# Patient Record
Sex: MALE | Race: WHITE | NOT HISPANIC OR LATINO | URBAN - METROPOLITAN AREA
[De-identification: names, ages, dates, MRNs, and addresses within clinical notes are randomized per-mention and may not be internally consistent; named-entity substitution may affect disease eponyms.]

---

## 2017-02-20 ENCOUNTER — INPATIENT (INPATIENT)
Facility: HOSPITAL | Age: 62
LOS: 6 days | Discharge: HOME | End: 2017-02-27
Attending: INTERNAL MEDICINE | Admitting: INTERNAL MEDICINE
Payer: MEDICARE

## 2017-02-20 VITALS
TEMPERATURE: 98 F | HEIGHT: 73 IN | DIASTOLIC BLOOD PRESSURE: 74 MMHG | SYSTOLIC BLOOD PRESSURE: 109 MMHG | HEART RATE: 108 BPM | OXYGEN SATURATION: 99 % | WEIGHT: 309.97 LBS

## 2017-02-20 DIAGNOSIS — Z95.5 PRESENCE OF CORONARY ANGIOPLASTY IMPLANT AND GRAFT: Chronic | ICD-10-CM

## 2017-02-20 LAB
ACETONE SERPL-MCNC: NEGATIVE — SIGNIFICANT CHANGE UP
ALBUMIN SERPL ELPH-MCNC: 2.9 G/DL — LOW (ref 3.3–5)
ALP SERPL-CCNC: 49 U/L — SIGNIFICANT CHANGE UP (ref 40–120)
ALT FLD-CCNC: 19 U/L — SIGNIFICANT CHANGE UP (ref 12–78)
ANION GAP SERPL CALC-SCNC: 8 MMOL/L — SIGNIFICANT CHANGE UP (ref 5–17)
APPEARANCE UR: CLEAR — SIGNIFICANT CHANGE UP
AST SERPL-CCNC: 7 U/L — LOW (ref 15–37)
BACTERIA # UR AUTO: ABNORMAL
BILIRUB SERPL-MCNC: 0.4 MG/DL — SIGNIFICANT CHANGE UP (ref 0.2–1.2)
BILIRUB UR-MCNC: NEGATIVE — SIGNIFICANT CHANGE UP
BUN SERPL-MCNC: 89 MG/DL — HIGH (ref 7–23)
CALCIUM SERPL-MCNC: 9.5 MG/DL — SIGNIFICANT CHANGE UP (ref 8.5–10.1)
CHLORIDE SERPL-SCNC: 100 MMOL/L — SIGNIFICANT CHANGE UP (ref 96–108)
CO2 SERPL-SCNC: 23 MMOL/L — SIGNIFICANT CHANGE UP (ref 22–31)
COLOR SPEC: YELLOW — SIGNIFICANT CHANGE UP
CREAT SERPL-MCNC: 1.7 MG/DL — HIGH (ref 0.5–1.3)
DIFF PNL FLD: NEGATIVE — SIGNIFICANT CHANGE UP
EPI CELLS # UR: SIGNIFICANT CHANGE UP
GLUCOSE SERPL-MCNC: 313 MG/DL — HIGH (ref 70–99)
GLUCOSE UR QL: 100 MG/DL
HCT VFR BLD CALC: 31.5 % — LOW (ref 39–50)
HGB BLD-MCNC: 11.2 G/DL — LOW (ref 13–17)
KETONES UR-MCNC: NEGATIVE — SIGNIFICANT CHANGE UP
LACTATE SERPL-SCNC: 1.6 MMOL/L — SIGNIFICANT CHANGE UP (ref 0.7–2)
LACTATE SERPL-SCNC: 2.1 MMOL/L — HIGH (ref 0.7–2)
LEUKOCYTE ESTERASE UR-ACNC: NEGATIVE — SIGNIFICANT CHANGE UP
MCHC RBC-ENTMCNC: 31.7 PG — SIGNIFICANT CHANGE UP (ref 27–34)
MCHC RBC-ENTMCNC: 35.5 GM/DL — SIGNIFICANT CHANGE UP (ref 32–36)
MCV RBC AUTO: 89.3 FL — SIGNIFICANT CHANGE UP (ref 80–100)
NITRITE UR-MCNC: NEGATIVE — SIGNIFICANT CHANGE UP
PH UR: 5 — SIGNIFICANT CHANGE UP (ref 4.8–8)
PLATELET # BLD AUTO: 212 K/UL — SIGNIFICANT CHANGE UP (ref 150–400)
POTASSIUM SERPL-MCNC: 4.8 MMOL/L — SIGNIFICANT CHANGE UP (ref 3.5–5.3)
POTASSIUM SERPL-SCNC: 4.8 MMOL/L — SIGNIFICANT CHANGE UP (ref 3.5–5.3)
PROT SERPL-MCNC: 6.2 GM/DL — SIGNIFICANT CHANGE UP (ref 6–8.3)
PROT UR-MCNC: 15 MG/DL
RBC # BLD: 3.53 M/UL — LOW (ref 4.2–5.8)
RBC # FLD: 12.8 % — SIGNIFICANT CHANGE UP (ref 11–15)
RBC CASTS # UR COMP ASSIST: SIGNIFICANT CHANGE UP /HPF (ref 0–4)
SODIUM SERPL-SCNC: 131 MMOL/L — LOW (ref 135–145)
SP GR SPEC: 1.01 — SIGNIFICANT CHANGE UP (ref 1.01–1.02)
UROBILINOGEN FLD QL: NEGATIVE MG/DL — SIGNIFICANT CHANGE UP
WBC # BLD: 18.4 K/UL — HIGH (ref 3.8–10.5)
WBC # FLD AUTO: 18.4 K/UL — HIGH (ref 3.8–10.5)
WBC UR QL: SIGNIFICANT CHANGE UP

## 2017-02-20 PROCEDURE — 74176 CT ABD & PELVIS W/O CONTRAST: CPT | Mod: 26

## 2017-02-20 PROCEDURE — 99285 EMERGENCY DEPT VISIT HI MDM: CPT

## 2017-02-20 RX ORDER — HEPARIN SODIUM 5000 [USP'U]/ML
5000 INJECTION INTRAVENOUS; SUBCUTANEOUS EVERY 12 HOURS
Qty: 0 | Refills: 0 | Status: DISCONTINUED | OUTPATIENT
Start: 2017-02-20 | End: 2017-02-27

## 2017-02-20 RX ORDER — SODIUM CHLORIDE 9 MG/ML
1000 INJECTION, SOLUTION INTRAVENOUS
Qty: 0 | Refills: 0 | Status: DISCONTINUED | OUTPATIENT
Start: 2017-02-20 | End: 2017-02-27

## 2017-02-20 RX ORDER — INSULIN GLARGINE 100 [IU]/ML
10 INJECTION, SOLUTION SUBCUTANEOUS AT BEDTIME
Qty: 0 | Refills: 0 | Status: DISCONTINUED | OUTPATIENT
Start: 2017-02-20 | End: 2017-02-27

## 2017-02-20 RX ORDER — PANTOPRAZOLE SODIUM 20 MG/1
40 TABLET, DELAYED RELEASE ORAL
Qty: 0 | Refills: 0 | Status: DISCONTINUED | OUTPATIENT
Start: 2017-02-20 | End: 2017-02-21

## 2017-02-20 RX ORDER — ASPIRIN/CALCIUM CARB/MAGNESIUM 324 MG
325 TABLET ORAL DAILY
Qty: 0 | Refills: 0 | Status: DISCONTINUED | OUTPATIENT
Start: 2017-02-20 | End: 2017-02-27

## 2017-02-20 RX ORDER — IOHEXOL 300 MG/ML
30 INJECTION, SOLUTION INTRAVENOUS ONCE
Qty: 0 | Refills: 0 | Status: COMPLETED | OUTPATIENT
Start: 2017-02-20 | End: 2017-02-20

## 2017-02-20 RX ORDER — DEXTROSE 50 % IN WATER 50 %
25 SYRINGE (ML) INTRAVENOUS ONCE
Qty: 0 | Refills: 0 | Status: DISCONTINUED | OUTPATIENT
Start: 2017-02-20 | End: 2017-02-27

## 2017-02-20 RX ORDER — ATORVASTATIN CALCIUM 80 MG/1
40 TABLET, FILM COATED ORAL AT BEDTIME
Qty: 0 | Refills: 0 | Status: DISCONTINUED | OUTPATIENT
Start: 2017-02-20 | End: 2017-02-27

## 2017-02-20 RX ORDER — GLUCAGON INJECTION, SOLUTION 0.5 MG/.1ML
1 INJECTION, SOLUTION SUBCUTANEOUS ONCE
Qty: 0 | Refills: 0 | Status: DISCONTINUED | OUTPATIENT
Start: 2017-02-20 | End: 2017-02-27

## 2017-02-20 RX ORDER — FAMOTIDINE 10 MG/ML
20 INJECTION INTRAVENOUS ONCE
Qty: 0 | Refills: 0 | Status: COMPLETED | OUTPATIENT
Start: 2017-02-20 | End: 2017-02-20

## 2017-02-20 RX ORDER — DEXTROSE 50 % IN WATER 50 %
12.5 SYRINGE (ML) INTRAVENOUS ONCE
Qty: 0 | Refills: 0 | Status: DISCONTINUED | OUTPATIENT
Start: 2017-02-20 | End: 2017-02-27

## 2017-02-20 RX ORDER — BACLOFEN 100 %
20 POWDER (GRAM) MISCELLANEOUS ONCE
Qty: 0 | Refills: 0 | Status: COMPLETED | OUTPATIENT
Start: 2017-02-20 | End: 2017-02-20

## 2017-02-20 RX ORDER — DEXTROSE 50 % IN WATER 50 %
1 SYRINGE (ML) INTRAVENOUS ONCE
Qty: 0 | Refills: 0 | Status: DISCONTINUED | OUTPATIENT
Start: 2017-02-20 | End: 2017-02-27

## 2017-02-20 RX ORDER — SODIUM CHLORIDE 9 MG/ML
2000 INJECTION INTRAMUSCULAR; INTRAVENOUS; SUBCUTANEOUS ONCE
Qty: 0 | Refills: 0 | Status: COMPLETED | OUTPATIENT
Start: 2017-02-20 | End: 2017-02-20

## 2017-02-20 RX ORDER — INSULIN LISPRO 100/ML
VIAL (ML) SUBCUTANEOUS
Qty: 0 | Refills: 0 | Status: DISCONTINUED | OUTPATIENT
Start: 2017-02-20 | End: 2017-02-27

## 2017-02-20 RX ORDER — CLOPIDOGREL BISULFATE 75 MG/1
75 TABLET, FILM COATED ORAL DAILY
Qty: 0 | Refills: 0 | Status: DISCONTINUED | OUTPATIENT
Start: 2017-02-20 | End: 2017-02-27

## 2017-02-20 RX ORDER — LISINOPRIL 2.5 MG/1
40 TABLET ORAL DAILY
Qty: 0 | Refills: 0 | Status: DISCONTINUED | OUTPATIENT
Start: 2017-02-20 | End: 2017-02-22

## 2017-02-20 RX ORDER — SODIUM CHLORIDE 9 MG/ML
1000 INJECTION, SOLUTION INTRAVENOUS
Qty: 0 | Refills: 0 | Status: DISCONTINUED | OUTPATIENT
Start: 2017-02-20 | End: 2017-02-26

## 2017-02-20 RX ORDER — PIPERACILLIN AND TAZOBACTAM 4; .5 G/20ML; G/20ML
3.38 INJECTION, POWDER, LYOPHILIZED, FOR SOLUTION INTRAVENOUS EVERY 8 HOURS
Qty: 0 | Refills: 0 | Status: DISCONTINUED | OUTPATIENT
Start: 2017-02-20 | End: 2017-02-27

## 2017-02-20 RX ORDER — ONDANSETRON 8 MG/1
8 TABLET, FILM COATED ORAL ONCE
Qty: 0 | Refills: 0 | Status: COMPLETED | OUTPATIENT
Start: 2017-02-20 | End: 2017-02-20

## 2017-02-20 RX ORDER — DOCUSATE SODIUM 100 MG
100 CAPSULE ORAL THREE TIMES A DAY
Qty: 0 | Refills: 0 | Status: DISCONTINUED | OUTPATIENT
Start: 2017-02-20 | End: 2017-02-27

## 2017-02-20 RX ADMIN — ATORVASTATIN CALCIUM 40 MILLIGRAM(S): 80 TABLET, FILM COATED ORAL at 21:37

## 2017-02-20 RX ADMIN — Medication: at 21:43

## 2017-02-20 RX ADMIN — FAMOTIDINE 20 MILLIGRAM(S): 10 INJECTION INTRAVENOUS at 12:35

## 2017-02-20 RX ADMIN — SODIUM CHLORIDE 2000 MILLILITER(S): 9 INJECTION INTRAMUSCULAR; INTRAVENOUS; SUBCUTANEOUS at 11:22

## 2017-02-20 RX ADMIN — Medication 100 MILLIGRAM(S): at 18:46

## 2017-02-20 RX ADMIN — PIPERACILLIN AND TAZOBACTAM 25 GRAM(S): 4; .5 INJECTION, POWDER, LYOPHILIZED, FOR SOLUTION INTRAVENOUS at 21:47

## 2017-02-20 RX ADMIN — HEPARIN SODIUM 5000 UNIT(S): 5000 INJECTION INTRAVENOUS; SUBCUTANEOUS at 18:45

## 2017-02-20 RX ADMIN — INSULIN GLARGINE 10 UNIT(S): 100 INJECTION, SOLUTION SUBCUTANEOUS at 21:40

## 2017-02-20 RX ADMIN — SODIUM CHLORIDE 75 MILLILITER(S): 9 INJECTION, SOLUTION INTRAVENOUS at 21:54

## 2017-02-20 RX ADMIN — IOHEXOL 30 MILLILITER(S): 300 INJECTION, SOLUTION INTRAVENOUS at 12:42

## 2017-02-20 RX ADMIN — Medication 20 MILLIGRAM(S): at 12:43

## 2017-02-20 RX ADMIN — Medication 100 MILLIGRAM(S): at 21:37

## 2017-02-20 RX ADMIN — ONDANSETRON 8 MILLIGRAM(S): 8 TABLET, FILM COATED ORAL at 12:35

## 2017-02-20 RX ADMIN — SODIUM CHLORIDE 75 MILLILITER(S): 9 INJECTION, SOLUTION INTRAVENOUS at 18:49

## 2017-02-20 NOTE — ED PROVIDER NOTE - MEDICAL DECISION MAKING DETAILS
pt presented with mid adominal pain and vomiting, pt hydrated, given pain medications and nausea medications, ct shows swelling to the distal aspect of the esphagus vs carcinoma, pt admitted for hydration

## 2017-02-20 NOTE — ED PROVIDER NOTE - OBJECTIVE STATEMENT
61 year old male with h/o DM, HTN, CAD, blind and neuropathy presents today c/o not feeling well since Friday, pt states that he has been experiencing mid abdominal tenderness associated with multiple episodes of nausea and vomiting, (-) fevers (-) sob (-) palpitations +chest pain with hiccups (-) diarhhea (-) recent travel (-) sick contacts

## 2017-02-20 NOTE — ED PROVIDER NOTE - PMH
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

## 2017-02-20 NOTE — ED PROVIDER NOTE - CONSTITUTIONAL, MLM
normal... Well appearing, well nourished, awake, alert, oriented to person, place, time/situation, appears pale, lips dry

## 2017-02-20 NOTE — PATIENT PROFILE ADULT. - VISION (WITH CORRECTIVE LENSES IF THE PATIENT USUALLY WEARS THEM):
Severely impaired: cannot locate objects without hearing or touching them or patient nonresponsive./pt blind in both eyes

## 2017-02-20 NOTE — H&P ADULT. - ASSESSMENT
61 years old male is admitted for sepsis, acute gastroenteritis, hyperglycemia, hyponatremia and tachycardia  Plan: IV Abx, IV Hydration, control DM. GI evaluation for esophagus lesion.

## 2017-02-21 ENCOUNTER — RESULT REVIEW (OUTPATIENT)
Age: 62
End: 2017-02-21

## 2017-02-21 DIAGNOSIS — R13.10 DYSPHAGIA, UNSPECIFIED: ICD-10-CM

## 2017-02-21 DIAGNOSIS — D50.0 IRON DEFICIENCY ANEMIA SECONDARY TO BLOOD LOSS (CHRONIC): ICD-10-CM

## 2017-02-21 DIAGNOSIS — K21.9 GASTRO-ESOPHAGEAL REFLUX DISEASE WITHOUT ESOPHAGITIS: ICD-10-CM

## 2017-02-21 DIAGNOSIS — K22.9 DISEASE OF ESOPHAGUS, UNSPECIFIED: ICD-10-CM

## 2017-02-21 LAB
ANION GAP SERPL CALC-SCNC: 7 MMOL/L — SIGNIFICANT CHANGE UP (ref 5–17)
BASOPHILS # BLD AUTO: 0 K/UL — SIGNIFICANT CHANGE UP (ref 0–0.2)
BASOPHILS NFR BLD AUTO: 0.4 % — SIGNIFICANT CHANGE UP (ref 0–2)
BUN SERPL-MCNC: 70 MG/DL — HIGH (ref 7–23)
CALCIUM SERPL-MCNC: 8.8 MG/DL — SIGNIFICANT CHANGE UP (ref 8.5–10.1)
CHLORIDE SERPL-SCNC: 106 MMOL/L — SIGNIFICANT CHANGE UP (ref 96–108)
CO2 SERPL-SCNC: 25 MMOL/L — SIGNIFICANT CHANGE UP (ref 22–31)
CREAT SERPL-MCNC: 1.59 MG/DL — HIGH (ref 0.5–1.3)
CULTURE RESULTS: NO GROWTH — SIGNIFICANT CHANGE UP
EOSINOPHIL # BLD AUTO: 0 K/UL — SIGNIFICANT CHANGE UP (ref 0–0.5)
EOSINOPHIL NFR BLD AUTO: 0.1 % — SIGNIFICANT CHANGE UP (ref 0–6)
GLUCOSE SERPL-MCNC: 117 MG/DL — HIGH (ref 70–99)
HBA1C BLD-MCNC: 6.9 % — HIGH (ref 4–5.6)
HCT VFR BLD CALC: 25.6 % — LOW (ref 39–50)
HCV AB S/CO SERPL IA: 0.43 S/CO — SIGNIFICANT CHANGE UP
HCV AB SERPL-IMP: SIGNIFICANT CHANGE UP
HGB BLD-MCNC: 8.8 G/DL — LOW (ref 13–17)
LYMPHOCYTES # BLD AUTO: 1 K/UL — SIGNIFICANT CHANGE UP (ref 1–3.3)
LYMPHOCYTES # BLD AUTO: 10.6 % — LOW (ref 13–44)
MCHC RBC-ENTMCNC: 30.2 PG — SIGNIFICANT CHANGE UP (ref 27–34)
MCHC RBC-ENTMCNC: 34.5 GM/DL — SIGNIFICANT CHANGE UP (ref 32–36)
MCV RBC AUTO: 87.6 FL — SIGNIFICANT CHANGE UP (ref 80–100)
MONOCYTES # BLD AUTO: 1 K/UL — HIGH (ref 0–0.9)
MONOCYTES NFR BLD AUTO: 10.3 % — SIGNIFICANT CHANGE UP (ref 2–14)
NEUTROPHILS # BLD AUTO: 7.5 K/UL — HIGH (ref 1.8–7.4)
NEUTROPHILS NFR BLD AUTO: 78.6 % — HIGH (ref 43–77)
PLATELET # BLD AUTO: 146 K/UL — LOW (ref 150–400)
POTASSIUM SERPL-MCNC: 4.3 MMOL/L — SIGNIFICANT CHANGE UP (ref 3.5–5.3)
POTASSIUM SERPL-SCNC: 4.3 MMOL/L — SIGNIFICANT CHANGE UP (ref 3.5–5.3)
RBC # BLD: 2.92 M/UL — LOW (ref 4.2–5.8)
RBC # FLD: 12.4 % — SIGNIFICANT CHANGE UP (ref 11–15)
SODIUM SERPL-SCNC: 138 MMOL/L — SIGNIFICANT CHANGE UP (ref 135–145)
SPECIMEN SOURCE: SIGNIFICANT CHANGE UP
WBC # BLD: 9.6 K/UL — SIGNIFICANT CHANGE UP (ref 3.8–10.5)
WBC # FLD AUTO: 9.6 K/UL — SIGNIFICANT CHANGE UP (ref 3.8–10.5)

## 2017-02-21 RX ORDER — PANTOPRAZOLE SODIUM 20 MG/1
40 TABLET, DELAYED RELEASE ORAL EVERY 12 HOURS
Qty: 0 | Refills: 0 | Status: DISCONTINUED | OUTPATIENT
Start: 2017-02-21 | End: 2017-02-27

## 2017-02-21 RX ADMIN — PANTOPRAZOLE SODIUM 40 MILLIGRAM(S): 20 TABLET, DELAYED RELEASE ORAL at 07:27

## 2017-02-21 RX ADMIN — SODIUM CHLORIDE 75 MILLILITER(S): 9 INJECTION, SOLUTION INTRAVENOUS at 11:15

## 2017-02-21 RX ADMIN — Medication 100 MILLIGRAM(S): at 21:52

## 2017-02-21 RX ADMIN — INSULIN GLARGINE 10 UNIT(S): 100 INJECTION, SOLUTION SUBCUTANEOUS at 21:53

## 2017-02-21 RX ADMIN — HEPARIN SODIUM 5000 UNIT(S): 5000 INJECTION INTRAVENOUS; SUBCUTANEOUS at 17:25

## 2017-02-21 RX ADMIN — PANTOPRAZOLE SODIUM 40 MILLIGRAM(S): 20 TABLET, DELAYED RELEASE ORAL at 20:44

## 2017-02-21 RX ADMIN — CLOPIDOGREL BISULFATE 75 MILLIGRAM(S): 75 TABLET, FILM COATED ORAL at 11:14

## 2017-02-21 RX ADMIN — Medication 100 MILLIGRAM(S): at 13:06

## 2017-02-21 RX ADMIN — Medication 100 MILLIGRAM(S): at 17:25

## 2017-02-21 RX ADMIN — PIPERACILLIN AND TAZOBACTAM 25 GRAM(S): 4; .5 INJECTION, POWDER, LYOPHILIZED, FOR SOLUTION INTRAVENOUS at 13:05

## 2017-02-21 RX ADMIN — Medication 100 MILLIGRAM(S): at 05:51

## 2017-02-21 RX ADMIN — Medication 30 MILLILITER(S): at 13:10

## 2017-02-21 RX ADMIN — PIPERACILLIN AND TAZOBACTAM 25 GRAM(S): 4; .5 INJECTION, POWDER, LYOPHILIZED, FOR SOLUTION INTRAVENOUS at 21:52

## 2017-02-21 RX ADMIN — Medication 2: at 07:27

## 2017-02-21 RX ADMIN — Medication 100 MILLIGRAM(S): at 05:56

## 2017-02-21 RX ADMIN — ATORVASTATIN CALCIUM 40 MILLIGRAM(S): 80 TABLET, FILM COATED ORAL at 21:52

## 2017-02-21 RX ADMIN — HEPARIN SODIUM 5000 UNIT(S): 5000 INJECTION INTRAVENOUS; SUBCUTANEOUS at 05:51

## 2017-02-21 RX ADMIN — Medication 325 MILLIGRAM(S): at 11:14

## 2017-02-21 RX ADMIN — Medication 2: at 15:51

## 2017-02-21 RX ADMIN — PIPERACILLIN AND TAZOBACTAM 25 GRAM(S): 4; .5 INJECTION, POWDER, LYOPHILIZED, FOR SOLUTION INTRAVENOUS at 05:55

## 2017-02-21 RX ADMIN — LISINOPRIL 40 MILLIGRAM(S): 2.5 TABLET ORAL at 05:51

## 2017-02-21 NOTE — PROGRESS NOTE ADULT - ASSESSMENT
Continue IV Abx for gastroenteritis. Await GI evaluation for esophageal lesion. Monitor hyponatremia and hyperglycemia.

## 2017-02-21 NOTE — CONSULT NOTE ADULT - SUBJECTIVE AND OBJECTIVE BOX
Chief Complaint:  Patient is a 61y old  Male who presents with a chief complaint of Vomiting (2017 17:59)      HPI:61 years old come to ER for vomiting "black" bilious material,   complaining of dysphagia with Hiccups x 2 weeks .  long standing hx of heartburn.  on PO OTC Prilosec and Tums recently stopped TUMS ( because not available in sugar free)  abdominal CT scan with circumferential thickening of esophagus    Allergies:  No Known Allergies    Medications:  oxyCODONE  5 mG/acetaminophen 325 mG 2Tablet(s) Oral every 4 hours PRN  lisinopril 40milliGRAM(s) Oral daily  aspirin 325milliGRAM(s) Oral daily  pantoprazole    Tablet 40milliGRAM(s) Oral before breakfast  docusate sodium 100milliGRAM(s) Oral three times a day  clopidogrel Tablet 75milliGRAM(s) Oral daily  atorvastatin 40milliGRAM(s) Oral at bedtime  pregabalin 100milliGRAM(s) Oral two times a day  heparin  Injectable 5000Unit(s) SubCutaneous every 12 hours  piperacillin/tazobactam IVPB. 3.375Gram(s) IV Intermittent every 8 hours  insulin glargine Injectable (LANTUS) 10Unit(s) SubCutaneous at bedtime  insulin lispro (HumaLOG) corrective regimen sliding scale  SubCutaneous three times a day before meals  dextrose 5%. 1000milliLiter(s) IV Continuous <Continuous>  dextrose Gel 1Dose(s) Oral once PRN  dextrose 50% Injectable 12.5Gram(s) IV Push once  dextrose 50% Injectable 25Gram(s) IV Push once  dextrose 50% Injectable 25Gram(s) IV Push once  glucagon  Injectable 1milliGRAM(s) IntraMuscular once PRN  sodium chloride 0.45%. 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  History of coronary artery stent placement  Right BKA    Family history:  No pertinent family history in first degree relatives    Social History: (-) ETOH, (-) Tobacco    ROS:     General:  No wt loss, fevers, chills, night sweats, fatigue,   Eyes:  Good vision, no reported pain  ENT:  No sore throat, pain, runny nose, dysphagia  CV:  No pain, palpitations, hypo/hypertension  Resp:  No dyspnea, cough, tachypnea, wheezing  GI:  No pain, + nausea, + vomiting, No diarrhea, No constipation, No weight loss, No fever, No pruritis, No rectal bleeding, No tarry stools, No dysphagia,  :  No pain, bleeding, incontinence, nocturia  Muscle:  No pain, weakness  Breast:  No pain, abscess, mass, discharge  Neuro:  No weakness, tingling, memory problems  Psych:  No fatigue, insomnia, mood problems, depression  Endocrine:  No polyuria, polydipsia, cold/heat intolerance  Heme:  No petechiae, ecchymosis, easy bruisability  Skin:  No rash, tattoos, scars, edema      PHYSICAL EXAM:   Vital Signs:  Vital Signs Last 24 Hrs  T(C): 36.6, Max: 36.7 ( @ 21:22)  T(F): 97.8, Max: 98 ( @ 21:22)  HR: 78 (74 - 101)  BP: 120/54 (99/52 - 139/62)  BP(mean): --  RR: 18 (16 - 20)  SpO2: 96% (96% - 99%)  Daily Height in cm: 185.42 (2017 22:56)      GENERAL:  Appears stated age, well-groomed, well-nourished, no distress  HEENT:  NC/AT,  conjunctivae clear and pink, no thyromegaly, nodules, adenopathy, no JVD, sclera -anicteric  CHEST:  Full & symmetric excursion, no increased effort, breath sounds clear  HEART:  Regular rhythm, S1, S2, no murmur/rub/S3/S4, no abdominal bruit, no edema  ABDOMEN:  Soft, non-tender, non-distended, normoactive bowel sounds,  no masses ,no hepatosplenomegaly no signs of chronic liver disease  EXTREMITIES  R BKA left trace edema  SKIN:  No rash/erythema/ecchymoses/petechiae/wounds/abscess/warm/dry  NEURO:  Alert, oriented, no asterixis, no tremor, no encephalopathy    LABS:           HB 11.2   now 8.8             8.8    9.6   )-----------( 146      ( 2017 08:33 )             25.6     2017 08:33    138    |  106    |  70     ----------------------------<  117    4.3     |  25     |  1.59     Ca    8.8        2017 08:33    TPro  6.2    /  Alb  2.9    /  TBili  0.4    /  DBili  x      /  AST  7      /  ALT  19     /  AlkPhos  49     2017 10:58    LIVER FUNCTIONS - ( 2017 10:58 )  Alb: 2.9 g/dL / Pro: 6.2 gm/dL / ALK PHOS: 49 U/L / ALT: 19 U/L / AST: 7 U/L / GGT: x             Urinalysis Basic - ( 2017 15:40 )    Color: Yellow / Appearance: Clear / S.010 / pH: x  Gluc: x / Ketone: Negative  / Bili: Negative / Urobili: Negative mg/dL   Blood: x / Protein: 15 mg/dL / Nitrite: Negative   Leuk Esterase: Negative / RBC: 0-2 /HPF / WBC 0-2   Sq Epi: x / Non Sq Epi: Occasional / Bacteria: Occasional          Imaging:  PROCEDURE DATE:  2017    INTERPRETATION:  Non contrast CT of the abdomen and pelvis   COMPARISON: None.  CLINICAL HISTORY: Mid abdominal pain. Nausea. Assess for colitis..  Technique: contiguous axial images were obtained with 5.0 mm slice   thickness without intravenous contrast administration, which limits the   visualization of the intra-abdominal structures. Oral contrast   administered.  Coronal and sagittal reformats were also submitted for interpretation.      FINDINGS:   The lung bases show small right pleural effusion..   The visualized portions of the heart are normal.  There is no free intra-abdominal air or ascites.   The unopacified liver, spleen, pancreas, adrenal glands and gallbladder   are normal.   There is no intra or extrahepatic biliary ductal dilatation.  There is diffuse thickening of the mildly dilated distal   esophagus.Further evaluation to differentiate inflammatory esophagitis   i.e. a reflux esophagitis versus carcinoma suggested.. The stomach,   duodenum, small, and large bowel and appendix are normal.  Both kidneys show atrophic renal cortex without urolithiasis or   hydronephrosis.   The urinary bladder shows normal morphology and contour.    Prostate normal in size.. There are no retroperitoneal masses or abnormal   lymphadenopathy.  The retroperitoneal vessels show atherosclerotic calcified plaques   throughout  nondilated abdominal aorta and iliac arteries.  Marginal   osteophytes are noted at multiple disc spaces in the spine.     IMPRESSION:   Thickened distal circumferential esophageal wall of indeterminate   etiology. Further evaluation recommended to differentiate esophagitis   from carcinoma.  No evidence of colitis or bowel obstruction.  MONTSE ORDONEZ M.D., ATTENDING RADIOLOGIST  This document has been electronically signed. 2017  2:45PM

## 2017-02-21 NOTE — PROGRESS NOTE ADULT - SUBJECTIVE AND OBJECTIVE BOX
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 3.375Gram(s) IV Intermittent every 8 hours  insulin glargine Injectable (LANTUS) 10Unit(s) SubCutaneous at bedtime  insulin lispro (HumaLOG) corrective regimen sliding scale  SubCutaneous three times a day before meals  dextrose 5%. 1000milliLiter(s) IV Continuous <Continuous>  dextrose 50% Injectable 12.5Gram(s) IV Push once  dextrose 50% Injectable 25Gram(s) IV Push once  dextrose 50% Injectable 25Gram(s) IV Push once  sodium chloride 0.45%. 1000milliLiter(s) IV Continuous <Continuous>    MEDICATIONS  (PRN):  oxyCODONE  5 mG/acetaminophen 325 mG 2Tablet(s) Oral every 4 hours PRN Moderate Pain  dextrose Gel 1Dose(s) Oral once PRN Blood Glucose LESS THAN 70 milliGRAM(s)/deciliter  glucagon  Injectable 1milliGRAM(s) IntraMuscular once PRN Glucose LESS THAN 70 milligrams/deciliter    Allergies    No Known Allergies    Intolerances      Vital Signs Last 24 Hrs  T(C): 36.6, Max: 36.7 (02-20 @ 10:22)  T(F): 97.8, Max: 98 (-20 @ 10:22)  HR: 90 (74 - 108)  BP: 124/59 (98/50 - 139/62)  BP(mean): --  RR: 18 (16 - 20)  SpO2: 97% (97% - 99%)    PHYSICAL EXAM:  GENERAL: NAD, well-groomed, well-developed  HEAD:  Atraumatic, Normocephalic  EYES: EOMI, PERRLA, conjunctiva and sclera clear  ENMT: No tonsillar erythema, exudates, or enlargement; Moist mucous membranes, Good dentition, No lesions  NECK: Supple, No JVD, Normal thyroid  NERVOUS SYSTEM:  Alert & Oriented X3, Good concentration; Motor Strength 5/5 B/L upper and lower extremities; DTRs 2+ intact and symmetric  CHEST/LUNG: Clear to percussion bilaterally; No rales, rhonchi, wheezing, or rubs  HEART: Regular rate and rhythm; No murmurs, rubs, or gallops  ABDOMEN: Soft, Nontender, Nondistended; Bowel sounds present  EXTREMITIES:  2+ Peripheral Pulses, No clubbing, cyanosis, or edema  LYMPH: No lymphadenopathy noted  SKIN: No rashes or lesions    I & Os for current day (as of  @ 08:31)  =============================================  IN: 850 ml / OUT: 3250 ml / NET: -2400 ml      LABS:                        11.2   18.4  )-----------( 212      ( 2017 10:58 )             31.5     2017 10:58    131    |  100    |  89     ----------------------------<  313    4.8     |  23     |  1.70     Ca    9.5        2017 10:58    TPro  6.2    /  Alb  2.9    /  TBili  0.4    /  DBili  x      /  AST  7      /  ALT  19     /  AlkPhos  49     2017 10:58      Urinalysis Basic - ( 2017 15:40 )    Color: Yellow / Appearance: Clear / S.010 / pH: x  Gluc: x / Ketone: Negative  / Bili: Negative / Urobili: Negative mg/dL   Blood: x / Protein: 15 mg/dL / Nitrite: Negative   Leuk Esterase: Negative / RBC: 0-2 /HPF / WBC 0-2   Sq Epi: x / Non Sq Epi: Occasional / Bacteria: Occasional      CAPILLARY BLOOD GLUCOSE  151 (2017 07:28)  215 (2017 21:22)  295 (2017 10:33)    RADIOLOGY & ADDITIONAL TESTS:    Imaging Personally Reviewed:  [X] YES  [ ] NO    Consultant(s) Notes Reviewed:  [X] YES  [ ] NO    Care Discussed with Consultants/Other Providers [X] YES  [ ] NO

## 2017-02-22 LAB
ANION GAP SERPL CALC-SCNC: 10 MMOL/L — SIGNIFICANT CHANGE UP (ref 5–17)
BASOPHILS # BLD AUTO: 0 K/UL — SIGNIFICANT CHANGE UP (ref 0–0.2)
BASOPHILS NFR BLD AUTO: 0.6 % — SIGNIFICANT CHANGE UP (ref 0–2)
BUN SERPL-MCNC: 50 MG/DL — HIGH (ref 7–23)
CALCIUM SERPL-MCNC: 8.6 MG/DL — SIGNIFICANT CHANGE UP (ref 8.5–10.1)
CHLORIDE SERPL-SCNC: 108 MMOL/L — SIGNIFICANT CHANGE UP (ref 96–108)
CO2 SERPL-SCNC: 24 MMOL/L — SIGNIFICANT CHANGE UP (ref 22–31)
CREAT SERPL-MCNC: 1.5 MG/DL — HIGH (ref 0.5–1.3)
EOSINOPHIL # BLD AUTO: 0.1 K/UL — SIGNIFICANT CHANGE UP (ref 0–0.5)
EOSINOPHIL NFR BLD AUTO: 1.1 % — SIGNIFICANT CHANGE UP (ref 0–6)
GLUCOSE SERPL-MCNC: 114 MG/DL — HIGH (ref 70–99)
HCT VFR BLD CALC: 24.7 % — LOW (ref 39–50)
HGB BLD-MCNC: 8.6 G/DL — LOW (ref 13–17)
LYMPHOCYTES # BLD AUTO: 1 K/UL — SIGNIFICANT CHANGE UP (ref 1–3.3)
LYMPHOCYTES # BLD AUTO: 14.6 % — SIGNIFICANT CHANGE UP (ref 13–44)
MCHC RBC-ENTMCNC: 31.7 PG — SIGNIFICANT CHANGE UP (ref 27–34)
MCHC RBC-ENTMCNC: 35 GM/DL — SIGNIFICANT CHANGE UP (ref 32–36)
MCV RBC AUTO: 90.5 FL — SIGNIFICANT CHANGE UP (ref 80–100)
MONOCYTES # BLD AUTO: 0.8 K/UL — SIGNIFICANT CHANGE UP (ref 0–0.9)
MONOCYTES NFR BLD AUTO: 12.4 % — SIGNIFICANT CHANGE UP (ref 2–14)
NEUTROPHILS # BLD AUTO: 4.7 K/UL — SIGNIFICANT CHANGE UP (ref 1.8–7.4)
NEUTROPHILS NFR BLD AUTO: 71.2 % — SIGNIFICANT CHANGE UP (ref 43–77)
PLATELET # BLD AUTO: 124 K/UL — LOW (ref 150–400)
POTASSIUM SERPL-MCNC: 4 MMOL/L — SIGNIFICANT CHANGE UP (ref 3.5–5.3)
POTASSIUM SERPL-SCNC: 4 MMOL/L — SIGNIFICANT CHANGE UP (ref 3.5–5.3)
RBC # BLD: 2.73 M/UL — LOW (ref 4.2–5.8)
RBC # FLD: 12.9 % — SIGNIFICANT CHANGE UP (ref 11–15)
SODIUM SERPL-SCNC: 142 MMOL/L — SIGNIFICANT CHANGE UP (ref 135–145)
WBC # BLD: 6.5 K/UL — SIGNIFICANT CHANGE UP (ref 3.8–10.5)
WBC # FLD AUTO: 6.5 K/UL — SIGNIFICANT CHANGE UP (ref 3.8–10.5)

## 2017-02-22 PROCEDURE — 88305 TISSUE EXAM BY PATHOLOGIST: CPT | Mod: 26

## 2017-02-22 RX ORDER — SODIUM CHLORIDE 9 MG/ML
1000 INJECTION, SOLUTION INTRAVENOUS
Qty: 0 | Refills: 0 | Status: DISCONTINUED | OUTPATIENT
Start: 2017-02-22 | End: 2017-02-22

## 2017-02-22 RX ORDER — LISINOPRIL 2.5 MG/1
20 TABLET ORAL DAILY
Qty: 0 | Refills: 0 | Status: DISCONTINUED | OUTPATIENT
Start: 2017-02-22 | End: 2017-02-27

## 2017-02-22 RX ORDER — ONDANSETRON 8 MG/1
4 TABLET, FILM COATED ORAL ONCE
Qty: 0 | Refills: 0 | Status: DISCONTINUED | OUTPATIENT
Start: 2017-02-22 | End: 2017-02-22

## 2017-02-22 RX ADMIN — SODIUM CHLORIDE 75 MILLILITER(S): 9 INJECTION, SOLUTION INTRAVENOUS at 05:36

## 2017-02-22 RX ADMIN — Medication 100 MILLIGRAM(S): at 05:33

## 2017-02-22 RX ADMIN — PANTOPRAZOLE SODIUM 40 MILLIGRAM(S): 20 TABLET, DELAYED RELEASE ORAL at 07:52

## 2017-02-22 RX ADMIN — PIPERACILLIN AND TAZOBACTAM 25 GRAM(S): 4; .5 INJECTION, POWDER, LYOPHILIZED, FOR SOLUTION INTRAVENOUS at 05:33

## 2017-02-22 RX ADMIN — Medication 325 MILLIGRAM(S): at 11:00

## 2017-02-22 RX ADMIN — Medication 100 MILLIGRAM(S): at 13:11

## 2017-02-22 RX ADMIN — INSULIN GLARGINE 10 UNIT(S): 100 INJECTION, SOLUTION SUBCUTANEOUS at 21:35

## 2017-02-22 RX ADMIN — HEPARIN SODIUM 5000 UNIT(S): 5000 INJECTION INTRAVENOUS; SUBCUTANEOUS at 17:07

## 2017-02-22 RX ADMIN — Medication 100 MILLIGRAM(S): at 17:07

## 2017-02-22 RX ADMIN — PANTOPRAZOLE SODIUM 40 MILLIGRAM(S): 20 TABLET, DELAYED RELEASE ORAL at 20:37

## 2017-02-22 RX ADMIN — SODIUM CHLORIDE 75 MILLILITER(S): 9 INJECTION, SOLUTION INTRAVENOUS at 17:58

## 2017-02-22 RX ADMIN — LISINOPRIL 20 MILLIGRAM(S): 2.5 TABLET ORAL at 06:43

## 2017-02-22 RX ADMIN — Medication 100 MILLIGRAM(S): at 21:35

## 2017-02-22 RX ADMIN — CLOPIDOGREL BISULFATE 75 MILLIGRAM(S): 75 TABLET, FILM COATED ORAL at 11:00

## 2017-02-22 RX ADMIN — PIPERACILLIN AND TAZOBACTAM 25 GRAM(S): 4; .5 INJECTION, POWDER, LYOPHILIZED, FOR SOLUTION INTRAVENOUS at 13:11

## 2017-02-22 RX ADMIN — ATORVASTATIN CALCIUM 40 MILLIGRAM(S): 80 TABLET, FILM COATED ORAL at 21:35

## 2017-02-22 RX ADMIN — SODIUM CHLORIDE 75 MILLILITER(S): 9 INJECTION, SOLUTION INTRAVENOUS at 14:39

## 2017-02-22 RX ADMIN — PIPERACILLIN AND TAZOBACTAM 25 GRAM(S): 4; .5 INJECTION, POWDER, LYOPHILIZED, FOR SOLUTION INTRAVENOUS at 21:35

## 2017-02-22 NOTE — PROGRESS NOTE ADULT - SUBJECTIVE AND OBJECTIVE BOX
Patient is a 61y old  Male who presents with a chief complaint of Vomiting (2017 17:59)    MEDICAL PROBLEMS:  DEHYDRATION; DIABETES  VOMITING, HIGH SUGAR  Neuropathy  CAD (coronary artery disease)  Blindness of both eyes  Essential hypertension  Diabetes mellitus due to underlying condition with complications  Dehydration  Esophageal abnormality  GERD (gastroesophageal reflux disease)  Dysphagia, unspecified type  Anemia due to blood loss  Diabetes      INTERVAL HPI/OVERNIGHT EVENTS: No more hiccups or nausea. Anemia    MEDICATIONS  (STANDING):  aspirin 325milliGRAM(s) Oral daily  docusate sodium 100milliGRAM(s) Oral three times a day  clopidogrel Tablet 75milliGRAM(s) Oral daily  atorvastatin 40milliGRAM(s) Oral at bedtime  pregabalin 100milliGRAM(s) Oral two times a day  heparin  Injectable 5000Unit(s) SubCutaneous every 12 hours  piperacillin/tazobactam IVPB. 3.375Gram(s) IV Intermittent every 8 hours  insulin glargine Injectable (LANTUS) 10Unit(s) SubCutaneous at bedtime  insulin lispro (HumaLOG) corrective regimen sliding scale  SubCutaneous three times a day before meals  dextrose 5%. 1000milliLiter(s) IV Continuous <Continuous>  dextrose 50% Injectable 12.5Gram(s) IV Push once  dextrose 50% Injectable 25Gram(s) IV Push once  dextrose 50% Injectable 25Gram(s) IV Push once  sodium chloride 0.45%. 1000milliLiter(s) IV Continuous <Continuous>  pantoprazole  Injectable 40milliGRAM(s) IV Push every 12 hours  lisinopril 20milliGRAM(s) Oral daily    MEDICATIONS  (PRN):  oxyCODONE  5 mG/acetaminophen 325 mG 2Tablet(s) Oral every 4 hours PRN Moderate Pain  dextrose Gel 1Dose(s) Oral once PRN Blood Glucose LESS THAN 70 milliGRAM(s)/deciliter  glucagon  Injectable 1milliGRAM(s) IntraMuscular once PRN Glucose LESS THAN 70 milligrams/deciliter  aluminum hydroxide/magnesium hydroxide/simethicone Suspension 30milliLiter(s) Oral every 6 hours PRN Dyspepsia    Allergies    No Known Allergies    Intolerances      Vital Signs Last 24 Hrs  T(C): 36.6, Max: 36.7 ( @ 11:03)  T(F): 97.8, Max: 98 ( @ 11:03)  HR: 73 (69 - 85)  BP: 110/50 (99/47 - 120/54)  BP(mean): --  RR: 17 (17 - 18)  SpO2: 94% (94% - 98%)    PHYSICAL EXAM:  GENERAL: NAD, well-groomed, well-developed  HEAD:  Atraumatic, Normocephalic  EYES: EOMI, PERRLA, conjunctiva and sclera clear  ENMT: No tonsillar erythema, exudates, or enlargement; Moist mucous membranes, Good dentition, No lesions  NECK: Supple, No JVD, Normal thyroid  NERVOUS SYSTEM:  Alert & Oriented X3, Good concentration; Motor Strength 5/5 B/L upper and lower extremities; DTRs 2+ intact and symmetric  CHEST/LUNG: Clear to percussion bilaterally; No rales, rhonchi, wheezing, or rubs  HEART: Regular rate and rhythm; No murmurs, rubs, or gallops  ABDOMEN: Soft, Nontender, Nondistended; Bowel sounds present  EXTREMITIES:  2+ Peripheral Pulses, No clubbing, cyanosis, or edema  LYMPH: No lymphadenopathy noted  SKIN: No rashes or lesions    I & Os for current day (as of  @ 09:05)  =============================================  IN: 2910 ml / OUT: 1000 ml / NET: 1910 ml      LABS:                        8.6    6.5   )-----------( 124      ( 2017 06:29 )             24.7     2017 06:29    142    |  108    |  50     ----------------------------<  114    4.0     |  24     |  1.50     Ca    8.6        2017 06:29    TPro  6.2    /  Alb  2.9    /  TBili  0.4    /  DBili  x      /  AST  7      /  ALT  19     /  AlkPhos  49     2017 10:58      Urinalysis Basic - ( 2017 15:40 )    Color: Yellow / Appearance: Clear / S.010 / pH: x  Gluc: x / Ketone: Negative  / Bili: Negative / Urobili: Negative mg/dL   Blood: x / Protein: 15 mg/dL / Nitrite: Negative   Leuk Esterase: Negative / RBC: 0-2 /HPF / WBC 0-2   Sq Epi: x / Non Sq Epi: Occasional / Bacteria: Occasional      CAPILLARY BLOOD GLUCOSE  134 (2017 07:48)  176 (2017 15:50)  139 (2017 11:14)    RADIOLOGY & ADDITIONAL TESTS:    Imaging Personally Reviewed:  [X] YES  [ ] NO    Consultant(s) Notes Reviewed:  [X] YES  [ ] NO    Care Discussed with Consultants/Other Providers [X] YES  [ ] NO

## 2017-02-22 NOTE — CONSULT NOTE ADULT - PROBLEM SELECTOR RECOMMENDATION 2
Esophagoscopy.
with abnormal CT scan will schedule upper endoscopy  Risks, benefits and alternatives discussed at length with patient and wife   and informed consent obtained. All questions were answered. Consent was signed by wife , as patient is blind)

## 2017-02-22 NOTE — PROGRESS NOTE ADULT - SUBJECTIVE AND OBJECTIVE BOX
GASTROENTEROLOGY  Patient seen and examined bedside resting comfortably.  No complaints offered.   NO Abdominal pain   Denies nausea and vomiting. No active bleeding     T(F): 98, Max: 98.2 (02-22 @ 10:55)  HR: 72 (69 - 84)  BP: 118/72 (99/47 - 120/54)  RR: 18 (16 - 18)  SpO2: 99% (94% - 99%)  Wt(kg): --  CAPILLARY BLOOD GLUCOSE  115 (22 Feb 2017 10:50)  134 (22 Feb 2017 07:48)  176 (21 Feb 2017 15:50)      PHYSICAL EXAM:  General: NAD, WDWN.   Neuro:  Alert & oriented x 3  HEENT: NCAT, EOMI, conjunctiva clear  CV: +S1+S2 regular rate and rhythm  Lung: clear to ausculation bilaterally, respirations nonlabored, good inspiratory effort  Abdomen: soft, NTND. Normactive BS  Extremities: no pedal edema or calf tenderness noted     LABS:                        8.6    6.5   )-----------( 124      ( 22 Feb 2017 06:29 )             24.7     22 Feb 2017 06:29    142    |  108    |  50     ----------------------------<  114    4.0     |  24     |  1.50     Ca    8.6        22 Feb 2017 06:29          I&O's Detail    I & Os for current day (as of 22 Feb 2017 13:08)  =============================================  IN:    sodium chloride 0.45%.: 1650 ml    Oral Fluid: 1060 ml    Solution: 200 ml    Total IN: 2910 ml  ---------------------------------------------  OUT:    Voided: 1000 ml    Total OUT: 1000 ml  ---------------------------------------------  Total NET: 1910 ml

## 2017-02-22 NOTE — CONSULT NOTE ADULT - SUBJECTIVE AND OBJECTIVE BOX
REASON FOR CONSULTATION:      Anemia.  Dysphagia.  Blindness.  Allergies    No Known Allergies    Intolerances        MEDICATIONS  (STANDING):  aspirin 325milliGRAM(s) Oral daily  docusate sodium 100milliGRAM(s) Oral three times a day  clopidogrel Tablet 75milliGRAM(s) Oral daily  atorvastatin 40milliGRAM(s) Oral at bedtime  pregabalin 100milliGRAM(s) Oral two times a day  heparin  Injectable 5000Unit(s) SubCutaneous every 12 hours  piperacillin/tazobactam IVPB. 3.375Gram(s) IV Intermittent every 8 hours  insulin glargine Injectable (LANTUS) 10Unit(s) SubCutaneous at bedtime  insulin lispro (HumaLOG) corrective regimen sliding scale  SubCutaneous three times a day before meals  dextrose 5%. 1000milliLiter(s) IV Continuous <Continuous>  dextrose 50% Injectable 12.5Gram(s) IV Push once  dextrose 50% Injectable 25Gram(s) IV Push once  dextrose 50% Injectable 25Gram(s) IV Push once  sodium chloride 0.45%. 1000milliLiter(s) IV Continuous <Continuous>  pantoprazole  Injectable 40milliGRAM(s) IV Push every 12 hours  lisinopril 20milliGRAM(s) Oral daily    MEDICATIONS  (PRN):  oxyCODONE  5 mG/acetaminophen 325 mG 2Tablet(s) Oral every 4 hours PRN Moderate Pain  dextrose Gel 1Dose(s) Oral once PRN Blood Glucose LESS THAN 70 milliGRAM(s)/deciliter  glucagon  Injectable 1milliGRAM(s) IntraMuscular once PRN Glucose LESS THAN 70 milligrams/deciliter  aluminum hydroxide/magnesium hydroxide/simethicone Suspension 30milliLiter(s) Oral every 6 hours PRN Dyspepsia      Vital Signs Last 24 Hrs  T(C): 36.6, Max: 36.7 (02-21 @ 11:03)  T(F): 97.8, Max: 98 (02-21 @ 11:03)  HR: 73 (69 - 85)  BP: 110/50 (99/47 - 120/54)  BP(mean): --  RR: 17 (17 - 18)  SpO2: 94% (94% - 98%)    PHYSICAL EXAM:        EYES: Blind.    NECK: Supple, No JVD, Normal thyroid    CHEST/LUNG: Clear to percussion bilaterally;   HEART: Regular rate and rhythm;   ABDOMEN: Soft, Nontender, Nondistended;      LYMPH: No lymphadenopathy noted        LABS:                        8.6    6.5   )-----------( 124      ( 2017 06:29 )             24.7     2017 06:29    142    |  108    |  50     ----------------------------<  114    4.0     |  24     |  1.50     Ca    8.6        2017 06:29    TPro  6.2    /  Alb  2.9    /  TBili  0.4    /  DBili  x      /  AST  7      /  ALT  19     /  AlkPhos  49     2017 10:58      Urinalysis Basic - ( 2017 15:40 )    Color: Yellow / Appearance: Clear / S.010 / pH: x  Gluc: x / Ketone: Negative  / Bili: Negative / Urobili: Negative mg/dL   Blood: x / Protein: 15 mg/dL / Nitrite: Negative   Leuk Esterase: Negative / RBC: 0-2 /HPF / WBC 0-2   Sq Epi: x / Non Sq Epi: Occasional / Bacteria: Occasional          RADIOLOGY & ADDITIONAL STUDIES:    PATHOLOGY:

## 2017-02-23 LAB
BASOPHILS # BLD AUTO: 0 K/UL — SIGNIFICANT CHANGE UP (ref 0–0.2)
BASOPHILS NFR BLD AUTO: 0.8 % — SIGNIFICANT CHANGE UP (ref 0–2)
EOSINOPHIL # BLD AUTO: 0.2 K/UL — SIGNIFICANT CHANGE UP (ref 0–0.5)
EOSINOPHIL NFR BLD AUTO: 3.2 % — SIGNIFICANT CHANGE UP (ref 0–6)
FOLATE SERPL-MCNC: 15.2 NG/ML — SIGNIFICANT CHANGE UP (ref 4.8–24.2)
HCT VFR BLD CALC: 23.8 % — LOW (ref 39–50)
HGB BLD-MCNC: 8.2 G/DL — LOW (ref 13–17)
IRON SATN MFR SERPL: 21 % — SIGNIFICANT CHANGE UP (ref 16–55)
IRON SATN MFR SERPL: 38 UG/DL — LOW (ref 45–165)
LYMPHOCYTES # BLD AUTO: 0.9 K/UL — LOW (ref 1–3.3)
LYMPHOCYTES # BLD AUTO: 17.2 % — SIGNIFICANT CHANGE UP (ref 13–44)
MCHC RBC-ENTMCNC: 31.8 PG — SIGNIFICANT CHANGE UP (ref 27–34)
MCHC RBC-ENTMCNC: 34.6 GM/DL — SIGNIFICANT CHANGE UP (ref 32–36)
MCV RBC AUTO: 91.8 FL — SIGNIFICANT CHANGE UP (ref 80–100)
MONOCYTES # BLD AUTO: 0.6 K/UL — SIGNIFICANT CHANGE UP (ref 0–0.9)
MONOCYTES NFR BLD AUTO: 11.3 % — SIGNIFICANT CHANGE UP (ref 2–14)
NEUTROPHILS # BLD AUTO: 3.6 K/UL — SIGNIFICANT CHANGE UP (ref 1.8–7.4)
NEUTROPHILS NFR BLD AUTO: 67.5 % — SIGNIFICANT CHANGE UP (ref 43–77)
PLATELET # BLD AUTO: 120 K/UL — LOW (ref 150–400)
RBC # BLD: 2.59 M/UL — LOW (ref 4.2–5.8)
RBC # FLD: 13.1 % — SIGNIFICANT CHANGE UP (ref 11–15)
TIBC SERPL-MCNC: 180 UG/DL — LOW (ref 220–430)
UIBC SERPL-MCNC: 142 UG/DL — SIGNIFICANT CHANGE UP (ref 110–370)
VIT B12 SERPL-MCNC: 912 PG/ML — HIGH (ref 243–894)
WBC # BLD: 5.4 K/UL — SIGNIFICANT CHANGE UP (ref 3.8–10.5)
WBC # FLD AUTO: 5.4 K/UL — SIGNIFICANT CHANGE UP (ref 3.8–10.5)

## 2017-02-23 RX ADMIN — ATORVASTATIN CALCIUM 40 MILLIGRAM(S): 80 TABLET, FILM COATED ORAL at 22:17

## 2017-02-23 RX ADMIN — INSULIN GLARGINE 10 UNIT(S): 100 INJECTION, SOLUTION SUBCUTANEOUS at 22:17

## 2017-02-23 RX ADMIN — PANTOPRAZOLE SODIUM 40 MILLIGRAM(S): 20 TABLET, DELAYED RELEASE ORAL at 08:17

## 2017-02-23 RX ADMIN — PIPERACILLIN AND TAZOBACTAM 25 GRAM(S): 4; .5 INJECTION, POWDER, LYOPHILIZED, FOR SOLUTION INTRAVENOUS at 14:09

## 2017-02-23 RX ADMIN — SODIUM CHLORIDE 75 MILLILITER(S): 9 INJECTION, SOLUTION INTRAVENOUS at 22:18

## 2017-02-23 RX ADMIN — PIPERACILLIN AND TAZOBACTAM 25 GRAM(S): 4; .5 INJECTION, POWDER, LYOPHILIZED, FOR SOLUTION INTRAVENOUS at 22:17

## 2017-02-23 RX ADMIN — Medication 100 MILLIGRAM(S): at 14:09

## 2017-02-23 RX ADMIN — Medication 100 MILLIGRAM(S): at 17:31

## 2017-02-23 RX ADMIN — Medication 100 MILLIGRAM(S): at 06:16

## 2017-02-23 RX ADMIN — HEPARIN SODIUM 5000 UNIT(S): 5000 INJECTION INTRAVENOUS; SUBCUTANEOUS at 17:31

## 2017-02-23 RX ADMIN — Medication 325 MILLIGRAM(S): at 12:08

## 2017-02-23 RX ADMIN — PIPERACILLIN AND TAZOBACTAM 25 GRAM(S): 4; .5 INJECTION, POWDER, LYOPHILIZED, FOR SOLUTION INTRAVENOUS at 06:17

## 2017-02-23 RX ADMIN — Medication 2: at 16:32

## 2017-02-23 RX ADMIN — HEPARIN SODIUM 5000 UNIT(S): 5000 INJECTION INTRAVENOUS; SUBCUTANEOUS at 06:16

## 2017-02-23 RX ADMIN — Medication 100 MILLIGRAM(S): at 22:18

## 2017-02-23 RX ADMIN — Medication 2: at 12:07

## 2017-02-23 RX ADMIN — CLOPIDOGREL BISULFATE 75 MILLIGRAM(S): 75 TABLET, FILM COATED ORAL at 12:08

## 2017-02-23 RX ADMIN — SODIUM CHLORIDE 75 MILLILITER(S): 9 INJECTION, SOLUTION INTRAVENOUS at 06:17

## 2017-02-23 RX ADMIN — PANTOPRAZOLE SODIUM 40 MILLIGRAM(S): 20 TABLET, DELAYED RELEASE ORAL at 22:17

## 2017-02-23 RX ADMIN — LISINOPRIL 20 MILLIGRAM(S): 2.5 TABLET ORAL at 06:13

## 2017-02-23 NOTE — PROGRESS NOTE ADULT - SUBJECTIVE AND OBJECTIVE BOX
GASTROENTEROLOGY  Patient seen and examined bedside resting comfortably.  No complaints offered.   Abdominal pain   Denies nausea and vomiting. Tolerating diet.  flatus/BM.     T(F): 98.4, Max: 98.6 (02-23 @ 05:43)  HR: 70 (69 - 95)  BP: 109/50 (92/50 - 124/39)  RR: 16 (15 - 20)  SpO2: 99% (96% - 100%)  Wt(kg): --  CAPILLARY BLOOD GLUCOSE  192 (23 Feb 2017 12:06)  137 (23 Feb 2017 08:14)  189 (22 Feb 2017 21:32)  117 (22 Feb 2017 15:47)      PHYSICAL EXAM:  General: NAD, WDWN.   Neuro:  Alert & oriented x 3  HEENT: NCAT, EOMI, conjunctiva clear  CV: +S1+S2 regular rate and rhythm  Lung: clear to ausculation bilaterally, respirations nonlabored, good inspiratory effort  Abdomen: soft, NTND. Normactive BS  Extremities: no pedal edema or calf tenderness noted     LABS:                        8.2    5.4   )-----------( 120      ( 23 Feb 2017 06:18 )             23.8     22 Feb 2017 06:29    142    |  108    |  50     ----------------------------<  114    4.0     |  24     |  1.50     Ca    8.6        22 Feb 2017 06:29          I&O's Detail    I & Os for current day (as of 23 Feb 2017 12:52)  =============================================  IN:    sodium chloride 0.45%.: 900 ml    Oral Fluid: 320 ml    Solution: 200 ml    lactated ringers.: 75 ml    Total IN: 1495 ml  ---------------------------------------------  OUT:    Voided: 900 ml    Total OUT: 900 ml  ---------------------------------------------  Total NET: 595 ml

## 2017-02-23 NOTE — PROGRESS NOTE ADULT - SUBJECTIVE AND OBJECTIVE BOX
Patient is a 61y old  Male who presents with a chief complaint of Vomiting (20 Feb 2017 17:59)    MEDICAL PROBLEMS:  DEHYDRATION; DIABETES  VOMITING, HIGH SUGAR  Neuropathy  CAD (coronary artery disease)  Blindness of both eyes  Essential hypertension  Diabetes mellitus due to underlying condition with complications  Dehydration  Esophageal abnormality  GERD (gastroesophageal reflux disease)  Dysphagia, unspecified type  Anemia due to blood loss  Diabetes      INTERVAL HPI/OVERNIGHT EVENTS: S/P EGD, pathology is pending. IV Abx for gastroenteritis    MEDICATIONS  (STANDING):  aspirin 325milliGRAM(s) Oral daily  docusate sodium 100milliGRAM(s) Oral three times a day  clopidogrel Tablet 75milliGRAM(s) Oral daily  atorvastatin 40milliGRAM(s) Oral at bedtime  pregabalin 100milliGRAM(s) Oral two times a day  heparin  Injectable 5000Unit(s) SubCutaneous every 12 hours  piperacillin/tazobactam IVPB. 3.375Gram(s) IV Intermittent every 8 hours  insulin glargine Injectable (LANTUS) 10Unit(s) SubCutaneous at bedtime  insulin lispro (HumaLOG) corrective regimen sliding scale  SubCutaneous three times a day before meals  dextrose 5%. 1000milliLiter(s) IV Continuous <Continuous>  dextrose 50% Injectable 12.5Gram(s) IV Push once  dextrose 50% Injectable 25Gram(s) IV Push once  dextrose 50% Injectable 25Gram(s) IV Push once  sodium chloride 0.45%. 1000milliLiter(s) IV Continuous <Continuous>  pantoprazole  Injectable 40milliGRAM(s) IV Push every 12 hours  lisinopril 20milliGRAM(s) Oral daily    MEDICATIONS  (PRN):  oxyCODONE  5 mG/acetaminophen 325 mG 2Tablet(s) Oral every 4 hours PRN Moderate Pain  dextrose Gel 1Dose(s) Oral once PRN Blood Glucose LESS THAN 70 milliGRAM(s)/deciliter  glucagon  Injectable 1milliGRAM(s) IntraMuscular once PRN Glucose LESS THAN 70 milligrams/deciliter  aluminum hydroxide/magnesium hydroxide/simethicone Suspension 30milliLiter(s) Oral every 6 hours PRN Dyspepsia    Allergies    No Known Allergies    Intolerances      Vital Signs Last 24 Hrs  T(C): 37.3, Max: 37.3 (02-23 @ 17:11)  T(F): 99.2, Max: 99.2 (02-23 @ 17:11)  HR: 83 (69 - 83)  BP: 124/60 (106/57 - 124/60)  BP(mean): --  RR: 16 (16 - 18)  SpO2: 98% (96% - 99%)    PHYSICAL EXAM:  GENERAL: NAD, well-groomed, well-developed  HEAD:  Atraumatic, Normocephalic  EYES: EOMI, PERRLA, conjunctiva and sclera clear  ENMT: No tonsillar erythema, exudates, or enlargement; Moist mucous membranes, Good dentition, No lesions  NECK: Supple, No JVD, Normal thyroid  NERVOUS SYSTEM:  Alert & Oriented X3, Good concentration; Motor Strength 5/5 B/L upper and lower extremities; DTRs 2+ intact and symmetric  CHEST/LUNG: Clear to percussion bilaterally; No rales, rhonchi, wheezing, or rubs  HEART: Regular rate and rhythm; No murmurs, rubs, or gallops  ABDOMEN: Soft, Nontender, Nondistended; Bowel sounds present  EXTREMITIES:  2+ Peripheral Pulses, No clubbing, cyanosis, or edema  LYMPH: No lymphadenopathy noted  SKIN: No rashes or lesions    I & Os for current day (as of 02-23 @ 19:14)  =============================================  IN: 1495 ml / OUT: 900 ml / NET: 595 ml      LABS:                        8.2    5.4   )-----------( 120      ( 23 Feb 2017 06:18 )             23.8     22 Feb 2017 06:29    142    |  108    |  50     ----------------------------<  114    4.0     |  24     |  1.50     Ca    8.6        22 Feb 2017 06:29          CAPILLARY BLOOD GLUCOSE  198 (23 Feb 2017 16:33)  192 (23 Feb 2017 12:06)  137 (23 Feb 2017 08:14)  189 (22 Feb 2017 21:32)    RADIOLOGY & ADDITIONAL TESTS:    Imaging Personally Reviewed:  [X] YES  [ ] NO    Consultant(s) Notes Reviewed:  [X] YES  [ ] NO    Care Discussed with Consultants/Other Providers [X] YES  [ ] NO

## 2017-02-23 NOTE — PROGRESS NOTE ADULT - SUBJECTIVE AND OBJECTIVE BOX
INTERVAL HISTORY:  Anemia.  dysphagia.        MEDICATIONS  (STANDING):  aspirin 325milliGRAM(s) Oral daily  docusate sodium 100milliGRAM(s) Oral three times a day  clopidogrel Tablet 75milliGRAM(s) Oral daily  atorvastatin 40milliGRAM(s) Oral at bedtime  pregabalin 100milliGRAM(s) Oral two times a day  heparin  Injectable 5000Unit(s) SubCutaneous every 12 hours  piperacillin/tazobactam IVPB. 3.375Gram(s) IV Intermittent every 8 hours  insulin glargine Injectable (LANTUS) 10Unit(s) SubCutaneous at bedtime  insulin lispro (HumaLOG) corrective regimen sliding scale  SubCutaneous three times a day before meals  dextrose 5%. 1000milliLiter(s) IV Continuous <Continuous>  dextrose 50% Injectable 12.5Gram(s) IV Push once  dextrose 50% Injectable 25Gram(s) IV Push once  dextrose 50% Injectable 25Gram(s) IV Push once  sodium chloride 0.45%. 1000milliLiter(s) IV Continuous <Continuous>  pantoprazole  Injectable 40milliGRAM(s) IV Push every 12 hours  lisinopril 20milliGRAM(s) Oral daily    MEDICATIONS  (PRN):  oxyCODONE  5 mG/acetaminophen 325 mG 2Tablet(s) Oral every 4 hours PRN Moderate Pain  dextrose Gel 1Dose(s) Oral once PRN Blood Glucose LESS THAN 70 milliGRAM(s)/deciliter  glucagon  Injectable 1milliGRAM(s) IntraMuscular once PRN Glucose LESS THAN 70 milligrams/deciliter  aluminum hydroxide/magnesium hydroxide/simethicone Suspension 30milliLiter(s) Oral every 6 hours PRN Dyspepsia      Vital Signs Last 24 Hrs  T(C): 37, Max: 37 (02-23 @ 05:43)  T(F): 98.6, Max: 98.6 (02-23 @ 05:43)  HR: 69 (69 - 95)  BP: 110/55 (92/50 - 124/39)  BP(mean): --  RR: 18 (15 - 20)  SpO2: 96% (96% - 100%)    PHYSICAL EXAM:    GENERAL: NAD,   HEAD:  Atraumatic, Normocephalic  EYES:blind    NECK: Supple, No JVD, Normal thyroid    CHEST/LUNG: Clear to percussion bilaterally; No rales, rhonchi,   HEART: Regular rate and rhythm;   ABDOMEN: Soft, Nontender.  EXTREMITIES:   Bilateral BKA  LYMPH: No lymphadenopathy noted        LABS:                        8.2    5.4   )-----------( 120      ( 23 Feb 2017 06:18 )             23.8     22 Feb 2017 06:29    142    |  108    |  50     ----------------------------<  114    4.0     |  24     |  1.50     Ca    8.6        22 Feb 2017 06:29              RADIOLOGY & ADDITIONAL STUDIES:    PATHOLOGY:

## 2017-02-24 DIAGNOSIS — K21.0 GASTRO-ESOPHAGEAL REFLUX DISEASE WITH ESOPHAGITIS: ICD-10-CM

## 2017-02-24 LAB
ALBUMIN SERPL ELPH-MCNC: 2.2 G/DL — LOW (ref 3.3–5)
ALP SERPL-CCNC: 40 U/L — SIGNIFICANT CHANGE UP (ref 40–120)
ALT FLD-CCNC: 15 U/L — SIGNIFICANT CHANGE UP (ref 12–78)
ANION GAP SERPL CALC-SCNC: 10 MMOL/L — SIGNIFICANT CHANGE UP (ref 5–17)
ANISOCYTOSIS BLD QL: SLIGHT — SIGNIFICANT CHANGE UP
AST SERPL-CCNC: 10 U/L — LOW (ref 15–37)
BILIRUB SERPL-MCNC: 0.4 MG/DL — SIGNIFICANT CHANGE UP (ref 0.2–1.2)
BLD GP AB SCN SERPL QL: SIGNIFICANT CHANGE UP
BUN SERPL-MCNC: 35 MG/DL — HIGH (ref 7–23)
CALCIUM SERPL-MCNC: 7.5 MG/DL — LOW (ref 8.5–10.1)
CHLORIDE SERPL-SCNC: 105 MMOL/L — SIGNIFICANT CHANGE UP (ref 96–108)
CO2 SERPL-SCNC: 25 MMOL/L — SIGNIFICANT CHANGE UP (ref 22–31)
CREAT SERPL-MCNC: 1.63 MG/DL — HIGH (ref 0.5–1.3)
EOSINOPHIL NFR BLD AUTO: 3 % — SIGNIFICANT CHANGE UP (ref 0–6)
GLUCOSE SERPL-MCNC: 187 MG/DL — HIGH (ref 70–99)
HCT VFR BLD CALC: 21.8 % — LOW (ref 39–50)
HGB BLD-MCNC: 7.6 G/DL — LOW (ref 13–17)
HYPOCHROMIA BLD QL: SIGNIFICANT CHANGE UP
LYMPHOCYTES # BLD AUTO: 40 % — SIGNIFICANT CHANGE UP (ref 13–44)
MACROCYTES BLD QL: SLIGHT — SIGNIFICANT CHANGE UP
MCHC RBC-ENTMCNC: 32.2 PG — SIGNIFICANT CHANGE UP (ref 27–34)
MCHC RBC-ENTMCNC: 34.9 GM/DL — SIGNIFICANT CHANGE UP (ref 32–36)
MCV RBC AUTO: 92.2 FL — SIGNIFICANT CHANGE UP (ref 80–100)
MICROCYTES BLD QL: SLIGHT — SIGNIFICANT CHANGE UP
NEUTROPHILS NFR BLD AUTO: 57 % — SIGNIFICANT CHANGE UP (ref 43–77)
PLAT MORPH BLD: NORMAL — SIGNIFICANT CHANGE UP
PLATELET # BLD AUTO: 120 K/UL — LOW (ref 150–400)
POIKILOCYTOSIS BLD QL AUTO: SLIGHT — SIGNIFICANT CHANGE UP
POTASSIUM SERPL-MCNC: 4 MMOL/L — SIGNIFICANT CHANGE UP (ref 3.5–5.3)
POTASSIUM SERPL-SCNC: 4 MMOL/L — SIGNIFICANT CHANGE UP (ref 3.5–5.3)
PROT SERPL-MCNC: 4.9 GM/DL — LOW (ref 6–8.3)
RBC # BLD: 2.36 M/UL — LOW (ref 4.2–5.8)
RBC # FLD: 13.4 % — SIGNIFICANT CHANGE UP (ref 11–15)
RBC BLD AUTO: ABNORMAL
SODIUM SERPL-SCNC: 140 MMOL/L — SIGNIFICANT CHANGE UP (ref 135–145)
SURGICAL PATHOLOGY FINAL REPORT - CH: SIGNIFICANT CHANGE UP
WBC # BLD: 5.2 K/UL — SIGNIFICANT CHANGE UP (ref 3.8–10.5)
WBC # FLD AUTO: 5.2 K/UL — SIGNIFICANT CHANGE UP (ref 3.8–10.5)

## 2017-02-24 RX ADMIN — HEPARIN SODIUM 5000 UNIT(S): 5000 INJECTION INTRAVENOUS; SUBCUTANEOUS at 05:32

## 2017-02-24 RX ADMIN — Medication 2: at 08:22

## 2017-02-24 RX ADMIN — Medication 4: at 18:31

## 2017-02-24 RX ADMIN — PIPERACILLIN AND TAZOBACTAM 25 GRAM(S): 4; .5 INJECTION, POWDER, LYOPHILIZED, FOR SOLUTION INTRAVENOUS at 06:53

## 2017-02-24 RX ADMIN — INSULIN GLARGINE 10 UNIT(S): 100 INJECTION, SOLUTION SUBCUTANEOUS at 21:23

## 2017-02-24 RX ADMIN — PIPERACILLIN AND TAZOBACTAM 25 GRAM(S): 4; .5 INJECTION, POWDER, LYOPHILIZED, FOR SOLUTION INTRAVENOUS at 21:23

## 2017-02-24 RX ADMIN — Medication 2: at 11:53

## 2017-02-24 RX ADMIN — LISINOPRIL 20 MILLIGRAM(S): 2.5 TABLET ORAL at 05:32

## 2017-02-24 RX ADMIN — Medication 100 MILLIGRAM(S): at 13:29

## 2017-02-24 RX ADMIN — HEPARIN SODIUM 5000 UNIT(S): 5000 INJECTION INTRAVENOUS; SUBCUTANEOUS at 18:35

## 2017-02-24 RX ADMIN — PANTOPRAZOLE SODIUM 40 MILLIGRAM(S): 20 TABLET, DELAYED RELEASE ORAL at 21:23

## 2017-02-24 RX ADMIN — Medication 100 MILLIGRAM(S): at 18:33

## 2017-02-24 RX ADMIN — Medication 100 MILLIGRAM(S): at 05:32

## 2017-02-24 RX ADMIN — PANTOPRAZOLE SODIUM 40 MILLIGRAM(S): 20 TABLET, DELAYED RELEASE ORAL at 08:25

## 2017-02-24 RX ADMIN — Medication 325 MILLIGRAM(S): at 11:53

## 2017-02-24 RX ADMIN — ATORVASTATIN CALCIUM 40 MILLIGRAM(S): 80 TABLET, FILM COATED ORAL at 21:23

## 2017-02-24 RX ADMIN — PIPERACILLIN AND TAZOBACTAM 25 GRAM(S): 4; .5 INJECTION, POWDER, LYOPHILIZED, FOR SOLUTION INTRAVENOUS at 13:28

## 2017-02-24 RX ADMIN — Medication 100 MILLIGRAM(S): at 21:23

## 2017-02-24 RX ADMIN — CLOPIDOGREL BISULFATE 75 MILLIGRAM(S): 75 TABLET, FILM COATED ORAL at 11:54

## 2017-02-24 NOTE — DIETITIAN INITIAL EVALUATION ADULT. - PERTINENT LABORATORY DATA
02-24 Na 140 mmol/L Glu 187 mg/dL<H> K+ 4.0 mmol/L Cr  1.63 mg/dL<H> BUN 35 mg/dL<H> Phos n/a   Alb 2.2 g/dL<L> PAB n/a   Hgb 7.6 g/dL<L> Hct 21.8 %<L> Cl 105, Ca 7.5 <L>, Albu 2.2 <L>, AST 10 <L>, GFR 44 <L>, HgbA1C 6.9<H>

## 2017-02-24 NOTE — PROGRESS NOTE ADULT - SUBJECTIVE AND OBJECTIVE BOX
Patient is a 61y old  Male who presents with a chief complaint of Vomiting (20 Feb 2017 17:59)    MEDICAL PROBLEMS:  DEHYDRATION; DIABETES  VOMITING, HIGH SUGAR  Neuropathy  CAD (coronary artery disease)  Blindness of both eyes  Essential hypertension  Diabetes mellitus due to underlying condition with complications  Dehydration  Gastroesophageal reflux disease with esophagitis  Esophageal abnormality  GERD (gastroesophageal reflux disease)  Dysphagia, unspecified type  Anemia due to blood loss  Diabetes      INTERVAL HPI/OVERNIGHT EVENTS: Drop of H/H, Hb: 7.6 today. hypotensive    MEDICATIONS  (STANDING):  aspirin 325milliGRAM(s) Oral daily  docusate sodium 100milliGRAM(s) Oral three times a day  clopidogrel Tablet 75milliGRAM(s) Oral daily  atorvastatin 40milliGRAM(s) Oral at bedtime  pregabalin 100milliGRAM(s) Oral two times a day  heparin  Injectable 5000Unit(s) SubCutaneous every 12 hours  piperacillin/tazobactam IVPB. 3.375Gram(s) IV Intermittent every 8 hours  insulin glargine Injectable (LANTUS) 10Unit(s) SubCutaneous at bedtime  insulin lispro (HumaLOG) corrective regimen sliding scale  SubCutaneous three times a day before meals  dextrose 5%. 1000milliLiter(s) IV Continuous <Continuous>  dextrose 50% Injectable 12.5Gram(s) IV Push once  dextrose 50% Injectable 25Gram(s) IV Push once  dextrose 50% Injectable 25Gram(s) IV Push once  sodium chloride 0.45%. 1000milliLiter(s) IV Continuous <Continuous>  pantoprazole  Injectable 40milliGRAM(s) IV Push every 12 hours  lisinopril 20milliGRAM(s) Oral daily    MEDICATIONS  (PRN):  oxyCODONE  5 mG/acetaminophen 325 mG 2Tablet(s) Oral every 4 hours PRN Moderate Pain  dextrose Gel 1Dose(s) Oral once PRN Blood Glucose LESS THAN 70 milliGRAM(s)/deciliter  glucagon  Injectable 1milliGRAM(s) IntraMuscular once PRN Glucose LESS THAN 70 milligrams/deciliter  aluminum hydroxide/magnesium hydroxide/simethicone Suspension 30milliLiter(s) Oral every 6 hours PRN Dyspepsia    Allergies    No Known Allergies    Intolerances      Vital Signs Last 24 Hrs  T(C): 36.6, Max: 37.3 (02-23 @ 17:11)  T(F): 97.8, Max: 99.2 (02-23 @ 17:11)  HR: 71 (68 - 83)  BP: 109/35 (108/52 - 124/60)  BP(mean): --  RR: 16 (15 - 16)  SpO2: 99% (95% - 99%)    PHYSICAL EXAM:  GENERAL: NAD, well-groomed, well-developed  HEAD:  Atraumatic, Normocephalic  EYES: EOMI, PERRLA, conjunctiva and sclera clear  ENMT: No tonsillar erythema, exudates, or enlargement; Moist mucous membranes, Good dentition, No lesions  NECK: Supple, No JVD, Normal thyroid  NERVOUS SYSTEM:  Alert & Oriented X3, Good concentration; Motor Strength 5/5 B/L upper and lower extremities; DTRs 2+ intact and symmetric  CHEST/LUNG: Clear to percussion bilaterally; No rales, rhonchi, wheezing, or rubs  HEART: Regular rate and rhythm; No murmurs, rubs, or gallops  ABDOMEN: Soft, Nontender, Nondistended; Bowel sounds present  EXTREMITIES:  2+ Peripheral Pulses, No clubbing, cyanosis, or edema  LYMPH: No lymphadenopathy noted  SKIN: No rashes or lesions    I & Os for current day (as of 02-24 @ 13:43)  =============================================  IN: 1140 ml / OUT: 750 ml / NET: 390 ml      LABS:                        7.6    5.2   )-----------( 120      ( 24 Feb 2017 06:29 )             21.8     24 Feb 2017 06:29    140    |  105    |  35     ----------------------------<  187    4.0     |  25     |  1.63     Ca    7.5        24 Feb 2017 06:29    TPro  4.9    /  Alb  2.2    /  TBili  0.4    /  DBili  x      /  AST  10     /  ALT  15     /  AlkPhos  40     24 Feb 2017 06:29        CAPILLARY BLOOD GLUCOSE  185 (24 Feb 2017 11:52)  197 (24 Feb 2017 08:21)  240 (23 Feb 2017 22:33)  198 (23 Feb 2017 16:33)    RADIOLOGY & ADDITIONAL TESTS:    Imaging Personally Reviewed:  [X] YES  [ ] NO    Consultant(s) Notes Reviewed:  [X] YES  [ ] NO    Care Discussed with Consultants/Other Providers [X] YES  [ ] NO

## 2017-02-24 NOTE — PROGRESS NOTE ADULT - SUBJECTIVE AND OBJECTIVE BOX
INTERVAL HISTORY:    Anemia.  Dysphagia.        MEDICATIONS  (STANDING):  aspirin 325milliGRAM(s) Oral daily  docusate sodium 100milliGRAM(s) Oral three times a day  clopidogrel Tablet 75milliGRAM(s) Oral daily  atorvastatin 40milliGRAM(s) Oral at bedtime  pregabalin 100milliGRAM(s) Oral two times a day  heparin  Injectable 5000Unit(s) SubCutaneous every 12 hours  piperacillin/tazobactam IVPB. 3.375Gram(s) IV Intermittent every 8 hours  insulin glargine Injectable (LANTUS) 10Unit(s) SubCutaneous at bedtime  insulin lispro (HumaLOG) corrective regimen sliding scale  SubCutaneous three times a day before meals  dextrose 5%. 1000milliLiter(s) IV Continuous <Continuous>  dextrose 50% Injectable 12.5Gram(s) IV Push once  dextrose 50% Injectable 25Gram(s) IV Push once  dextrose 50% Injectable 25Gram(s) IV Push once  sodium chloride 0.45%. 1000milliLiter(s) IV Continuous <Continuous>  pantoprazole  Injectable 40milliGRAM(s) IV Push every 12 hours  lisinopril 20milliGRAM(s) Oral daily    MEDICATIONS  (PRN):  oxyCODONE  5 mG/acetaminophen 325 mG 2Tablet(s) Oral every 4 hours PRN Moderate Pain  dextrose Gel 1Dose(s) Oral once PRN Blood Glucose LESS THAN 70 milliGRAM(s)/deciliter  glucagon  Injectable 1milliGRAM(s) IntraMuscular once PRN Glucose LESS THAN 70 milligrams/deciliter  aluminum hydroxide/magnesium hydroxide/simethicone Suspension 30milliLiter(s) Oral every 6 hours PRN Dyspepsia      Vital Signs Last 24 Hrs  T(C): 37.1, Max: 37.3 (02-23 @ 17:11)  T(F): 98.8, Max: 99.2 (02-23 @ 17:11)  HR: 72 (68 - 83)  BP: 114/56 (108/52 - 124/60)  BP(mean): --  RR: 15 (15 - 16)  SpO2: 95% (95% - 99%)    PHYSICAL EXAM:    GENERAL: Pallor.  Blind.  HEAD:  Atraumatic, Normocephalic      NECK: Supple, No JVD, Normal thyroid    CHEST/LUNG: Clear to percussion bilaterally; No rales, rhonchi,   HEART: Regular rate and rhythm;   ABDOMEN: Soft, Nontender.    LYMPH: No lymphadenopathy noted        LABS:                        7.6    5.2   )-----------( 120      ( 24 Feb 2017 06:29 )             21.8     24 Feb 2017 06:29    140    |  105    |  35     ----------------------------<  187    4.0     |  25     |  1.63     Ca    7.5        24 Feb 2017 06:29    TPro  4.9    /  Alb  2.2    /  TBili  0.4    /  DBili  x      /  AST  10     /  ALT  15     /  AlkPhos  40     24 Feb 2017 06:29            RADIOLOGY & ADDITIONAL STUDIES:    PATHOLOGY:

## 2017-02-24 NOTE — PROGRESS NOTE ADULT - SUBJECTIVE AND OBJECTIVE BOX
GASTROENTEROLOGY  Patient seen and examined bedside resting comfortably.  No complaints offered. tolerating diet   Denies Abdominal pain  nausea and vomiting. Tolerating diet.  flatus/BM. +neuropathy pain needec painmeds last PM    T(F): 98.8, Max: 99.2 (02-23 @ 17:11)  HR: 72 (68 - 83)  BP: 114/56 (108/52 - 124/60)  RR: 15 (15 - 16)  SpO2: 95% (95% - 99%)  Wt(kg): --  CAPILLARY BLOOD GLUCOSE  240 (23 Feb 2017 22:33)  198 (23 Feb 2017 16:33)  192 (23 Feb 2017 12:06)      PHYSICAL EXAM:  General: NAD, WDWN.   Neuro:  Alert & oriented x 3  HEENT: NCAT, opacified right eye  conjunctiva clear  BLIND in BOTH eyes  CV: +S1+S2 regular rate and rhythm  Lung: clear to ausculation bilaterally, respirations nonlabored, good inspiratory effort  Abdomen: soft, OBESE NT ND. Normo active BS  Extremities: RBKA  L no edema    LABS:                        7.6    5.2   )-----------( 120      ( 24 Feb 2017 06:29 )             21.8     24 Feb 2017 06:29    140    |  105    |  35     ----------------------------<  187    4.0     |  25     |  1.63     Ca    7.5        24 Feb 2017 06:29    TPro  4.9    /  Alb  2.2    /  TBili  0.4    /  DBili  x      /  AST  10     /  ALT  15     /  AlkPhos  40     24 Feb 2017 06:29    LIVER FUNCTIONS - ( 24 Feb 2017 06:29 )  Alb: 2.2 g/dL / Pro: 4.9 gm/dL / ALK PHOS: 40 U/L / ALT: 15 U/L / AST: 10 U/L / GGT: x             I&O's Detail    I & Os for current day (as of 24 Feb 2017 08:18)  =============================================  IN:    sodium chloride 0.45%.: 900 ml    Oral Fluid: 240 ml    Total IN: 1140 ml  ---------------------------------------------  OUT:    Voided: 750 ml    Total OUT: 750 ml  ---------------------------------------------  Total NET: 390 ml

## 2017-02-24 NOTE — DIETITIAN INITIAL EVALUATION ADULT. - OTHER INFO
Pt was seen for RN DM consult (2/22). Pt reports good appetite & 100% intake meals; observed 100% intake breakfast (2/24). Pt reports intentional weight loss of 68kg in past 2 years due to fat loss diet; reports gaining back 9kg recently (unknown time frame) due to unrestrictive diet. No N/V/D chewing/swallowing problems. Pt reports constipation; last BM 2/16.  Lives c wife & children; wife does food shopping & cooking. Pt reports managing DM c medication & insulin (unknown amount and brand); pt wife checks fingersticks 1 x daily in am. Pt reports BKA amputation is due to uncontrolled DM.

## 2017-02-24 NOTE — PROGRESS NOTE ADULT - ASSESSMENT
DYSPHAGIA , SYMPTOMATIC ANEMIA, ABNORMAL CT SCAN WITH ESOPHAGEAL THICKENING s/p EGD with biopsies erosive esophagtitis, R/O Neoplasia

## 2017-02-24 NOTE — DIETITIAN INITIAL EVALUATION ADULT. - FACTORS AFF FOOD INTAKE
none vomiting/other (specify)/difficulty with food procurement/preparation/Pt is blind & uses a walker; PTA pt had uncontrollable vomiting- now improving

## 2017-02-24 NOTE — DIETITIAN INITIAL EVALUATION ADULT. - ENERGY NEEDS
Ht: 73inch   Adjusted wt rt leg BKA amputee: 153kg (2/20)   IBW: 79kg +/- 10%   %IBW: 176% BMI: 44.7  UBW: unknown

## 2017-02-24 NOTE — DIETITIAN INITIAL EVALUATION ADULT. - NS AS NUTRI INTERV ED CONTENT
Recommended modifications/Nutrition relationship to health/disease/Provide pt c T2DM nutrition therapy & GERD nutrition therapy handouts/Purpose of the nutrition education

## 2017-02-24 NOTE — DIETITIAN INITIAL EVALUATION ADULT. - PERTINENT MEDS FT
Aspirin, Plavix, Heparin, Zosyn IVPB, sodium chloride 0.45% 1L IV, Zestril, Maalox, Protonix, Lipitor, Colace, Lantus 10 units pm, Humalog (sliding scale) x 3 daily before meals, Percocet, Lyrica

## 2017-02-24 NOTE — DIETITIAN INITIAL EVALUATION ADULT. - DIET TYPE
DASH/TLC (sodium and cholesterol restricted diet)/02/23/2017/consistent carbohydrate (evening snack)/mechanical soft

## 2017-02-25 LAB
BASOPHILS # BLD AUTO: 0.1 K/UL — SIGNIFICANT CHANGE UP (ref 0–0.2)
BASOPHILS NFR BLD AUTO: 1.1 % — SIGNIFICANT CHANGE UP (ref 0–2)
EOSINOPHIL # BLD AUTO: 0.2 K/UL — SIGNIFICANT CHANGE UP (ref 0–0.5)
EOSINOPHIL NFR BLD AUTO: 4.6 % — SIGNIFICANT CHANGE UP (ref 0–6)
HCT VFR BLD CALC: 28.4 % — LOW (ref 39–50)
HGB BLD-MCNC: 9.5 G/DL — LOW (ref 13–17)
LYMPHOCYTES # BLD AUTO: 1.3 K/UL — SIGNIFICANT CHANGE UP (ref 1–3.3)
LYMPHOCYTES # BLD AUTO: 25.5 % — SIGNIFICANT CHANGE UP (ref 13–44)
MCHC RBC-ENTMCNC: 30.6 PG — SIGNIFICANT CHANGE UP (ref 27–34)
MCHC RBC-ENTMCNC: 33.6 GM/DL — SIGNIFICANT CHANGE UP (ref 32–36)
MCV RBC AUTO: 91.3 FL — SIGNIFICANT CHANGE UP (ref 80–100)
MONOCYTES # BLD AUTO: 0.6 K/UL — SIGNIFICANT CHANGE UP (ref 0–0.9)
MONOCYTES NFR BLD AUTO: 11.7 % — SIGNIFICANT CHANGE UP (ref 2–14)
NEUTROPHILS # BLD AUTO: 3 K/UL — SIGNIFICANT CHANGE UP (ref 1.8–7.4)
NEUTROPHILS NFR BLD AUTO: 57.1 % — SIGNIFICANT CHANGE UP (ref 43–77)
PLATELET # BLD AUTO: 130 K/UL — LOW (ref 150–400)
RBC # BLD: 3.11 M/UL — LOW (ref 4.2–5.8)
RBC # FLD: 14.3 % — SIGNIFICANT CHANGE UP (ref 11–15)
WBC # BLD: 5.2 K/UL — SIGNIFICANT CHANGE UP (ref 3.8–10.5)
WBC # FLD AUTO: 5.2 K/UL — SIGNIFICANT CHANGE UP (ref 3.8–10.5)

## 2017-02-25 RX ADMIN — Medication 100 MILLIGRAM(S): at 06:09

## 2017-02-25 RX ADMIN — INSULIN GLARGINE 10 UNIT(S): 100 INJECTION, SOLUTION SUBCUTANEOUS at 22:03

## 2017-02-25 RX ADMIN — LISINOPRIL 20 MILLIGRAM(S): 2.5 TABLET ORAL at 06:06

## 2017-02-25 RX ADMIN — PIPERACILLIN AND TAZOBACTAM 25 GRAM(S): 4; .5 INJECTION, POWDER, LYOPHILIZED, FOR SOLUTION INTRAVENOUS at 13:10

## 2017-02-25 RX ADMIN — Medication 325 MILLIGRAM(S): at 11:14

## 2017-02-25 RX ADMIN — Medication 4: at 11:14

## 2017-02-25 RX ADMIN — Medication 100 MILLIGRAM(S): at 17:15

## 2017-02-25 RX ADMIN — Medication 100 MILLIGRAM(S): at 13:10

## 2017-02-25 RX ADMIN — PIPERACILLIN AND TAZOBACTAM 25 GRAM(S): 4; .5 INJECTION, POWDER, LYOPHILIZED, FOR SOLUTION INTRAVENOUS at 22:01

## 2017-02-25 RX ADMIN — Medication 100 MILLIGRAM(S): at 22:00

## 2017-02-25 RX ADMIN — PANTOPRAZOLE SODIUM 40 MILLIGRAM(S): 20 TABLET, DELAYED RELEASE ORAL at 21:58

## 2017-02-25 RX ADMIN — Medication 2: at 15:47

## 2017-02-25 RX ADMIN — PIPERACILLIN AND TAZOBACTAM 25 GRAM(S): 4; .5 INJECTION, POWDER, LYOPHILIZED, FOR SOLUTION INTRAVENOUS at 06:07

## 2017-02-25 RX ADMIN — PANTOPRAZOLE SODIUM 40 MILLIGRAM(S): 20 TABLET, DELAYED RELEASE ORAL at 07:28

## 2017-02-25 RX ADMIN — Medication 2: at 07:28

## 2017-02-25 RX ADMIN — HEPARIN SODIUM 5000 UNIT(S): 5000 INJECTION INTRAVENOUS; SUBCUTANEOUS at 06:06

## 2017-02-25 RX ADMIN — HEPARIN SODIUM 5000 UNIT(S): 5000 INJECTION INTRAVENOUS; SUBCUTANEOUS at 17:15

## 2017-02-25 RX ADMIN — CLOPIDOGREL BISULFATE 75 MILLIGRAM(S): 75 TABLET, FILM COATED ORAL at 11:14

## 2017-02-25 RX ADMIN — Medication 100 MILLIGRAM(S): at 06:06

## 2017-02-25 RX ADMIN — ATORVASTATIN CALCIUM 40 MILLIGRAM(S): 80 TABLET, FILM COATED ORAL at 22:00

## 2017-02-25 NOTE — PROGRESS NOTE ADULT - SUBJECTIVE AND OBJECTIVE BOX
INTERVAL HISTORY:  Weak.  Anemic.        MEDICATIONS  (STANDING):  aspirin 325milliGRAM(s) Oral daily  docusate sodium 100milliGRAM(s) Oral three times a day  clopidogrel Tablet 75milliGRAM(s) Oral daily  atorvastatin 40milliGRAM(s) Oral at bedtime  pregabalin 100milliGRAM(s) Oral two times a day  heparin  Injectable 5000Unit(s) SubCutaneous every 12 hours  piperacillin/tazobactam IVPB. 3.375Gram(s) IV Intermittent every 8 hours  insulin glargine Injectable (LANTUS) 10Unit(s) SubCutaneous at bedtime  insulin lispro (HumaLOG) corrective regimen sliding scale  SubCutaneous three times a day before meals  dextrose 5%. 1000milliLiter(s) IV Continuous <Continuous>  dextrose 50% Injectable 12.5Gram(s) IV Push once  dextrose 50% Injectable 25Gram(s) IV Push once  dextrose 50% Injectable 25Gram(s) IV Push once  sodium chloride 0.45%. 1000milliLiter(s) IV Continuous <Continuous>  pantoprazole  Injectable 40milliGRAM(s) IV Push every 12 hours  lisinopril 20milliGRAM(s) Oral daily    MEDICATIONS  (PRN):  oxyCODONE  5 mG/acetaminophen 325 mG 2Tablet(s) Oral every 4 hours PRN Moderate Pain  dextrose Gel 1Dose(s) Oral once PRN Blood Glucose LESS THAN 70 milliGRAM(s)/deciliter  glucagon  Injectable 1milliGRAM(s) IntraMuscular once PRN Glucose LESS THAN 70 milligrams/deciliter  aluminum hydroxide/magnesium hydroxide/simethicone Suspension 30milliLiter(s) Oral every 6 hours PRN Dyspepsia      Vital Signs Last 24 Hrs  T(C): 37.1, Max: 37.1 (02-24 @ 20:45)  T(F): 98.8, Max: 98.8 (02-24 @ 20:45)  HR: 68 (68 - 982)  BP: 118/57 (98/48 - 118/57)  BP(mean): --  RR: 18 (16 - 18)  SpO2: 96% (95% - 99%)    PHYSICAL EXAM:    GENERAL: NAD,   HEAD:  Atraumatic, Normocephalic  EYES: EOMI, PERRLA, conjunctiva and sclera clear    NECK: Supple, No JVD, Normal thyroid      CHEST/LUNG: Clear to percussion bilaterally; No rales, rhonchi,   HEART: Regular rate and rhythm;   ABDOMEN: Soft, Nontender.  EXTREMITIES:   Bilateral Amputation.      LABS:                        9.5    5.2   )-----------( 130      ( 25 Feb 2017 09:22 )             28.4     24 Feb 2017 06:29    140    |  105    |  35     ----------------------------<  187    4.0     |  25     |  1.63     Ca    7.5        24 Feb 2017 06:29    TPro  4.9    /  Alb  2.2    /  TBili  0.4    /  DBili  x      /  AST  10     /  ALT  15     /  AlkPhos  40     24 Feb 2017 06:29            RADIOLOGY & ADDITIONAL STUDIES:    PATHOLOGY:

## 2017-02-25 NOTE — PROGRESS NOTE ADULT - SUBJECTIVE AND OBJECTIVE BOX
Patient is a 61y old  Male who presents with a chief complaint of Vomiting (20 Feb 2017 17:59)    MEDICAL PROBLEMS:  DEHYDRATION; DIABETES  VOMITING, HIGH SUGAR  Neuropathy  CAD (coronary artery disease)  Blindness of both eyes  Essential hypertension  Diabetes mellitus due to underlying condition with complications  Dehydration  Gastroesophageal reflux disease with esophagitis  Esophageal abnormality  GERD (gastroesophageal reflux disease)  Dysphagia, unspecified type  Anemia due to blood loss  Diabetes      INTERVAL HPI/OVERNIGHT EVENTS: Tolerated diet. Path report from esophageal lesion revealed no malignancy. Received 2 u PRBC. Hypotensive    MEDICATIONS  (STANDING):  aspirin 325milliGRAM(s) Oral daily  docusate sodium 100milliGRAM(s) Oral three times a day  clopidogrel Tablet 75milliGRAM(s) Oral daily  atorvastatin 40milliGRAM(s) Oral at bedtime  pregabalin 100milliGRAM(s) Oral two times a day  heparin  Injectable 5000Unit(s) SubCutaneous every 12 hours  piperacillin/tazobactam IVPB. 3.375Gram(s) IV Intermittent every 8 hours  insulin glargine Injectable (LANTUS) 10Unit(s) SubCutaneous at bedtime  insulin lispro (HumaLOG) corrective regimen sliding scale  SubCutaneous three times a day before meals  dextrose 5%. 1000milliLiter(s) IV Continuous <Continuous>  dextrose 50% Injectable 12.5Gram(s) IV Push once  dextrose 50% Injectable 25Gram(s) IV Push once  dextrose 50% Injectable 25Gram(s) IV Push once  sodium chloride 0.45%. 1000milliLiter(s) IV Continuous <Continuous>  pantoprazole  Injectable 40milliGRAM(s) IV Push every 12 hours  lisinopril 20milliGRAM(s) Oral daily    MEDICATIONS  (PRN):  oxyCODONE  5 mG/acetaminophen 325 mG 2Tablet(s) Oral every 4 hours PRN Moderate Pain  dextrose Gel 1Dose(s) Oral once PRN Blood Glucose LESS THAN 70 milliGRAM(s)/deciliter  glucagon  Injectable 1milliGRAM(s) IntraMuscular once PRN Glucose LESS THAN 70 milligrams/deciliter  aluminum hydroxide/magnesium hydroxide/simethicone Suspension 30milliLiter(s) Oral every 6 hours PRN Dyspepsia    Allergies    No Known Allergies    Intolerances      Vital Signs Last 24 Hrs  T(C): 37.1, Max: 37.1 (02-24 @ 20:45)  T(F): 98.8, Max: 98.8 (02-24 @ 20:45)  HR: 68 (68 - 982)  BP: 118/57 (98/48 - 118/57)  BP(mean): --  RR: 18 (16 - 18)  SpO2: 96% (95% - 99%)    PHYSICAL EXAM:  GENERAL: NAD, well-groomed, well-developed  HEAD:  Atraumatic, Normocephalic  EYES: EOMI, PERRLA, conjunctiva and sclera clear  ENMT: No tonsillar erythema, exudates, or enlargement; Moist mucous membranes, Good dentition, No lesions  NECK: Supple, No JVD, Normal thyroid  NERVOUS SYSTEM:  Alert & Oriented X3, Good concentration; Motor Strength 5/5 B/L upper and lower extremities; DTRs 2+ intact and symmetric  CHEST/LUNG: Clear to percussion bilaterally; No rales, rhonchi, wheezing, or rubs  HEART: Regular rate and rhythm; No murmurs, rubs, or gallops  ABDOMEN: Soft, Nontender, Nondistended; Bowel sounds present  EXTREMITIES:  2+ Peripheral Pulses, No clubbing, cyanosis, or edema  LYMPH: No lymphadenopathy noted  SKIN: No rashes or lesions    I & Os for current day (as of 02-25 @ 08:32)  =============================================  IN: 1880 ml / OUT: 2350 ml / NET: -470 ml      LABS:                        7.6    5.2   )-----------( 120      ( 24 Feb 2017 06:29 )             21.8     24 Feb 2017 06:29    140    |  105    |  35     ----------------------------<  187    4.0     |  25     |  1.63     Ca    7.5        24 Feb 2017 06:29    TPro  4.9    /  Alb  2.2    /  TBili  0.4    /  DBili  x      /  AST  10     /  ALT  15     /  AlkPhos  40     24 Feb 2017 06:29        CAPILLARY BLOOD GLUCOSE  196 (25 Feb 2017 07:27)  220 (24 Feb 2017 21:22)  238 (24 Feb 2017 18:31)  185 (24 Feb 2017 11:52)    RADIOLOGY & ADDITIONAL TESTS:    Imaging Personally Reviewed:  [X] YES  [ ] NO    Consultant(s) Notes Reviewed:  [X] YES  [ ] NO    Care Discussed with Consultants/Other Providers [X] YES  [ ] NO

## 2017-02-26 DIAGNOSIS — D64.9 ANEMIA, UNSPECIFIED: ICD-10-CM

## 2017-02-26 LAB
ALBUMIN SERPL ELPH-MCNC: 2.5 G/DL — LOW (ref 3.3–5)
ALP SERPL-CCNC: 48 U/L — SIGNIFICANT CHANGE UP (ref 40–120)
ALT FLD-CCNC: 18 U/L — SIGNIFICANT CHANGE UP (ref 12–78)
ANION GAP SERPL CALC-SCNC: 7 MMOL/L — SIGNIFICANT CHANGE UP (ref 5–17)
AST SERPL-CCNC: 16 U/L — SIGNIFICANT CHANGE UP (ref 15–37)
BASOPHILS # BLD AUTO: 0.1 K/UL — SIGNIFICANT CHANGE UP (ref 0–0.2)
BASOPHILS NFR BLD AUTO: 1.3 % — SIGNIFICANT CHANGE UP (ref 0–2)
BILIRUB SERPL-MCNC: 0.4 MG/DL — SIGNIFICANT CHANGE UP (ref 0.2–1.2)
BUN SERPL-MCNC: 20 MG/DL — SIGNIFICANT CHANGE UP (ref 7–23)
CALCIUM SERPL-MCNC: 8.1 MG/DL — LOW (ref 8.5–10.1)
CHLORIDE SERPL-SCNC: 105 MMOL/L — SIGNIFICANT CHANGE UP (ref 96–108)
CO2 SERPL-SCNC: 29 MMOL/L — SIGNIFICANT CHANGE UP (ref 22–31)
CREAT SERPL-MCNC: 1.43 MG/DL — HIGH (ref 0.5–1.3)
CULTURE RESULTS: SIGNIFICANT CHANGE UP
CULTURE RESULTS: SIGNIFICANT CHANGE UP
EOSINOPHIL # BLD AUTO: 0.2 K/UL — SIGNIFICANT CHANGE UP (ref 0–0.5)
EOSINOPHIL NFR BLD AUTO: 3.4 % — SIGNIFICANT CHANGE UP (ref 0–6)
GLUCOSE SERPL-MCNC: 157 MG/DL — HIGH (ref 70–99)
HCT VFR BLD CALC: 29.6 % — LOW (ref 39–50)
HGB BLD-MCNC: 10.3 G/DL — LOW (ref 13–17)
LYMPHOCYTES # BLD AUTO: 1.3 K/UL — SIGNIFICANT CHANGE UP (ref 1–3.3)
LYMPHOCYTES # BLD AUTO: 23.4 % — SIGNIFICANT CHANGE UP (ref 13–44)
MCHC RBC-ENTMCNC: 31.7 PG — SIGNIFICANT CHANGE UP (ref 27–34)
MCHC RBC-ENTMCNC: 34.8 GM/DL — SIGNIFICANT CHANGE UP (ref 32–36)
MCV RBC AUTO: 91 FL — SIGNIFICANT CHANGE UP (ref 80–100)
MONOCYTES # BLD AUTO: 0.6 K/UL — SIGNIFICANT CHANGE UP (ref 0–0.9)
MONOCYTES NFR BLD AUTO: 11.3 % — SIGNIFICANT CHANGE UP (ref 2–14)
NEUTROPHILS # BLD AUTO: 3.3 K/UL — SIGNIFICANT CHANGE UP (ref 1.8–7.4)
NEUTROPHILS NFR BLD AUTO: 60.5 % — SIGNIFICANT CHANGE UP (ref 43–77)
PLATELET # BLD AUTO: 139 K/UL — LOW (ref 150–400)
POTASSIUM SERPL-MCNC: 4.3 MMOL/L — SIGNIFICANT CHANGE UP (ref 3.5–5.3)
POTASSIUM SERPL-SCNC: 4.3 MMOL/L — SIGNIFICANT CHANGE UP (ref 3.5–5.3)
PROT SERPL-MCNC: 5.7 GM/DL — LOW (ref 6–8.3)
RBC # BLD: 3.25 M/UL — LOW (ref 4.2–5.8)
RBC # FLD: 14.2 % — SIGNIFICANT CHANGE UP (ref 11–15)
SODIUM SERPL-SCNC: 141 MMOL/L — SIGNIFICANT CHANGE UP (ref 135–145)
SPECIMEN SOURCE: SIGNIFICANT CHANGE UP
SPECIMEN SOURCE: SIGNIFICANT CHANGE UP
WBC # BLD: 5.5 K/UL — SIGNIFICANT CHANGE UP (ref 3.8–10.5)
WBC # FLD AUTO: 5.5 K/UL — SIGNIFICANT CHANGE UP (ref 3.8–10.5)

## 2017-02-26 RX ADMIN — PANTOPRAZOLE SODIUM 40 MILLIGRAM(S): 20 TABLET, DELAYED RELEASE ORAL at 22:21

## 2017-02-26 RX ADMIN — Medication 4: at 11:32

## 2017-02-26 RX ADMIN — Medication 100 MILLIGRAM(S): at 13:02

## 2017-02-26 RX ADMIN — Medication 2: at 07:21

## 2017-02-26 RX ADMIN — PIPERACILLIN AND TAZOBACTAM 25 GRAM(S): 4; .5 INJECTION, POWDER, LYOPHILIZED, FOR SOLUTION INTRAVENOUS at 05:40

## 2017-02-26 RX ADMIN — ATORVASTATIN CALCIUM 40 MILLIGRAM(S): 80 TABLET, FILM COATED ORAL at 22:22

## 2017-02-26 RX ADMIN — HEPARIN SODIUM 5000 UNIT(S): 5000 INJECTION INTRAVENOUS; SUBCUTANEOUS at 17:03

## 2017-02-26 RX ADMIN — Medication 100 MILLIGRAM(S): at 05:40

## 2017-02-26 RX ADMIN — HEPARIN SODIUM 5000 UNIT(S): 5000 INJECTION INTRAVENOUS; SUBCUTANEOUS at 05:40

## 2017-02-26 RX ADMIN — PIPERACILLIN AND TAZOBACTAM 25 GRAM(S): 4; .5 INJECTION, POWDER, LYOPHILIZED, FOR SOLUTION INTRAVENOUS at 13:02

## 2017-02-26 RX ADMIN — LISINOPRIL 20 MILLIGRAM(S): 2.5 TABLET ORAL at 05:40

## 2017-02-26 RX ADMIN — Medication 100 MILLIGRAM(S): at 17:03

## 2017-02-26 RX ADMIN — INSULIN GLARGINE 10 UNIT(S): 100 INJECTION, SOLUTION SUBCUTANEOUS at 22:24

## 2017-02-26 RX ADMIN — PIPERACILLIN AND TAZOBACTAM 25 GRAM(S): 4; .5 INJECTION, POWDER, LYOPHILIZED, FOR SOLUTION INTRAVENOUS at 22:24

## 2017-02-26 RX ADMIN — Medication 2: at 15:43

## 2017-02-26 RX ADMIN — Medication 325 MILLIGRAM(S): at 11:31

## 2017-02-26 RX ADMIN — CLOPIDOGREL BISULFATE 75 MILLIGRAM(S): 75 TABLET, FILM COATED ORAL at 11:32

## 2017-02-26 RX ADMIN — PANTOPRAZOLE SODIUM 40 MILLIGRAM(S): 20 TABLET, DELAYED RELEASE ORAL at 07:21

## 2017-02-26 RX ADMIN — Medication 100 MILLIGRAM(S): at 22:22

## 2017-02-26 NOTE — PROGRESS NOTE ADULT - SUBJECTIVE AND OBJECTIVE BOX
INTERVAL HISTORY:  Anemia.  Diabetes.  CRF        MEDICATIONS  (STANDING):  aspirin 325milliGRAM(s) Oral daily  docusate sodium 100milliGRAM(s) Oral three times a day  clopidogrel Tablet 75milliGRAM(s) Oral daily  atorvastatin 40milliGRAM(s) Oral at bedtime  pregabalin 100milliGRAM(s) Oral two times a day  heparin  Injectable 5000Unit(s) SubCutaneous every 12 hours  piperacillin/tazobactam IVPB. 3.375Gram(s) IV Intermittent every 8 hours  insulin glargine Injectable (LANTUS) 10Unit(s) SubCutaneous at bedtime  insulin lispro (HumaLOG) corrective regimen sliding scale  SubCutaneous three times a day before meals  dextrose 5%. 1000milliLiter(s) IV Continuous <Continuous>  dextrose 50% Injectable 12.5Gram(s) IV Push once  dextrose 50% Injectable 25Gram(s) IV Push once  dextrose 50% Injectable 25Gram(s) IV Push once  sodium chloride 0.45%. 1000milliLiter(s) IV Continuous <Continuous>  pantoprazole  Injectable 40milliGRAM(s) IV Push every 12 hours  lisinopril 20milliGRAM(s) Oral daily    MEDICATIONS  (PRN):  oxyCODONE  5 mG/acetaminophen 325 mG 2Tablet(s) Oral every 4 hours PRN Moderate Pain  dextrose Gel 1Dose(s) Oral once PRN Blood Glucose LESS THAN 70 milliGRAM(s)/deciliter  glucagon  Injectable 1milliGRAM(s) IntraMuscular once PRN Glucose LESS THAN 70 milligrams/deciliter  aluminum hydroxide/magnesium hydroxide/simethicone Suspension 30milliLiter(s) Oral every 6 hours PRN Dyspepsia      Vital Signs Last 24 Hrs  T(C): 36.9, Max: 37.1 (02-25 @ 12:01)  T(F): 98.4, Max: 98.8 (02-25 @ 12:01)  HR: 70 (68 - 70)  BP: 106/61 (106/61 - 132/62)  BP(mean): --  RR: 18 (17 - 18)  SpO2: 96% (94% - 98%)    PHYSICAL EXAM:    GENERAL: NAD,   HEAD:  Atraumatic, Normocephalic  EYES: Blind.    NECK: Supple, No JVD, Normal thyroid    CHEST/LUNG: Clear to percussion bilaterally; No rales, rhonchi,   HEART: Regular rate and rhythm;   ABDOMEN: Soft, Nontender.  EXTREMITIES:  Bilateral BKA  LYMPH: No lymphadenopathy noted        LABS:                        10.3   5.5   )-----------( 139      ( 26 Feb 2017 08:38 )             29.6     26 Feb 2017 08:38    141    |  105    |  20     ----------------------------<  157    4.3     |  29     |  1.43     Ca    8.1        26 Feb 2017 08:38    TPro  5.7    /  Alb  2.5    /  TBili  0.4    /  DBili  x      /  AST  16     /  ALT  18     /  AlkPhos  48     26 Feb 2017 08:38            RADIOLOGY & ADDITIONAL STUDIES:    PATHOLOGY:

## 2017-02-26 NOTE — PROGRESS NOTE ADULT - SUBJECTIVE AND OBJECTIVE BOX
Patient is a 61y old  Male who presents with a chief complaint of Vomiting (20 Feb 2017 17:59)    MEDICAL PROBLEMS:  DEHYDRATION; DIABETES  VOMITING, HIGH SUGAR  Neuropathy  CAD (coronary artery disease)  Blindness of both eyes  Essential hypertension  Diabetes mellitus due to underlying condition with complications  Dehydration  Gastroesophageal reflux disease with esophagitis  Esophageal abnormality  GERD (gastroesophageal reflux disease)  Dysphagia, unspecified type  Anemia due to blood loss  Diabetes      INTERVAL HPI/OVERNIGHT EVENTS: Tolerated diet. Path report from EGD revealed inflammation.    MEDICATIONS  (STANDING):  aspirin 325milliGRAM(s) Oral daily  docusate sodium 100milliGRAM(s) Oral three times a day  clopidogrel Tablet 75milliGRAM(s) Oral daily  atorvastatin 40milliGRAM(s) Oral at bedtime  pregabalin 100milliGRAM(s) Oral two times a day  heparin  Injectable 5000Unit(s) SubCutaneous every 12 hours  piperacillin/tazobactam IVPB. 3.375Gram(s) IV Intermittent every 8 hours  insulin glargine Injectable (LANTUS) 10Unit(s) SubCutaneous at bedtime  insulin lispro (HumaLOG) corrective regimen sliding scale  SubCutaneous three times a day before meals  dextrose 5%. 1000milliLiter(s) IV Continuous <Continuous>  dextrose 50% Injectable 12.5Gram(s) IV Push once  dextrose 50% Injectable 25Gram(s) IV Push once  dextrose 50% Injectable 25Gram(s) IV Push once  sodium chloride 0.45%. 1000milliLiter(s) IV Continuous <Continuous>  pantoprazole  Injectable 40milliGRAM(s) IV Push every 12 hours  lisinopril 20milliGRAM(s) Oral daily    MEDICATIONS  (PRN):  oxyCODONE  5 mG/acetaminophen 325 mG 2Tablet(s) Oral every 4 hours PRN Moderate Pain  dextrose Gel 1Dose(s) Oral once PRN Blood Glucose LESS THAN 70 milliGRAM(s)/deciliter  glucagon  Injectable 1milliGRAM(s) IntraMuscular once PRN Glucose LESS THAN 70 milligrams/deciliter  aluminum hydroxide/magnesium hydroxide/simethicone Suspension 30milliLiter(s) Oral every 6 hours PRN Dyspepsia    Allergies    No Known Allergies    Intolerances      Vital Signs Last 24 Hrs  T(C): 36.9, Max: 37.1 (02-25 @ 12:01)  T(F): 98.4, Max: 98.8 (02-25 @ 12:01)  HR: 70 (68 - 70)  BP: 106/61 (106/61 - 132/62)  BP(mean): --  RR: 18 (17 - 18)  SpO2: 96% (94% - 98%)    PHYSICAL EXAM:  GENERAL: NAD, well-groomed, well-developed  HEAD:  Atraumatic, Normocephalic  EYES: EOMI, PERRLA, conjunctiva and sclera clear  ENMT: No tonsillar erythema, exudates, or enlargement; Moist mucous membranes, Good dentition, No lesions  NECK: Supple, No JVD, Normal thyroid  NERVOUS SYSTEM:  Alert & Oriented X3, Good concentration; Motor Strength 5/5 B/L upper and lower extremities; DTRs 2+ intact and symmetric  CHEST/LUNG: Clear to percussion bilaterally; No rales, rhonchi, wheezing, or rubs  HEART: Regular rate and rhythm; No murmurs, rubs, or gallops  ABDOMEN: Soft, Nontender, Nondistended; Bowel sounds present  EXTREMITIES:  2+ Peripheral Pulses, No clubbing, cyanosis, or edema  LYMPH: No lymphadenopathy noted  SKIN: No rashes or lesions    I & Os for current day (as of 02-26 @ 10:37)  =============================================  IN: 2830 ml / OUT: 3325 ml / NET: -495 ml      LABS:                        10.3   5.5   )-----------( 139      ( 26 Feb 2017 08:38 )             29.6     26 Feb 2017 08:38    141    |  105    |  20     ----------------------------<  157    4.3     |  29     |  1.43     Ca    8.1        26 Feb 2017 08:38    TPro  5.7    /  Alb  2.5    /  TBili  0.4    /  DBili  x      /  AST  16     /  ALT  18     /  AlkPhos  48     26 Feb 2017 08:38        CAPILLARY BLOOD GLUCOSE  184 (26 Feb 2017 07:20)  188 (25 Feb 2017 21:41)  199 (25 Feb 2017 15:46)  242 (25 Feb 2017 11:13)    RADIOLOGY & ADDITIONAL TESTS:    Imaging Personally Reviewed:  [X] YES  [ ] NO    Consultant(s) Notes Reviewed:  [X] YES  [ ] NO    Care Discussed with Consultants/Other Providers [X] YES  [ ] NO

## 2017-02-27 VITALS
DIASTOLIC BLOOD PRESSURE: 56 MMHG | SYSTOLIC BLOOD PRESSURE: 112 MMHG | OXYGEN SATURATION: 99 % | TEMPERATURE: 97 F | RESPIRATION RATE: 16 BRPM | HEART RATE: 62 BPM

## 2017-02-27 RX ORDER — PANTOPRAZOLE SODIUM 20 MG/1
1 TABLET, DELAYED RELEASE ORAL
Qty: 30 | Refills: 0 | OUTPATIENT
Start: 2017-02-27

## 2017-02-27 RX ORDER — ASPIRIN/CALCIUM CARB/MAGNESIUM 324 MG
1 TABLET ORAL
Qty: 30 | Refills: 0
Start: 2017-02-27

## 2017-02-27 RX ORDER — CLOPIDOGREL BISULFATE 75 MG/1
1 TABLET, FILM COATED ORAL
Qty: 0 | Refills: 0 | DISCHARGE
Start: 2017-02-27

## 2017-02-27 RX ORDER — LACTOBACILLUS ACIDOPHILUS 100MM CELL
1 CAPSULE ORAL
Qty: 30 | Refills: 0 | OUTPATIENT
Start: 2017-02-27 | End: 2017-03-29

## 2017-02-27 RX ORDER — LISINOPRIL 2.5 MG/1
1 TABLET ORAL
Qty: 30 | Refills: 0 | OUTPATIENT
Start: 2017-02-27

## 2017-02-27 RX ORDER — DOCUSATE SODIUM 100 MG
1 CAPSULE ORAL
Qty: 100 | Refills: 0 | OUTPATIENT
Start: 2017-02-27

## 2017-02-27 RX ORDER — INSULIN GLARGINE 100 [IU]/ML
10 INJECTION, SOLUTION SUBCUTANEOUS
Qty: 0 | Refills: 0 | DISCHARGE
Start: 2017-02-27

## 2017-02-27 RX ORDER — CALCIUM CARBONATE 500(1250)
2 TABLET ORAL
Qty: 200 | Refills: 0 | OUTPATIENT
Start: 2017-02-27

## 2017-02-27 RX ORDER — INSULIN GLARGINE 100 [IU]/ML
20 INJECTION, SOLUTION SUBCUTANEOUS
Qty: 0 | Refills: 0 | DISCHARGE
Start: 2017-02-27

## 2017-02-27 RX ORDER — ATORVASTATIN CALCIUM 80 MG/1
1 TABLET, FILM COATED ORAL
Qty: 30 | Refills: 0
Start: 2017-02-27

## 2017-02-27 RX ADMIN — Medication 100 MILLIGRAM(S): at 05:53

## 2017-02-27 RX ADMIN — Medication 2: at 08:08

## 2017-02-27 RX ADMIN — PANTOPRAZOLE SODIUM 40 MILLIGRAM(S): 20 TABLET, DELAYED RELEASE ORAL at 08:12

## 2017-02-27 RX ADMIN — Medication 100 MILLIGRAM(S): at 05:52

## 2017-02-27 RX ADMIN — HEPARIN SODIUM 5000 UNIT(S): 5000 INJECTION INTRAVENOUS; SUBCUTANEOUS at 05:55

## 2017-02-27 RX ADMIN — LISINOPRIL 20 MILLIGRAM(S): 2.5 TABLET ORAL at 05:52

## 2017-02-27 RX ADMIN — CLOPIDOGREL BISULFATE 75 MILLIGRAM(S): 75 TABLET, FILM COATED ORAL at 12:17

## 2017-02-27 RX ADMIN — PIPERACILLIN AND TAZOBACTAM 25 GRAM(S): 4; .5 INJECTION, POWDER, LYOPHILIZED, FOR SOLUTION INTRAVENOUS at 05:51

## 2017-02-27 RX ADMIN — Medication 4: at 12:17

## 2017-02-27 NOTE — DISCHARGE NOTE ADULT - HOSPITAL COURSE
61 years old male was admitted for sepsis, acute gastroenteritis, vomiting, acute renal insufficiency and dehydration. Conditions are improved with IV hydration and IV Abx. EGD revealed inflammation at the low part of esophagus. S/P 2 units of PRBC transfusion for blood loss anemia. Patient refuse bone marrow study. Stable to go home and follow up with GI.

## 2017-02-27 NOTE — PROGRESS NOTE ADULT - PROBLEM SELECTOR PROBLEM 1
Anemia
Anemia due to blood loss
Anemia due to blood loss
Dysphagia, unspecified type
Dysphagia, unspecified type
Esophageal abnormality

## 2017-02-27 NOTE — PROGRESS NOTE ADULT - PROBLEM SELECTOR PROBLEM 2
Anemia due to blood loss
Dysphagia, unspecified type
GERD (gastroesophageal reflux disease)
GERD (gastroesophageal reflux disease)
Gastroesophageal reflux disease with esophagitis
Gastroesophageal reflux disease with esophagitis

## 2017-02-27 NOTE — DISCHARGE NOTE ADULT - PATIENT PORTAL LINK FT
“You can access the [employer] Patient Portal, offered by [facility], by registering with the following website: [email or web address]”

## 2017-02-27 NOTE — DISCHARGE NOTE ADULT - MEDICATION SUMMARY - MEDICATIONS TO STOP TAKING
I will STOP taking the medications listed below when I get home from the hospital:    Plavix 75 mg oral tablet  -- 1 tab(s) by mouth once a day    acetaminophen-oxyCODONE 325 mg-5 mg oral tablet  -- 2 tab(s) by mouth every 4 hours, As needed, Moderate Pain    enoxaparin  -- 40 milligram(s) subcutaneous once a day    amoxicillin-clavulanate 500 mg-125 mg oral tablet  -- 1 tab(s) by mouth 2 times a day    ciprofloxacin 500 mg oral tablet  -- 1 tab(s) by mouth every 12 hours

## 2017-02-27 NOTE — DISCHARGE NOTE ADULT - MEDICATION SUMMARY - MEDICATIONS TO TAKE
I will START or STAY ON the medications listed below when I get home from the hospital:    aspirin 325 mg oral tablet  -- 1 tab(s) by mouth once a day  -- Indication: For Supple    lisinopril 40 mg oral tablet  -- 1 tab(s) by mouth once a day  -- Indication: For HTN    calcium carbonate 500 mg (200 mg elemental calcium) oral tablet, chewable  -- 2 tab(s) by mouth 3 times a day  -- Indication: For Supple    Lyrica 100 mg oral capsule  -- 1 cap(s) by mouth 2 times a day MDD:2 tab per day  -- Indication: For Pain    atorvastatin 40 mg oral tablet  -- 1 tab(s) by mouth once a day (at bedtime)  -- Indication: For HLD    docusate sodium 100 mg oral capsule  -- 1 cap(s) by mouth 3 times a day  -- Indication: For Constipation    Bacid (LAC) oral capsule  -- 1 cap(s) by mouth once a day  -- Indication: For Gastroesophageal reflux disease with esophagitis    pantoprazole 40 mg oral delayed release tablet  -- 1 tab(s) by mouth once a day (before a meal)  -- Indication: For Gastroesophageal reflux disease with esophagitis I will START or STAY ON the medications listed below when I get home from the hospital:    aspirin 325 mg oral tablet  -- 1 tab(s) by mouth once a day  -- Indication: For Supple    lisinopril 40 mg oral tablet  -- 1 tab(s) by mouth once a day  -- Indication: For HTN    calcium carbonate 500 mg (200 mg elemental calcium) oral tablet, chewable  -- 2 tab(s) by mouth 3 times a day  -- Indication: For Supple    Lyrica 100 mg oral capsule  -- 1 cap(s) by mouth 2 times a day MDD:2 tab per day  -- Indication: For Pain    insulin glargine  -- 10 unit(s) subcutaneous once a day (at bedtime)  -- Indication: For DM    atorvastatin 40 mg oral tablet  -- 1 tab(s) by mouth once a day (at bedtime)  -- Indication: For HLD    clopidogrel 75 mg oral tablet  -- 1 tab(s) by mouth once a day  -- Indication: For PVD    docusate sodium 100 mg oral capsule  -- 1 cap(s) by mouth 3 times a day  -- Indication: For Constipation    Bacid (LAC) oral capsule  -- 1 cap(s) by mouth once a day  -- Indication: For Gastroesophageal reflux disease with esophagitis    pantoprazole 40 mg oral delayed release tablet  -- 1 tab(s) by mouth once a day (before a meal)  -- Indication: For Gastroesophageal reflux disease with esophagitis

## 2017-02-27 NOTE — DISCHARGE NOTE ADULT - VISION (WITH CORRECTIVE LENSES IF THE PATIENT USUALLY WEARS THEM):
pt blind in both eyes/Severely impaired: cannot locate objects without hearing or touching them or patient nonresponsive.

## 2017-02-27 NOTE — PROGRESS NOTE ADULT - SUBJECTIVE AND OBJECTIVE BOX
GASTROENTEROLOGY  Patient seen and examined bedside resting comfortably.  No complaints offered. tolerating diet   Denies Abdominal pain  nausea and vomiting. Tolerating diet.  flatus/BM. +neuropathy pain     T(F): 97.8, Max: 98.8 (02-26 @ 16:45)  HR: 69 (61 - 69)  BP: 126/62 (112/67 - 126/62)  RR: 16 (16 - 16)  SpO2: 95% (95% - 97%)  Wt(kg): --  CAPILLARY BLOOD GLUCOSE  187 (27 Feb 2017 08:07)  175 (26 Feb 2017 21:39)  183 (26 Feb 2017 15:43)  240 (26 Feb 2017 11:31)      PHYSICAL EXAM:  General: NAD, WDWN.   Neuro:  Alert & oriented x 3  HEENT: NCAT, opacified right eye  conjunctiva clear  BLIND in BOTH eyes  CV: +S1+S2 regular rate and rhythm  Lung: clear to ausculation bilaterally, respirations nonlabored, good inspiratory effort  Abdomen: soft, OBESE NT ND. Normo active BS  Extremities: RBKA  L no edema    LABS:                        10.3   5.5   )-----------( 139      ( 26 Feb 2017 08:38 )             29.6     26 Feb 2017 08:38    141    |  105    |  20     ----------------------------<  157    4.3     |  29     |  1.43     Ca    8.1        26 Feb 2017 08:38    TPro  5.7    /  Alb  2.5    /  TBili  0.4    /  DBili  x      /  AST  16     /  ALT  18     /  AlkPhos  48     26 Feb 2017 08:38    LIVER FUNCTIONS - ( 26 Feb 2017 08:38 )  Alb: 2.5 g/dL / Pro: 5.7 gm/dL / ALK PHOS: 48 U/L / ALT: 18 U/L / AST: 16 U/L / GGT: x             I&O's Detail    I & Os for current day (as of 27 Feb 2017 09:34)  =============================================  IN:    Oral Fluid: 780 ml    Solution: 100 ml    Total IN: 880 ml  ---------------------------------------------  OUT:    Voided: 750 ml    Total OUT: 750 ml  ---------------------------------------------  Total NET: 130 ml

## 2017-02-27 NOTE — PROGRESS NOTE ADULT - PROBLEM SELECTOR PLAN 2
CBC  Pt. refuses Bone Marrow exam.  Transfuse prn.
EGD: -Inflammation.  GI follow up.
Esophageal Ulcer.  No Malignancy.  GI Follow up.
Gastroscopy:- inflammation.
Continue PPI
continue PPI .  toerating diet  HB is decreasingh ? transfusion
continue PPI and carafate.  tolerating diet  HB is stable s/p transfusion.
contonie PPI drip.

## 2017-02-27 NOTE — DISCHARGE NOTE ADULT - SECONDARY DIAGNOSIS.
Acute gastroenteritis Gastroesophageal reflux disease with esophagitis Esophageal abnormality Diabetes Acute kidney injury Dehydration

## 2017-02-27 NOTE — DISCHARGE NOTE ADULT - CARE PROVIDER_API CALL
Cameron Marcus), Internal Medicine  [address] [place] [address]  Phone: [phone]  Fax: [phone]

## 2017-02-27 NOTE — DISCHARGE NOTE ADULT - CARE PLAN
Principal Discharge DX:	Sepsis  Goal:	Resolved  Instructions for follow-up, activity and diet:	Observation  Secondary Diagnosis:	Acute gastroenteritis  Goal:	Resolved  Secondary Diagnosis:	Gastroesophageal reflux disease with esophagitis  Goal:	Improving  Secondary Diagnosis:	Esophageal abnormality  Goal:	S/P EGD, follow up with GI  Secondary Diagnosis:	Diabetes  Goal:	Meds  Secondary Diagnosis:	Acute kidney injury  Goal:	Improved  Secondary Diagnosis:	Dehydration  Goal:	Improved

## 2017-02-27 NOTE — PROGRESS NOTE ADULT - PROBLEM SELECTOR PLAN 1
CBC  Antibiotics.
CBC  CRF
CBC  Transfuse prn.  Pt. refuses Bone-Marrow Bx.
EGD.  GI follow up.
For EGD today
S/P EGD, f/u pathoogy
awaiting biopsies results
biopsies results. fibropurlent esophagitis  severe no neoplasia seen.  would repeat EGD in 8 weeks as outpatient

## 2017-03-02 DIAGNOSIS — N17.9 ACUTE KIDNEY FAILURE, UNSPECIFIED: ICD-10-CM

## 2017-03-02 DIAGNOSIS — I25.10 ATHEROSCLEROTIC HEART DISEASE OF NATIVE CORONARY ARTERY WITHOUT ANGINA PECTORIS: ICD-10-CM

## 2017-03-02 DIAGNOSIS — D62 ACUTE POSTHEMORRHAGIC ANEMIA: ICD-10-CM

## 2017-03-02 DIAGNOSIS — E86.0 DEHYDRATION: ICD-10-CM

## 2017-03-02 DIAGNOSIS — K52.9 NONINFECTIVE GASTROENTERITIS AND COLITIS, UNSPECIFIED: ICD-10-CM

## 2017-03-02 DIAGNOSIS — E11.40 TYPE 2 DIABETES MELLITUS WITH DIABETIC NEUROPATHY, UNSPECIFIED: ICD-10-CM

## 2017-03-02 DIAGNOSIS — K21.0 GASTRO-ESOPHAGEAL REFLUX DISEASE WITH ESOPHAGITIS: ICD-10-CM

## 2017-03-02 DIAGNOSIS — K44.9 DIAPHRAGMATIC HERNIA WITHOUT OBSTRUCTION OR GANGRENE: ICD-10-CM

## 2017-03-02 DIAGNOSIS — A41.9 SEPSIS, UNSPECIFIED ORGANISM: ICD-10-CM

## 2017-03-02 DIAGNOSIS — K22.8 OTHER SPECIFIED DISEASES OF ESOPHAGUS: ICD-10-CM

## 2017-03-02 DIAGNOSIS — H54.0 BLINDNESS, BOTH EYES: ICD-10-CM

## 2017-03-02 DIAGNOSIS — I10 ESSENTIAL (PRIMARY) HYPERTENSION: ICD-10-CM

## 2017-03-02 DIAGNOSIS — R11.10 VOMITING, UNSPECIFIED: ICD-10-CM

## 2017-03-02 DIAGNOSIS — Z79.4 LONG TERM (CURRENT) USE OF INSULIN: ICD-10-CM

## 2017-08-21 ENCOUNTER — INPATIENT (INPATIENT)
Facility: HOSPITAL | Age: 62
LOS: 6 days | Discharge: HOME HEALTH | End: 2017-08-28
Attending: INTERNAL MEDICINE | Admitting: INTERNAL MEDICINE
Payer: MEDICARE

## 2017-08-21 VITALS
SYSTOLIC BLOOD PRESSURE: 127 MMHG | HEART RATE: 88 BPM | OXYGEN SATURATION: 97 % | RESPIRATION RATE: 16 BRPM | DIASTOLIC BLOOD PRESSURE: 62 MMHG | HEIGHT: 73 IN | TEMPERATURE: 99 F | WEIGHT: 315 LBS

## 2017-08-21 DIAGNOSIS — G62.9 POLYNEUROPATHY, UNSPECIFIED: ICD-10-CM

## 2017-08-21 DIAGNOSIS — E08.8 DIABETES MELLITUS DUE TO UNDERLYING CONDITION WITH UNSPECIFIED COMPLICATIONS: ICD-10-CM

## 2017-08-21 DIAGNOSIS — I25.10 ATHEROSCLEROTIC HEART DISEASE OF NATIVE CORONARY ARTERY WITHOUT ANGINA PECTORIS: ICD-10-CM

## 2017-08-21 DIAGNOSIS — H54.0 BLINDNESS, BOTH EYES: ICD-10-CM

## 2017-08-21 DIAGNOSIS — M86.161 OTHER ACUTE OSTEOMYELITIS, RIGHT TIBIA AND FIBULA: ICD-10-CM

## 2017-08-21 DIAGNOSIS — Z95.5 PRESENCE OF CORONARY ANGIOPLASTY IMPLANT AND GRAFT: Chronic | ICD-10-CM

## 2017-08-21 LAB
ALBUMIN SERPL ELPH-MCNC: 3 G/DL — LOW (ref 3.3–5)
ALP SERPL-CCNC: 94 U/L — SIGNIFICANT CHANGE UP (ref 40–120)
ALT FLD-CCNC: 15 U/L — SIGNIFICANT CHANGE UP (ref 12–78)
ANION GAP SERPL CALC-SCNC: 11 MMOL/L — SIGNIFICANT CHANGE UP (ref 5–17)
APTT BLD: 33.2 SEC — SIGNIFICANT CHANGE UP (ref 27.5–37.4)
AST SERPL-CCNC: 14 U/L — LOW (ref 15–37)
BILIRUB SERPL-MCNC: 0.3 MG/DL — SIGNIFICANT CHANGE UP (ref 0.2–1.2)
BUN SERPL-MCNC: 31 MG/DL — HIGH (ref 7–23)
CALCIUM SERPL-MCNC: 9.4 MG/DL — SIGNIFICANT CHANGE UP (ref 8.5–10.1)
CHLORIDE SERPL-SCNC: 104 MMOL/L — SIGNIFICANT CHANGE UP (ref 96–108)
CO2 SERPL-SCNC: 24 MMOL/L — SIGNIFICANT CHANGE UP (ref 22–31)
CREAT SERPL-MCNC: 1.61 MG/DL — HIGH (ref 0.5–1.3)
ERYTHROCYTE [SEDIMENTATION RATE] IN BLOOD: 67 MM/HR — HIGH (ref 0–20)
GLUCOSE SERPL-MCNC: 175 MG/DL — HIGH (ref 70–99)
HCT VFR BLD CALC: 33.4 % — LOW (ref 39–50)
HGB BLD-MCNC: 10.6 G/DL — LOW (ref 13–17)
INR BLD: 1.21 RATIO — HIGH (ref 0.88–1.16)
MCHC RBC-ENTMCNC: 29.9 PG — SIGNIFICANT CHANGE UP (ref 27–34)
MCHC RBC-ENTMCNC: 31.8 GM/DL — LOW (ref 32–36)
MCV RBC AUTO: 94 FL — SIGNIFICANT CHANGE UP (ref 80–100)
PLATELET # BLD AUTO: 153 K/UL — SIGNIFICANT CHANGE UP (ref 150–400)
POTASSIUM SERPL-MCNC: 4.9 MMOL/L — SIGNIFICANT CHANGE UP (ref 3.5–5.3)
POTASSIUM SERPL-SCNC: 4.9 MMOL/L — SIGNIFICANT CHANGE UP (ref 3.5–5.3)
PROT SERPL-MCNC: 7.5 GM/DL — SIGNIFICANT CHANGE UP (ref 6–8.3)
PROTHROM AB SERPL-ACNC: 13.2 SEC — HIGH (ref 9.8–12.7)
RBC # BLD: 3.55 M/UL — LOW (ref 4.2–5.8)
RBC # FLD: 13.4 % — SIGNIFICANT CHANGE UP (ref 11–15)
SODIUM SERPL-SCNC: 139 MMOL/L — SIGNIFICANT CHANGE UP (ref 135–145)
WBC # BLD: 5.5 K/UL — SIGNIFICANT CHANGE UP (ref 3.8–10.5)
WBC # FLD AUTO: 5.5 K/UL — SIGNIFICANT CHANGE UP (ref 3.8–10.5)

## 2017-08-21 PROCEDURE — 99223 1ST HOSP IP/OBS HIGH 75: CPT | Mod: AI

## 2017-08-21 PROCEDURE — 99284 EMERGENCY DEPT VISIT MOD MDM: CPT

## 2017-08-21 PROCEDURE — 73590 X-RAY EXAM OF LOWER LEG: CPT | Mod: 26,RT

## 2017-08-21 RX ORDER — SODIUM CHLORIDE 9 MG/ML
1000 INJECTION, SOLUTION INTRAVENOUS
Qty: 0 | Refills: 0 | Status: DISCONTINUED | OUTPATIENT
Start: 2017-08-21 | End: 2017-08-24

## 2017-08-21 RX ORDER — DEXTROSE 50 % IN WATER 50 %
25 SYRINGE (ML) INTRAVENOUS ONCE
Qty: 0 | Refills: 0 | Status: DISCONTINUED | OUTPATIENT
Start: 2017-08-21 | End: 2017-08-24

## 2017-08-21 RX ORDER — PANTOPRAZOLE SODIUM 20 MG/1
40 TABLET, DELAYED RELEASE ORAL
Qty: 0 | Refills: 0 | Status: DISCONTINUED | OUTPATIENT
Start: 2017-08-21 | End: 2017-08-24

## 2017-08-21 RX ORDER — LISINOPRIL 2.5 MG/1
40 TABLET ORAL DAILY
Qty: 0 | Refills: 0 | Status: DISCONTINUED | OUTPATIENT
Start: 2017-08-21 | End: 2017-08-24

## 2017-08-21 RX ORDER — GLUCAGON INJECTION, SOLUTION 0.5 MG/.1ML
1 INJECTION, SOLUTION SUBCUTANEOUS ONCE
Qty: 0 | Refills: 0 | Status: DISCONTINUED | OUTPATIENT
Start: 2017-08-21 | End: 2017-08-24

## 2017-08-21 RX ORDER — CEFEPIME 1 G/1
1000 INJECTION, POWDER, FOR SOLUTION INTRAMUSCULAR; INTRAVENOUS EVERY 12 HOURS
Qty: 0 | Refills: 0 | Status: DISCONTINUED | OUTPATIENT
Start: 2017-08-22 | End: 2017-08-24

## 2017-08-21 RX ORDER — DOCUSATE SODIUM 100 MG
100 CAPSULE ORAL THREE TIMES A DAY
Qty: 0 | Refills: 0 | Status: DISCONTINUED | OUTPATIENT
Start: 2017-08-21 | End: 2017-08-24

## 2017-08-21 RX ORDER — HEPARIN SODIUM 5000 [USP'U]/ML
7500 INJECTION INTRAVENOUS; SUBCUTANEOUS EVERY 8 HOURS
Qty: 0 | Refills: 0 | Status: DISCONTINUED | OUTPATIENT
Start: 2017-08-21 | End: 2017-08-23

## 2017-08-21 RX ORDER — CEFEPIME 1 G/1
1000 INJECTION, POWDER, FOR SOLUTION INTRAMUSCULAR; INTRAVENOUS ONCE
Qty: 0 | Refills: 0 | Status: COMPLETED | OUTPATIENT
Start: 2017-08-21 | End: 2017-08-21

## 2017-08-21 RX ORDER — GABAPENTIN 400 MG/1
300 CAPSULE ORAL
Qty: 0 | Refills: 0 | Status: DISCONTINUED | OUTPATIENT
Start: 2017-08-21 | End: 2017-08-24

## 2017-08-21 RX ORDER — DEXTROSE 50 % IN WATER 50 %
1 SYRINGE (ML) INTRAVENOUS ONCE
Qty: 0 | Refills: 0 | Status: DISCONTINUED | OUTPATIENT
Start: 2017-08-21 | End: 2017-08-24

## 2017-08-21 RX ORDER — VANCOMYCIN HCL 1 G
1000 VIAL (EA) INTRAVENOUS EVERY 12 HOURS
Qty: 0 | Refills: 0 | Status: DISCONTINUED | OUTPATIENT
Start: 2017-08-21 | End: 2017-08-21

## 2017-08-21 RX ORDER — CEFEPIME 1 G/1
INJECTION, POWDER, FOR SOLUTION INTRAMUSCULAR; INTRAVENOUS
Qty: 0 | Refills: 0 | Status: DISCONTINUED | OUTPATIENT
Start: 2017-08-21 | End: 2017-08-24

## 2017-08-21 RX ORDER — ATORVASTATIN CALCIUM 80 MG/1
40 TABLET, FILM COATED ORAL AT BEDTIME
Qty: 0 | Refills: 0 | Status: DISCONTINUED | OUTPATIENT
Start: 2017-08-21 | End: 2017-08-24

## 2017-08-21 RX ORDER — CLOPIDOGREL BISULFATE 75 MG/1
75 TABLET, FILM COATED ORAL DAILY
Qty: 0 | Refills: 0 | Status: DISCONTINUED | OUTPATIENT
Start: 2017-08-21 | End: 2017-08-23

## 2017-08-21 RX ORDER — VANCOMYCIN HCL 1 G
750 VIAL (EA) INTRAVENOUS EVERY 12 HOURS
Qty: 0 | Refills: 0 | Status: DISCONTINUED | OUTPATIENT
Start: 2017-08-22 | End: 2017-08-24

## 2017-08-21 RX ORDER — VANCOMYCIN HCL 1 G
1000 VIAL (EA) INTRAVENOUS ONCE
Qty: 0 | Refills: 0 | Status: COMPLETED | OUTPATIENT
Start: 2017-08-21 | End: 2017-08-21

## 2017-08-21 RX ORDER — INSULIN LISPRO 100/ML
VIAL (ML) SUBCUTANEOUS
Qty: 0 | Refills: 0 | Status: DISCONTINUED | OUTPATIENT
Start: 2017-08-21 | End: 2017-08-24

## 2017-08-21 RX ORDER — DEXTROSE 50 % IN WATER 50 %
12.5 SYRINGE (ML) INTRAVENOUS ONCE
Qty: 0 | Refills: 0 | Status: DISCONTINUED | OUTPATIENT
Start: 2017-08-21 | End: 2017-08-24

## 2017-08-21 RX ADMIN — CEFEPIME 100 MILLIGRAM(S): 1 INJECTION, POWDER, FOR SOLUTION INTRAMUSCULAR; INTRAVENOUS at 20:07

## 2017-08-21 RX ADMIN — ATORVASTATIN CALCIUM 40 MILLIGRAM(S): 80 TABLET, FILM COATED ORAL at 21:33

## 2017-08-21 RX ADMIN — Medication 100 MILLIGRAM(S): at 21:34

## 2017-08-21 RX ADMIN — HEPARIN SODIUM 7500 UNIT(S): 5000 INJECTION INTRAVENOUS; SUBCUTANEOUS at 21:34

## 2017-08-21 RX ADMIN — Medication 250 MILLIGRAM(S): at 16:49

## 2017-08-21 RX ADMIN — Medication 2: at 21:34

## 2017-08-21 NOTE — ED ADULT NURSE REASSESSMENT NOTE - NS ED NURSE REASSESS COMMENT FT1
o x3 , right bka , with stump wound , bc x2 ordered , patient difficult access ,  1 set of culture sent . dr. mchugh notified

## 2017-08-21 NOTE — H&P ADULT - NSHPPHYSICALEXAM_GEN_ALL_CORE
GENERAL: NAD, well-groomed, well-developed  HEAD:  Atraumatic, Normocephalic  EYES: EOMI, PERRLA, Blind  ENMT: No tonsillar erythema, exudates, or enlargement; Moist mucous membranes, Good dentition, No lesions  NECK: Supple, No JVD, Normal thyroid  NERVOUS SYSTEM:  Alert & Oriented X3, Good concentration; Motor Strength 5/5 B/L upper and lower extremities; DTRs 2+ intact and symmetric  CHEST/LUNG: Clear to percussion bilaterally; No rales, rhonchi, wheezing, or rubs  HEART: Regular rate and rhythm; No murmurs, rubs, or gallops  ABDOMEN: Soft, Nontender, Nondistended; Bowel sounds present, Obese  EXTREMITIES:  2+ Peripheral Pulses, No clubbing, cyanosis, or edema R BKA stump, draining wound w erythema  LYMPH: No lymphadenopathy notedRECTAL: No masses, prostate normal size and smooth, Guiac negative   BREAST: No palpatble masses, skin no lesions no retractions, no discharages. adenexa no palpable masses noted   GYN: uterus normal size, adenexa no palpable masses, no CMT, no uterine discharge   SKIN: No rashes or lesions

## 2017-08-21 NOTE — CONSULT NOTE ADULT - ASSESSMENT
A/P: 62M with osteomyelitis of RLE  Pain control  WBAT  DVT ppx per medicine team  IV antibiotics per med/ID  Keep dressing clean, dry and intact  FU blood cultures  Medical management per medical team  No acute orthopedic surgical intervention necessary at this time  Will discuss with attending and advise if plan changes
Patient has red bloody discharge from wound lateral side right stump. AKA, secondary to diabetes, and OM.

## 2017-08-21 NOTE — H&P ADULT - NSHPLABSRESULTS_GEN_ALL_CORE
10.6   5.5   )-----------( 153      ( 21 Aug 2017 16:03 )             33.4     08-21    139  |  104  |  31<H>  ----------------------------<  175<H>  4.9   |  24  |  1.61<H>    Ca    9.4      21 Aug 2017 16:03    TPro  7.5  /  Alb  3.0<L>  /  TBili  0.3  /  DBili  x   /  AST  14<L>  /  ALT  15  /  AlkPhos  94  08-21    PT/INR - ( 21 Aug 2017 16:03 )   PT: 13.2 sec;   INR: 1.21 ratio         PTT - ( 21 Aug 2017 16:03 )  PTT:33.2 sec  xray:IMPRESSION : Status post amputation without plain film evidence of   osteomyelitis    Outpatient MRI R LE: Osteomyelitis of distal tibia in R stump

## 2017-08-21 NOTE — ED PROVIDER NOTE - MEDICAL DECISION MAKING DETAILS
61 yo M with osteo of R tibia on MRI  -obtain MRI report/images from pt.'s wife, basic labs, blood cultures, coag, type and screen, crp, esr, xr tib  -vanco, finger sticks, repeat vitals, ortho consult

## 2017-08-21 NOTE — ED PROVIDER NOTE - PHYSICAL EXAMINATION
Vitals: WNL  Gen: AAOx3, NAD, sitting comfortably in stretcher, legally blind, cataracts over eyes  Head: ncat, perrla, eomi b/l  Neck: supple, no lymphadenopathy, no midline deviation  Heart: rrr, no m/r/g  Lungs: CTA b/l, no rales/ronchi/wheezes  Abd: soft, nontender, non-distended, no rebound or guarding  Ext: no clubbing/cyanosis/edema, RLE BKA, local erythema around wound, no expressible pus drainage, no trailing cellulitis   Neuro: sensation and muscle strength intact b/l, steady gait

## 2017-08-21 NOTE — H&P ADULT - ASSESSMENT
63yo m pmhx of dm, blindness, cad, neuropathy. Sent in by pmd after mri finding of osteomyelitis in his R BKA stump. Pt had bka amputation on 6/16. Was on out pt abx for his r bka ulcer.

## 2017-08-21 NOTE — CONSULT NOTE ADULT - PROBLEM SELECTOR RECOMMENDATION 9
MRI reportedly is done outpatient, results reported as OM. Will start antibiotics at this point. Cefepime 1 gm IVPB q 12 hrs.  Continue vanco 1 gm IVPB q 12 hrs.  Vascular evaluation.  PICC line and 6 weeks of antibiotics. MRI out-patient report to be followed. MRI reportedly is done outpatient, results reported as OM. Will start antibiotics at this point. Cefepime 1 gm IVPB q 12 hrs.  Continue vanco 1 gm IVPB q 12 hrs.  Vascular evaluation.  PICC line and 6 weeks of antibiotics. MRI out-patient report to be followed.  Daily dressing.

## 2017-08-21 NOTE — ED PROVIDER NOTE - OBJECTIVE STATEMENT
61 yo M with osetomyelitis on outpt. MRI of R amputated extremity.  Pt. says there is a draining wound over the posterior stump.  Pt. reports no pain.  Pt. denies constitutional symptoms.  no other complaints. PCP recommended pt. come to ER for antibiotics.  BKA performed here about a year ago by Dr. Yip.  pcp: Dr. Saldana  ROS: negative for fever, cough, headache, chest pain, shortness of breath, abd pain, nausea, vomiting, diarrhea, rash, paresthesia, and weakness.   PMH: IDDM; Meds: insulin; SH: Denies smoking/drinking/drug use

## 2017-08-21 NOTE — H&P ADULT - HISTORY OF PRESENT ILLNESS
61yo m pmhx of dm, blindness, cad, neuropathy. Sent in by pmd after mri finding of osteomyelitis in his R BKA stump. Pt had bka amputation on 6/16. Was on out pt abx for his r bka ulcer. Pt states he feels well, denies pain at the area. No fevers/chill/n/v, no cp, sob. 61yo m pmhx of dm, blindness, cad, neuropathy. Sent in by pmd after mri finding of osteomyelitis in his R BKA stump. Pt had bka amputation on 6/2016. Was on out pt abx for his r bka ulcer. Pt states he feels well, denies pain at the area. No fevers/chill/n/v, no cp, sob.

## 2017-08-21 NOTE — ED ADULT NURSE NOTE - OBJECTIVE STATEMENT
ao x3, accompanied by his spouse, stated his primary doctor told him to come to ED for infection to the stump, PATIENT WITH RIGHT bka

## 2017-08-22 LAB
ANION GAP SERPL CALC-SCNC: 9 MMOL/L — SIGNIFICANT CHANGE UP (ref 5–17)
BASOPHILS # BLD AUTO: 0.1 K/UL — SIGNIFICANT CHANGE UP (ref 0–0.2)
BASOPHILS NFR BLD AUTO: 1.3 % — SIGNIFICANT CHANGE UP (ref 0–2)
BUN SERPL-MCNC: 27 MG/DL — HIGH (ref 7–23)
CALCIUM SERPL-MCNC: 9 MG/DL — SIGNIFICANT CHANGE UP (ref 8.5–10.1)
CHLORIDE SERPL-SCNC: 104 MMOL/L — SIGNIFICANT CHANGE UP (ref 96–108)
CO2 SERPL-SCNC: 25 MMOL/L — SIGNIFICANT CHANGE UP (ref 22–31)
CREAT SERPL-MCNC: 1.48 MG/DL — HIGH (ref 0.5–1.3)
CRP SERPL-MCNC: 2 MG/DL — HIGH (ref 0–0.4)
EOSINOPHIL # BLD AUTO: 0.1 K/UL — SIGNIFICANT CHANGE UP (ref 0–0.5)
EOSINOPHIL NFR BLD AUTO: 2.5 % — SIGNIFICANT CHANGE UP (ref 0–6)
GLUCOSE SERPL-MCNC: 99 MG/DL — SIGNIFICANT CHANGE UP (ref 70–99)
HBA1C BLD-MCNC: 6.8 % — HIGH (ref 4–5.6)
HCT VFR BLD CALC: 30.3 % — LOW (ref 39–50)
HGB BLD-MCNC: 10.2 G/DL — LOW (ref 13–17)
LYMPHOCYTES # BLD AUTO: 1.1 K/UL — SIGNIFICANT CHANGE UP (ref 1–3.3)
LYMPHOCYTES # BLD AUTO: 20 % — SIGNIFICANT CHANGE UP (ref 13–44)
MCHC RBC-ENTMCNC: 31 PG — SIGNIFICANT CHANGE UP (ref 27–34)
MCHC RBC-ENTMCNC: 33.6 GM/DL — SIGNIFICANT CHANGE UP (ref 32–36)
MCV RBC AUTO: 92.1 FL — SIGNIFICANT CHANGE UP (ref 80–100)
MONOCYTES # BLD AUTO: 0.7 K/UL — SIGNIFICANT CHANGE UP (ref 0–0.9)
MONOCYTES NFR BLD AUTO: 12.3 % — SIGNIFICANT CHANGE UP (ref 2–14)
NEUTROPHILS # BLD AUTO: 3.5 K/UL — SIGNIFICANT CHANGE UP (ref 1.8–7.4)
NEUTROPHILS NFR BLD AUTO: 63.9 % — SIGNIFICANT CHANGE UP (ref 43–77)
PLATELET # BLD AUTO: 146 K/UL — LOW (ref 150–400)
POTASSIUM SERPL-MCNC: 4.3 MMOL/L — SIGNIFICANT CHANGE UP (ref 3.5–5.3)
POTASSIUM SERPL-SCNC: 4.3 MMOL/L — SIGNIFICANT CHANGE UP (ref 3.5–5.3)
RBC # BLD: 3.29 M/UL — LOW (ref 4.2–5.8)
RBC # FLD: 13.4 % — SIGNIFICANT CHANGE UP (ref 11–15)
SODIUM SERPL-SCNC: 138 MMOL/L — SIGNIFICANT CHANGE UP (ref 135–145)
WBC # BLD: 5.4 K/UL — SIGNIFICANT CHANGE UP (ref 3.8–10.5)
WBC # FLD AUTO: 5.4 K/UL — SIGNIFICANT CHANGE UP (ref 3.8–10.5)

## 2017-08-22 PROCEDURE — 99233 SBSQ HOSP IP/OBS HIGH 50: CPT

## 2017-08-22 RX ADMIN — PANTOPRAZOLE SODIUM 40 MILLIGRAM(S): 20 TABLET, DELAYED RELEASE ORAL at 08:11

## 2017-08-22 RX ADMIN — Medication 150 MILLIGRAM(S): at 17:45

## 2017-08-22 RX ADMIN — CEFEPIME 100 MILLIGRAM(S): 1 INJECTION, POWDER, FOR SOLUTION INTRAMUSCULAR; INTRAVENOUS at 08:11

## 2017-08-22 RX ADMIN — Medication 100 MILLIGRAM(S): at 22:31

## 2017-08-22 RX ADMIN — HEPARIN SODIUM 7500 UNIT(S): 5000 INJECTION INTRAVENOUS; SUBCUTANEOUS at 13:19

## 2017-08-22 RX ADMIN — HEPARIN SODIUM 7500 UNIT(S): 5000 INJECTION INTRAVENOUS; SUBCUTANEOUS at 22:32

## 2017-08-22 RX ADMIN — HEPARIN SODIUM 7500 UNIT(S): 5000 INJECTION INTRAVENOUS; SUBCUTANEOUS at 06:14

## 2017-08-22 RX ADMIN — CLOPIDOGREL BISULFATE 75 MILLIGRAM(S): 75 TABLET, FILM COATED ORAL at 11:01

## 2017-08-22 RX ADMIN — GABAPENTIN 300 MILLIGRAM(S): 400 CAPSULE ORAL at 06:14

## 2017-08-22 RX ADMIN — Medication 100 MILLIGRAM(S): at 13:19

## 2017-08-22 RX ADMIN — LISINOPRIL 40 MILLIGRAM(S): 2.5 TABLET ORAL at 06:14

## 2017-08-22 RX ADMIN — ATORVASTATIN CALCIUM 40 MILLIGRAM(S): 80 TABLET, FILM COATED ORAL at 22:31

## 2017-08-22 RX ADMIN — GABAPENTIN 300 MILLIGRAM(S): 400 CAPSULE ORAL at 17:45

## 2017-08-22 RX ADMIN — Medication 150 MILLIGRAM(S): at 06:16

## 2017-08-22 RX ADMIN — CEFEPIME 100 MILLIGRAM(S): 1 INJECTION, POWDER, FOR SOLUTION INTRAMUSCULAR; INTRAVENOUS at 20:13

## 2017-08-22 RX ADMIN — Medication 100 MILLIGRAM(S): at 06:14

## 2017-08-22 NOTE — PROGRESS NOTE ADULT - SUBJECTIVE AND OBJECTIVE BOX
Patient is a 62y old  Male who presents with a chief complaint of Sent by PMD for Osteomyelitis (21 Aug 2017 18:42)      OVERNIGHT EVENTS:      REVIEW OF SYSTEMS: denies chest pain/SOB, diaphoresis, no F/C, cough, dizziness, headache, blurry vision, nausea, vomiting, abdominal pain. Rest unremarkable     MEDICATIONS  (STANDING):  heparin  Injectable 7500 Unit(s) SubCutaneous every 8 hours  insulin lispro (HumaLOG) corrective regimen sliding scale   SubCutaneous three times a day before meals  dextrose 5%. 1000 milliLiter(s) (50 mL/Hr) IV Continuous <Continuous>  dextrose 50% Injectable 12.5 Gram(s) IV Push once  dextrose 50% Injectable 25 Gram(s) IV Push once  dextrose 50% Injectable 25 Gram(s) IV Push once  lisinopril 40 milliGRAM(s) Oral daily  atorvastatin 40 milliGRAM(s) Oral at bedtime  clopidogrel Tablet 75 milliGRAM(s) Oral daily  docusate sodium 100 milliGRAM(s) Oral three times a day  pantoprazole    Tablet 40 milliGRAM(s) Oral before breakfast  gabapentin 300 milliGRAM(s) Oral two times a day  cefepime  IVPB   IV Intermittent   cefepime  IVPB 1000 milliGRAM(s) IV Intermittent every 12 hours  vancomycin  IVPB 750 milliGRAM(s) IV Intermittent every 12 hours    MEDICATIONS  (PRN):  dextrose Gel 1 Dose(s) Oral once PRN Blood Glucose LESS THAN 70 milliGRAM(s)/deciliter  glucagon  Injectable 1 milliGRAM(s) IntraMuscular once PRN Glucose LESS THAN 70 milligrams/deciliter      Allergies    No Known Allergies    Intolerances        SUBJECTIVE: in bed in NAD, no acute events overnight     T(F): 97.7 (08-22-17 @ 12:57), Max: 98.7 (08-21-17 @ 14:20)  HR: 66 (08-22-17 @ 12:57) (66 - 88)  BP: 122/686 (08-22-17 @ 12:57) (106/58 - 164/86)  RR: 18 (08-22-17 @ 12:57) (16 - 19)  SpO2: 100% (08-22-17 @ 12:57) (96% - 100%)  Wt(kg): --    PHYSICAL EXAM:  GENERAL: NAD, well-groomed, well-developed  HEAD:  Atraumatic, Normocephalic  EYES: EOMI, PERRLA, conjunctiva and sclera clear  ENMT: No tonsillar erythema, exudates, or enlargement; Moist mucous membranes, Good dentition, No lesions  NECK: Supple, No JVD, Normal thyroid  CHEST/LUNG: Clear to  auscultation bilaterally; No rales, rhonchi, wheezing, or rubs  bilaterally  HEART: Regular rate and rhythm; No murmurs, rubs, or gallops  ABDOMEN: Soft, Nontender, Nondistended; Bowel sounds present  EXTREMITIES:  2+ Peripheral Pulses, No clubbing, cyanosis, or edema BL LE  LYMPH: No lymphadenopathy noted  SKIN: No rashes or lesions  NERVOUS SYSTEM:  Alert & Oriented X3, Good concentration; Motor Strength 5/5 B/L upper and lower extremities;   DTRs 2+ intact and symmetric, sensation intact BL    LABS:                        10.2   5.4   )-----------( 146      ( 22 Aug 2017 07:12 )             30.3     08-22    138  |  104  |  27<H>  ----------------------------<  99  4.3   |  25  |  1.48<H>    Ca    9.0      22 Aug 2017 07:12    TPro  7.5  /  Alb  3.0<L>  /  TBili  0.3  /  DBili  x   /  AST  14<L>  /  ALT  15  /  AlkPhos  94  08-21    PT/INR - ( 21 Aug 2017 16:03 )   PT: 13.2 sec;   INR: 1.21 ratio         PTT - ( 21 Aug 2017 16:03 )  PTT:33.2 sec    Cultures;   CAPILLARY BLOOD GLUCOSE  134 (22 Aug 2017 10:58)  106 (22 Aug 2017 07:35)  198 (21 Aug 2017 21:08)    Hemoglobin A1C, Whole Blood (08.22.17 @ 08:02)    Hemoglobin A1C, Whole Blood: 6.8: Method: Immunoassay       Reference Range                4.0-5.6%       High risk (prediabetic)        5.7-6.4%       Diabetic, diagnostic             >=6.5%       ADA diabetic treatment goal       <7.0%  The Hemoglobin A1c testing is NGSP-certified.6.8: Reference ranges are based  upon the 2010 recommendations of  the American Diabetes Association.  Interpretation may vary for children  and adolescents. %        Lipid panel:           RADIOLOGY & ADDITIONAL TESTS:      Imaging Personally Reviewed:  [ ] YES      Consultant(s) Notes Reviewed:  [ ] YES     Care Discussed with [ ] Consultants [X ] Patient [ ] Family  [x ]    [x ]  Other; RN Patient is a 62y old  Male who presents with a chief complaint of Sent by PMD for Osteomyelitis (21 Aug 2017 18:42)      OVERNIGHT EVENTS:      REVIEW OF SYSTEMS: denies chest pain/SOB, diaphoresis, no F/C, cough, dizziness, headache, blurry vision, nausea, vomiting, abdominal pain. Rest unremarkable     MEDICATIONS  (STANDING):  heparin  Injectable 7500 Unit(s) SubCutaneous every 8 hours  insulin lispro (HumaLOG) corrective regimen sliding scale   SubCutaneous three times a day before meals  dextrose 5%. 1000 milliLiter(s) (50 mL/Hr) IV Continuous <Continuous>  dextrose 50% Injectable 12.5 Gram(s) IV Push once  dextrose 50% Injectable 25 Gram(s) IV Push once  dextrose 50% Injectable 25 Gram(s) IV Push once  lisinopril 40 milliGRAM(s) Oral daily  atorvastatin 40 milliGRAM(s) Oral at bedtime  clopidogrel Tablet 75 milliGRAM(s) Oral daily  docusate sodium 100 milliGRAM(s) Oral three times a day  pantoprazole    Tablet 40 milliGRAM(s) Oral before breakfast  gabapentin 300 milliGRAM(s) Oral two times a day  cefepime  IVPB   IV Intermittent   cefepime  IVPB 1000 milliGRAM(s) IV Intermittent every 12 hours  vancomycin  IVPB 750 milliGRAM(s) IV Intermittent every 12 hours    MEDICATIONS  (PRN):  dextrose Gel 1 Dose(s) Oral once PRN Blood Glucose LESS THAN 70 milliGRAM(s)/deciliter  glucagon  Injectable 1 milliGRAM(s) IntraMuscular once PRN Glucose LESS THAN 70 milligrams/deciliter      Allergies    No Known Allergies    Intolerances        SUBJECTIVE: in bed in NAD, no acute events overnight     T(F): 97.7 (08-22-17 @ 12:57), Max: 98.7 (08-21-17 @ 14:20)  HR: 66 (08-22-17 @ 12:57) (66 - 88)  BP: 122/686 (08-22-17 @ 12:57) (106/58 - 164/86)  RR: 18 (08-22-17 @ 12:57) (16 - 19)  SpO2: 100% (08-22-17 @ 12:57) (96% - 100%)  Wt(kg): --    PHYSICAL EXAM:  GENERAL: NAD, obese well-groomed, well-developed  HEAD:  Atraumatic, Normocephalic  EYES: EOMI, blind , conjunctiva and sclera clear  ENMT: No tonsillar erythema, exudates, or enlargement; Moist mucous membranes, Good dentition, No lesions  NECK: Supple, No JVD, Normal thyroid  CHEST/LUNG: Clear to  auscultation bilaterally; No rales, rhonchi, wheezing, or rubs  bilaterally  HEART: Regular rate and rhythm; No murmurs, rubs, or gallops  ABDOMEN: Soft, Nontender, Nondistended; Bowel sounds present  EXTREMITIES:  2+ Peripheral Pulses, No clubbing, cyanosis, or edema BL LE  LYMPH: No lymphadenopathy noted  SKIN: wound draining pus on lateral distal aspect of stump mild induration and erythema non tender.   NERVOUS SYSTEM:  Alert & Oriented X3, Good concentration; Motor Strength 5/5 B/L upper and lower extremities; s/p right bka  DTRs 2+ intact and symmetric, sensation intact BL    LABS:                        10.2   5.4   )-----------( 146      ( 22 Aug 2017 07:12 )             30.3     08-22    138  |  104  |  27<H>  ----------------------------<  99  4.3   |  25  |  1.48<H>    Ca    9.0      22 Aug 2017 07:12    TPro  7.5  /  Alb  3.0<L>  /  TBili  0.3  /  DBili  x   /  AST  14<L>  /  ALT  15  /  AlkPhos  94  08-21    PT/INR - ( 21 Aug 2017 16:03 )   PT: 13.2 sec;   INR: 1.21 ratio         PTT - ( 21 Aug 2017 16:03 )  PTT:33.2 sec    Cultures;   CAPILLARY BLOOD GLUCOSE  134 (22 Aug 2017 10:58)  106 (22 Aug 2017 07:35)  198 (21 Aug 2017 21:08)    Hemoglobin A1C, Whole Blood (08.22.17 @ 08:02)    Hemoglobin A1C, Whole Blood: 6.8: Method: Immunoassay       Reference Range                4.0-5.6%       High risk (prediabetic)        5.7-6.4%       Diabetic, diagnostic             >=6.5%       ADA diabetic treatment goal       <7.0%  The Hemoglobin A1c testing is NGSP-certified.6.8: Reference ranges are based  upon the 2010 recommendations of  the American Diabetes Association.  Interpretation may vary for children  and adolescents. %        Lipid panel:           RADIOLOGY & ADDITIONAL TESTS:      Imaging Personally Reviewed:  [ ] YES      Consultant(s) Notes Reviewed:  [ ] YES     Care Discussed with [ ] Consultants [X ] Patient [ ] Family  [x ]    [x ]  Other; RN

## 2017-08-22 NOTE — PHYSICAL THERAPY INITIAL EVALUATION ADULT - CRITERIA FOR SKILLED THERAPEUTIC INTERVENTIONS
home with home PT pending functional assessment./impairments found/functional limitations in following categories/anticipated discharge recommendation/risk reduction/prevention/predicted duration of therapy intervention/therapy frequency

## 2017-08-22 NOTE — PROGRESS NOTE ADULT - PROBLEM SELECTOR PLAN 1
Right BKA stump infection being treated with vanco and cefepime.  Follow vascular consult.  Will need PICC line and 6 weeks of antibiotics because of MRI already showing OM.

## 2017-08-22 NOTE — PROGRESS NOTE ADULT - PROBLEM SELECTOR PLAN 1
started on vanco and cefepime per ID consult   no ortho intervention,   will need picc line eval tomorrow   also consulted Dr. shrestha

## 2017-08-22 NOTE — PROGRESS NOTE ADULT - SUBJECTIVE AND OBJECTIVE BOX
INTERVAL HPI / OVERNIGHT EVENTS:    Afebrile. In good spirits, talked to the family.    REVIEW OF SYSTEMS:    ROS:Right BKA with stump inf.  CONSTITUTIONAL:  Negative fever or chills, feels well, good appetite  EYES:  Negative  blurry vision or double vision  CARDIOVASCULAR:  Negative for chest pain or palpitations  RESPIRATORY:  Negative for cough, wheezing, or SOB   GASTROINTESTINAL:  Negative for nausea, vomiting, diarrhea, constipation, or abdominal pain  GENITOURINARY:  Negative frequency, urgency or dysuria  NEUROLOGIC:  No headache, confusion, dizziness, lightheadedness      MEDICATIONS  (STANDING):  heparin  Injectable 7500 Unit(s) SubCutaneous every 8 hours  insulin lispro (HumaLOG) corrective regimen sliding scale   SubCutaneous three times a day before meals  dextrose 5%. 1000 milliLiter(s) (50 mL/Hr) IV Continuous <Continuous>  dextrose 50% Injectable 12.5 Gram(s) IV Push once  dextrose 50% Injectable 25 Gram(s) IV Push once  dextrose 50% Injectable 25 Gram(s) IV Push once  lisinopril 40 milliGRAM(s) Oral daily  atorvastatin 40 milliGRAM(s) Oral at bedtime  clopidogrel Tablet 75 milliGRAM(s) Oral daily  docusate sodium 100 milliGRAM(s) Oral three times a day  pantoprazole    Tablet 40 milliGRAM(s) Oral before breakfast  gabapentin 300 milliGRAM(s) Oral two times a day  cefepime  IVPB   IV Intermittent   cefepime  IVPB 1000 milliGRAM(s) IV Intermittent every 12 hours  vancomycin  IVPB 750 milliGRAM(s) IV Intermittent every 12 hours    MEDICATIONS  (PRN):  dextrose Gel 1 Dose(s) Oral once PRN Blood Glucose LESS THAN 70 milliGRAM(s)/deciliter  glucagon  Injectable 1 milliGRAM(s) IntraMuscular once PRN Glucose LESS THAN 70 milligrams/deciliter      Vital Signs Last 24 Hrs  T(C): 36.6 (22 Aug 2017 17:20), Max: 36.6 (22 Aug 2017 17:20)  T(F): 97.8 (22 Aug 2017 17:20), Max: 97.8 (22 Aug 2017 17:20)  HR: 74 (22 Aug 2017 17:20) (66 - 74)  BP: 113/58 (22 Aug 2017 17:20) (106/58 - 131/70)  BP(mean): --  RR: 18 (22 Aug 2017 17:20) (18 - 18)  SpO2: 99% (22 Aug 2017 17:20) (97% - 100%)    PHYSICAL EXAM:    Constitutional: NAD, well-groomed, well-developed  HEENT: PERRLA, EOMI, Normal Hearing, MMM  Neck: No LAD, No JVD  Back: Normal spine flexure, No CVA tenderness  Respiratory: CTAB  Cardiovascular: S1 and S2, RRR, no M/G/R  Gastrointestinal: BS+, soft, NT/ND  Extremities: No peripheral edema  Vascular: 2+ peripheral pulses  Neurological: A/O x 3, no focal deficits  Psychiatric: Normal mood, normal affect  Musculoskeletal: Right BKA with stump infection, bandaged  Skin: No rashes      LABS:                        10.2   5.4   )-----------( 146      ( 22 Aug 2017 07:12 )             30.3     08-22    138  |  104  |  27<H>  ----------------------------<  99  4.3   |  25  |  1.48<H>    Ca    9.0      22 Aug 2017 07:12    TPro  7.5  /  Alb  3.0<L>  /  TBili  0.3  /  DBili  x   /  AST  14<L>  /  ALT  15  /  AlkPhos  94  08-21    PT/INR - ( 21 Aug 2017 16:03 )   PT: 13.2 sec;   INR: 1.21 ratio         PTT - ( 21 Aug 2017 16:03 )  PTT:33.2 sec        MICROBIOLOGY:    RADIOLOGY & ADDITIONAL STUDIES:

## 2017-08-22 NOTE — PHYSICAL THERAPY INITIAL EVALUATION ADULT - MODIFIED CLINICAL TEST OF SENSORY INTEGRATION IN BALANCE TEST
Barthel Index: Feeding Score ___10, Bathing Score __0_, Grooming Score 0___, Dressing Score __0_, Bowels Score __10_, Bladder Score _10__, Toilet Score10 ___, Transfers Score 5___, Mobility Score _0__, Stairs Score 0___,     Total Score __45_

## 2017-08-22 NOTE — PHYSICAL THERAPY INITIAL EVALUATION ADULT - ADDITIONAL COMMENTS
Pt is legally blind and reports he has a prosthesis (R BKA) he uses in which he transfers to wheel chair using sliding board transfer. Pt reports he has a wheel chair lift in home environment. Pt reports his wife assists with ADL's (bathing, cleaning).

## 2017-08-23 LAB
ANION GAP SERPL CALC-SCNC: 9 MMOL/L — SIGNIFICANT CHANGE UP (ref 5–17)
BUN SERPL-MCNC: 25 MG/DL — HIGH (ref 7–23)
CALCIUM SERPL-MCNC: 8.5 MG/DL — SIGNIFICANT CHANGE UP (ref 8.5–10.1)
CHLORIDE SERPL-SCNC: 103 MMOL/L — SIGNIFICANT CHANGE UP (ref 96–108)
CO2 SERPL-SCNC: 26 MMOL/L — SIGNIFICANT CHANGE UP (ref 22–31)
CREAT SERPL-MCNC: 1.56 MG/DL — HIGH (ref 0.5–1.3)
GLUCOSE SERPL-MCNC: 134 MG/DL — HIGH (ref 70–99)
HCT VFR BLD CALC: 28.8 % — LOW (ref 39–50)
HGB BLD-MCNC: 9.4 G/DL — LOW (ref 13–17)
MAGNESIUM SERPL-MCNC: 1.9 MG/DL — SIGNIFICANT CHANGE UP (ref 1.6–2.6)
MCHC RBC-ENTMCNC: 30.2 PG — SIGNIFICANT CHANGE UP (ref 27–34)
MCHC RBC-ENTMCNC: 32.7 GM/DL — SIGNIFICANT CHANGE UP (ref 32–36)
MCV RBC AUTO: 92.3 FL — SIGNIFICANT CHANGE UP (ref 80–100)
PHOSPHATE SERPL-MCNC: 3.3 MG/DL — SIGNIFICANT CHANGE UP (ref 2.5–4.5)
PLATELET # BLD AUTO: 139 K/UL — LOW (ref 150–400)
POTASSIUM SERPL-MCNC: 4.5 MMOL/L — SIGNIFICANT CHANGE UP (ref 3.5–5.3)
POTASSIUM SERPL-SCNC: 4.5 MMOL/L — SIGNIFICANT CHANGE UP (ref 3.5–5.3)
RBC # BLD: 3.12 M/UL — LOW (ref 4.2–5.8)
RBC # FLD: 13.2 % — SIGNIFICANT CHANGE UP (ref 11–15)
SODIUM SERPL-SCNC: 138 MMOL/L — SIGNIFICANT CHANGE UP (ref 135–145)
VANCOMYCIN TROUGH SERPL-MCNC: 11.5 UG/ML — SIGNIFICANT CHANGE UP (ref 10–20)
VANCOMYCIN TROUGH SERPL-MCNC: 13.9 UG/ML — SIGNIFICANT CHANGE UP (ref 10–20)
WBC # BLD: 4.7 K/UL — SIGNIFICANT CHANGE UP (ref 3.8–10.5)
WBC # FLD AUTO: 4.7 K/UL — SIGNIFICANT CHANGE UP (ref 3.8–10.5)

## 2017-08-23 PROCEDURE — 99233 SBSQ HOSP IP/OBS HIGH 50: CPT

## 2017-08-23 RX ADMIN — Medication 150 MILLIGRAM(S): at 06:28

## 2017-08-23 RX ADMIN — HEPARIN SODIUM 7500 UNIT(S): 5000 INJECTION INTRAVENOUS; SUBCUTANEOUS at 06:27

## 2017-08-23 RX ADMIN — CEFEPIME 100 MILLIGRAM(S): 1 INJECTION, POWDER, FOR SOLUTION INTRAMUSCULAR; INTRAVENOUS at 22:34

## 2017-08-23 RX ADMIN — CEFEPIME 100 MILLIGRAM(S): 1 INJECTION, POWDER, FOR SOLUTION INTRAMUSCULAR; INTRAVENOUS at 08:00

## 2017-08-23 RX ADMIN — Medication 150 MILLIGRAM(S): at 18:07

## 2017-08-23 RX ADMIN — GABAPENTIN 300 MILLIGRAM(S): 400 CAPSULE ORAL at 06:27

## 2017-08-23 RX ADMIN — Medication 100 MILLIGRAM(S): at 06:27

## 2017-08-23 RX ADMIN — Medication 100 MILLIGRAM(S): at 14:17

## 2017-08-23 RX ADMIN — LISINOPRIL 40 MILLIGRAM(S): 2.5 TABLET ORAL at 06:27

## 2017-08-23 RX ADMIN — Medication 100 MILLIGRAM(S): at 22:34

## 2017-08-23 RX ADMIN — CLOPIDOGREL BISULFATE 75 MILLIGRAM(S): 75 TABLET, FILM COATED ORAL at 12:21

## 2017-08-23 RX ADMIN — ATORVASTATIN CALCIUM 40 MILLIGRAM(S): 80 TABLET, FILM COATED ORAL at 22:33

## 2017-08-23 RX ADMIN — GABAPENTIN 300 MILLIGRAM(S): 400 CAPSULE ORAL at 17:00

## 2017-08-23 RX ADMIN — PANTOPRAZOLE SODIUM 40 MILLIGRAM(S): 20 TABLET, DELAYED RELEASE ORAL at 06:27

## 2017-08-23 RX ADMIN — Medication 2: at 12:20

## 2017-08-23 NOTE — DIETITIAN INITIAL EVALUATION ADULT. - OTHER INFO
Pt seen for consult - BMI > 40. Pt lives c wife & family; legally blind; wheelchair bound (right BKA June 2016) wife does shopping, cooking, & assists c ADL. Pt c DM; takes Lantus (10 units x 1/day prn; if BG , 140 mg/dL, doesn't take it). Denies any N/V/C/D or chew/swallowing difficulty.  Pt reports he feels well & appetite is good. Discussed at length CHO intake, what's appropriate, role of CHO for health (need modified amount) importance of portion control, role of protein & "good" fats, weight control. Morbid obesity status not documented in medical record.

## 2017-08-23 NOTE — PROGRESS NOTE ADULT - SUBJECTIVE AND OBJECTIVE BOX
Patient is a 62y old  Male who presents with a chief complaint of Sent by PMD for Osteomyelitis (21 Aug 2017 18:42)      OVERNIGHT EVENTS:      REVIEW OF SYSTEMS: denies chest pain/SOB, diaphoresis, no F/C, cough, dizziness, headache, blurry vision, nausea, vomiting, abdominal pain. Rest unremarkable   MEDICATIONS  (STANDING):  heparin  Injectable 7500 Unit(s) SubCutaneous every 8 hours  insulin lispro (HumaLOG) corrective regimen sliding scale   SubCutaneous three times a day before meals  dextrose 5%. 1000 milliLiter(s) (50 mL/Hr) IV Continuous <Continuous>  dextrose 50% Injectable 12.5 Gram(s) IV Push once  dextrose 50% Injectable 25 Gram(s) IV Push once  dextrose 50% Injectable 25 Gram(s) IV Push once  lisinopril 40 milliGRAM(s) Oral daily  atorvastatin 40 milliGRAM(s) Oral at bedtime  clopidogrel Tablet 75 milliGRAM(s) Oral daily  docusate sodium 100 milliGRAM(s) Oral three times a day  pantoprazole    Tablet 40 milliGRAM(s) Oral before breakfast  gabapentin 300 milliGRAM(s) Oral two times a day  cefepime  IVPB   IV Intermittent   cefepime  IVPB 1000 milliGRAM(s) IV Intermittent every 12 hours  vancomycin  IVPB 750 milliGRAM(s) IV Intermittent every 12 hours    MEDICATIONS  (PRN):  dextrose Gel 1 Dose(s) Oral once PRN Blood Glucose LESS THAN 70 milliGRAM(s)/deciliter  glucagon  Injectable 1 milliGRAM(s) IntraMuscular once PRN Glucose LESS THAN 70 milligrams/deciliter    Allergies    No Known Allergies    Intolerances        SUBJECTIVE: in bed in NAD, no acute events overnight     Vital Signs Last 24 Hrs  T(C): 36.6 (23 Aug 2017 06:16), Max: 36.6 (22 Aug 2017 17:20)  T(F): 97.9 (23 Aug 2017 06:16), Max: 97.9 (23 Aug 2017 00:03)  HR: 68 (23 Aug 2017 06:16) (66 - 74)  BP: 122/54 (23 Aug 2017 06:16) (113/58 - 131/70)  BP(mean): --  RR: 18 (23 Aug 2017 06:16) (18 - 18)  SpO2: 96% (23 Aug 2017 06:16) (96% - 100%)    PHYSICAL EXAM:  GENERAL: NAD, obese well-groomed, well-developed  HEAD:  Atraumatic, Normocephalic  EYES: EOMI, blind , conjunctiva and sclera clear  ENMT: No tonsillar erythema, exudates, or enlargement; Moist mucous membranes, Good dentition, No lesions  NECK: Supple, No JVD, Normal thyroid  CHEST/LUNG: Clear to  auscultation bilaterally; No rales, rhonchi, wheezing, or rubs  bilaterally  HEART: Regular rate and rhythm; No murmurs, rubs, or gallops  ABDOMEN: Soft, Nontender, Nondistended; Bowel sounds present  EXTREMITIES:  2+ Peripheral Pulses, No clubbing, cyanosis, or edema BL LE  LYMPH: No lymphadenopathy noted  SKIN: wound draining pus on lateral distal aspect of stump mild induration and erythema non tender.   NERVOUS SYSTEM:  Alert & Oriented X3, Good concentration; Motor Strength 5/5 B/L upper and lower extremities; s/p right bka  DTRs 2+ intact and symmetric, sensation intact BL    LABS:                          9.4    4.7   )-----------( 139      ( 23 Aug 2017 07:19 )             28.8   08-23    138  |  103  |  25<H>  ----------------------------<  134<H>  4.5   |  26  |  1.56<H>    Ca    8.5      23 Aug 2017 07:19  Phos  3.3     08-23  Mg     1.9     08-23    TPro  7.5  /  Alb  3.0<L>  /  TBili  0.3  /  DBili  x   /  AST  14<L>  /  ALT  15  /  AlkPhos  94  08-21    Cultures;     CAPILLARY BLOOD GLUCOSE  145 (23 Aug 2017 08:49)  148 (22 Aug 2017 22:30)  147 (22 Aug 2017 15:59)  134 (22 Aug 2017 10:58)        Hemoglobin A1C, Whole Blood (08.22.17 @ 08:02)    Hemoglobin A1C, Whole Blood: 6.8: Method: Immunoassay       Reference Range                4.0-5.6%       High risk (prediabetic)        5.7-6.4%       Diabetic, diagnostic             >=6.5%       ADA diabetic treatment goal       <7.0%  The Hemoglobin A1c testing is NGSP-certified.6.8: Reference ranges are based  upon the 2010 recommendations of  the American Diabetes Association.  Interpretation may vary for children  and adolescents. %        Lipid panel:           RADIOLOGY & ADDITIONAL TESTS:      Imaging Personally Reviewed:  [ ] YES      Consultant(s) Notes Reviewed:  [ ] YES     Care Discussed with [ ] Consultants [X ] Patient [ ] Family  [x ]    [x ]  Other; RN

## 2017-08-23 NOTE — PROGRESS NOTE ADULT - SUBJECTIVE AND OBJECTIVE BOX
INTERVAL HPI / OVERNIGHT EVENTS:  Afebrile      REVIEW OF SYSTEMS:    ROS:Stump wound  CONSTITUTIONAL:  Negative fever or chills, feels well, good appetite  EYES:  Negative  blurry vision or double vision  CARDIOVASCULAR:  Negative for chest pain or palpitations  RESPIRATORY:  Negative for cough, wheezing, or SOB   GASTROINTESTINAL:  Negative for nausea, vomiting, diarrhea, constipation, or abdominal pain  GENITOURINARY:  Negative frequency, urgency or dysuria  NEUROLOGIC:  No headache, confusion, dizziness, lightheadedness      MEDICATIONS  (STANDING):  insulin lispro (HumaLOG) corrective regimen sliding scale   SubCutaneous three times a day before meals  dextrose 5%. 1000 milliLiter(s) (50 mL/Hr) IV Continuous <Continuous>  dextrose 50% Injectable 12.5 Gram(s) IV Push once  dextrose 50% Injectable 25 Gram(s) IV Push once  dextrose 50% Injectable 25 Gram(s) IV Push once  lisinopril 40 milliGRAM(s) Oral daily  atorvastatin 40 milliGRAM(s) Oral at bedtime  docusate sodium 100 milliGRAM(s) Oral three times a day  pantoprazole    Tablet 40 milliGRAM(s) Oral before breakfast  gabapentin 300 milliGRAM(s) Oral two times a day  cefepime  IVPB   IV Intermittent   cefepime  IVPB 1000 milliGRAM(s) IV Intermittent every 12 hours  vancomycin  IVPB 750 milliGRAM(s) IV Intermittent every 12 hours    MEDICATIONS  (PRN):  dextrose Gel 1 Dose(s) Oral once PRN Blood Glucose LESS THAN 70 milliGRAM(s)/deciliter  glucagon  Injectable 1 milliGRAM(s) IntraMuscular once PRN Glucose LESS THAN 70 milligrams/deciliter      Vital Signs Last 24 Hrs  T(C): 36.7 (23 Aug 2017 17:28), Max: 36.7 (23 Aug 2017 17:28)  T(F): 98.1 (23 Aug 2017 17:28), Max: 98.1 (23 Aug 2017 17:28)  HR: 69 (23 Aug 2017 17:28) (60 - 69)  BP: 117/48 (23 Aug 2017 17:28) (115/53 - 122/54)  BP(mean): --  RR: 17 (23 Aug 2017 17:28) (17 - 18)  SpO2: 98% (23 Aug 2017 17:28) (96% - 98%)    PHYSICAL EXAM:    Constitutional: NAD, well-developed  HEENT: PERRLA, EOMI, Normal Hearing, MMM  Neck: No LAD, No JVD  Back: Normal spine flexure, No CVA tenderness  Respiratory: CTABCardiovascular: S1 and S2, RRR, no M/G/R  Gastrointestinal: BS+, soft, NT/ND  Extremities: No peripheral edema  Vascular: 2+ peripheral pulses  Neurological: A/O x 3, no focal deficits  Psychiatric: Normal mood, normal affect  Musculoskeletal: Right BKA stump wound  Skin: No rashes      LABS:                        9.4    4.7   )-----------( 139      ( 23 Aug 2017 07:19 )             28.8     08-23    138  |  103  |  25<H>  ----------------------------<  134<H>  4.5   |  26  |  1.56<H>    Ca    8.5      23 Aug 2017 07:19  Phos  3.3     08-23  Mg     1.9     08-23              MICROBIOLOGY:    RADIOLOGY & ADDITIONAL STUDIES:

## 2017-08-23 NOTE — DIETITIAN INITIAL EVALUATION ADULT. - PERTINENT MEDS FT
Plavix, Heparin, Maxipime, Vancocycin, Lipitor, Colace, Neurontin, Humalog sliding scale, Zestril, Protonix

## 2017-08-23 NOTE — DIETITIAN INITIAL EVALUATION ADULT. - ENERGY NEEDS
Height (cm): 185.42 (08-21)  Weight (kg): 146.5 (08-21)  BMI (kg/m2): 43.9 (08-21) - adjusted for BKA  IBW: 78.6 kg +/- 10%     % IBW:   186%     UBW:  153 kg (Feb 2017)    %UBW: 96%

## 2017-08-23 NOTE — PROGRESS NOTE ADULT - PROBLEM SELECTOR PLAN 1
Right BKA stump infection being treated with vanco and cefepime.  Follow vascular consult. Wound culture right leg stump requested  Will need PICC line and 6 weeks of antibiotics because of MRI already showing OM.

## 2017-08-23 NOTE — PROGRESS NOTE ADULT - SUBJECTIVE AND OBJECTIVE BOX
Pt S/E at bedside, no acute events overnight, pain controlled.    AVSS  Gen: NAD, AAOx3    Right Lower Extremity:  Dressing clean dry intact, +wound on posterolateral stump  Grossly moving R stump  Decreased sensation on distal stump, otherwise SILT  Warm, well perfused stump, cap refill brisk  Compartments soft  No calf TTP B/L

## 2017-08-23 NOTE — PROGRESS NOTE ADULT - PROBLEM SELECTOR PLAN 1
started on vanco and cefepime per ID consult   no ortho intervention,   will need picc line hopefully placed today   also consulted Dr. shrestha for possible I&D started on vanco and cefepime per ID consult   no ortho intervention,   will need picc line hopefully placed today   also consulted Dr. shrestha for possible I&D on tomorrow in OR and anesthesia requesting cardiology clearance started on vanco and cefepime per ID consult   no ortho intervention,   will need picc line hopefully placed today   also consulted Dr. shrestha for possible I&D on tomorrow in OR and anesthesia requesting cardiology clearance  hold ac and antiplatelet meds informed finn pt received on today

## 2017-08-23 NOTE — CHART NOTE - NSCHARTNOTEFT_GEN_A_CORE
Upon Nutritional Assessment by the Registered Dietitian your patient was determined to meet criteria / has evidence of the following diagnosis/diagnoses:          [ ]  Mild Protein Calorie Malnutrition        [ ]  Moderate Protein Calorie Malnutrition        [ ] Severe Protein Calorie Malnutrition        [ ] Unspecified Protein Calorie Malnutrition        [ ] Underweight / BMI <19        [X ] Morbid Obesity / BMI > 40      Findings as based on:  •  Comprehensive nutrition assessment and consultation  •  Calorie counts (nutrient intake analysis)  •  Food acceptance and intake status from observations by staff  •  Follow up  •  Patient education  •  Intervention secondary to interdisciplinary rounds  •   concerns      Treatment:    The following diet has been recommended: [facility] no snack diet      PROVIDER Section:     By signing this assessment you are acknowledging and agree with the diagnosis/diagnoses assigned by the Registered Dietitian    Comments:

## 2017-08-23 NOTE — DIETITIAN INITIAL EVALUATION ADULT. - PERTINENT LABORATORY DATA
08-23 Na138 mmol/L Glu 134 mg/dL<H> K+ 4.5 mmol/L Cr  1.56 mg/dL<H> BUN 25 mg/dL<H> Phos 3.3 mg/dL Alb n/a   PAB n/a, GFR 46,; (8/22) HgbA1c 6.8H

## 2017-08-23 NOTE — CONSULT NOTE ADULT - SUBJECTIVE AND OBJECTIVE BOX
62M presents to ED with 3 week history of draining wound on RLE. Pt had a R BKA done by Dr. Peraza in June of 2016. Pt developed draining wound on posterolateral stump about 3 weeks ago. Pt saw his PMD (Dr. Saldana) who prescribed antibiotics that have not resolved the wound. Pt was sent to ED by his PMD for IV antibiotic treatment. Pt has no complaints of pain, fever/chills. No other complaints.    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  History of coronary artery stent placement    Allergies    No Known Allergies    Intolerances    Vital Signs Last 24 Hrs  T(C): 36.7 (21 Aug 2017 17:20), Max: 37.1 (21 Aug 2017 14:20)  T(F): 98 (21 Aug 2017 17:20), Max: 98.7 (21 Aug 2017 14:20)  HR: 84 (21 Aug 2017 17:20) (84 - 88)  BP: 164/86 (21 Aug 2017 17:20) (127/62 - 164/86)  BP(mean): --  RR: 19 (21 Aug 2017 17:20) (16 - 19)  SpO2: 96% (21 Aug 2017 17:20) (96% - 97%)    Imaging: XR R Tib/Fib: No fx, no evidence of osteomyelitis on xray    Outpatient MRI R LE: Osteomyelitis of distal tibia in R stump    Gen: NAD  RLE: + draining wound on posterolateral aspect of stump, +erythema around wound  Grossly moving RLE  Decreased sensation over distal stump, otherwise SILT  Distal stump warm, well perfused  Compartments soft  No calf TTP b/l
HPI:  HPI:  63yo m pmhx of dm, blindness, cad, neuropathy. Sent in by pmd after mri finding of osteomyelitis in his R BKA stump. Pt had bka amputation on 2016. Was on out pt abx for his r bka ulcer. Pt states he feels well, denies pain at the area. No fevers/chill/n/v, no cp, sob. (21 Aug 2017 18:42)      Chief Complaint:  Patient is a 62y old  Male who presents with a chief complaint of Sent by PMD for Osteomyelitis (21 Aug 2017 18:42)      Review of Systems:    see above           Social History/Family History  SOCHX:   tobacco,  -  alcohol    FMHX: FA/MO  - contributory       Discussed with:  PMD, Family    Physical Exam:    Vital Signs:  Vital Signs Last 24 Hrs  T(C): 36.7 (23 Aug 2017 17:28), Max: 36.7 (23 Aug 2017 17:28)  T(F): 98.1 (23 Aug 2017 17:28), Max: 98.1 (23 Aug 2017 17:28)  HR: 69 (23 Aug 2017 17:28) (60 - 69)  BP: 117/48 (23 Aug 2017 17:28) (115/53 - 122/54)  BP(mean): --  RR: 17 (23 Aug 2017 17:28) (17 - 18)  SpO2: 98% (23 Aug 2017 17:28) (96% - 98%)  Daily     Daily Weight in k.6 (23 Aug 2017 10:26)  I&O's Summary    22 Aug 2017 07:  -  23 Aug 2017 07:00  --------------------------------------------------------  IN: 710 mL / OUT: 750 mL / NET: -40 mL    23 Aug 2017 07:  -  23 Aug 2017 21:53  --------------------------------------------------------  IN: 1110 mL / OUT: 500 mL / NET: 610 mL          Chest:  Full & symmetric excursion, no increased effort, breath sounds clear  Cardiovascular:  Regular rhythm, S1, S2, no murmur/rub/S3/S4, no carotid/femoral/abdominal bruit, radial/pedal pulses 2+, no edema  Abdomen:  Soft, non-tender, non-distended, normoactive bowel sounds, no HSM  Ext. BKA, wound    Laboratory:                          9.4    4.7   )-----------( 139      ( 23 Aug 2017 07:19 )             28.8     08-23    138  |  103  |  25<H>  ----------------------------<  134<H>  4.5   |  26  |  1.56<H>    Ca    8.5      23 Aug 2017 07:19  Phos  3.3     08-  Mg     1.9     08            CAPILLARY BLOOD GLUCOSE  130 (23 Aug 2017 16:51)  145 (23 Aug 2017 08:49)  148 (22 Aug 2017 22:30)              Assessment:    BKA , OM, wound  For OR  H/O noted, plavix may be held  No murmur on exam, for this surgery CV risk is acceptable , may proceed
Patient is a 62y old  Male who presents with a chief complaint of Sent by PMD for Osteomyelitis (21 Aug 2017 18:42)    HPI:  62M PMH DM, blindness, CAD, HTN, neuropathy was sent by PMD after MRI finding of osteomyelitis in his R BKA stump.   Pt is s/p RLE BKA in June of 2016 and has had outpatient PT but admits that due to dizziness he mainly uses wheelchair.  He reports his wife noticed a wound on his leg a few weeks ago, otherwise he is without any new symptoms. Denies pain/numbness in the leg, fever, chills, n/v, cp, dyspnea.     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    MEDICATIONS  (STANDING):  heparin  Injectable 7500 Unit(s) SubCutaneous every 8 hours  insulin lispro (HumaLOG) corrective regimen sliding scale   SubCutaneous three times a day before meals  dextrose 5%. 1000 milliLiter(s) (50 mL/Hr) IV Continuous <Continuous>  dextrose 50% Injectable 12.5 Gram(s) IV Push once  dextrose 50% Injectable 25 Gram(s) IV Push once  dextrose 50% Injectable 25 Gram(s) IV Push once  lisinopril 40 milliGRAM(s) Oral daily  atorvastatin 40 milliGRAM(s) Oral at bedtime  clopidogrel Tablet 75 milliGRAM(s) Oral daily  docusate sodium 100 milliGRAM(s) Oral three times a day  pantoprazole    Tablet 40 milliGRAM(s) Oral before breakfast  gabapentin 300 milliGRAM(s) Oral two times a day  cefepime  IVPB   IV Intermittent   cefepime  IVPB 1000 milliGRAM(s) IV Intermittent every 12 hours  vancomycin  IVPB 750 milliGRAM(s) IV Intermittent every 12 hours    MEDICATIONS  (PRN):  dextrose Gel 1 Dose(s) Oral once PRN Blood Glucose LESS THAN 70 milliGRAM(s)/deciliter  glucagon  Injectable 1 milliGRAM(s) IntraMuscular once PRN Glucose LESS THAN 70 milligrams/deciliter      Allergies  No Known Allergies         FAMILY HISTORY:  No pertinent family history in first degree relatives      Vital Signs Last 24 Hrs  T(C): 36.4 (23 Aug 2017 11:56), Max: 36.6 (22 Aug 2017 17:20)  T(F): 97.5 (23 Aug 2017 11:56), Max: 97.9 (23 Aug 2017 00:03)  HR: 60 (23 Aug 2017 11:56) (60 - 74)  BP: 118/50 (23 Aug 2017 11:56) (113/58 - 131/70)  BP(mean): --  RR: 18 (23 Aug 2017 11:56) (18 - 18)  SpO2: 96% (23 Aug 2017 11:56) (96% - 99%)    Physical Exam:  General:  Appears stated age, well-nourished, no distress  Eyes: haziness b/l eyes, blind  HENT: WNL, no JVD  Chest: nonlabored respirations  Abdomen: soft, nontender  Neuro/Psych:  Alert, oriented to time, place and person   Extremities: RLE BKA stump with lateral wound, opening ~0.5cm with surrounding erythema and induration ~4cm. No gross purulence, bleeding or drainage. Nontender. Irrigated with normal saline and foam dressing applied.   LLE with decreased sensation to mid calf. No edema    LABS:                        9.4    4.7   )-----------( 139      ( 23 Aug 2017 07:19 )             28.8     08-23    138  |  103  |  25<H>  ----------------------------<  134<H>  4.5   |  26  |  1.56<H>    Ca    8.5      23 Aug 2017 07:19  Phos  3.3     08-23  Mg     1.9     08-23    TPro  7.5  /  Alb  3.0<L>  /  TBili  0.3  /  DBili  x   /  AST  14<L>  /  ALT  15  /  AlkPhos  94  08-21    PT/INR - ( 21 Aug 2017 16:03 )   PT: 13.2 sec;   INR: 1.21 ratio         PTT - ( 21 Aug 2017 16:03 )  PTT:33.2 sec        RADIOLOGY & ADDITIONAL STUDIES:    < from: Xray Tibia + Fibula 2 Views, Right (08.21.17 @ 15:59) >    IMPRESSION : Status post amputation without plain film evidence of   osteomyelitis      < end of copied text >    Impression/Plan:  62M PMH DM, blindness, CAD, HTN, neuropathy with osteomyelitis and open wound of right BKA stump  -as per Dr Peraza, plan for OR incision and debridement of Right BKA tomorrow at 915am.  Consent to be obtained by surgeon. Pt's wife to bring in MRI results.   Hold plavix for OR. Cardio clearance pending.   -continue IV abx per ID, patient to have PICC inserted today  -pt will require long term abx and may eventually require AKA. Explained in detail to patient. All questions answered  -preop labs  -continue present medical management, DM control  -Ortho Follow up noted.
Infectious Diseases - Attending at Dr. Bland 's request.    HPI:  61yo m pmhx of dm, blindness, cad, neuropathy. Sent in by pmd after mri finding of osteomyelitis in his R BKA stump. Pt had bka amputation on 6/2016. Was on out pt abx for his r bka ulcer. Pt states he feels well, denies pain at the area. No fevers/chill/n/v, no cp, sob. 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  History of coronary artery stent placement      REVIEW OF SYSTEMS:  Oozing right stump  ROS:  CONSTITUTIONAL:  Negative fever or chills, feels well, good appetite  EYES:  Negative  blurry vision or double vision  CARDIOVASCULAR:  Negative for chest pain or palpitations  RESPIRATORY:  Negative for cough, wheezing, or SOB   GASTROINTESTINAL:  Negative for nausea, vomiting, diarrhea, constipation, or abdominal pain  GENITOURINARY:  Negative frequency, urgency or dysuria  NEUROLOGIC:  No headache, confusion, dizziness, lightheadedness      MEDICATIONS  (STANDING):  vancomycin  IVPB 1000 milliGRAM(s) IV Intermittent every 12 hours  heparin  Injectable 7500 Unit(s) SubCutaneous every 8 hours  insulin lispro (HumaLOG) corrective regimen sliding scale   SubCutaneous three times a day before meals  dextrose 5%. 1000 milliLiter(s) (50 mL/Hr) IV Continuous <Continuous>  dextrose 50% Injectable 12.5 Gram(s) IV Push once  dextrose 50% Injectable 25 Gram(s) IV Push once  dextrose 50% Injectable 25 Gram(s) IV Push once  lisinopril 40 milliGRAM(s) Oral daily  atorvastatin 40 milliGRAM(s) Oral at bedtime  clopidogrel Tablet 75 milliGRAM(s) Oral daily  docusate sodium 100 milliGRAM(s) Oral three times a day  pantoprazole    Tablet 40 milliGRAM(s) Oral before breakfast  gabapentin 300 milliGRAM(s) Oral two times a day  cefepime  IVPB   IV Intermittent   cefepime  IVPB 1000 milliGRAM(s) IV Intermittent once    MEDICATIONS  (PRN):  dextrose Gel 1 Dose(s) Oral once PRN Blood Glucose LESS THAN 70 milliGRAM(s)/deciliter  glucagon  Injectable 1 milliGRAM(s) IntraMuscular once PRN Glucose LESS THAN 70 milligrams/deciliter      Allergies    No Known Allergies    Intolerances        SOCIAL HISTORY:    FAMILY HISTORY:  No pertinent family history in first degree relatives      Vital Signs Last 24 Hrs  T(C): 36.7 (21 Aug 2017 17:20), Max: 37.1 (21 Aug 2017 14:20)  T(F): 98 (21 Aug 2017 17:20), Max: 98.7 (21 Aug 2017 14:20)  HR: 84 (21 Aug 2017 17:20) (84 - 88)  BP: 164/86 (21 Aug 2017 17:20) (127/62 - 164/86)  BP(mean): --  RR: 19 (21 Aug 2017 17:20) (16 - 19)  SpO2: 96% (21 Aug 2017 17:20) (96% - 97%)    PHYSICAL EXAM:      Constitutional: NAD, well-groomed, well-developed  HEENT: PERRLA, EOMI, Normal Hearing, MMM  Neck: No LAD, No JVD  Back: Normal spine flexure, No CVA tenderness  Respiratory: CTAP Cardiovascular: S1 and S2, RRR, no M/G/R  Gastrointestinal: BS+, soft, NT/ND  Extremities: No peripheral edema  Vascular: 2+ peripheral pulses  Neurological: A/O x 3, no focal deficits  Psychiatric: Normal mood, normal affect  Musculoskeletal: Oozing right stump. Right AKA. open wound on stump.  Skin: No rashes        LABS:                        10.6   5.5   )-----------( 153      ( 21 Aug 2017 16:03 )             33.4     08-21    139  |  104  |  31<H>  ----------------------------<  175<H>  4.9   |  24  |  1.61<H>    Ca    9.4      21 Aug 2017 16:03    TPro  7.5  /  Alb  3.0<L>  /  TBili  0.3  /  DBili  x   /  AST  14<L>  /  ALT  15  /  AlkPhos  94  08-21    PT/INR - ( 21 Aug 2017 16:03 )   PT: 13.2 sec;   INR: 1.21 ratio         PTT - ( 21 Aug 2017 16:03 )  PTT:33.2 sec    MICROBIOLOGY:  RADIOLOGY & ADDITIONAL STUDIES:

## 2017-08-24 ENCOUNTER — RESULT REVIEW (OUTPATIENT)
Age: 62
End: 2017-08-24

## 2017-08-24 ENCOUNTER — TRANSCRIPTION ENCOUNTER (OUTPATIENT)
Age: 62
End: 2017-08-24

## 2017-08-24 LAB
ALBUMIN SERPL ELPH-MCNC: 2.8 G/DL — LOW (ref 3.3–5)
ALP SERPL-CCNC: 79 U/L — SIGNIFICANT CHANGE UP (ref 40–120)
ALT FLD-CCNC: 14 U/L — SIGNIFICANT CHANGE UP (ref 12–78)
ANION GAP SERPL CALC-SCNC: 8 MMOL/L — SIGNIFICANT CHANGE UP (ref 5–17)
APTT BLD: 31.1 SEC — SIGNIFICANT CHANGE UP (ref 27.5–37.4)
AST SERPL-CCNC: 9 U/L — LOW (ref 15–37)
BILIRUB SERPL-MCNC: 0.5 MG/DL — SIGNIFICANT CHANGE UP (ref 0.2–1.2)
BLD GP AB SCN SERPL QL: SIGNIFICANT CHANGE UP
BUN SERPL-MCNC: 28 MG/DL — HIGH (ref 7–23)
CALCIUM SERPL-MCNC: 8.3 MG/DL — LOW (ref 8.5–10.1)
CHLORIDE SERPL-SCNC: 102 MMOL/L — SIGNIFICANT CHANGE UP (ref 96–108)
CO2 SERPL-SCNC: 26 MMOL/L — SIGNIFICANT CHANGE UP (ref 22–31)
CREAT SERPL-MCNC: 1.66 MG/DL — HIGH (ref 0.5–1.3)
GLUCOSE SERPL-MCNC: 166 MG/DL — HIGH (ref 70–99)
HCT VFR BLD CALC: 29.6 % — LOW (ref 39–50)
HGB BLD-MCNC: 9.9 G/DL — LOW (ref 13–17)
INR BLD: 1.19 RATIO — HIGH (ref 0.88–1.16)
MCHC RBC-ENTMCNC: 30.9 PG — SIGNIFICANT CHANGE UP (ref 27–34)
MCHC RBC-ENTMCNC: 33.3 GM/DL — SIGNIFICANT CHANGE UP (ref 32–36)
MCV RBC AUTO: 92.8 FL — SIGNIFICANT CHANGE UP (ref 80–100)
PLATELET # BLD AUTO: 142 K/UL — LOW (ref 150–400)
POTASSIUM SERPL-MCNC: 3.9 MMOL/L — SIGNIFICANT CHANGE UP (ref 3.5–5.3)
POTASSIUM SERPL-SCNC: 3.9 MMOL/L — SIGNIFICANT CHANGE UP (ref 3.5–5.3)
PROT SERPL-MCNC: 6.7 GM/DL — SIGNIFICANT CHANGE UP (ref 6–8.3)
PROTHROM AB SERPL-ACNC: 13 SEC — HIGH (ref 9.8–12.7)
RBC # BLD: 3.2 M/UL — LOW (ref 4.2–5.8)
RBC # FLD: 13.3 % — SIGNIFICANT CHANGE UP (ref 11–15)
SODIUM SERPL-SCNC: 136 MMOL/L — SIGNIFICANT CHANGE UP (ref 135–145)
WBC # BLD: 4 K/UL — SIGNIFICANT CHANGE UP (ref 3.8–10.5)
WBC # FLD AUTO: 4 K/UL — SIGNIFICANT CHANGE UP (ref 3.8–10.5)

## 2017-08-24 PROCEDURE — 99232 SBSQ HOSP IP/OBS MODERATE 35: CPT

## 2017-08-24 PROCEDURE — 88300 SURGICAL PATH GROSS: CPT | Mod: 26

## 2017-08-24 RX ORDER — OXYCODONE AND ACETAMINOPHEN 5; 325 MG/1; MG/1
2 TABLET ORAL EVERY 4 HOURS
Qty: 0 | Refills: 0 | Status: DISCONTINUED | OUTPATIENT
Start: 2017-08-24 | End: 2017-08-28

## 2017-08-24 RX ORDER — DEXTROSE 50 % IN WATER 50 %
25 SYRINGE (ML) INTRAVENOUS ONCE
Qty: 0 | Refills: 0 | Status: DISCONTINUED | OUTPATIENT
Start: 2017-08-24 | End: 2017-08-28

## 2017-08-24 RX ORDER — HEPARIN SODIUM 5000 [USP'U]/ML
5000 INJECTION INTRAVENOUS; SUBCUTANEOUS EVERY 8 HOURS
Qty: 0 | Refills: 0 | Status: DISCONTINUED | OUTPATIENT
Start: 2017-08-24 | End: 2017-08-28

## 2017-08-24 RX ORDER — GLUCAGON INJECTION, SOLUTION 0.5 MG/.1ML
1 INJECTION, SOLUTION SUBCUTANEOUS ONCE
Qty: 0 | Refills: 0 | Status: DISCONTINUED | OUTPATIENT
Start: 2017-08-24 | End: 2017-08-28

## 2017-08-24 RX ORDER — PANTOPRAZOLE SODIUM 20 MG/1
40 TABLET, DELAYED RELEASE ORAL
Qty: 0 | Refills: 0 | Status: DISCONTINUED | OUTPATIENT
Start: 2017-08-24 | End: 2017-08-28

## 2017-08-24 RX ORDER — SODIUM CHLORIDE 9 MG/ML
1000 INJECTION, SOLUTION INTRAVENOUS
Qty: 0 | Refills: 0 | Status: DISCONTINUED | OUTPATIENT
Start: 2017-08-24 | End: 2017-08-28

## 2017-08-24 RX ORDER — ATORVASTATIN CALCIUM 80 MG/1
40 TABLET, FILM COATED ORAL AT BEDTIME
Qty: 0 | Refills: 0 | Status: DISCONTINUED | OUTPATIENT
Start: 2017-08-24 | End: 2017-08-28

## 2017-08-24 RX ORDER — LISINOPRIL 2.5 MG/1
40 TABLET ORAL DAILY
Qty: 0 | Refills: 0 | Status: DISCONTINUED | OUTPATIENT
Start: 2017-08-24 | End: 2017-08-28

## 2017-08-24 RX ORDER — GABAPENTIN 400 MG/1
300 CAPSULE ORAL
Qty: 0 | Refills: 0 | Status: DISCONTINUED | OUTPATIENT
Start: 2017-08-24 | End: 2017-08-28

## 2017-08-24 RX ORDER — CLOPIDOGREL BISULFATE 75 MG/1
75 TABLET, FILM COATED ORAL DAILY
Qty: 0 | Refills: 0 | Status: DISCONTINUED | OUTPATIENT
Start: 2017-08-25 | End: 2017-08-28

## 2017-08-24 RX ORDER — DEXTROSE 50 % IN WATER 50 %
12.5 SYRINGE (ML) INTRAVENOUS ONCE
Qty: 0 | Refills: 0 | Status: DISCONTINUED | OUTPATIENT
Start: 2017-08-24 | End: 2017-08-28

## 2017-08-24 RX ORDER — SODIUM CHLORIDE 9 MG/ML
1000 INJECTION, SOLUTION INTRAVENOUS
Qty: 0 | Refills: 0 | Status: DISCONTINUED | OUTPATIENT
Start: 2017-08-24 | End: 2017-08-24

## 2017-08-24 RX ORDER — DOCUSATE SODIUM 100 MG
100 CAPSULE ORAL THREE TIMES A DAY
Qty: 0 | Refills: 0 | Status: DISCONTINUED | OUTPATIENT
Start: 2017-08-24 | End: 2017-08-28

## 2017-08-24 RX ORDER — FENTANYL CITRATE 50 UG/ML
50 INJECTION INTRAVENOUS
Qty: 0 | Refills: 0 | Status: DISCONTINUED | OUTPATIENT
Start: 2017-08-24 | End: 2017-08-24

## 2017-08-24 RX ORDER — VANCOMYCIN HCL 1 G
750 VIAL (EA) INTRAVENOUS EVERY 12 HOURS
Qty: 0 | Refills: 0 | Status: DISCONTINUED | OUTPATIENT
Start: 2017-08-24 | End: 2017-08-26

## 2017-08-24 RX ORDER — MORPHINE SULFATE 50 MG/1
2 CAPSULE, EXTENDED RELEASE ORAL EVERY 4 HOURS
Qty: 0 | Refills: 0 | Status: DISCONTINUED | OUTPATIENT
Start: 2017-08-24 | End: 2017-08-28

## 2017-08-24 RX ORDER — INSULIN LISPRO 100/ML
VIAL (ML) SUBCUTANEOUS
Qty: 0 | Refills: 0 | Status: DISCONTINUED | OUTPATIENT
Start: 2017-08-24 | End: 2017-08-28

## 2017-08-24 RX ORDER — DEXTROSE 50 % IN WATER 50 %
1 SYRINGE (ML) INTRAVENOUS ONCE
Qty: 0 | Refills: 0 | Status: DISCONTINUED | OUTPATIENT
Start: 2017-08-24 | End: 2017-08-28

## 2017-08-24 RX ORDER — CEFEPIME 1 G/1
1000 INJECTION, POWDER, FOR SOLUTION INTRAMUSCULAR; INTRAVENOUS EVERY 12 HOURS
Qty: 0 | Refills: 0 | Status: DISCONTINUED | OUTPATIENT
Start: 2017-08-24 | End: 2017-08-28

## 2017-08-24 RX ORDER — ONDANSETRON 8 MG/1
4 TABLET, FILM COATED ORAL ONCE
Qty: 0 | Refills: 0 | Status: DISCONTINUED | OUTPATIENT
Start: 2017-08-24 | End: 2017-08-24

## 2017-08-24 RX ADMIN — OXYCODONE AND ACETAMINOPHEN 2 TABLET(S): 5; 325 TABLET ORAL at 18:31

## 2017-08-24 RX ADMIN — Medication 150 MILLIGRAM(S): at 19:32

## 2017-08-24 RX ADMIN — GABAPENTIN 300 MILLIGRAM(S): 400 CAPSULE ORAL at 06:21

## 2017-08-24 RX ADMIN — CEFEPIME 100 MILLIGRAM(S): 1 INJECTION, POWDER, FOR SOLUTION INTRAMUSCULAR; INTRAVENOUS at 08:50

## 2017-08-24 RX ADMIN — LISINOPRIL 40 MILLIGRAM(S): 2.5 TABLET ORAL at 06:21

## 2017-08-24 RX ADMIN — SODIUM CHLORIDE 75 MILLILITER(S): 9 INJECTION, SOLUTION INTRAVENOUS at 11:54

## 2017-08-24 RX ADMIN — Medication 100 MILLIGRAM(S): at 13:40

## 2017-08-24 RX ADMIN — HEPARIN SODIUM 5000 UNIT(S): 5000 INJECTION INTRAVENOUS; SUBCUTANEOUS at 18:31

## 2017-08-24 RX ADMIN — ATORVASTATIN CALCIUM 40 MILLIGRAM(S): 80 TABLET, FILM COATED ORAL at 22:05

## 2017-08-24 RX ADMIN — Medication 100 MILLIGRAM(S): at 22:05

## 2017-08-24 RX ADMIN — CEFEPIME 100 MILLIGRAM(S): 1 INJECTION, POWDER, FOR SOLUTION INTRAMUSCULAR; INTRAVENOUS at 22:04

## 2017-08-24 RX ADMIN — Medication 2: at 16:40

## 2017-08-24 RX ADMIN — GABAPENTIN 300 MILLIGRAM(S): 400 CAPSULE ORAL at 18:31

## 2017-08-24 RX ADMIN — OXYCODONE AND ACETAMINOPHEN 2 TABLET(S): 5; 325 TABLET ORAL at 19:30

## 2017-08-24 RX ADMIN — Medication 150 MILLIGRAM(S): at 06:23

## 2017-08-24 RX ADMIN — PANTOPRAZOLE SODIUM 40 MILLIGRAM(S): 20 TABLET, DELAYED RELEASE ORAL at 13:40

## 2017-08-24 NOTE — PHARMACY COMMUNICATION NOTE - COMMENTS
8/24/17 Spoke w Dr Brantley regarding dose of vancomycin. Md wants 750mg q12h as ordered pat scr increasing. Will monitor & reevaluate if necessaryt

## 2017-08-24 NOTE — PROGRESS NOTE ADULT - SUBJECTIVE AND OBJECTIVE BOX
Patient is a 62y old  Male who presents with a chief complaint of Sent by PMD for Osteomyelitis (21 Aug 2017 18:42)      OVERNIGHT EVENTS:    MEDICATIONS  (STANDING):  heparin  Injectable 5000 Unit(s) SubCutaneous every 8 hours  cefepime  IVPB 1000 milliGRAM(s) IV Intermittent every 12 hours  vancomycin  IVPB 750 milliGRAM(s) IV Intermittent every 12 hours  insulin lispro (HumaLOG) corrective regimen sliding scale   SubCutaneous three times a day before meals  dextrose 5%. 1000 milliLiter(s) (50 mL/Hr) IV Continuous <Continuous>  dextrose 50% Injectable 12.5 Gram(s) IV Push once  dextrose 50% Injectable 25 Gram(s) IV Push once  dextrose 50% Injectable 25 Gram(s) IV Push once  lisinopril 40 milliGRAM(s) Oral daily  atorvastatin 40 milliGRAM(s) Oral at bedtime  docusate sodium 100 milliGRAM(s) Oral three times a day  pantoprazole    Tablet 40 milliGRAM(s) Oral before breakfast  gabapentin 300 milliGRAM(s) Oral two times a day    MEDICATIONS  (PRN):  fentaNYL    Injectable 50 MICROGram(s) IV Push every 5 minutes PRN Severe Pain  ondansetron Injectable 4 milliGRAM(s) IV Push once PRN Nausea and/or Vomiting  dextrose Gel 1 Dose(s) Oral once PRN Blood Glucose LESS THAN 70 milliGRAM(s)/deciliter  glucagon  Injectable 1 milliGRAM(s) IntraMuscular once PRN Glucose LESS THAN 70 milligrams/deciliter  morphine  - Injectable 2 milliGRAM(s) IV Push every 4 hours PRN Severe Pain (7 - 10)  oxyCODONE    5 mG/acetaminophen 325 mG 2 Tablet(s) Oral every 4 hours PRN Moderate Pain (4 - 6)      Allergies    No Known Allergies    Intolerances        SUBJECTIVE: in bed in NAD, no acute events overnight     Vital Signs Last 24 Hrs  T(C): 36.1 (24 Aug 2017 13:30), Max: 36.7 (23 Aug 2017 17:28)  T(F): 97 (24 Aug 2017 13:30), Max: 98.1 (23 Aug 2017 17:28)  HR: 79 (24 Aug 2017 13:30) (64 - 90)  BP: 125/56 (24 Aug 2017 13:30) (95/72 - 132/59)  BP(mean): --  RR: 18 (24 Aug 2017 13:30) (14 - 20)  SpO2: 98% (24 Aug 2017 13:30) (96% - 100%)      PHYSICAL EXAM:  GENERAL: NAD, obese well-groomed, well-developed  HEAD:  Atraumatic, Normocephalic  EYES: EOMI, blind , conjunctiva and sclera clear  ENMT: No tonsillar erythema, exudates, or enlargement; Moist mucous membranes, Good dentition, No lesions  NECK: Supple, No JVD, Normal thyroid  CHEST/LUNG: Clear to  auscultation bilaterally; No rales, rhonchi, wheezing, or rubs  bilaterally  HEART: Regular rate and rhythm; No murmurs, rubs, or gallops  ABDOMEN: Soft, Nontender, Nondistended; Bowel sounds present  EXTREMITIES:  2+ Peripheral Pulses, No clubbing, cyanosis, or edema BL LE  LYMPH: No lymphadenopathy noted  SKIN: wound draining pus on lateral distal aspect of stump mild induration and erythema non tender.   NERVOUS SYSTEM:  Alert & Oriented X3, Good concentration; Motor Strength 5/5 B/L upper and lower extremities; s/p right bka  DTRs 2+ intact and symmetric, sensation intact BL    LABS:                          9.4    4.7   )-----------( 139      ( 23 Aug 2017 07:19 )             28.8   08-23    138  |  103  |  25<H>  ----------------------------<  134<H>  4.5   |  26  |  1.56<H>    Ca    8.5      23 Aug 2017 07:19  Phos  3.3     08-23  Mg     1.9     08-23    TPro  7.5  /  Alb  3.0<L>  /  TBili  0.3  /  DBili  x   /  AST  14<L>  /  ALT  15  /  AlkPhos  94  08-21    Cultures;     CAPILLARY BLOOD GLUCOSE  145 (23 Aug 2017 08:49)  148 (22 Aug 2017 22:30)  147 (22 Aug 2017 15:59)  134 (22 Aug 2017 10:58)        Hemoglobin A1C, Whole Blood (08.22.17 @ 08:02)    Hemoglobin A1C, Whole Blood: 6.8: Method: Immunoassay       Reference Range                4.0-5.6%       High risk (prediabetic)        5.7-6.4%       Diabetic, diagnostic             >=6.5%       ADA diabetic treatment goal       <7.0%  The Hemoglobin A1c testing is NGSP-certified.6.8: Reference ranges are based  upon the 2010 recommendations of  the American Diabetes Association.  Interpretation may vary for children  and adolescents. %        Lipid panel:           RADIOLOGY & ADDITIONAL TESTS:      Imaging Personally Reviewed:  [ ] YES      Consultant(s) Notes Reviewed:  [ ] YES     Care Discussed with [ ] Consultants [X ] Patient [ ] Family  [x ]    [x ]  Other; RN

## 2017-08-25 LAB
ANION GAP SERPL CALC-SCNC: 9 MMOL/L — SIGNIFICANT CHANGE UP (ref 5–17)
BASOPHILS # BLD AUTO: 0 K/UL — SIGNIFICANT CHANGE UP (ref 0–0.2)
BASOPHILS NFR BLD AUTO: 0.7 % — SIGNIFICANT CHANGE UP (ref 0–2)
BUN SERPL-MCNC: 33 MG/DL — HIGH (ref 7–23)
CALCIUM SERPL-MCNC: 8.1 MG/DL — LOW (ref 8.5–10.1)
CHLORIDE SERPL-SCNC: 101 MMOL/L — SIGNIFICANT CHANGE UP (ref 96–108)
CO2 SERPL-SCNC: 25 MMOL/L — SIGNIFICANT CHANGE UP (ref 22–31)
CREAT SERPL-MCNC: 2.09 MG/DL — HIGH (ref 0.5–1.3)
CRP SERPL-MCNC: 3.8 MG/DL — HIGH (ref 0–0.4)
EOSINOPHIL # BLD AUTO: 0.1 K/UL — SIGNIFICANT CHANGE UP (ref 0–0.5)
EOSINOPHIL NFR BLD AUTO: 1.3 % — SIGNIFICANT CHANGE UP (ref 0–6)
ERYTHROCYTE [SEDIMENTATION RATE] IN BLOOD: 63 MM/HR — HIGH (ref 0–20)
GLUCOSE SERPL-MCNC: 184 MG/DL — HIGH (ref 70–99)
HCT VFR BLD CALC: 29.3 % — LOW (ref 39–50)
HGB BLD-MCNC: 9.6 G/DL — LOW (ref 13–17)
LYMPHOCYTES # BLD AUTO: 1 K/UL — SIGNIFICANT CHANGE UP (ref 1–3.3)
LYMPHOCYTES # BLD AUTO: 17 % — SIGNIFICANT CHANGE UP (ref 13–44)
MCHC RBC-ENTMCNC: 30.4 PG — SIGNIFICANT CHANGE UP (ref 27–34)
MCHC RBC-ENTMCNC: 32.8 GM/DL — SIGNIFICANT CHANGE UP (ref 32–36)
MCV RBC AUTO: 92.7 FL — SIGNIFICANT CHANGE UP (ref 80–100)
MONOCYTES # BLD AUTO: 0.6 K/UL — SIGNIFICANT CHANGE UP (ref 0–0.9)
MONOCYTES NFR BLD AUTO: 10.5 % — SIGNIFICANT CHANGE UP (ref 2–14)
NEUTROPHILS # BLD AUTO: 4.2 K/UL — SIGNIFICANT CHANGE UP (ref 1.8–7.4)
NEUTROPHILS NFR BLD AUTO: 70.4 % — SIGNIFICANT CHANGE UP (ref 43–77)
PLATELET # BLD AUTO: 156 K/UL — SIGNIFICANT CHANGE UP (ref 150–400)
POTASSIUM SERPL-MCNC: 4.5 MMOL/L — SIGNIFICANT CHANGE UP (ref 3.5–5.3)
POTASSIUM SERPL-SCNC: 4.5 MMOL/L — SIGNIFICANT CHANGE UP (ref 3.5–5.3)
RBC # BLD: 3.16 M/UL — LOW (ref 4.2–5.8)
RBC # FLD: 13.6 % — SIGNIFICANT CHANGE UP (ref 11–15)
SODIUM SERPL-SCNC: 135 MMOL/L — SIGNIFICANT CHANGE UP (ref 135–145)
WBC # BLD: 6 K/UL — SIGNIFICANT CHANGE UP (ref 3.8–10.5)
WBC # FLD AUTO: 6 K/UL — SIGNIFICANT CHANGE UP (ref 3.8–10.5)

## 2017-08-25 PROCEDURE — 99232 SBSQ HOSP IP/OBS MODERATE 35: CPT

## 2017-08-25 PROCEDURE — 76937 US GUIDE VASCULAR ACCESS: CPT | Mod: 26

## 2017-08-25 PROCEDURE — 36569 INSJ PICC 5 YR+ W/O IMAGING: CPT

## 2017-08-25 PROCEDURE — 77001 FLUOROGUIDE FOR VEIN DEVICE: CPT | Mod: 26

## 2017-08-25 RX ADMIN — HEPARIN SODIUM 5000 UNIT(S): 5000 INJECTION INTRAVENOUS; SUBCUTANEOUS at 22:35

## 2017-08-25 RX ADMIN — Medication 150 MILLIGRAM(S): at 18:04

## 2017-08-25 RX ADMIN — ATORVASTATIN CALCIUM 40 MILLIGRAM(S): 80 TABLET, FILM COATED ORAL at 22:36

## 2017-08-25 RX ADMIN — Medication 100 MILLIGRAM(S): at 22:36

## 2017-08-25 RX ADMIN — HEPARIN SODIUM 5000 UNIT(S): 5000 INJECTION INTRAVENOUS; SUBCUTANEOUS at 14:14

## 2017-08-25 RX ADMIN — GABAPENTIN 300 MILLIGRAM(S): 400 CAPSULE ORAL at 06:38

## 2017-08-25 RX ADMIN — CLOPIDOGREL BISULFATE 75 MILLIGRAM(S): 75 TABLET, FILM COATED ORAL at 11:29

## 2017-08-25 RX ADMIN — Medication 100 MILLIGRAM(S): at 06:39

## 2017-08-25 RX ADMIN — GABAPENTIN 300 MILLIGRAM(S): 400 CAPSULE ORAL at 18:05

## 2017-08-25 RX ADMIN — Medication 2: at 08:10

## 2017-08-25 RX ADMIN — HEPARIN SODIUM 5000 UNIT(S): 5000 INJECTION INTRAVENOUS; SUBCUTANEOUS at 06:38

## 2017-08-25 RX ADMIN — OXYCODONE AND ACETAMINOPHEN 2 TABLET(S): 5; 325 TABLET ORAL at 23:35

## 2017-08-25 RX ADMIN — OXYCODONE AND ACETAMINOPHEN 2 TABLET(S): 5; 325 TABLET ORAL at 22:35

## 2017-08-25 RX ADMIN — CEFEPIME 100 MILLIGRAM(S): 1 INJECTION, POWDER, FOR SOLUTION INTRAMUSCULAR; INTRAVENOUS at 08:12

## 2017-08-25 RX ADMIN — Medication 2: at 18:03

## 2017-08-25 RX ADMIN — Medication 2: at 11:28

## 2017-08-25 RX ADMIN — LISINOPRIL 40 MILLIGRAM(S): 2.5 TABLET ORAL at 08:11

## 2017-08-25 RX ADMIN — Medication 150 MILLIGRAM(S): at 06:36

## 2017-08-25 RX ADMIN — Medication 100 MILLIGRAM(S): at 14:13

## 2017-08-25 RX ADMIN — CEFEPIME 100 MILLIGRAM(S): 1 INJECTION, POWDER, FOR SOLUTION INTRAMUSCULAR; INTRAVENOUS at 19:58

## 2017-08-25 RX ADMIN — PANTOPRAZOLE SODIUM 40 MILLIGRAM(S): 20 TABLET, DELAYED RELEASE ORAL at 08:10

## 2017-08-25 NOTE — PROGRESS NOTE ADULT - SUBJECTIVE AND OBJECTIVE BOX
Patient is a 62y old  Male who presents with a chief complaint of Sent by PMD for Osteomyelitis (21 Aug 2017 18:42)      OVERNIGHT EVENTS:    MEDICATIONS  (STANDING):  clopidogrel Tablet 75 milliGRAM(s) Oral daily  heparin  Injectable 5000 Unit(s) SubCutaneous every 8 hours  cefepime  IVPB 1000 milliGRAM(s) IV Intermittent every 12 hours  vancomycin  IVPB 750 milliGRAM(s) IV Intermittent every 12 hours  insulin lispro (HumaLOG) corrective regimen sliding scale   SubCutaneous three times a day before meals  dextrose 5%. 1000 milliLiter(s) (50 mL/Hr) IV Continuous <Continuous>  dextrose 50% Injectable 12.5 Gram(s) IV Push once  dextrose 50% Injectable 25 Gram(s) IV Push once  dextrose 50% Injectable 25 Gram(s) IV Push once  lisinopril 40 milliGRAM(s) Oral daily  atorvastatin 40 milliGRAM(s) Oral at bedtime  docusate sodium 100 milliGRAM(s) Oral three times a day  pantoprazole    Tablet 40 milliGRAM(s) Oral before breakfast  gabapentin 300 milliGRAM(s) Oral two times a day    MEDICATIONS  (PRN):  dextrose Gel 1 Dose(s) Oral once PRN Blood Glucose LESS THAN 70 milliGRAM(s)/deciliter  glucagon  Injectable 1 milliGRAM(s) IntraMuscular once PRN Glucose LESS THAN 70 milligrams/deciliter  morphine  - Injectable 2 milliGRAM(s) IV Push every 4 hours PRN Severe Pain (7 - 10)  oxyCODONE    5 mG/acetaminophen 325 mG 2 Tablet(s) Oral every 4 hours PRN Moderate Pain (4 - 6)        Allergies    No Known Allergies    Intolerances        SUBJECTIVE: in bed in NAD, no acute events overnight     Vital Signs Last 24 Hrs  T(C): 36.6 (25 Aug 2017 11:24), Max: 36.9 (25 Aug 2017 08:30)  T(F): 97.8 (25 Aug 2017 11:24), Max: 98.5 (25 Aug 2017 08:30)  HR: 80 (25 Aug 2017 13:12) (74 - 100)  BP: 120/62 (25 Aug 2017 13:12) (100/52 - 129/55)  BP(mean): --  RR: 16 (25 Aug 2017 13:12) (16 - 18)  SpO2: 95% (25 Aug 2017 13:12) (95% - 100%)    PHYSICAL EXAM:  GENERAL: NAD, obese well-groomed, well-developed  HEAD:  Atraumatic, Normocephalic  EYES: EOMI, blind , conjunctiva and sclera clear  ENMT: No tonsillar erythema, exudates, or enlargement; Moist mucous membranes, Good dentition, No lesions  NECK: Supple, No JVD, Normal thyroid  CHEST/LUNG: Clear to  auscultation bilaterally; No rales, rhonchi, wheezing, or rubs  bilaterally  HEART: Regular rate and rhythm; No murmurs, rubs, or gallops  ABDOMEN: Soft, Nontender, Nondistended; Bowel sounds present  EXTREMITIES:  2+ Peripheral Pulses, No clubbing, cyanosis, or edema BL LE    SKIN: wound bandaged with wound vac in place to right bka stump . .   NERVOUS SYSTEM:  Alert & Oriented X3, Good concentration; Motor Strength 5/5 B/L upper and lower extremities; s/p right bka  DTRs 2+ intact and symmetric, sensation intact BL    LABS:                            9.6    6.0   )-----------( 156      ( 25 Aug 2017 08:46 )             29.3   08-25    135  |  101  |  33<H>  ----------------------------<  184<H>  4.5   |  25  |  2.09<H>    Ca    8.1<L>      25 Aug 2017 08:46    TPro  6.7  /  Alb  2.8<L>  /  TBili  0.5  /  DBili  x   /  AST  9<L>  /  ALT  14  /  AlkPhos  79  08-24       Cultures;   CAPILLARY BLOOD GLUCOSE  199 (25 Aug 2017 11:35)  182 (25 Aug 2017 07:45)  188 (24 Aug 2017 22:02)  190 (24 Aug 2017 16:39)          Hemoglobin A1C, Whole Blood (08.22.17 @ 08:02)    Hemoglobin A1C, Whole Blood: 6.8: Method: Immunoassay       Reference Range                4.0-5.6%       High risk (prediabetic)        5.7-6.4%       Diabetic, diagnostic             >=6.5%       ADA diabetic treatment goal       <7.0%  The Hemoglobin A1c testing is NGSP-certified.6.8: Reference ranges are based  upon the 2010 recommendations of  the American Diabetes Association.  Interpretation may vary for children  and adolescents. %        Lipid panel:           RADIOLOGY & ADDITIONAL TESTS:      Imaging Personally Reviewed:  [ ] YES      Consultant(s) Notes Reviewed:  [ ] YES     Care Discussed with [ ] Consultants [X ] Patient [ ] Family  [x ]    [x ]  Other; RN

## 2017-08-25 NOTE — PROGRESS NOTE ADULT - PROBLEM SELECTOR PLAN 1
started on vanco and cefepime per ID consult .. vanco trough acceptable . monitor creatinine has CKD already   no ortho intervention,   still await picc line hopefully placed today    s/p I and D on 8/24/17 for wound vac in ppac and pt would like to have home IV antibx infusion...

## 2017-08-25 NOTE — PROGRESS NOTE ADULT - SUBJECTIVE AND OBJECTIVE BOX
Assessment:    BKA , OM, wound  For OR  H/O noted, plavix may be held id bleeding noted  post op, I and D

## 2017-08-26 LAB
-  AMPICILLIN/SULBACTAM: SIGNIFICANT CHANGE UP
-  CEFAZOLIN: SIGNIFICANT CHANGE UP
-  CIPROFLOXACIN: SIGNIFICANT CHANGE UP
-  CLINDAMYCIN: SIGNIFICANT CHANGE UP
-  DAPTOMYCIN: SIGNIFICANT CHANGE UP
-  ERYTHROMYCIN: SIGNIFICANT CHANGE UP
-  GENTAMICIN: SIGNIFICANT CHANGE UP
-  LEVOFLOXACIN: SIGNIFICANT CHANGE UP
-  LINEZOLID: SIGNIFICANT CHANGE UP
-  MOXIFLOXACIN(AEROBIC): SIGNIFICANT CHANGE UP
-  OXACILLIN: SIGNIFICANT CHANGE UP
-  PENICILLIN: SIGNIFICANT CHANGE UP
-  RIFAMPIN: SIGNIFICANT CHANGE UP
-  TETRACYCLINE: SIGNIFICANT CHANGE UP
-  TRIMETHOPRIM/SULFAMETHOXAZOLE: SIGNIFICANT CHANGE UP
-  VANCOMYCIN: SIGNIFICANT CHANGE UP
ANION GAP SERPL CALC-SCNC: 6 MMOL/L — SIGNIFICANT CHANGE UP (ref 5–17)
BUN SERPL-MCNC: 38 MG/DL — HIGH (ref 7–23)
CALCIUM SERPL-MCNC: 7.4 MG/DL — LOW (ref 8.5–10.1)
CHLORIDE SERPL-SCNC: 102 MMOL/L — SIGNIFICANT CHANGE UP (ref 96–108)
CO2 SERPL-SCNC: 26 MMOL/L — SIGNIFICANT CHANGE UP (ref 22–31)
CREAT SERPL-MCNC: 2.05 MG/DL — HIGH (ref 0.5–1.3)
GLUCOSE SERPL-MCNC: 159 MG/DL — HIGH (ref 70–99)
HCT VFR BLD CALC: 25.7 % — LOW (ref 39–50)
HGB BLD-MCNC: 8.2 G/DL — LOW (ref 13–17)
MCHC RBC-ENTMCNC: 30.2 PG — SIGNIFICANT CHANGE UP (ref 27–34)
MCHC RBC-ENTMCNC: 31.9 GM/DL — LOW (ref 32–36)
MCV RBC AUTO: 94.6 FL — SIGNIFICANT CHANGE UP (ref 80–100)
METHOD TYPE: SIGNIFICANT CHANGE UP
PLATELET # BLD AUTO: 126 K/UL — LOW (ref 150–400)
POTASSIUM SERPL-MCNC: 4.5 MMOL/L — SIGNIFICANT CHANGE UP (ref 3.5–5.3)
POTASSIUM SERPL-SCNC: 4.5 MMOL/L — SIGNIFICANT CHANGE UP (ref 3.5–5.3)
RBC # BLD: 2.72 M/UL — LOW (ref 4.2–5.8)
RBC # FLD: 13.2 % — SIGNIFICANT CHANGE UP (ref 11–15)
SODIUM SERPL-SCNC: 134 MMOL/L — LOW (ref 135–145)
VANCOMYCIN TROUGH SERPL-MCNC: 23.1 UG/ML — HIGH (ref 10–20)
WBC # BLD: 4.6 K/UL — SIGNIFICANT CHANGE UP (ref 3.8–10.5)
WBC # FLD AUTO: 4.6 K/UL — SIGNIFICANT CHANGE UP (ref 3.8–10.5)

## 2017-08-26 PROCEDURE — 99232 SBSQ HOSP IP/OBS MODERATE 35: CPT

## 2017-08-26 RX ORDER — SODIUM CHLORIDE 9 MG/ML
1000 INJECTION INTRAMUSCULAR; INTRAVENOUS; SUBCUTANEOUS
Qty: 0 | Refills: 0 | Status: COMPLETED | OUTPATIENT
Start: 2017-08-26 | End: 2017-08-26

## 2017-08-26 RX ADMIN — Medication 2: at 16:01

## 2017-08-26 RX ADMIN — HEPARIN SODIUM 5000 UNIT(S): 5000 INJECTION INTRAVENOUS; SUBCUTANEOUS at 21:41

## 2017-08-26 RX ADMIN — Medication 150 MILLIGRAM(S): at 17:47

## 2017-08-26 RX ADMIN — PANTOPRAZOLE SODIUM 40 MILLIGRAM(S): 20 TABLET, DELAYED RELEASE ORAL at 08:00

## 2017-08-26 RX ADMIN — Medication 2: at 11:40

## 2017-08-26 RX ADMIN — HEPARIN SODIUM 5000 UNIT(S): 5000 INJECTION INTRAVENOUS; SUBCUTANEOUS at 07:08

## 2017-08-26 RX ADMIN — CLOPIDOGREL BISULFATE 75 MILLIGRAM(S): 75 TABLET, FILM COATED ORAL at 11:40

## 2017-08-26 RX ADMIN — CEFEPIME 100 MILLIGRAM(S): 1 INJECTION, POWDER, FOR SOLUTION INTRAMUSCULAR; INTRAVENOUS at 08:00

## 2017-08-26 RX ADMIN — Medication 100 MILLIGRAM(S): at 07:05

## 2017-08-26 RX ADMIN — LISINOPRIL 40 MILLIGRAM(S): 2.5 TABLET ORAL at 07:05

## 2017-08-26 RX ADMIN — GABAPENTIN 300 MILLIGRAM(S): 400 CAPSULE ORAL at 07:05

## 2017-08-26 RX ADMIN — Medication 100 MILLIGRAM(S): at 13:10

## 2017-08-26 RX ADMIN — HEPARIN SODIUM 5000 UNIT(S): 5000 INJECTION INTRAVENOUS; SUBCUTANEOUS at 13:10

## 2017-08-26 RX ADMIN — SODIUM CHLORIDE 75 MILLILITER(S): 9 INJECTION INTRAMUSCULAR; INTRAVENOUS; SUBCUTANEOUS at 19:18

## 2017-08-26 RX ADMIN — OXYCODONE AND ACETAMINOPHEN 2 TABLET(S): 5; 325 TABLET ORAL at 22:23

## 2017-08-26 RX ADMIN — Medication 100 MILLIGRAM(S): at 21:41

## 2017-08-26 RX ADMIN — Medication 150 MILLIGRAM(S): at 07:06

## 2017-08-26 RX ADMIN — Medication 2: at 08:00

## 2017-08-26 RX ADMIN — CEFEPIME 100 MILLIGRAM(S): 1 INJECTION, POWDER, FOR SOLUTION INTRAMUSCULAR; INTRAVENOUS at 19:53

## 2017-08-26 RX ADMIN — GABAPENTIN 300 MILLIGRAM(S): 400 CAPSULE ORAL at 17:04

## 2017-08-26 RX ADMIN — ATORVASTATIN CALCIUM 40 MILLIGRAM(S): 80 TABLET, FILM COATED ORAL at 21:41

## 2017-08-26 RX ADMIN — OXYCODONE AND ACETAMINOPHEN 2 TABLET(S): 5; 325 TABLET ORAL at 23:23

## 2017-08-26 NOTE — PROGRESS NOTE ADULT - SUBJECTIVE AND OBJECTIVE BOX
Patient is a 62y old  Male who presents with a chief complaint of Sent by PMD for Osteomyelitis (21 Aug 2017 18:42)      OVERNIGHT EVENTS:    MEDICATIONS  (STANDING):  clopidogrel Tablet 75 milliGRAM(s) Oral daily  heparin  Injectable 5000 Unit(s) SubCutaneous every 8 hours  cefepime  IVPB 1000 milliGRAM(s) IV Intermittent every 12 hours  vancomycin  IVPB 750 milliGRAM(s) IV Intermittent every 12 hours  insulin lispro (HumaLOG) corrective regimen sliding scale   SubCutaneous three times a day before meals  dextrose 5%. 1000 milliLiter(s) (50 mL/Hr) IV Continuous <Continuous>  dextrose 50% Injectable 12.5 Gram(s) IV Push once  dextrose 50% Injectable 25 Gram(s) IV Push once  dextrose 50% Injectable 25 Gram(s) IV Push once  lisinopril 40 milliGRAM(s) Oral daily  atorvastatin 40 milliGRAM(s) Oral at bedtime  docusate sodium 100 milliGRAM(s) Oral three times a day  pantoprazole    Tablet 40 milliGRAM(s) Oral before breakfast  gabapentin 300 milliGRAM(s) Oral two times a day    MEDICATIONS  (PRN):  dextrose Gel 1 Dose(s) Oral once PRN Blood Glucose LESS THAN 70 milliGRAM(s)/deciliter  glucagon  Injectable 1 milliGRAM(s) IntraMuscular once PRN Glucose LESS THAN 70 milligrams/deciliter  morphine  - Injectable 2 milliGRAM(s) IV Push every 4 hours PRN Severe Pain (7 - 10)  oxyCODONE    5 mG/acetaminophen 325 mG 2 Tablet(s) Oral every 4 hours PRN Moderate Pain (4 - 6)        Allergies    No Known Allergies    Intolerances        SUBJECTIVE: in bed in NAD, no acute events overnight     Vital Signs Last 24 Hrs  T(C): 36.1 (26 Aug 2017 11:50), Max: 36.8 (26 Aug 2017 01:03)  T(F): 97 (26 Aug 2017 11:50), Max: 98.3 (26 Aug 2017 01:03)  HR: 67 (26 Aug 2017 11:50) (67 - 71)  BP: 117/53 (26 Aug 2017 11:50) (101/41 - 117/53)  BP(mean): --  RR: 17 (26 Aug 2017 11:50) (17 - 18)  SpO2: 98% (26 Aug 2017 11:50) (95% - 98%)    PHYSICAL EXAM:  GENERAL: NAD, obese well-groomed, well-developed  HEAD:  Atraumatic, Normocephalic  EYES: EOMI, blind , conjunctiva and sclera clear  ENMT: No tonsillar erythema, exudates, or enlargement; Moist mucous membranes, Good dentition, No lesions  NECK: Supple, No JVD, Normal thyroid  CHEST/LUNG: Clear to  auscultation bilaterally; No rales, rhonchi, wheezing, or rubs  bilaterally  HEART: Regular rate and rhythm; No murmurs, rubs, or gallops  ABDOMEN: Soft, Nontender, Nondistended; Bowel sounds present  EXTREMITIES:  2+ Peripheral Pulses, No clubbing, cyanosis, or edema BL LE    SKIN: wound bandaged with wound vac in place to right bka stump . .   NERVOUS SYSTEM:  Alert & Oriented X3, Good concentration; Motor Strength 5/5 B/L upper and lower extremities; s/p right bka  DTRs 2+ intact and symmetric, sensation intact BL    LABS:                               8.2    4.6   )-----------( 126      ( 26 Aug 2017 08:01 )             25.7   08-26    134<L>  |  102  |  38<H>  ----------------------------<  159<H>  4.5   |  26  |  2.05<H>    Ca    7.4<L>      26 Aug 2017 08:01      Cultures;     CAPILLARY BLOOD GLUCOSE  171 (26 Aug 2017 11:39)  171 (26 Aug 2017 07:59)  184 (25 Aug 2017 22:31)  197 (25 Aug 2017 16:48)          Hemoglobin A1C, Whole Blood (08.22.17 @ 08:02)    Hemoglobin A1C, Whole Blood: 6.8: Method: Immunoassay       Reference Range                4.0-5.6%       High risk (prediabetic)        5.7-6.4%       Diabetic, diagnostic             >=6.5%       ADA diabetic treatment goal       <7.0%  The Hemoglobin A1c testing is NGSP-certified.6.8: Reference ranges are based  upon the 2010 recommendations of  the American Diabetes Association.  Interpretation may vary for children  and adolescents. %        Lipid panel:           RADIOLOGY & ADDITIONAL TESTS:      Imaging Personally Reviewed:  [ ] YES      Consultant(s) Notes Reviewed:  [ ] YES     Care Discussed with [ ] Consultants [X ] Patient [ ] Family  [x ]    [x ]  Other; RN

## 2017-08-26 NOTE — PROGRESS NOTE ADULT - PROBLEM SELECTOR PLAN 1
started on vanco and cefepime per ID consult .. vanco trough acceptable . monitor  CKD already   no ortho intervention,   still await picc line hopefully placed today    s/p I and D on 8/24/17 for wound vac in place and pt would like to have home IV antibx infusion...await arrangements per rounds this morning

## 2017-08-26 NOTE — PROGRESS NOTE ADULT - SUBJECTIVE AND OBJECTIVE BOX
Assessment:    BKA ,   OM, wound vac  H/O noted, plavix may be held if bleeding noted  post op, I and D

## 2017-08-26 NOTE — PROGRESS NOTE ADULT - SUBJECTIVE AND OBJECTIVE BOX
INTERVAL HPI / OVERNIGHT EVENTS:    Afebrile    REVIEW OF SYSTEMS:    ROS:  CONSTITUTIONAL:  Negative fever or chills, feels well, good appetite  EYES:  Negative  blurry vision or double vision  CARDIOVASCULAR:  Negative for chest pain or palpitations  RESPIRATORY:  Negative for cough, wheezing, or SOB   GASTROINTESTINAL:  Negative for nausea, vomiting, diarrhea, constipation, or abdominal pain  GENITOURINARY:  Negative frequency, urgency or dysuria  NEUROLOGIC:  No headache, confusion, dizziness, lightheadedness      MEDICATIONS  (STANDING):  clopidogrel Tablet 75 milliGRAM(s) Oral daily  heparin  Injectable 5000 Unit(s) SubCutaneous every 8 hours  cefepime  IVPB 1000 milliGRAM(s) IV Intermittent every 12 hours  insulin lispro (HumaLOG) corrective regimen sliding scale   SubCutaneous three times a day before meals  dextrose 5%. 1000 milliLiter(s) (50 mL/Hr) IV Continuous <Continuous>  dextrose 50% Injectable 12.5 Gram(s) IV Push once  dextrose 50% Injectable 25 Gram(s) IV Push once  dextrose 50% Injectable 25 Gram(s) IV Push once  lisinopril 40 milliGRAM(s) Oral daily  atorvastatin 40 milliGRAM(s) Oral at bedtime  docusate sodium 100 milliGRAM(s) Oral three times a day  pantoprazole    Tablet 40 milliGRAM(s) Oral before breakfast  gabapentin 300 milliGRAM(s) Oral two times a day    MEDICATIONS  (PRN):  dextrose Gel 1 Dose(s) Oral once PRN Blood Glucose LESS THAN 70 milliGRAM(s)/deciliter  glucagon  Injectable 1 milliGRAM(s) IntraMuscular once PRN Glucose LESS THAN 70 milligrams/deciliter  morphine  - Injectable 2 milliGRAM(s) IV Push every 4 hours PRN Severe Pain (7 - 10)  oxyCODONE    5 mG/acetaminophen 325 mG 2 Tablet(s) Oral every 4 hours PRN Moderate Pain (4 - 6)      Vital Signs Last 24 Hrs  T(C): 36.1 (26 Aug 2017 17:16), Max: 36.8 (26 Aug 2017 01:03)  T(F): 97 (26 Aug 2017 17:16), Max: 98.3 (26 Aug 2017 01:03)  HR: 74 (26 Aug 2017 17:16) (67 - 74)  BP: 134/53 (26 Aug 2017 17:16) (101/41 - 134/53)  BP(mean): --  RR: 16 (26 Aug 2017 17:16) (16 - 18)  SpO2: 97% (26 Aug 2017 17:16) (95% - 98%)    PHYSICAL EXAM:    Constitutional: NAD, well-groomed, well-developed  HEENT: PERRLA, EOMI, Normal Hearing, MMM  Neck: No LAD, No JVD  Back: Normal spine flexure, No CVA tenderness  Respiratory: CTAB  Cardiovascular: S1 and S2, RRR, no M/G/R  Gastrointestinal: BS+, soft, NT/ND  Extremities: Stmp rt. BKA  Vascular: 2+ peripheral pulses  Neurological: A/O x 3, no focal deficits  Psychiatric: Normal mood, normal affect  Musculoskeletal: 5/5 strength b/l upper and lower extremities  Skin: No rashes      LABS:                        8.2    4.6   )-----------( 126      ( 26 Aug 2017 08:01 )             25.7     08-26    134<L>  |  102  |  38<H>  ----------------------------<  159<H>  4.5   |  26  |  2.05<H>    Ca    7.4<L>      26 Aug 2017 08:01              MICROBIOLOGY:    RADIOLOGY & ADDITIONAL STUDIES:

## 2017-08-26 NOTE — PROGRESS NOTE ADULT - PROBLEM SELECTOR PLAN 1
Platelets need to be monitored if linezolid is started at present 126 K is low at present. Vanco at present on board. Monitor creat and vanco level.  Continue cefepime.

## 2017-08-26 NOTE — PROGRESS NOTE ADULT - SUBJECTIVE AND OBJECTIVE BOX
Surgery NP note    POD #2  Patient seen and examined bedside resting comfortably  No new complaints offered.   Denies Abdominal pain, Denies nausea and vomiting. Tolerating diet.  + flatus/BM.       T(F): 97 (08-26-17 @ 11:50), Max: 98.3 (08-26-17 @ 01:03)  HR: 67 (08-26-17 @ 11:50) (67 - 71)  BP: 117/53 (08-26-17 @ 11:50) (101/41 - 117/53)  RR: 17 (08-26-17 @ 11:50) (17 - 18)  SpO2: 98% (08-26-17 @ 11:50) (95% - 98%)  Wt(kg): --  CAPILLARY BLOOD GLUCOSE  171 (26 Aug 2017 11:39)  171 (26 Aug 2017 07:59)  184 (25 Aug 2017 22:31)  197 (25 Aug 2017 16:48)          PHYSICAL EXAM:  General: NAD.   Neuro:  Alert & oriented x 3  HEENT: NCAT, patient legally blind  CV: +S1+S2 regular rate and rhythm  Lung: clear to ausculation bilaterally, respirations nonlabored, good inspiratory effort  Extremities: s/p right BKA, no pedal edema or calf tenderness noted     LABS:                        8.2    4.6   )-----------( 126      ( 26 Aug 2017 08:01 )             25.7     08-26    134<L>  |  102  |  38<H>  ----------------------------<  159<H>  4.5   |  26  |  2.05<H>    Ca    7.4<L>      26 Aug 2017 08:01        I&O's Detail    25 Aug 2017 07:01  -  26 Aug 2017 07:00  --------------------------------------------------------  IN:    Oral Fluid: 960 mL    Solution: 100 mL    Solution: 300 mL  Total IN: 1360 mL    OUT:    Voided: 500 mL  Total OUT: 500 mL    Total NET: 860 mL            Impression: 62y Male admitted with Surgery NP note    Patient seen and examined bedside   No new complaints offered.   Abdominal pain, Denies nausea and vomiting. Tolerating diet.   flatus/BM.       T(F): 97 (08-26-17 @ 11:50), Max: 98.3 (08-26-17 @ 01:03)  HR: 67 (08-26-17 @ 11:50) (67 - 71)  BP: 117/53 (08-26-17 @ 11:50) (101/41 - 117/53)  RR: 17 (08-26-17 @ 11:50) (17 - 18)  SpO2: 98% (08-26-17 @ 11:50) (95% - 98%)  Wt(kg): --  CAPILLARY BLOOD GLUCOSE  171 (26 Aug 2017 11:39)  171 (26 Aug 2017 07:59)  184 (25 Aug 2017 22:31)  197 (25 Aug 2017 16:48)          PHYSICAL EXAM:  General: NAD.   Neuro:  Alert & oriented x 3  HEENT: NCAT, EOMI, conjunctiva clear  CV: +S1+S2 regular rate and rhythm  Lung: clear to ausculation bilaterally, respirations nonlabored, good inspiratory effort  Abdomen: soft,  non tender, no distention, + bowel sounds  Extremities: no pedal edema or calf tenderness noted   : No CVA or SP tenderness    LABS:                        8.2    4.6   )-----------( 126      ( 26 Aug 2017 08:01 )             25.7     08-26    134<L>  |  102  |  38<H>  ----------------------------<  159<H>  4.5   |  26  |  2.05<H>    Ca    7.4<L>      26 Aug 2017 08:01        I&O's Detail    25 Aug 2017 07:01  -  26 Aug 2017 07:00  --------------------------------------------------------  IN:    Oral Fluid: 960 mL    Solution: 100 mL    Solution: 300 mL  Total IN: 1360 mL    OUT:    Voided: 500 mL  Total OUT: 500 mL    Total NET: 860 mL            Impression: 62y Male admitted with osteomyelitis s/p right BKA stump open wound POD # 2.   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   -continue patient wound care and wound VAC.   -will discuss with surgical attending for Reccomendations Surgery NP note    POD #2  Patient seen and examined bedside resting comfortably  No new complaints offered.   Denies Abdominal pain, Denies nausea and vomiting. Tolerating diet.  + flatus/BM.       T(F): 97 (08-26-17 @ 11:50), Max: 98.3 (08-26-17 @ 01:03)  HR: 67 (08-26-17 @ 11:50) (67 - 71)  BP: 117/53 (08-26-17 @ 11:50) (101/41 - 117/53)  RR: 17 (08-26-17 @ 11:50) (17 - 18)  SpO2: 98% (08-26-17 @ 11:50) (95% - 98%)  Wt(kg): --  CAPILLARY BLOOD GLUCOSE  171 (26 Aug 2017 11:39)  171 (26 Aug 2017 07:59)  184 (25 Aug 2017 22:31)  197 (25 Aug 2017 16:48)          PHYSICAL EXAM:  General: NAD.   Neuro:  Alert & oriented x 3  HEENT: NCAT, patient legally blind  CV: +S1+S2 regular rate and rhythm  Lung: clear to ausculation bilaterally, respirations nonlabored, good inspiratory effort  Extremities: s/p right BKA,  wound vac to suction with black foam, Serous / sanguinous drainage noted    LABS:                        8.2    4.6   )-----------( 126      ( 26 Aug 2017 08:01 )             25.7     08-26    134<L>  |  102  |  38<H>  ----------------------------<  159<H>  4.5   |  26  |  2.05<H>    Ca    7.4<L>      26 Aug 2017 08:01        I&O's Detail    25 Aug 2017 07:01  -  26 Aug 2017 07:00  --------------------------------------------------------  IN:    Oral Fluid: 960 mL    Solution: 100 mL    Solution: 300 mL  Total IN: 1360 mL    OUT:    Voided: 500 mL  Total OUT: 500 mL    Total NET: 860 mL      Impression: 62y Male admitted with osteomyelitis s/p right BKA stump open wound POD # 2.   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

## 2017-08-27 LAB
ANION GAP SERPL CALC-SCNC: 7 MMOL/L — SIGNIFICANT CHANGE UP (ref 5–17)
BUN SERPL-MCNC: 33 MG/DL — HIGH (ref 7–23)
CALCIUM SERPL-MCNC: 7.6 MG/DL — LOW (ref 8.5–10.1)
CHLORIDE SERPL-SCNC: 108 MMOL/L — SIGNIFICANT CHANGE UP (ref 96–108)
CO2 SERPL-SCNC: 25 MMOL/L — SIGNIFICANT CHANGE UP (ref 22–31)
CREAT SERPL-MCNC: 1.6 MG/DL — HIGH (ref 0.5–1.3)
CULTURE RESULTS: SIGNIFICANT CHANGE UP
CULTURE RESULTS: SIGNIFICANT CHANGE UP
GLUCOSE SERPL-MCNC: 153 MG/DL — HIGH (ref 70–99)
HCT VFR BLD CALC: 27.6 % — LOW (ref 39–50)
HGB BLD-MCNC: 8.6 G/DL — LOW (ref 13–17)
MCHC RBC-ENTMCNC: 29.8 PG — SIGNIFICANT CHANGE UP (ref 27–34)
MCHC RBC-ENTMCNC: 31.2 GM/DL — LOW (ref 32–36)
MCV RBC AUTO: 95.4 FL — SIGNIFICANT CHANGE UP (ref 80–100)
PLATELET # BLD AUTO: 136 K/UL — LOW (ref 150–400)
POTASSIUM SERPL-MCNC: 4.6 MMOL/L — SIGNIFICANT CHANGE UP (ref 3.5–5.3)
POTASSIUM SERPL-SCNC: 4.6 MMOL/L — SIGNIFICANT CHANGE UP (ref 3.5–5.3)
RBC # BLD: 2.9 M/UL — LOW (ref 4.2–5.8)
RBC # FLD: 13.1 % — SIGNIFICANT CHANGE UP (ref 11–15)
SODIUM SERPL-SCNC: 140 MMOL/L — SIGNIFICANT CHANGE UP (ref 135–145)
SPECIMEN SOURCE: SIGNIFICANT CHANGE UP
SPECIMEN SOURCE: SIGNIFICANT CHANGE UP
VANCOMYCIN FLD-MCNC: 23 UG/ML — SIGNIFICANT CHANGE UP
VANCOMYCIN TROUGH SERPL-MCNC: 21.4 UG/ML — HIGH (ref 10–20)
WBC # BLD: 4.2 K/UL — SIGNIFICANT CHANGE UP (ref 3.8–10.5)
WBC # FLD AUTO: 4.2 K/UL — SIGNIFICANT CHANGE UP (ref 3.8–10.5)

## 2017-08-27 PROCEDURE — 99232 SBSQ HOSP IP/OBS MODERATE 35: CPT

## 2017-08-27 RX ORDER — CHLORHEXIDINE GLUCONATE 213 G/1000ML
1 SOLUTION TOPICAL DAILY
Qty: 0 | Refills: 0 | Status: DISCONTINUED | OUTPATIENT
Start: 2017-08-27 | End: 2017-08-28

## 2017-08-27 RX ADMIN — HEPARIN SODIUM 5000 UNIT(S): 5000 INJECTION INTRAVENOUS; SUBCUTANEOUS at 21:59

## 2017-08-27 RX ADMIN — Medication 100 MILLIGRAM(S): at 21:59

## 2017-08-27 RX ADMIN — Medication 2: at 15:37

## 2017-08-27 RX ADMIN — PANTOPRAZOLE SODIUM 40 MILLIGRAM(S): 20 TABLET, DELAYED RELEASE ORAL at 07:28

## 2017-08-27 RX ADMIN — Medication 100 MILLIGRAM(S): at 13:13

## 2017-08-27 RX ADMIN — LISINOPRIL 40 MILLIGRAM(S): 2.5 TABLET ORAL at 06:40

## 2017-08-27 RX ADMIN — GABAPENTIN 300 MILLIGRAM(S): 400 CAPSULE ORAL at 06:40

## 2017-08-27 RX ADMIN — GABAPENTIN 300 MILLIGRAM(S): 400 CAPSULE ORAL at 17:19

## 2017-08-27 RX ADMIN — HEPARIN SODIUM 5000 UNIT(S): 5000 INJECTION INTRAVENOUS; SUBCUTANEOUS at 13:13

## 2017-08-27 RX ADMIN — Medication 100 MILLIGRAM(S): at 06:40

## 2017-08-27 RX ADMIN — CLOPIDOGREL BISULFATE 75 MILLIGRAM(S): 75 TABLET, FILM COATED ORAL at 11:03

## 2017-08-27 RX ADMIN — ATORVASTATIN CALCIUM 40 MILLIGRAM(S): 80 TABLET, FILM COATED ORAL at 21:59

## 2017-08-27 RX ADMIN — HEPARIN SODIUM 5000 UNIT(S): 5000 INJECTION INTRAVENOUS; SUBCUTANEOUS at 06:47

## 2017-08-27 RX ADMIN — Medication 2: at 11:03

## 2017-08-27 RX ADMIN — CEFEPIME 100 MILLIGRAM(S): 1 INJECTION, POWDER, FOR SOLUTION INTRAMUSCULAR; INTRAVENOUS at 07:30

## 2017-08-27 RX ADMIN — CEFEPIME 100 MILLIGRAM(S): 1 INJECTION, POWDER, FOR SOLUTION INTRAMUSCULAR; INTRAVENOUS at 19:55

## 2017-08-27 NOTE — PROGRESS NOTE ADULT - PROBLEM SELECTOR PLAN 1
MRSA .. on vanco and cefepime per ID consult .. vanco trough  elevated , Vanco held. check random in am     no ortho intervention,   s/p PICC line    s/p I and D on 8/24/17 for wound vac in place and pt would like to have home IV antibx infusion...await arrangements per rounds this morning

## 2017-08-27 NOTE — PROGRESS NOTE ADULT - PROBLEM SELECTOR PROBLEM 3
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

## 2017-08-27 NOTE — PROGRESS NOTE ADULT - SUBJECTIVE AND OBJECTIVE BOX
Patient is a 62y old  Male who presents with a chief complaint of Sent by PMD for Osteomyelitis (21 Aug 2017 18:42)      OVERNIGHT EVENTS:  MEDICATIONS  (STANDING):  clopidogrel Tablet 75 milliGRAM(s) Oral daily  heparin  Injectable 5000 Unit(s) SubCutaneous every 8 hours  cefepime  IVPB 1000 milliGRAM(s) IV Intermittent every 12 hours  insulin lispro (HumaLOG) corrective regimen sliding scale   SubCutaneous three times a day before meals  dextrose 5%. 1000 milliLiter(s) (50 mL/Hr) IV Continuous <Continuous>  dextrose 50% Injectable 12.5 Gram(s) IV Push once  dextrose 50% Injectable 25 Gram(s) IV Push once  dextrose 50% Injectable 25 Gram(s) IV Push once  lisinopril 40 milliGRAM(s) Oral daily  atorvastatin 40 milliGRAM(s) Oral at bedtime  docusate sodium 100 milliGRAM(s) Oral three times a day  pantoprazole    Tablet 40 milliGRAM(s) Oral before breakfast  gabapentin 300 milliGRAM(s) Oral two times a day    MEDICATIONS  (PRN):  dextrose Gel 1 Dose(s) Oral once PRN Blood Glucose LESS THAN 70 milliGRAM(s)/deciliter  glucagon  Injectable 1 milliGRAM(s) IntraMuscular once PRN Glucose LESS THAN 70 milligrams/deciliter  morphine  - Injectable 2 milliGRAM(s) IV Push every 4 hours PRN Severe Pain (7 - 10)  oxyCODONE    5 mG/acetaminophen 325 mG 2 Tablet(s) Oral every 4 hours PRN Moderate Pain (4 - 6)      Allergies    No Known Allergies    Intolerances        SUBJECTIVE: in bed in NAD, no acute events overnight     Vital Signs Last 24 Hrs  T(C): 36.1 (27 Aug 2017 06:29), Max: 36.3 (27 Aug 2017 00:03)  T(F): 97 (27 Aug 2017 06:29), Max: 97.4 (27 Aug 2017 00:03)  HR: 72 (27 Aug 2017 06:29) (67 - 81)  BP: 128/67 (27 Aug 2017 06:29) (116/59 - 134/53)  BP(mean): --  RR: 16 (27 Aug 2017 06:29) (16 - 17)  SpO2: 97% (27 Aug 2017 06:29) (96% - 98%)    PHYSICAL EXAM:  GENERAL: NAD, obese well-groomed, well-developed  HEAD:  Atraumatic, Normocephalic  EYES: EOMI, blind , conjunctiva and sclera clear  ENMT: No tonsillar erythema, exudates, or enlargement; Moist mucous membranes, Good dentition, No lesions  NECK: Supple, No JVD, Normal thyroid  CHEST/LUNG: Clear to  auscultation bilaterally; No rales, rhonchi, wheezing, or rubs  bilaterally  HEART: Regular rate and rhythm; No murmurs, rubs, or gallops  ABDOMEN: Soft, Nontender, Nondistended; Bowel sounds present  EXTREMITIES:  2+ Peripheral Pulses, No clubbing, cyanosis, or edema BL LE    SKIN: wound bandaged with wound vac in place to right bka stump . .   NERVOUS SYSTEM:  Alert & Oriented X3, Good concentration; Motor Strength 5/5 B/L upper and lower extremities; s/p right bka  DTRs 2+ intact and symmetric, sensation intact BL    LABS:                           8.6    4.2   )-----------( 136      ( 27 Aug 2017 07:18 )             27.6   08-27    140  |  108  |  33<H>  ----------------------------<  153<H>  4.6   |  25  |  1.60<H>    Ca    7.6<L>      27 Aug 2017 07:18          Cultures;       Hemoglobin A1C, Whole Blood (08.22.17 @ 08:02)    Hemoglobin A1C, Whole Blood: 6.8: Method: Immunoassay       Reference Range                4.0-5.6%       High risk (prediabetic)        5.7-6.4%       Diabetic, diagnostic             >=6.5%       ADA diabetic treatment goal       <7.0%  The Hemoglobin A1c testing is NGSP-certified.6.8: Reference ranges are based  upon the 2010 recommendations of  the American Diabetes Association.  Interpretation may vary for children  and adolescents. %        Lipid panel:           RADIOLOGY & ADDITIONAL TESTS:      Imaging Personally Reviewed:  [ ] YES      Consultant(s) Notes Reviewed:  [ ] YES     Care Discussed with [ ] Consultants [X ] Patient [ ] Family  [x ]    [x ]  Other; RN

## 2017-08-27 NOTE — PROGRESS NOTE ADULT - PROBLEM SELECTOR PROBLEM 1
Other acute osteomyelitis of right tibia

## 2017-08-27 NOTE — PROGRESS NOTE ADULT - PROBLEM SELECTOR PROBLEM 5
Blindness of both eyes

## 2017-08-27 NOTE — PROGRESS NOTE ADULT - PROBLEM SELECTOR PROBLEM 2
CAD (coronary artery disease)

## 2017-08-27 NOTE — PROGRESS NOTE ADULT - SUBJECTIVE AND OBJECTIVE BOX
Postoperative Day #: 3  Patient seen and examined bedside resting comfortably.  No complaints offered.   Denies pain. Tolerating diet. No f/c.    T(F): 97.6 (08-27-17 @ 17:45), Max: 97.6 (08-27-17 @ 17:45)  HR: 72 (08-27-17 @ 17:45) (65 - 81)  BP: 131/56 (08-27-17 @ 17:45) (116/59 - 131/56)  RR: 17 (08-27-17 @ 17:45) (16 - 17)  SpO2: 98% (08-27-17 @ 17:45) (96% - 99%)  Wt(kg): --  CAPILLARY BLOOD GLUCOSE  175 (27 Aug 2017 15:37)  191 (27 Aug 2017 11:02)  147 (27 Aug 2017 07:29)  179 (26 Aug 2017 21:39)    PHYSICAL EXAM:  General: NAD, WDWN  Neuro:  Alert & oriented x 3  HEENT: blind, conjunctival haziness b/l  Chest: respirations nonlabored, good inspiratory effort  Abdomen: soft, NTND.   Extremities: R BKA site wound vac intact laterally, good seal.  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 D/c planning home tomorrow per medicine once home care set up  -Continue PT wound care with vac to suction    -will discuss with surgical attending

## 2017-08-27 NOTE — PROGRESS NOTE ADULT - ASSESSMENT
63yo m pmhx of dm, blindness, cad, neuropathy. Sent in by pmd after mri finding of osteomyelitis in his R BKA stump. Pt had bka amputation on 6/16. Was on out pt abx for his r bka ulcer.
61yo m pmhx of dm, blindness, cad, neuropathy. Sent in by pmd after mri finding of osteomyelitis in his R BKA stump. Pt had bka amputation on 6/16. Was on out pt abx for his r bka ulcer.
A/P: 62M with R tibia osteomyelitis  Pain control  FU PICC line   WBAT  DVT ppx per medical team  IV antibiotics per ID  No acute orthopedic surgery intervention warranted at this time  Cont med/surg management
A/P: 62M with R tibia osteomyelitis  Pain control  FU PICC line   WBAT  DVT ppx per medical team  IV antibiotics per ID  No acute orthopedic surgery intervention warranted at this time  FU labs/blood cultures
Patient has vanco trough 23, which is high, creat has incd. to 2.2, therefoe vancomycin is witheld. Patient has MRSA from stump wound. MRI shows OM. 8 weeks total Rx. Treatment started on: 21st Aug. this should be noted.
Creat increased slightly will not increase vanco dose, trough 13.9

## 2017-08-27 NOTE — PROGRESS NOTE ADULT - PROBLEM SELECTOR PLAN 3
ISS, hold po meds for now  hgba1c is 6.8

## 2017-08-28 ENCOUNTER — TRANSCRIPTION ENCOUNTER (OUTPATIENT)
Age: 62
End: 2017-08-28

## 2017-08-28 VITALS
HEART RATE: 78 BPM | RESPIRATION RATE: 16 BRPM | OXYGEN SATURATION: 98 % | TEMPERATURE: 98 F | DIASTOLIC BLOOD PRESSURE: 65 MMHG | SYSTOLIC BLOOD PRESSURE: 138 MMHG

## 2017-08-28 LAB
ANION GAP SERPL CALC-SCNC: 8 MMOL/L — SIGNIFICANT CHANGE UP (ref 5–17)
BUN SERPL-MCNC: 26 MG/DL — HIGH (ref 7–23)
CALCIUM SERPL-MCNC: 8.2 MG/DL — LOW (ref 8.5–10.1)
CHLORIDE SERPL-SCNC: 108 MMOL/L — SIGNIFICANT CHANGE UP (ref 96–108)
CO2 SERPL-SCNC: 25 MMOL/L — SIGNIFICANT CHANGE UP (ref 22–31)
CREAT SERPL-MCNC: 1.28 MG/DL — SIGNIFICANT CHANGE UP (ref 0.5–1.3)
GLUCOSE SERPL-MCNC: 130 MG/DL — HIGH (ref 70–99)
HCT VFR BLD CALC: 29.2 % — LOW (ref 39–50)
HGB BLD-MCNC: 9.2 G/DL — LOW (ref 13–17)
MCHC RBC-ENTMCNC: 30 PG — SIGNIFICANT CHANGE UP (ref 27–34)
MCHC RBC-ENTMCNC: 31.6 GM/DL — LOW (ref 32–36)
MCV RBC AUTO: 94.9 FL — SIGNIFICANT CHANGE UP (ref 80–100)
PLATELET # BLD AUTO: 154 K/UL — SIGNIFICANT CHANGE UP (ref 150–400)
POTASSIUM SERPL-MCNC: 4.5 MMOL/L — SIGNIFICANT CHANGE UP (ref 3.5–5.3)
POTASSIUM SERPL-SCNC: 4.5 MMOL/L — SIGNIFICANT CHANGE UP (ref 3.5–5.3)
RBC # BLD: 3.07 M/UL — LOW (ref 4.2–5.8)
RBC # FLD: 13.3 % — SIGNIFICANT CHANGE UP (ref 11–15)
SODIUM SERPL-SCNC: 141 MMOL/L — SIGNIFICANT CHANGE UP (ref 135–145)
SURGICAL PATHOLOGY FINAL REPORT - CH: SIGNIFICANT CHANGE UP
VANCOMYCIN FLD-MCNC: 16 UG/ML — SIGNIFICANT CHANGE UP
WBC # BLD: 4.4 K/UL — SIGNIFICANT CHANGE UP (ref 3.8–10.5)
WBC # FLD AUTO: 4.4 K/UL — SIGNIFICANT CHANGE UP (ref 3.8–10.5)

## 2017-08-28 RX ORDER — VANCOMYCIN HCL 1 G
1000 VIAL (EA) INTRAVENOUS EVERY 24 HOURS
Qty: 0 | Refills: 0 | Status: DISCONTINUED | OUTPATIENT
Start: 2017-08-29 | End: 2017-08-28

## 2017-08-28 RX ORDER — VANCOMYCIN HCL 1 G
1000 VIAL (EA) INTRAVENOUS EVERY 12 HOURS
Qty: 0 | Refills: 0 | Status: DISCONTINUED | OUTPATIENT
Start: 2017-08-28 | End: 2017-08-28

## 2017-08-28 RX ORDER — LACTOBACILLUS ACIDOPHILUS 100MM CELL
1 CAPSULE ORAL
Qty: 60 | Refills: 0 | OUTPATIENT
Start: 2017-08-28 | End: 2017-09-27

## 2017-08-28 RX ORDER — VANCOMYCIN HCL 1 G
1 VIAL (EA) INTRAVENOUS
Qty: 0 | Refills: 0 | COMMUNITY
Start: 2017-08-28 | End: 2017-10-15

## 2017-08-28 RX ORDER — LISINOPRIL 2.5 MG/1
1 TABLET ORAL
Qty: 0 | Refills: 0 | COMMUNITY
Start: 2017-08-28

## 2017-08-28 RX ORDER — VANCOMYCIN HCL 1 G
VIAL (EA) INTRAVENOUS
Qty: 0 | Refills: 0 | Status: DISCONTINUED | OUTPATIENT
Start: 2017-08-28 | End: 2017-08-28

## 2017-08-28 RX ORDER — DOCUSATE SODIUM 100 MG
1 CAPSULE ORAL
Qty: 0 | Refills: 0 | DISCHARGE
Start: 2017-08-28

## 2017-08-28 RX ORDER — VANCOMYCIN HCL 1 G
1000 VIAL (EA) INTRAVENOUS ONCE
Qty: 0 | Refills: 0 | Status: COMPLETED | OUTPATIENT
Start: 2017-08-28 | End: 2017-08-28

## 2017-08-28 RX ORDER — CEFEPIME 1 G/1
1 INJECTION, POWDER, FOR SOLUTION INTRAMUSCULAR; INTRAVENOUS
Qty: 0 | Refills: 0 | COMMUNITY
Start: 2017-08-28 | End: 2017-10-15

## 2017-08-28 RX ORDER — PANTOPRAZOLE SODIUM 20 MG/1
1 TABLET, DELAYED RELEASE ORAL
Qty: 0 | Refills: 0 | COMMUNITY
Start: 2017-08-28

## 2017-08-28 RX ADMIN — Medication 100 MILLIGRAM(S): at 06:16

## 2017-08-28 RX ADMIN — Medication 4: at 12:12

## 2017-08-28 RX ADMIN — LISINOPRIL 40 MILLIGRAM(S): 2.5 TABLET ORAL at 06:16

## 2017-08-28 RX ADMIN — Medication 2: at 16:28

## 2017-08-28 RX ADMIN — CHLORHEXIDINE GLUCONATE 1 APPLICATION(S): 213 SOLUTION TOPICAL at 12:08

## 2017-08-28 RX ADMIN — PANTOPRAZOLE SODIUM 40 MILLIGRAM(S): 20 TABLET, DELAYED RELEASE ORAL at 08:07

## 2017-08-28 RX ADMIN — CLOPIDOGREL BISULFATE 75 MILLIGRAM(S): 75 TABLET, FILM COATED ORAL at 12:12

## 2017-08-28 RX ADMIN — CEFEPIME 100 MILLIGRAM(S): 1 INJECTION, POWDER, FOR SOLUTION INTRAMUSCULAR; INTRAVENOUS at 08:07

## 2017-08-28 RX ADMIN — Medication 250 MILLIGRAM(S): at 12:12

## 2017-08-28 RX ADMIN — Medication 100 MILLIGRAM(S): at 15:16

## 2017-08-28 RX ADMIN — GABAPENTIN 300 MILLIGRAM(S): 400 CAPSULE ORAL at 06:16

## 2017-08-28 RX ADMIN — HEPARIN SODIUM 5000 UNIT(S): 5000 INJECTION INTRAVENOUS; SUBCUTANEOUS at 15:16

## 2017-08-28 RX ADMIN — HEPARIN SODIUM 5000 UNIT(S): 5000 INJECTION INTRAVENOUS; SUBCUTANEOUS at 06:16

## 2017-08-28 NOTE — DISCHARGE NOTE ADULT - CARE PLAN
Principal Discharge DX:	Other acute osteomyelitis of right tibia  Goal:	complete antibx.. 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

## 2017-08-28 NOTE — DISCHARGE NOTE ADULT - MEDICATION SUMMARY - MEDICATIONS TO STOP TAKING
I will STOP taking the medications listed below when I get home from the hospital:    Bacid (LAC) oral capsule  -- 1 cap(s) by mouth once a day

## 2017-08-28 NOTE — DISCHARGE NOTE ADULT - MEDICATION SUMMARY - MEDICATIONS TO TAKE
I will START or STAY ON the medications listed below when I get home from the hospital:    aspirin 325 mg oral tablet  -- 1 tab(s) by mouth once a day  -- Indication: For general    Percocet 5/325 oral tablet  -- 1 tab(s) by mouth every 8 hours, As Needed, Moderate Pain (4 - 6) MDD:maximum daily dose 3 tabs  -- Indication: For pain    lisinopril 40 mg oral tablet  -- 1 tab(s) by mouth once a day  -- Indication: For htn    calcium carbonate 500 mg (200 mg elemental calcium) oral tablet, chewable  -- 2 tab(s) by mouth 3 times a day  -- Indication: For general    Lyrica 100 mg oral capsule  -- 1 cap(s) by mouth 2 times a day MDD:2 tab per day  -- Indication: For Nerupopathy    insulin glargine  -- 10 unit(s) subcutaneous once a day (at bedtime)  -- Indication: For Dm    atorvastatin 40 mg oral tablet  -- 1 tab(s) by mouth once a day (at bedtime)  -- Indication: For hld    clopidogrel 75 mg oral tablet  -- 1 tab(s) by mouth once a day  -- Indication: For general    cefepime  -- 1 gram(s) intravenous every 12 hours  -- Indication: For Osteomyletis    vancomycin  -- 1 gram(s) intravenous once a day  -- Indication: For Osteomyleitis    docusate sodium 100 mg oral capsule  -- 1 cap(s) by mouth 3 times a day  -- Indication: For general    lactobacillus acidophilus oral tablet  -- 1 tab(s) by mouth 2 times a day  -- Indication: For general    pantoprazole 40 mg oral delayed release tablet  -- 1 tab(s) by mouth once a day (before a meal)  -- Indication: For general

## 2017-08-28 NOTE — DISCHARGE NOTE ADULT - PROVIDER TOKENS
TOKEN:'[id number]',FREE:[LAST:[ffollow up primary care doctor],PHONE:[(   )    -],FAX:[(   )    -]] TOKEN:'[id number]',TOKEN:'[id number]'

## 2017-08-28 NOTE — DISCHARGE NOTE ADULT - PLAN OF CARE
complete antibx.. 10/15/17 CMP CBC q weekly  Vancomycin trough q weekly   send results to primary care doctor

## 2017-08-28 NOTE — PROGRESS NOTE ADULT - PROVIDER SPECIALTY LIST ADULT
Cardiology
Hospitalist
Infectious Disease
Orthopedics
Orthopedics
Vascular Surgery
Cardiology
Hospitalist

## 2017-08-28 NOTE — DISCHARGE NOTE ADULT - PATIENT PORTAL LINK FT
“You can access the [employer] Patient Portal, offered by [facility], by registering with the following website: [email or web address]”

## 2017-08-28 NOTE — DISCHARGE NOTE ADULT - CARE PROVIDER_API CALL
Ted Peraza), Surgery  [address] [facility]  [address] [place] [address]  Phone: [phone]  Fax: [phone]    ffollow up primary care doctor,   Phone: (   )    -  Fax: (   )    - Ted Peraza), Surgery  [address] [facility]  [address] [place] [address]  Phone: [phone]  Fax: [phone]    Pierre Boone (DO), Family Medicine  [address] [place] [address]  Phone: [phone]  Fax: [phone]

## 2017-08-28 NOTE — PROGRESS NOTE ADULT - SUBJECTIVE AND OBJECTIVE BOX
Postoperative Day #: 4  Patient seen and examined bedside resting comfortably.  No complaints offered. No fever/chills  Denies pain. Tolerating diet. No f/c.    T(F): 97.9 (08-28-17 @ 06:14), Max: 97.9 (08-28-17 @ 06:14)  HR: 72 (08-28-17 @ 10:57) (65 - 72)  BP: 140/68 (08-28-17 @ 10:57) (125/55 - 146/71)  RR: 16 (08-28-17 @ 10:57) (16 - 17)  SpO2: 99% (08-28-17 @ 10:57) (98% - 99%)  Wt(kg): --  CAPILLARY BLOOD GLUCOSE  139 (28 Aug 2017 08:03)  166 (27 Aug 2017 21:28)  175 (27 Aug 2017 15:37)    PHYSICAL EXAM:  General: NAD, WDWN  Neuro:  Alert & oriented x 3  HEENT: blind, conjunctival haziness b/l  Chest: respirations nonlabored, good inspiratory effort  Abdomen: soft, NTND.   Extremities: R BKA site wound vac intact, good seal.  RUE PICC line      LABS:                        9.2    4.4   )-----------( 154      ( 28 Aug 2017 06:38 )             29.2     08-28    141  |  108  |  26<H>  ----------------------------<  130<H>  4.5   |  25  |  1.28    Ca    8.2<L>      28 Aug 2017 06:38        I&O's Detail    27 Aug 2017 07:01  -  28 Aug 2017 07:00  --------------------------------------------------------  IN:    Oral Fluid: 360 mL    Solution: 50 mL  Total IN: 410 mL    OUT:    Voided: 350 mL  Total OUT: 350 mL    Total NET: 60 mL            Impression: 62y Male admitted with OTHER ACUTE OSTEOMYELITIS OF RIGHT TIBIA  RIGHT LEG PAIN    PMH   Neuropathy  CAD (coronary artery disease)  Blindness of both eyes  Essential hypertension  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

## 2017-08-28 NOTE — DISCHARGE NOTE ADULT - ADDITIONAL INSTRUCTIONS
CMP CBC q weekly  Vancomycin trough q weekly   send results to primary care doctor CMP CBC q weekly  Vancomycin trough q weekly   send results to primary care doctor dr. zuri dale

## 2017-08-28 NOTE — DISCHARGE NOTE ADULT - HOSPITAL COURSE
Assessment and Plan:   · Assessment	  63yo m pmhx of dm, blindness, cad, neuropathy. Sent in by pmd after mri finding of osteomyelitis in his R BKA stump. Pt had bka amputation on 6/16. Was on out pt abx for his r bka ulcer.    Problem/Plan - 1:  ·  Problem: Other acute osteomyelitis of right tibia.  Plan: MRSA .. on vanco and cefepime per ID consult .. vanco trough was   elevated , Vanco held.  and vanco adjusted     no ortho intervention,   s/p PICC line will need antibiotics till 10/15/17    s/p I and D on 8/24/17 for wound vac which is in in place and pt would like to have home IV antibx infusion...      Problem/Plan - 2:  ·  Problem: CAD (coronary artery disease).  Plan: c/w plavix.     Problem/Plan - 3:  ·  Problem: Diabetes mellitus due to underlying condition with complications.  Plan: ISS, hold po meds for now  hgba1c is 6.8.     Problem/Plan - 4:  ·  Problem: Neuropathy.  Plan: c/w gabapentin.     Problem/Plan - 5:  ·  Problem: Blindness of both eyes.  Plan: supportive care.

## 2017-08-29 LAB
CULTURE RESULTS: SIGNIFICANT CHANGE UP
ORGANISM # SPEC MICROSCOPIC CNT: SIGNIFICANT CHANGE UP
ORGANISM # SPEC MICROSCOPIC CNT: SIGNIFICANT CHANGE UP
SPECIMEN SOURCE: SIGNIFICANT CHANGE UP

## 2017-08-30 DIAGNOSIS — Z79.4 LONG TERM (CURRENT) USE OF INSULIN: ICD-10-CM

## 2017-08-30 DIAGNOSIS — Z95.5 PRESENCE OF CORONARY ANGIOPLASTY IMPLANT AND GRAFT: ICD-10-CM

## 2017-08-30 DIAGNOSIS — I10 ESSENTIAL (PRIMARY) HYPERTENSION: ICD-10-CM

## 2017-08-30 DIAGNOSIS — T87.43 INFECTION OF AMPUTATION STUMP, RIGHT LOWER EXTREMITY: ICD-10-CM

## 2017-08-30 DIAGNOSIS — H54.0 BLINDNESS, BOTH EYES: ICD-10-CM

## 2017-08-30 DIAGNOSIS — M86.161 OTHER ACUTE OSTEOMYELITIS, RIGHT TIBIA AND FIBULA: ICD-10-CM

## 2017-08-30 DIAGNOSIS — E11.319 TYPE 2 DIABETES MELLITUS WITH UNSPECIFIED DIABETIC RETINOPATHY WITHOUT MACULAR EDEMA: ICD-10-CM

## 2017-08-30 DIAGNOSIS — E11.9 TYPE 2 DIABETES MELLITUS WITHOUT COMPLICATIONS: ICD-10-CM
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
## 2017-08-30 DIAGNOSIS — I25.10 ATHEROSCLEROTIC HEART DISEASE OF NATIVE CORONARY ARTERY WITHOUT ANGINA PECTORIS: ICD-10-CM

## 2017-08-30 DIAGNOSIS — B95.62 METHICILLIN RESISTANT STAPHYLOCOCCUS AUREUS INFECTION AS THE CAUSE OF DISEASES CLASSIFIED ELSEWHERE: ICD-10-CM
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
## 2017-08-30 DIAGNOSIS — E78.5 HYPERLIPIDEMIA, UNSPECIFIED: ICD-10-CM

## 2017-08-30 DIAGNOSIS — G62.9 POLYNEUROPATHY, UNSPECIFIED: ICD-10-CM

## 2017-08-30 DIAGNOSIS — E66.01 MORBID (SEVERE) OBESITY DUE TO EXCESS CALORIES: ICD-10-CM

## 2017-08-30 DIAGNOSIS — E11.69 TYPE 2 DIABETES MELLITUS WITH OTHER SPECIFIED COMPLICATION: ICD-10-CM

## 2017-09-13 ENCOUNTER — RESULT REVIEW (OUTPATIENT)
Age: 62
End: 2017-09-13

## 2017-09-13 ENCOUNTER — OUTPATIENT (OUTPATIENT)
Dept: OUTPATIENT SERVICES | Facility: HOSPITAL | Age: 62
LOS: 1 days | Discharge: HOME | End: 2017-09-13
Payer: MEDICARE
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
## 2017-09-13 DIAGNOSIS — Z99.3 DEPENDENCE ON WHEELCHAIR: ICD-10-CM
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
## 2017-09-13 DIAGNOSIS — Z79.84 LONG TERM (CURRENT) USE OF ORAL HYPOGLYCEMIC DRUGS: ICD-10-CM

## 2017-09-13 DIAGNOSIS — Z79.01 LONG TERM (CURRENT) USE OF ANTICOAGULANTS: ICD-10-CM

## 2017-09-13 DIAGNOSIS — M62.81 MUSCLE WEAKNESS (GENERALIZED): ICD-10-CM

## 2017-09-13 DIAGNOSIS — E11.9 TYPE 2 DIABETES MELLITUS WITHOUT COMPLICATIONS: ICD-10-CM

## 2017-09-13 DIAGNOSIS — Z95.5 PRESENCE OF CORONARY ANGIOPLASTY IMPLANT AND GRAFT: Chronic | ICD-10-CM

## 2017-09-13 DIAGNOSIS — H54.8 LEGAL BLINDNESS, AS DEFINED IN USA: ICD-10-CM

## 2017-09-13 DIAGNOSIS — Z80.9 FAMILY HISTORY OF MALIGNANT NEOPLASM, UNSPECIFIED: ICD-10-CM

## 2017-09-13 DIAGNOSIS — Z79.899 OTHER LONG TERM (CURRENT) DRUG THERAPY: ICD-10-CM

## 2017-09-13 DIAGNOSIS — Z79.4 LONG TERM (CURRENT) USE OF INSULIN: ICD-10-CM

## 2017-09-13 DIAGNOSIS — L89.90 PRESSURE ULCER OF UNSPECIFIED SITE, UNSPECIFIED STAGE: ICD-10-CM

## 2017-09-13 DIAGNOSIS — Z74.1 NEED FOR ASSISTANCE WITH PERSONAL CARE: ICD-10-CM

## 2017-09-13 DIAGNOSIS — Z83.3 FAMILY HISTORY OF DIABETES MELLITUS: ICD-10-CM

## 2017-09-13 DIAGNOSIS — Z95.5 PRESENCE OF CORONARY ANGIOPLASTY IMPLANT AND GRAFT: ICD-10-CM

## 2017-09-13 DIAGNOSIS — Z89.511 ACQUIRED ABSENCE OF RIGHT LEG BELOW KNEE: ICD-10-CM

## 2017-09-13 DIAGNOSIS — T87.89 OTHER COMPLICATIONS OF AMPUTATION STUMP: ICD-10-CM

## 2017-09-13 DIAGNOSIS — Z82.49 FAMILY HISTORY OF ISCHEMIC HEART DISEASE AND OTHER DISEASES OF THE CIRCULATORY SYSTEM: ICD-10-CM

## 2017-09-13 PROCEDURE — 88300 SURGICAL PATH GROSS: CPT | Mod: 26

## 2017-09-14 LAB — SURGICAL PATHOLOGY FINAL REPORT - CH: SIGNIFICANT CHANGE UP

## 2017-10-03 ENCOUNTER — INPATIENT (INPATIENT)
Facility: HOSPITAL | Age: 62
LOS: 9 days | Discharge: HOME | End: 2017-10-13
Attending: HOSPITALIST | Admitting: HOSPITALIST
Payer: MEDICARE

## 2017-10-03 VITALS
SYSTOLIC BLOOD PRESSURE: 134 MMHG | WEIGHT: 315 LBS | RESPIRATION RATE: 16 BRPM | TEMPERATURE: 98 F | OXYGEN SATURATION: 100 % | DIASTOLIC BLOOD PRESSURE: 71 MMHG | HEIGHT: 73 IN | HEART RATE: 119 BPM

## 2017-10-03 DIAGNOSIS — A49.02 METHICILLIN RESISTANT STAPHYLOCOCCUS AUREUS INFECTION, UNSPECIFIED SITE: ICD-10-CM

## 2017-10-03 DIAGNOSIS — Z95.5 PRESENCE OF CORONARY ANGIOPLASTY IMPLANT AND GRAFT: Chronic | ICD-10-CM

## 2017-10-03 DIAGNOSIS — S88.111D COMPLETE TRAUMATIC AMPUTATION AT LEVEL BETWEEN KNEE AND ANKLE, RIGHT LOWER LEG, SUBSEQUENT ENCOUNTER: Chronic | ICD-10-CM

## 2017-10-03 DIAGNOSIS — R93.8 ABNORMAL FINDINGS ON DIAGNOSTIC IMAGING OF OTHER SPECIFIED BODY STRUCTURES: ICD-10-CM

## 2017-10-03 DIAGNOSIS — I10 ESSENTIAL (PRIMARY) HYPERTENSION: ICD-10-CM

## 2017-10-03 DIAGNOSIS — E08.8 DIABETES MELLITUS DUE TO UNDERLYING CONDITION WITH UNSPECIFIED COMPLICATIONS: ICD-10-CM

## 2017-10-03 DIAGNOSIS — K20.9 ESOPHAGITIS, UNSPECIFIED: ICD-10-CM

## 2017-10-03 DIAGNOSIS — N17.9 ACUTE KIDNEY FAILURE, UNSPECIFIED: ICD-10-CM

## 2017-10-03 LAB
ACETONE SERPL-MCNC: SIGNIFICANT CHANGE UP
ALBUMIN SERPL ELPH-MCNC: 2.6 G/DL — LOW (ref 3.3–5)
ALP SERPL-CCNC: 49 U/L — SIGNIFICANT CHANGE UP (ref 40–120)
ALT FLD-CCNC: 14 U/L — SIGNIFICANT CHANGE UP (ref 12–78)
AMYLASE P1 CFR SERPL: 65 U/L — SIGNIFICANT CHANGE UP (ref 25–115)
ANION GAP SERPL CALC-SCNC: 12 MMOL/L — SIGNIFICANT CHANGE UP (ref 5–17)
APTT BLD: 26.2 SEC — LOW (ref 27.5–37.4)
AST SERPL-CCNC: 10 U/L — LOW (ref 15–37)
BASOPHILS # BLD AUTO: 0 K/UL — SIGNIFICANT CHANGE UP (ref 0–0.2)
BILIRUB SERPL-MCNC: 0.5 MG/DL — SIGNIFICANT CHANGE UP (ref 0.2–1.2)
BUN SERPL-MCNC: 135 MG/DL — HIGH (ref 7–23)
CALCIUM SERPL-MCNC: 10.5 MG/DL — HIGH (ref 8.5–10.1)
CHLORIDE SERPL-SCNC: 103 MMOL/L — SIGNIFICANT CHANGE UP (ref 96–108)
CO2 SERPL-SCNC: 23 MMOL/L — SIGNIFICANT CHANGE UP (ref 22–31)
CREAT SERPL-MCNC: 5.5 MG/DL — HIGH (ref 0.5–1.3)
EOSINOPHIL # BLD AUTO: 0 K/UL — SIGNIFICANT CHANGE UP (ref 0–0.5)
GLUCOSE SERPL-MCNC: 217 MG/DL — HIGH (ref 70–99)
HCT VFR BLD CALC: 25 % — LOW (ref 39–50)
HGB BLD-MCNC: 8 G/DL — LOW (ref 13–17)
HYPOCHROMIA BLD QL: SLIGHT — SIGNIFICANT CHANGE UP
INR BLD: 1.22 RATIO — HIGH (ref 0.88–1.16)
LACTATE SERPL-SCNC: 1.4 MMOL/L — SIGNIFICANT CHANGE UP (ref 0.7–2)
LIDOCAIN IGE QN: 82 U/L — SIGNIFICANT CHANGE UP (ref 73–393)
LYMPHOCYTES # BLD AUTO: 0.4 K/UL — LOW (ref 1–3.3)
LYMPHOCYTES # BLD AUTO: 37 % — SIGNIFICANT CHANGE UP (ref 13–44)
MACROCYTES BLD QL: SLIGHT — SIGNIFICANT CHANGE UP
MCHC RBC-ENTMCNC: 30.2 PG — SIGNIFICANT CHANGE UP (ref 27–34)
MCHC RBC-ENTMCNC: 32.2 GM/DL — SIGNIFICANT CHANGE UP (ref 32–36)
MCV RBC AUTO: 93.8 FL — SIGNIFICANT CHANGE UP (ref 80–100)
MONOCYTES # BLD AUTO: 0.5 K/UL — SIGNIFICANT CHANGE UP (ref 0–0.9)
MONOCYTES NFR BLD AUTO: 1 % — LOW (ref 2–14)
NEUTROPHILS # BLD AUTO: 8.9 K/UL — HIGH (ref 1.8–7.4)
NEUTROPHILS NFR BLD AUTO: 62 % — SIGNIFICANT CHANGE UP (ref 43–77)
PLAT MORPH BLD: NORMAL — SIGNIFICANT CHANGE UP
PLATELET # BLD AUTO: 146 K/UL — LOW (ref 150–400)
POTASSIUM SERPL-MCNC: 4.9 MMOL/L — SIGNIFICANT CHANGE UP (ref 3.5–5.3)
POTASSIUM SERPL-SCNC: 4.9 MMOL/L — SIGNIFICANT CHANGE UP (ref 3.5–5.3)
PROT SERPL-MCNC: 6.4 GM/DL — SIGNIFICANT CHANGE UP (ref 6–8.3)
PROTHROM AB SERPL-ACNC: 13.4 SEC — HIGH (ref 9.8–12.7)
RBC # BLD: 2.66 M/UL — LOW (ref 4.2–5.8)
RBC # FLD: 13.4 % — SIGNIFICANT CHANGE UP (ref 11–15)
RBC BLD AUTO: ABNORMAL
SODIUM SERPL-SCNC: 138 MMOL/L — SIGNIFICANT CHANGE UP (ref 135–145)
VANCOMYCIN TROUGH SERPL-MCNC: 23.9 UG/ML — HIGH (ref 10–20)
WBC # BLD: 9.9 K/UL — SIGNIFICANT CHANGE UP (ref 3.8–10.5)
WBC # FLD AUTO: 9.9 K/UL — SIGNIFICANT CHANGE UP (ref 3.8–10.5)

## 2017-10-03 PROCEDURE — 71010: CPT | Mod: 26

## 2017-10-03 PROCEDURE — 74176 CT ABD & PELVIS W/O CONTRAST: CPT | Mod: 26

## 2017-10-03 PROCEDURE — 71250 CT THORAX DX C-: CPT | Mod: 26

## 2017-10-03 PROCEDURE — 99223 1ST HOSP IP/OBS HIGH 75: CPT

## 2017-10-03 PROCEDURE — 99285 EMERGENCY DEPT VISIT HI MDM: CPT

## 2017-10-03 RX ORDER — ONDANSETRON 8 MG/1
4 TABLET, FILM COATED ORAL ONCE
Qty: 0 | Refills: 0 | Status: COMPLETED | OUTPATIENT
Start: 2017-10-03 | End: 2017-10-03

## 2017-10-03 RX ORDER — SODIUM CHLORIDE 9 MG/ML
1000 INJECTION INTRAMUSCULAR; INTRAVENOUS; SUBCUTANEOUS
Qty: 0 | Refills: 0 | Status: DISCONTINUED | OUTPATIENT
Start: 2017-10-03 | End: 2017-10-10

## 2017-10-03 RX ORDER — INFLUENZA VIRUS VACCINE 15; 15; 15; 15 UG/.5ML; UG/.5ML; UG/.5ML; UG/.5ML
0.5 SUSPENSION INTRAMUSCULAR ONCE
Qty: 0 | Refills: 0 | Status: COMPLETED | OUTPATIENT
Start: 2017-10-03 | End: 2017-10-06

## 2017-10-03 RX ORDER — DEXTROSE 50 % IN WATER 50 %
25 SYRINGE (ML) INTRAVENOUS ONCE
Qty: 0 | Refills: 0 | Status: DISCONTINUED | OUTPATIENT
Start: 2017-10-03 | End: 2017-10-10

## 2017-10-03 RX ORDER — SODIUM CHLORIDE 9 MG/ML
2000 INJECTION INTRAMUSCULAR; INTRAVENOUS; SUBCUTANEOUS ONCE
Qty: 0 | Refills: 0 | Status: COMPLETED | OUTPATIENT
Start: 2017-10-03 | End: 2017-10-03

## 2017-10-03 RX ORDER — CEFEPIME 1 G/1
500 INJECTION, POWDER, FOR SOLUTION INTRAMUSCULAR; INTRAVENOUS ONCE
Qty: 0 | Refills: 0 | Status: COMPLETED | OUTPATIENT
Start: 2017-10-03 | End: 2017-10-03

## 2017-10-03 RX ORDER — SODIUM CHLORIDE 9 MG/ML
1000 INJECTION, SOLUTION INTRAVENOUS
Qty: 0 | Refills: 0 | Status: DISCONTINUED | OUTPATIENT
Start: 2017-10-03 | End: 2017-10-10

## 2017-10-03 RX ORDER — PANTOPRAZOLE SODIUM 20 MG/1
40 TABLET, DELAYED RELEASE ORAL ONCE
Qty: 0 | Refills: 0 | Status: COMPLETED | OUTPATIENT
Start: 2017-10-03 | End: 2017-10-03

## 2017-10-03 RX ORDER — PANTOPRAZOLE SODIUM 20 MG/1
40 TABLET, DELAYED RELEASE ORAL EVERY 12 HOURS
Qty: 0 | Refills: 0 | Status: DISCONTINUED | OUTPATIENT
Start: 2017-10-03 | End: 2017-10-03

## 2017-10-03 RX ORDER — HEPARIN SODIUM 5000 [USP'U]/ML
5000 INJECTION INTRAVENOUS; SUBCUTANEOUS EVERY 12 HOURS
Qty: 0 | Refills: 0 | Status: DISCONTINUED | OUTPATIENT
Start: 2017-10-03 | End: 2017-10-05

## 2017-10-03 RX ORDER — CALCIUM CARBONATE 500(1250)
1 TABLET ORAL THREE TIMES A DAY
Qty: 0 | Refills: 0 | Status: DISCONTINUED | OUTPATIENT
Start: 2017-10-03 | End: 2017-10-09

## 2017-10-03 RX ORDER — GLUCAGON INJECTION, SOLUTION 0.5 MG/.1ML
1 INJECTION, SOLUTION SUBCUTANEOUS ONCE
Qty: 0 | Refills: 0 | Status: DISCONTINUED | OUTPATIENT
Start: 2017-10-03 | End: 2017-10-10

## 2017-10-03 RX ORDER — SUCRALFATE 1 G
1 TABLET ORAL
Qty: 0 | Refills: 0 | Status: DISCONTINUED | OUTPATIENT
Start: 2017-10-03 | End: 2017-10-10

## 2017-10-03 RX ORDER — ATORVASTATIN CALCIUM 80 MG/1
40 TABLET, FILM COATED ORAL AT BEDTIME
Qty: 0 | Refills: 0 | Status: DISCONTINUED | OUTPATIENT
Start: 2017-10-03 | End: 2017-10-10

## 2017-10-03 RX ORDER — CLOPIDOGREL BISULFATE 75 MG/1
75 TABLET, FILM COATED ORAL DAILY
Qty: 0 | Refills: 0 | Status: DISCONTINUED | OUTPATIENT
Start: 2017-10-03 | End: 2017-10-10

## 2017-10-03 RX ORDER — INSULIN LISPRO 100/ML
VIAL (ML) SUBCUTANEOUS
Qty: 0 | Refills: 0 | Status: DISCONTINUED | OUTPATIENT
Start: 2017-10-03 | End: 2017-10-10

## 2017-10-03 RX ORDER — DOCUSATE SODIUM 100 MG
100 CAPSULE ORAL THREE TIMES A DAY
Qty: 0 | Refills: 0 | Status: DISCONTINUED | OUTPATIENT
Start: 2017-10-03 | End: 2017-10-10

## 2017-10-03 RX ORDER — VANCOMYCIN HCL 1 G
1000 VIAL (EA) INTRAVENOUS ONCE
Qty: 0 | Refills: 0 | Status: COMPLETED | OUTPATIENT
Start: 2017-10-03 | End: 2017-10-03

## 2017-10-03 RX ORDER — CEFEPIME 1 G/1
INJECTION, POWDER, FOR SOLUTION INTRAMUSCULAR; INTRAVENOUS
Qty: 0 | Refills: 0 | Status: DISCONTINUED | OUTPATIENT
Start: 2017-10-03 | End: 2017-10-03

## 2017-10-03 RX ORDER — OXYCODONE AND ACETAMINOPHEN 5; 325 MG/1; MG/1
1 TABLET ORAL EVERY 8 HOURS
Qty: 0 | Refills: 0 | Status: DISCONTINUED | OUTPATIENT
Start: 2017-10-03 | End: 2017-10-10

## 2017-10-03 RX ORDER — PANTOPRAZOLE SODIUM 20 MG/1
40 TABLET, DELAYED RELEASE ORAL
Qty: 0 | Refills: 0 | Status: DISCONTINUED | OUTPATIENT
Start: 2017-10-03 | End: 2017-10-03

## 2017-10-03 RX ORDER — INSULIN GLARGINE 100 [IU]/ML
10 INJECTION, SOLUTION SUBCUTANEOUS AT BEDTIME
Qty: 0 | Refills: 0 | Status: DISCONTINUED | OUTPATIENT
Start: 2017-10-03 | End: 2017-10-10

## 2017-10-03 RX ORDER — DEXTROSE 50 % IN WATER 50 %
1 SYRINGE (ML) INTRAVENOUS ONCE
Qty: 0 | Refills: 0 | Status: DISCONTINUED | OUTPATIENT
Start: 2017-10-03 | End: 2017-10-10

## 2017-10-03 RX ORDER — DEXTROSE 50 % IN WATER 50 %
12.5 SYRINGE (ML) INTRAVENOUS ONCE
Qty: 0 | Refills: 0 | Status: DISCONTINUED | OUTPATIENT
Start: 2017-10-03 | End: 2017-10-10

## 2017-10-03 RX ORDER — LACTOBACILLUS ACIDOPHILUS 100MM CELL
1 CAPSULE ORAL
Qty: 0 | Refills: 0 | Status: DISCONTINUED | OUTPATIENT
Start: 2017-10-03 | End: 2017-10-10

## 2017-10-03 RX ORDER — ASPIRIN/CALCIUM CARB/MAGNESIUM 324 MG
325 TABLET ORAL DAILY
Qty: 0 | Refills: 0 | Status: DISCONTINUED | OUTPATIENT
Start: 2017-10-03 | End: 2017-10-10

## 2017-10-03 RX ORDER — IOHEXOL 300 MG/ML
30 INJECTION, SOLUTION INTRAVENOUS ONCE
Qty: 0 | Refills: 0 | Status: COMPLETED | OUTPATIENT
Start: 2017-10-03 | End: 2017-10-03

## 2017-10-03 RX ADMIN — Medication 1 TABLET(S): at 22:55

## 2017-10-03 RX ADMIN — Medication 2: at 16:17

## 2017-10-03 RX ADMIN — IOHEXOL 30 MILLILITER(S): 300 INJECTION, SOLUTION INTRAVENOUS at 10:30

## 2017-10-03 RX ADMIN — ONDANSETRON 4 MILLIGRAM(S): 8 TABLET, FILM COATED ORAL at 09:57

## 2017-10-03 RX ADMIN — CLOPIDOGREL BISULFATE 75 MILLIGRAM(S): 75 TABLET, FILM COATED ORAL at 17:56

## 2017-10-03 RX ADMIN — SODIUM CHLORIDE 80 MILLILITER(S): 9 INJECTION INTRAMUSCULAR; INTRAVENOUS; SUBCUTANEOUS at 15:51

## 2017-10-03 RX ADMIN — Medication 1 GRAM(S): at 17:56

## 2017-10-03 RX ADMIN — Medication 100 MILLIGRAM(S): at 17:56

## 2017-10-03 RX ADMIN — Medication 250 MILLIGRAM(S): at 10:30

## 2017-10-03 RX ADMIN — CEFEPIME 100 MILLIGRAM(S): 1 INJECTION, POWDER, FOR SOLUTION INTRAMUSCULAR; INTRAVENOUS at 15:50

## 2017-10-03 RX ADMIN — Medication 325 MILLIGRAM(S): at 17:56

## 2017-10-03 RX ADMIN — SODIUM CHLORIDE 1000 MILLILITER(S): 9 INJECTION INTRAMUSCULAR; INTRAVENOUS; SUBCUTANEOUS at 09:57

## 2017-10-03 RX ADMIN — Medication 1 GRAM(S): at 23:28

## 2017-10-03 RX ADMIN — ATORVASTATIN CALCIUM 40 MILLIGRAM(S): 80 TABLET, FILM COATED ORAL at 22:55

## 2017-10-03 RX ADMIN — HEPARIN SODIUM 5000 UNIT(S): 5000 INJECTION INTRAVENOUS; SUBCUTANEOUS at 17:56

## 2017-10-03 RX ADMIN — INSULIN GLARGINE 10 UNIT(S): 100 INJECTION, SOLUTION SUBCUTANEOUS at 22:53

## 2017-10-03 RX ADMIN — Medication 1 TABLET(S): at 17:56

## 2017-10-03 RX ADMIN — Medication 100 MILLIGRAM(S): at 22:55

## 2017-10-03 RX ADMIN — PANTOPRAZOLE SODIUM 40 MILLIGRAM(S): 20 TABLET, DELAYED RELEASE ORAL at 09:57

## 2017-10-03 NOTE — PATIENT PROFILE ADULT. - VISION (WITH CORRECTIVE LENSES IF THE PATIENT USUALLY WEARS THEM):
Legally blind both eyes/Severely impaired: cannot locate objects without hearing or touching them or patient nonresponsive.

## 2017-10-03 NOTE — CONSULT NOTE ADULT - SUBJECTIVE AND OBJECTIVE BOX
full consult dictated    NPO after MN for EGD in AM full consult dictated    Pt will eventually need EGD; but will hold off for now until renal issues have resolved  will also change protonix to carafate

## 2017-10-03 NOTE — ED PROVIDER NOTE - OBJECTIVE STATEMENT
62 years old male by ems c/o persist nausea vomiting then diffused abd pain for 4 days and unable to take orally. Pt sts he has recent hx of left foot amputation with dx of MRSA from the surgical site and now is taking iv vancomyn 1 gram once a day but has not received it today. Pt denies headache, dizziness, sob, cough, chest pain, fever, chills, dysuria, hematuria, or irregular bowel movements.

## 2017-10-03 NOTE — H&P ADULT - NSHPPHYSICALEXAM_GEN_ALL_CORE
GENERAL: NAD well-developed  HEAD:  Atraumatic, Normocephalic  EYES: blindness with cataracts b/l   ENMT: No tonsillar erythema, exudates, or enlargement; Moist mucous membranes, Good dentition, No lesions  NECK: Supple, No JVD, Normal thyroid  NERVOUS SYSTEM:  Alert & Oriented X3, Good concentration; Motor Strength 5/5 B/L upper and lower extremities; DTRs 2+ intact and symmetric  CHEST/LUNG: Clear to percussion bilaterally; No rales, rhonchi, wheezing, or rubs  HEART: Regular rate and rhythm; No murmurs, rubs, or gallops  ABDOMEN: Soft, mild tender upper epigasdtric area, Nondistended; Bowel sounds present  EXTREMITIES:  right below the knee amputation with healed stump wound   LYMPH: No lymphadenopathy   SKIN: No rashes or lesions

## 2017-10-03 NOTE — H&P ADULT - NSHPLABSRESULTS_GEN_ALL_CORE
8.0    9.9   )-----------( 146      ( 03 Oct 2017 10:13 )             25.0   10-03    138  |  103  |  135<H>  ----------------------------<  217<H>  4.9   |  23  |  5.50<H>    Ca    10.5<H>      03 Oct 2017 10:13    TPro  6.4  /  Alb  2.6<L>  /  TBili  0.5  /  DBili  x   /  AST  10<L>  /  ALT  14  /  AlkPhos  49  10-03    < from: CT Abdomen and Pelvis w/ Oral Cont (10.03.17 @ 12:27) >    CT of the chest, abdomen and pelvis without contrast. Oral contrast was   administered. Coronal and sagittal reformats were performed.    FINDINGS:    LUNG AND LARGE AIRWAYS: There are patchyand groundglass opacities in the   lower lobes.  PLEURA: Within normal limits.  VESSELS: There is a right-sided PICC line with the tip in the SVC.  HEART: Normal size. Coronary artery calcifications are present No   pericardial effusion.  MEDIASTINUMAND JEFFREY: There are several prominent mediastinal nodes. For   reference a pretracheal node measures 1 cm in short axis. There is   esophageal thickening.  CHEST WALL AND LOWER NECK: Within normal limits.    The study is limited by streak artifact.  LIVER: Within normal limits.  BILE DUCTS: Normal caliber.  GALLBLADDER: Within normal limits.  SPLEEN: Within normal limits.  PANCREAS: Within normal limits.  ADRENALS: Within normal limits.  KIDNEYS/URETERS: Within normal limits.    BLADDER: Within normal limits.  REPRODUCTIVE ORGANS: Within normal limits    BOWEL: Large amount of stool within the rectum. No bowel obstruction.   PERITONEUM: No ascites.  VESSELS:  Marked atherosclerotic changes of the aorta and branching   vessels.  RETROPERITONEUM: No lymphadenopathy.    ABDOMINAL WALL: Within normal limits.  BONES: Within normal limits.    IMPRESSION:     Diffuse esophageal thickening. Differential includes esophagitis.   However, correlation with endoscopy is recommended.    Bibasilar pneumonia.  < from: Xray Chest 1 View AP/PA. (10.03.17 @ 10:30) >    MPRESSION: Rounded density in the right upper lobe may be related to the   right anterior second rib. Nonemergent chest CT is recommended for   further evaluation.      <

## 2017-10-03 NOTE — CONSULT NOTE ADULT - SUBJECTIVE AND OBJECTIVE BOX
Information from chart:  "Patient is a 62y old  Male who presents with a chief complaint of   HPI:  ·62 years old male by ems c/o persist nausea vomiting then diffused abd pain for 4 days and unable to take orally. Pt sts he has recent hx of left foot amputation with dx of MRSA from the surgical site and now is taking iv vancomyn 1 gram once a day but has not received it today. Pt denies headache, dizziness, sob, cough, chest pain, fever, chills, dysuria, hematuria, or irregular bowel movements. according to wife his vanco was stopped 4 days ago because of elevated vanco level but the emergency room gave him one dose of vanco on arrival to emergency room (03 Oct 2017 13:09)"     Patient with hx of ASUNCION associated with past infections and antibiotics; diabetes mellitus.  He has been Vanco and Cefepime; recently adjusted due to changing GFR.    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 1000 milliLiter(s) (50 mL/Hr) IV Continuous <Continuous>  dextrose 50% Injectable 12.5 Gram(s) IV Push once  dextrose 50% Injectable 25 Gram(s) IV Push once  dextrose 50% Injectable 25 Gram(s) IV Push once  docusate sodium 100 milliGRAM(s) Oral three times a day  heparin  Injectable 5000 Unit(s) SubCutaneous every 12 hours  insulin glargine Injectable (LANTUS) 10 Unit(s) SubCutaneous at bedtime  insulin lispro (HumaLOG) corrective regimen sliding scale   SubCutaneous three times a day before meals  lactobacillus acidophilus 1 Tablet(s) Oral two times a day with meals  pregabalin 100 milliGRAM(s) Oral two times a day  sodium chloride 0.9%. 1000 milliLiter(s) (80 mL/Hr) IV Continuous <Continuous>  sucralfate suspension 1 Gram(s) Oral four times a day    MEDICATIONS  (PRN):  dextrose Gel 1 Dose(s) Oral once PRN Blood Glucose LESS THAN 70 milliGRAM(s)/deciliter  glucagon  Injectable 1 milliGRAM(s) IntraMuscular once PRN Glucose LESS THAN 70 milligrams/deciliter  oxyCODONE    5 mG/acetaminophen 325 mG 1 Tablet(s) Oral every 8 hours PRN Moderate Pain (4 - 6)    Vital Signs Last 24 Hrs  T(C): 36.7 (03 Oct 2017 09:17), Max: 36.7 (03 Oct 2017 09:17)  T(F): 98 (03 Oct 2017 09:17), Max: 98 (03 Oct 2017 09:17)  HR: 119 (03 Oct 2017 09:17) (119 - 119)  BP: 134/71 (03 Oct 2017 09:17) (134/71 - 134/71)  BP(mean): --  RR: 16 (03 Oct 2017 09:17) (16 - 16)  SpO2: 100% (03 Oct 2017 09:17) (100% - 100%)    Daily Height in cm: 185.42 (03 Oct 2017 09:17)    Daily   CAPILLARY BLOOD GLUCOSE  219 (03 Oct 2017 13:43)  221 (03 Oct 2017 09:17)        PHYSICAL EXAM:      T(C): 36.7 (10-03-17 @ 09:17), Max: 36.7 (10-03-17 @ 09:17)  HR: 119 (10-03-17 @ 09:17) (119 - 119)  BP: 134/71 (10-03-17 @ 09:17) (134/71 - 134/71)  RR: 16 (10-03-17 @ 09:17) (16 - 16)  SpO2: 100% (10-03-17 @ 09:17) (100% - 100%)  Wt(kg): --  Respiratory: clear anteriorly, decreased BS at bases  Cardiovascular: S1 S2  Gastrointestinal: soft NT ND +BS  Extremities:   R BKA no edema              10-03    138  |  103  |  135<H>  ----------------------------<  217<H>  4.9   |  23  |  5.50<H>    Ca    10.5<H>      03 Oct 2017 10:13    TPro  6.4  /  Alb  2.6<L>  /  TBili  0.5  /  DBili  x   /  AST  10<L>  /  ALT  14  /  AlkPhos  49  10-03                          8.0    9.9   )-----------( 146      ( 03 Oct 2017 10:13 )             25.0       Assessment and Plan    ASUNCION suspected CKD 3; pre renal azotemia vs Vanco ATN; Cefepime AIN;  IVF NS  150 ml hr for now.  UA micro, Urine eosinophils;   Paraprotein screen.

## 2017-10-03 NOTE — H&P ADULT - HISTORY OF PRESENT ILLNESS
·62 years old male by ems c/o persist nausea vomiting then diffused abd pain for 4 days and unable to take orally. Pt sts he has recent hx of left foot amputation with dx of MRSA from the surgical site and now is taking iv vancomyn 1 gram once a day but has not received it today. Pt denies headache, dizziness, sob, cough, chest pain, fever, chills, dysuria, hematuria, or irregular bowel movements. according to wife his vanco was stopped 4 days ago because of elevated vanco level but the emergency room gave him one dose of vanco on arrival to emergency room

## 2017-10-03 NOTE — CONSULT NOTE ADULT - SUBJECTIVE AND OBJECTIVE BOX
Infectious Diseases - Attending at Dr. Cheatham    HPI:  ·62 years old male by ems c/o persist nausea vomiting then diffused abd pain for 4 days and unable to take orally. Pt sts he has recent hx of left foot amputation with dx of MRSA from the surgical site and now is taking iv vancomyn 1 gram once a day but has not received it today. Pt denies headache, dizziness, sob, cough, chest pain, fever, chills, dysuria, hematuria, or irregular bowel movements. according to wife his vanco was stopped 4 days ago because of elevated vanco level but the emergency room gave him one dose of vanco on arrival to emergency room (03 Oct 2017 13:09)      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  Unilateral complete BKA, right, subsequent encounter  History of coronary artery stent placement      Allergies    No Known Allergies    Intolerances        FAMILY HISTORY:  No pertinent family history in first degree relatives      SOCIAL HISTORY:    REVIEW OF SYSTEMS:    Constitutional: No fever, weight loss or fatigue  Eyes: No eye pain, visual disturbances, or discharge  ENT:  No difficulty hearing, tinnitus, vertigo; No sinus or throat pain  Neck: No pain or stiffness  Respiratory: No cough, wheezing, chills or hemoptysis  Cardiovascular: No chest pain, palpitations, shortness of breath, dizziness or leg swelling  Gastrointestinal: No abdominal or epigastric pain. No nausea, vomiting or hematemesis; No diarrhea or constipation. No melena or hematochezia.  Genitourinary: No dysuria, frequency, hematuria or incontinence  Neurological: No headaches, memory loss, loss of strength, numbness or tremors  Skin: No itching, burning, rashes or lesions       MEDICATIONS  (STANDING):  aspirin 325 milliGRAM(s) Oral daily  atorvastatin 40 milliGRAM(s) Oral at bedtime  calcium carbonate 500 mG (Tums) Chewable 1 Tablet(s) Chew three times a day  clopidogrel Tablet 75 milliGRAM(s) Oral daily  dextrose 5%. 1000 milliLiter(s) (50 mL/Hr) IV Continuous <Continuous>  dextrose 50% Injectable 12.5 Gram(s) IV Push once  dextrose 50% Injectable 25 Gram(s) IV Push once  dextrose 50% Injectable 25 Gram(s) IV Push once  docusate sodium 100 milliGRAM(s) Oral three times a day  heparin  Injectable 5000 Unit(s) SubCutaneous every 12 hours  influenza   Vaccine 0.5 milliLiter(s) IntraMuscular once  insulin glargine Injectable (LANTUS) 10 Unit(s) SubCutaneous at bedtime  insulin lispro (HumaLOG) corrective regimen sliding scale   SubCutaneous three times a day before meals  lactobacillus acidophilus 1 Tablet(s) Oral two times a day with meals  pregabalin 100 milliGRAM(s) Oral two times a day  sodium chloride 0.9%. 1000 milliLiter(s) (80 mL/Hr) IV Continuous <Continuous>  sucralfate suspension 1 Gram(s) Oral four times a day    MEDICATIONS  (PRN):  dextrose Gel 1 Dose(s) Oral once PRN Blood Glucose LESS THAN 70 milliGRAM(s)/deciliter  glucagon  Injectable 1 milliGRAM(s) IntraMuscular once PRN Glucose LESS THAN 70 milligrams/deciliter  oxyCODONE    5 mG/acetaminophen 325 mG 1 Tablet(s) Oral every 8 hours PRN Moderate Pain (4 - 6)      Vital Signs Last 24 Hrs  T(C): 36.7 (03 Oct 2017 16:58), Max: 37.2 (03 Oct 2017 15:57)  T(F): 98 (03 Oct 2017 16:58), Max: 98.9 (03 Oct 2017 15:57)  HR: 93 (03 Oct 2017 16:58) (78 - 119)  BP: 91/72 (03 Oct 2017 16:58) (91/72 - 134/71)  BP(mean): --  RR: 18 (03 Oct 2017 16:58) (16 - 18)  SpO2: 95% (03 Oct 2017 16:58) (95% - 100%)    PHYSICAL EXAM:    Constitutional: NAD, well-groomed, well-developed  HEENT: PERRLA, EOMI, Normal Hearing, MMM  Neck: No LAD, No JVD  Back: Normal spine flexure, No CVA tenderness  Respiratory: CTAB/L  Cardiovascular: S1 and S2, RRR, no M/G/R  Gastrointestinal: BS+, soft, NT/ND  Extremities: No peripheral edema  Vascular: 2+ peripheral pulses  Neurological: A/O x 3, no focal deficits  Skin: No rashes      LABS:                        8.0    9.9   )-----------( 146      ( 03 Oct 2017 10:13 )             25.0     10-03    138  |  103  |  135<H>  ----------------------------<  217<H>  4.9   |  23  |  5.50<H>    Ca    10.5<H>      03 Oct 2017 10:13    TPro  6.4  /  Alb  2.6<L>  /  TBili  0.5  /  DBili  x   /  AST  10<L>  /  ALT  14  /  AlkPhos  49  10-03    PT/INR - ( 03 Oct 2017 10:13 )   PT: 13.4 sec;   INR: 1.22 ratio         PTT - ( 03 Oct 2017 10:13 )  PTT:26.2 sec      MICROBIOLOGY:  RECENT CULTURES:        RADIOLOGY & ADDITIONAL STUDIES: Infectious Diseases - Attending at Dr. Cheatham    HPI:  ·62 years old male by ems c/o persist nausea vomiting then diffused abd pain for 4 days and unable to take orally. Pt sts he has recent hx of left foot amputation with dx of MRSA from the surgical site and now is taking iv vancomyn 1 gram once a day but has not received it today. Pt denies headache, dizziness, sob, cough, chest pain, fever, chills, dysuria, hematuria, or irregular bowel movements. according to wife his vanco was stopped 4 days ago because of elevated vanco level but the emergency room gave him one dose of vanco on arrival to emergency room (03 Oct 2017 13:09)  Pt was admitted for right LE amputation site OM with MRSA and was discharged with cefepime and vanco .Last week pt's vanco level increased to 27.9 and Cr went from 2 to 3. So vanco was held off and cefepime was reduced to once a day. Wound has been healing well. Pt was following with wound care but had to recently stop due to transportation problems    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  Unilateral complete BKA, right, subsequent encounter  History of coronary artery stent placement      Allergies    No Known Allergies    Intolerances        FAMILY HISTORY:  No pertinent family history in first degree relatives      SOCIAL HISTORY: Non  smoker    REVIEW OF SYSTEMS:    Constitutional: No fever, weight loss or fatigue  Eyes: No eye pain, visual disturbances, or discharge  ENT:  No difficulty hearing, tinnitus, vertigo; No sinus or throat pain  Neck: No pain or stiffness  Respiratory: No cough, wheezing, chills or hemoptysis  Cardiovascular: No chest pain, palpitations, shortness of breath, dizziness or leg swelling  Gastrointestinal: Severe nausea, vomiting  Genitourinary: No dysuria, frequency, hematuria or incontinence  Neurological: No headaches, memory loss, loss of strength, numbness or tremors  Skin: No itching, burning, rashes or lesions       MEDICATIONS  (STANDING):  aspirin 325 milliGRAM(s) Oral daily  atorvastatin 40 milliGRAM(s) Oral at bedtime  calcium carbonate 500 mG (Tums) Chewable 1 Tablet(s) Chew three times a day  clopidogrel Tablet 75 milliGRAM(s) Oral daily  dextrose 5%. 1000 milliLiter(s) (50 mL/Hr) IV Continuous <Continuous>  dextrose 50% Injectable 12.5 Gram(s) IV Push once  dextrose 50% Injectable 25 Gram(s) IV Push once  dextrose 50% Injectable 25 Gram(s) IV Push once  docusate sodium 100 milliGRAM(s) Oral three times a day  heparin  Injectable 5000 Unit(s) SubCutaneous every 12 hours  influenza   Vaccine 0.5 milliLiter(s) IntraMuscular once  insulin glargine Injectable (LANTUS) 10 Unit(s) SubCutaneous at bedtime  insulin lispro (HumaLOG) corrective regimen sliding scale   SubCutaneous three times a day before meals  lactobacillus acidophilus 1 Tablet(s) Oral two times a day with meals  pregabalin 100 milliGRAM(s) Oral two times a day  sodium chloride 0.9%. 1000 milliLiter(s) (80 mL/Hr) IV Continuous <Continuous>  sucralfate suspension 1 Gram(s) Oral four times a day    MEDICATIONS  (PRN):  dextrose Gel 1 Dose(s) Oral once PRN Blood Glucose LESS THAN 70 milliGRAM(s)/deciliter  glucagon  Injectable 1 milliGRAM(s) IntraMuscular once PRN Glucose LESS THAN 70 milligrams/deciliter  oxyCODONE  5 mG/acetaminophen 325 mG 1 Tablet(s) Oral every 8 hours PRN Moderate Pain (4 - 6)      Vital Signs Last 24 Hrs  T(C): 36.7 (03 Oct 2017 16:58), Max: 37.2 (03 Oct 2017 15:57)  T(F): 98 (03 Oct 2017 16:58), Max: 98.9 (03 Oct 2017 15:57)  HR: 93 (03 Oct 2017 16:58) (78 - 119)  BP: 91/72 (03 Oct 2017 16:58) (91/72 - 134/71)  BP(mean): --  RR: 18 (03 Oct 2017 16:58) (16 - 18)  SpO2: 95% (03 Oct 2017 16:58) (95% - 100%)    PHYSICAL EXAM:    Constitutional: NAD, well-groomed, well-developed  HEENT: PERRLA, EOMI, Normal Hearing, MMM  Neck: No LAD, No JVD  Back: Normal spine flexure, No CVA tenderness  Respiratory: CTAB/L  Cardiovascular: S1 and S2, RRR, no M/G/R  Gastrointestinal: BS+, soft, NT/ND  Extremities: No peripheral edema  Vascular: 2+ peripheral pulses  Neurological: A/O x 3, no focal deficits  Skin: No rashes      LABS:                        8.0    9.9   )-----------( 146      ( 03 Oct 2017 10:13 )             25.0     10-03    138  |  103  |  135<H>  ----------------------------<  217<H>  4.9   |  23  |  5.50<H>    Ca    10.5<H>      03 Oct 2017 10:13    TPro  6.4  /  Alb  2.6<L>  /  TBili  0.5  /  DBili  x   /  AST  10<L>  /  ALT  14  /  AlkPhos  49  10-03    PT/INR - ( 03 Oct 2017 10:13 )   PT: 13.4 sec;   INR: 1.22 ratio         PTT - ( 03 Oct 2017 10:13 )  PTT:26.2 sec      MICROBIOLOGY:    RECENT CULTURES:        RADIOLOGY & ADDITIONAL STUDIES:  < from: CT Abdomen and Pelvis w/ Oral Cont (10.03.17 @ 12:27) >    IMPRESSION:     Diffuse esophageal thickening. Differential includes esophagitis.   However, correlation with endoscopy is recommended.    Bibasilar pneumonia.    < end of copied text >

## 2017-10-03 NOTE — ED PROVIDER NOTE - CONSTITUTIONAL, MLM
normal... Well appearing, well nourished, awake, alert, oriented to person, place, time/situation and in no apparent distress. Speaking in clear full sentences no nasal flaring no shoulders retractions no active vomiting, smiling appears very comfortable lying in the stretcher

## 2017-10-03 NOTE — ED ADULT NURSE NOTE - OBJECTIVE STATEMENT
Patient alert, stating Saturday that he started to get abdominal pain. States he did not take any medication to alleviate the symptoms.

## 2017-10-03 NOTE — H&P ADULT - NSHPREVIEWOFSYSTEMS_GEN_ALL_CORE
CONSTITUTIONAL: No fever, weight loss, or fatigue  EYES :blind   ENMT:  No difficulty hearing, tinnitus, vertigo; No sinus or throat pain  NECK: No pain or stiffness  RESPIRATORY: No cough, wheezing, chills or hemoptysis; No shortness of breath  CARDIOVASCULAR: No chest pain, palpitations, dizziness, or leg swelling  GASTROINTESTINAL upper epigastric pain  POSITIVE  vomiting, or hematemesis; No diarrhea or constipation. No melena or hematochezia.  GENITOURINARY: No dysuria, frequency, hematuria, or incontinence  NEUROLOGICAL: No headaches, memory loss, loss of strength, numbness, or tremors  SKIN: No itching, burning, rashes, or lesions   LYMPH NODES: No enlarged glands  ENDOCRINE: No heat or cold intolerance; No hair loss  MUSCULOSKELETAL: s/p  PSYCHIATRIC: No depression, anxiety, mood swings, or difficulty sleeping  HEME/LYMPH: No easy bruising, or bleeding gums  ALLERGY AND IMMUNOLOGIC: No hives or eczema

## 2017-10-03 NOTE — CONSULT NOTE ADULT - ATTENDING COMMENTS
pt has been on cefepime /vancp fr, 8/21,finished 6 weeks  developed ASUNCION on CKD sec to vnaoc which was d.mariya at home last week already as cr had increased from 2 to 3   picc to be removed on d/c hoome  wound care

## 2017-10-03 NOTE — CONSULT NOTE ADULT - PROBLEM SELECTOR RECOMMENDATION 3
with MRSA .Pt was on abx from 8/21 to present with vanco being disconintued few days ago   check ER and CRP   pt has finished 6 weeks of antibiotics hence will stop antibiotics  picc to be removed before discharge  ok to use picc here

## 2017-10-04 DIAGNOSIS — M86.9 OSTEOMYELITIS, UNSPECIFIED: ICD-10-CM

## 2017-10-04 DIAGNOSIS — R11.2 NAUSEA WITH VOMITING, UNSPECIFIED: ICD-10-CM

## 2017-10-04 DIAGNOSIS — N17.0 ACUTE KIDNEY FAILURE WITH TUBULAR NECROSIS: ICD-10-CM

## 2017-10-04 LAB
ALBUMIN SERPL ELPH-MCNC: 2.1 G/DL — LOW (ref 3.3–5)
ALP SERPL-CCNC: 37 U/L — LOW (ref 40–120)
ALT FLD-CCNC: 10 U/L — LOW (ref 12–78)
ANION GAP SERPL CALC-SCNC: 12 MMOL/L — SIGNIFICANT CHANGE UP (ref 5–17)
APPEARANCE UR: ABNORMAL
AST SERPL-CCNC: 10 U/L — LOW (ref 15–37)
BACTERIA # UR AUTO: ABNORMAL
BILIRUB SERPL-MCNC: 0.4 MG/DL — SIGNIFICANT CHANGE UP (ref 0.2–1.2)
BILIRUB UR-MCNC: NEGATIVE — SIGNIFICANT CHANGE UP
BLD GP AB SCN SERPL QL: SIGNIFICANT CHANGE UP
BUN SERPL-MCNC: 130 MG/DL — HIGH (ref 7–23)
CALCIUM SERPL-MCNC: 9.5 MG/DL — SIGNIFICANT CHANGE UP (ref 8.5–10.1)
CHLORIDE SERPL-SCNC: 105 MMOL/L — SIGNIFICANT CHANGE UP (ref 96–108)
CO2 SERPL-SCNC: 20 MMOL/L — LOW (ref 22–31)
COLOR SPEC: YELLOW — SIGNIFICANT CHANGE UP
COMMENT - URINE: SIGNIFICANT CHANGE UP
CREAT SERPL-MCNC: 5.66 MG/DL — HIGH (ref 0.5–1.3)
CRP SERPL-MCNC: 8.7 MG/DL — HIGH (ref 0–0.4)
DIFF PNL FLD: ABNORMAL
EPI CELLS # UR: SIGNIFICANT CHANGE UP
ERYTHROCYTE [SEDIMENTATION RATE] IN BLOOD: 55 MM/HR — HIGH (ref 0–20)
FERRITIN SERPL-MCNC: 113 NG/ML — SIGNIFICANT CHANGE UP (ref 30–400)
GLUCOSE SERPL-MCNC: 131 MG/DL — HIGH (ref 70–99)
GLUCOSE UR QL: 50 MG/DL
GRAN CASTS # UR COMP ASSIST: ABNORMAL /LPF
HCT VFR BLD CALC: 18.2 % — CRITICAL LOW (ref 39–50)
HCT VFR BLD CALC: 19 % — CRITICAL LOW (ref 39–50)
HCT VFR BLD CALC: 21.3 % — LOW (ref 39–50)
HGB BLD-MCNC: 5.9 G/DL — CRITICAL LOW (ref 13–17)
HGB BLD-MCNC: 6.1 G/DL — CRITICAL LOW (ref 13–17)
HGB BLD-MCNC: 6.9 G/DL — CRITICAL LOW (ref 13–17)
IRON SATN MFR SERPL: 22 % — SIGNIFICANT CHANGE UP (ref 16–55)
IRON SATN MFR SERPL: 46 UG/DL — SIGNIFICANT CHANGE UP (ref 45–165)
KETONES UR-MCNC: NEGATIVE — SIGNIFICANT CHANGE UP
LEUKOCYTE ESTERASE UR-ACNC: NEGATIVE — SIGNIFICANT CHANGE UP
MCHC RBC-ENTMCNC: 30.8 PG — SIGNIFICANT CHANGE UP (ref 27–34)
MCHC RBC-ENTMCNC: 30.9 PG — SIGNIFICANT CHANGE UP (ref 27–34)
MCHC RBC-ENTMCNC: 31.3 PG — SIGNIFICANT CHANGE UP (ref 27–34)
MCHC RBC-ENTMCNC: 32.2 GM/DL — SIGNIFICANT CHANGE UP (ref 32–36)
MCHC RBC-ENTMCNC: 32.5 GM/DL — SIGNIFICANT CHANGE UP (ref 32–36)
MCHC RBC-ENTMCNC: 32.6 GM/DL — SIGNIFICANT CHANGE UP (ref 32–36)
MCV RBC AUTO: 94.5 FL — SIGNIFICANT CHANGE UP (ref 80–100)
MCV RBC AUTO: 95.8 FL — SIGNIFICANT CHANGE UP (ref 80–100)
MCV RBC AUTO: 95.8 FL — SIGNIFICANT CHANGE UP (ref 80–100)
NITRITE UR-MCNC: NEGATIVE — SIGNIFICANT CHANGE UP
PH UR: 5 — SIGNIFICANT CHANGE UP (ref 5–8)
PLATELET # BLD AUTO: 93 K/UL — LOW (ref 150–400)
PLATELET # BLD AUTO: 97 K/UL — LOW (ref 150–400)
PLATELET # BLD AUTO: 99 K/UL — LOW (ref 150–400)
POTASSIUM SERPL-MCNC: 5.3 MMOL/L — SIGNIFICANT CHANGE UP (ref 3.5–5.3)
POTASSIUM SERPL-SCNC: 5.3 MMOL/L — SIGNIFICANT CHANGE UP (ref 3.5–5.3)
PROT SERPL-MCNC: 4.9 G/DL — LOW (ref 6–8.3)
PROT SERPL-MCNC: 4.9 G/DL — LOW (ref 6–8.3)
PROT SERPL-MCNC: 5.4 GM/DL — LOW (ref 6–8.3)
PROT UR-MCNC: 30 MG/DL
RBC # BLD: 1.9 M/UL — LOW (ref 4.2–5.8)
RBC # BLD: 1.98 M/UL — LOW (ref 4.2–5.8)
RBC # BLD: 2.26 M/UL — LOW (ref 4.2–5.8)
RBC # FLD: 13.1 % — SIGNIFICANT CHANGE UP (ref 11–15)
RBC # FLD: 13.4 % — SIGNIFICANT CHANGE UP (ref 11–15)
RBC # FLD: 13.5 % — SIGNIFICANT CHANGE UP (ref 11–15)
RBC CASTS # UR COMP ASSIST: SIGNIFICANT CHANGE UP /HPF (ref 0–4)
SODIUM SERPL-SCNC: 137 MMOL/L — SIGNIFICANT CHANGE UP (ref 135–145)
SP GR SPEC: 1.01 — SIGNIFICANT CHANGE UP (ref 1.01–1.02)
TIBC SERPL-MCNC: 211 UG/DL — LOW (ref 220–430)
UIBC SERPL-MCNC: 165 UG/DL — SIGNIFICANT CHANGE UP (ref 110–370)
UROBILINOGEN FLD QL: NEGATIVE MG/DL — SIGNIFICANT CHANGE UP
WBC # BLD: 4.1 K/UL — SIGNIFICANT CHANGE UP (ref 3.8–10.5)
WBC # BLD: 4.4 K/UL — SIGNIFICANT CHANGE UP (ref 3.8–10.5)
WBC # BLD: 4.9 K/UL — SIGNIFICANT CHANGE UP (ref 3.8–10.5)
WBC # FLD AUTO: 4.1 K/UL — SIGNIFICANT CHANGE UP (ref 3.8–10.5)
WBC # FLD AUTO: 4.4 K/UL — SIGNIFICANT CHANGE UP (ref 3.8–10.5)
WBC # FLD AUTO: 4.9 K/UL — SIGNIFICANT CHANGE UP (ref 3.8–10.5)
WBC UR QL: SIGNIFICANT CHANGE UP

## 2017-10-04 PROCEDURE — 99233 SBSQ HOSP IP/OBS HIGH 50: CPT

## 2017-10-04 RX ADMIN — Medication 1 TABLET(S): at 06:43

## 2017-10-04 RX ADMIN — Medication 1 GRAM(S): at 12:17

## 2017-10-04 RX ADMIN — Medication 1 TABLET(S): at 15:14

## 2017-10-04 RX ADMIN — Medication 1 TABLET(S): at 18:50

## 2017-10-04 RX ADMIN — Medication: at 13:06

## 2017-10-04 RX ADMIN — ATORVASTATIN CALCIUM 40 MILLIGRAM(S): 80 TABLET, FILM COATED ORAL at 23:32

## 2017-10-04 RX ADMIN — SODIUM CHLORIDE 80 MILLILITER(S): 9 INJECTION INTRAMUSCULAR; INTRAVENOUS; SUBCUTANEOUS at 06:38

## 2017-10-04 RX ADMIN — HEPARIN SODIUM 5000 UNIT(S): 5000 INJECTION INTRAVENOUS; SUBCUTANEOUS at 18:50

## 2017-10-04 RX ADMIN — Medication 1 GRAM(S): at 18:50

## 2017-10-04 RX ADMIN — Medication 100 MILLIGRAM(S): at 06:44

## 2017-10-04 RX ADMIN — Medication 1 GRAM(S): at 06:41

## 2017-10-04 RX ADMIN — Medication 325 MILLIGRAM(S): at 12:17

## 2017-10-04 RX ADMIN — Medication 1 TABLET(S): at 23:32

## 2017-10-04 RX ADMIN — Medication 30 MILLILITER(S): at 23:32

## 2017-10-04 RX ADMIN — Medication 1 GRAM(S): at 23:32

## 2017-10-04 RX ADMIN — Medication 100 MILLIGRAM(S): at 15:14

## 2017-10-04 RX ADMIN — Medication 1 TABLET(S): at 08:50

## 2017-10-04 RX ADMIN — HEPARIN SODIUM 5000 UNIT(S): 5000 INJECTION INTRAVENOUS; SUBCUTANEOUS at 06:41

## 2017-10-04 RX ADMIN — Medication 100 MILLIGRAM(S): at 06:42

## 2017-10-04 RX ADMIN — CLOPIDOGREL BISULFATE 75 MILLIGRAM(S): 75 TABLET, FILM COATED ORAL at 12:17

## 2017-10-04 RX ADMIN — Medication 100 MILLIGRAM(S): at 23:32

## 2017-10-04 RX ADMIN — Medication 1: at 16:32

## 2017-10-04 RX ADMIN — INSULIN GLARGINE 10 UNIT(S): 100 INJECTION, SOLUTION SUBCUTANEOUS at 23:32

## 2017-10-04 RX ADMIN — Medication 100 MILLIGRAM(S): at 18:50

## 2017-10-04 NOTE — PROGRESS NOTE ADULT - SUBJECTIVE AND OBJECTIVE BOX
Patient seen in follow up for ASUNCION; no distress. No sob, n/v.    MEDICATIONS  (STANDING):  aspirin 325 milliGRAM(s) Oral daily  atorvastatin 40 milliGRAM(s) Oral at bedtime  calcium carbonate 500 mG (Tums) Chewable 1 Tablet(s) Chew three times a day  clopidogrel Tablet 75 milliGRAM(s) Oral daily  dextrose 5%. 1000 milliLiter(s) (50 mL/Hr) IV Continuous <Continuous>  dextrose 50% Injectable 12.5 Gram(s) IV Push once  dextrose 50% Injectable 25 Gram(s) IV Push once  dextrose 50% Injectable 25 Gram(s) IV Push once  docusate sodium 100 milliGRAM(s) Oral three times a day  heparin  Injectable 5000 Unit(s) SubCutaneous every 12 hours  influenza   Vaccine 0.5 milliLiter(s) IntraMuscular once  insulin glargine Injectable (LANTUS) 10 Unit(s) SubCutaneous at bedtime  insulin lispro (HumaLOG) corrective regimen sliding scale   SubCutaneous three times a day before meals  lactobacillus acidophilus 1 Tablet(s) Oral two times a day with meals  pregabalin 100 milliGRAM(s) Oral two times a day  sodium chloride 0.9%. 1000 milliLiter(s) (80 mL/Hr) IV Continuous <Continuous>  sucralfate suspension 1 Gram(s) Oral four times a day    MEDICATIONS  (PRN):  dextrose Gel 1 Dose(s) Oral once PRN Blood Glucose LESS THAN 70 milliGRAM(s)/deciliter  glucagon  Injectable 1 milliGRAM(s) IntraMuscular once PRN Glucose LESS THAN 70 milligrams/deciliter  oxyCODONE    5 mG/acetaminophen 325 mG 1 Tablet(s) Oral every 8 hours PRN Moderate Pain (4 - 6)    PHYSICAL EXAM:      T(C): 36.6 (10-04-17 @ 05:45), Max: 37.2 (10-03-17 @ 15:57)  HR: 79 (10-04-17 @ 05:45) (78 - 93)  BP: 101/48 (10-04-17 @ 05:45) (91/72 - 122/50)  RR: 18 (10-04-17 @ 05:45) (16 - 18)  SpO2: 97% (10-04-17 @ 05:45) (95% - 99%)  Wt(kg): --  Respiratory: clear anteriorly, decreased BS at bases  Cardiovascular: S1 S2  Gastrointestinal: soft NT ND +BS  Extremities:    r bka  L 1 edema                                    6.1    4.4   )-----------( 99       ( 04 Oct 2017 11:16 )             19.0     10-04    137  |  105  |  130<H>  ----------------------------<  131<H>  5.3   |  20<L>  |  5.66<H>    Ca    9.5      04 Oct 2017 08:07    TPro  5.4<L>  /  Alb  2.1<L>  /  TBili  0.4  /  DBili  x   /  AST  10<L>  /  ALT  10<L>  /  AlkPhos  37<L>  10-04      LIVER FUNCTIONS - ( 04 Oct 2017 08:07 )  Alb: 2.1 g/dL / Pro: 5.4 gm/dL / ALK PHOS: 37 U/L / ALT: 10 U/L / AST: 10 U/L / GGT: x             Assessment and Plan:  ASUNCION suspected nephrotoxic ATN vs AIN;   Hgb noted for PRBC; guaiac stool; GI noted.  Decrease IVF rate and follow trend.   No emergent indication for HD.

## 2017-10-04 NOTE — PROGRESS NOTE ADULT - SUBJECTIVE AND OBJECTIVE BOX
Pt stable - H/H very low - no active bleeding  seen by Renal and ID    MEDICATIONS  (STANDING):  aspirin 325 milliGRAM(s) Oral daily  atorvastatin 40 milliGRAM(s) Oral at bedtime  calcium carbonate 500 mG (Tums) Chewable 1 Tablet(s) Chew three times a day  clopidogrel Tablet 75 milliGRAM(s) Oral daily  dextrose 5%. 1000 milliLiter(s) (50 mL/Hr) IV Continuous <Continuous>  dextrose 50% Injectable 12.5 Gram(s) IV Push once  dextrose 50% Injectable 25 Gram(s) IV Push once  dextrose 50% Injectable 25 Gram(s) IV Push once  docusate sodium 100 milliGRAM(s) Oral three times a day  heparin  Injectable 5000 Unit(s) SubCutaneous every 12 hours  influenza   Vaccine 0.5 milliLiter(s) IntraMuscular once  insulin glargine Injectable (LANTUS) 10 Unit(s) SubCutaneous at bedtime  insulin lispro (HumaLOG) corrective regimen sliding scale   SubCutaneous three times a day before meals  lactobacillus acidophilus 1 Tablet(s) Oral two times a day with meals  pregabalin 100 milliGRAM(s) Oral two times a day  sodium chloride 0.9%. 1000 milliLiter(s) (80 mL/Hr) IV Continuous <Continuous>  sucralfate suspension 1 Gram(s) Oral four times a day    MEDICATIONS  (PRN):  dextrose Gel 1 Dose(s) Oral once PRN Blood Glucose LESS THAN 70 milliGRAM(s)/deciliter  glucagon  Injectable 1 milliGRAM(s) IntraMuscular once PRN Glucose LESS THAN 70 milligrams/deciliter  oxyCODONE    5 mG/acetaminophen 325 mG 1 Tablet(s) Oral every 8 hours PRN Moderate Pain (4 - 6)      Allergies    No Known Allergies    Intolerances        Vital Signs Last 24 Hrs  T(C): 36.6 (04 Oct 2017 05:45), Max: 37.2 (03 Oct 2017 15:57)  T(F): 97.8 (04 Oct 2017 05:45), Max: 98.9 (03 Oct 2017 15:57)  HR: 79 (04 Oct 2017 05:45) (78 - 93)  BP: 101/48 (04 Oct 2017 05:45) (91/72 - 122/50)  BP(mean): --  RR: 18 (04 Oct 2017 05:45) (16 - 18)  SpO2: 97% (04 Oct 2017 05:45) (95% - 99%)    PHYSICAL EXAM:  General: NAD.  CVS: S1, S2  Chest: air entry bilaterally present  Abd: BS present, soft, non-tender      LABS:                        5.9    4.1   )-----------( x        ( 04 Oct 2017 08:07 )             18.2     10-04    137  |  105  |  130<H>  ----------------------------<  131<H>  5.3   |  20<L>  |  5.66<H>    Ca    9.5      04 Oct 2017 08:07    TPro  5.4<L>  /  Alb  2.1<L>  /  TBili  0.4  /  DBili  x   /  AST  10<L>  /  ALT  10<L>  /  AlkPhos  37<L>  10-04    PT/INR - ( 03 Oct 2017 10:13 )   PT: 13.4 sec;   INR: 1.22 ratio         PTT - ( 03 Oct 2017 10:13 )  PTT:26.2 sec    Pt with significant drop in H/H  Transfuse 2 units PRBC's ASAP  on Carafate  add protonix 40mg IV daily

## 2017-10-04 NOTE — PROGRESS NOTE ADULT - SUBJECTIVE AND OBJECTIVE BOX
HPI:  ·62 years old male by ems c/o persist nausea vomiting then diffused abd pain for 4 days and unable to take orally. Pt sts he has recent hx of left foot amputation with dx of MRSA from the surgical site and now is taking iv vancomyn 1 gram once a day but has not received it today. Pt denies headache, dizziness, sob, cough, chest pain, fever, chills, dysuria, hematuria, or irregular bowel movements. 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 No nausea, vomiting, or hematemesis; No diarrhea or constipation. No melena or hematochezia.  GENITOURINARY: No dysuria, frequency, hematuria, or incontinence  NEUROLOGICAL: No headaches, memory loss, loss of strength, numbness, or tremors  SKIN: No itching, burning, rashes, or lesions   LYMPH NODES: No enlarged glands  ENDOCRINE: No heat or cold intolerance; No hair loss  MUSCULOSKELETAL: No joint pain or swelling; No muscle, back, or extremity pain  PSYCHIATRIC: No depression, anxiety, mood swings, or difficulty sleeping  HEME/LYMPH: No easy bruising, or bleeding gums  ALLERGY AND IMMUNOLOGIC: No hives or eczema      MEDICATIONS  (STANDING):  aspirin 325 milliGRAM(s) Oral daily  atorvastatin 40 milliGRAM(s) Oral at bedtime  calcium carbonate 500 mG (Tums) Chewable 1 Tablet(s) Chew three times a day  clopidogrel Tablet 75 milliGRAM(s) Oral daily  dextrose 5%. 1000 milliLiter(s) (50 mL/Hr) IV Continuous <Continuous>  dextrose 50% Injectable 12.5 Gram(s) IV Push once  dextrose 50% Injectable 25 Gram(s) IV Push once  dextrose 50% Injectable 25 Gram(s) IV Push once  docusate sodium 100 milliGRAM(s) Oral three times a day  heparin  Injectable 5000 Unit(s) SubCutaneous every 12 hours  influenza   Vaccine 0.5 milliLiter(s) IntraMuscular once  insulin glargine Injectable (LANTUS) 10 Unit(s) SubCutaneous at bedtime  insulin lispro (HumaLOG) corrective regimen sliding scale   SubCutaneous three times a day before meals  lactobacillus acidophilus 1 Tablet(s) Oral two times a day with meals  pregabalin 100 milliGRAM(s) Oral two times a day  sodium chloride 0.9%. 1000 milliLiter(s) (80 mL/Hr) IV Continuous <Continuous>  sucralfate suspension 1 Gram(s) Oral four times a day    MEDICATIONS  (PRN):  dextrose Gel 1 Dose(s) Oral once PRN Blood Glucose LESS THAN 70 milliGRAM(s)/deciliter  glucagon  Injectable 1 milliGRAM(s) IntraMuscular once PRN Glucose LESS THAN 70 milligrams/deciliter  oxyCODONE    5 mG/acetaminophen 325 mG 1 Tablet(s) Oral every 8 hours PRN Moderate Pain (4 - 6)      Allergies    No Known Allergies    Intolerances        SOCIAL HISTORY:    FAMILY HISTORY:  No pertinent family history in first degree relatives      Vital Signs Last 24 Hrs  T(C): 36.6 (04 Oct 2017 05:45), Max: 36.6 (03 Oct 2017 23:16)  T(F): 97.8 (04 Oct 2017 05:45), Max: 97.8 (03 Oct 2017 23:16)  HR: 79 (04 Oct 2017 05:45) (78 - 79)  BP: 101/48 (04 Oct 2017 05:45) (100/52 - 101/48)  BP(mean): --  RR: 18 (04 Oct 2017 05:45) (16 - 18)  SpO2: 97% (04 Oct 2017 05:45) (97% - 97%)    PHYSICAL EXAM:    GENERAL: NAD, well-groomed, well-developed  HEAD:  Atraumatic, Normocephalic  EYES: EOMI, PERRLA, conjunctiva and sclera clear  ENMT: No tonsillar erythema, exudates, or enlargement; Moist mucous membranes, Good dentition, No lesions  NECK: Supple, No JVD, Normal thyroid  NERVOUS SYSTEM:  Alert & Oriented X3, Good concentration; Motor Strength 5/5 B/L upper and lower extremities; DTRs 2+ intact and symmetric  CHEST/LUNG: Clear to percussion bilaterally; No rales, rhonchi, wheezing, or rubs  HEART: Regular rate and rhythm; No murmurs, rubs, or gallops  ABDOMEN: Soft, Nontender, Nondistended; Bowel sounds present  EXTREMITIES:  cchronic feet ulcers   LYMPH: No lymphadenopathy noted, Guiac negative   SKIN: No rashes or lesions      LABS:                        6.9    4.9   )-----------( 97       ( 04 Oct 2017 16:51 )             21.3     10-04    137  |  105  |  130<H>  ----------------------------<  131<H>  5.3   |  20<L>  |  5.66<H>    Ca    9.5      04 Oct 2017 08:07    TPro  5.4<L>  /  Alb  2.1<L>  /  TBili  0.4  /  DBili  x   /  AST  10<L>  /  ALT  10<L>  /  AlkPhos  37<L>  10-04    PT/INR - ( 03 Oct 2017 10:13 )   PT: 13.4 sec;   INR: 1.22 ratio         PTT - ( 03 Oct 2017 10:13 )  PTT:26.2 sec  Urinalysis Basic - ( 04 Oct 2017 02:27 )    Color: Yellow / Appearance: very cloudy / S.010 / pH: x  Gluc: x / Ketone: Negative  / Bili: Negative / Urobili: Negative mg/dL   Blood: x / Protein: 30 mg/dL / Nitrite: Negative   Leuk Esterase: Negative / RBC: 0-2 /HPF / WBC 0-2   Sq Epi: x / Non Sq Epi: Occasional / Bacteria: Occasional        RADIOLOGY & ADDITIONAL STUDIES:

## 2017-10-05 LAB
% ALBUMIN: 53.7 % — SIGNIFICANT CHANGE UP
% ALPHA 1: 7.4 % — SIGNIFICANT CHANGE UP
% ALPHA 2: 13.6 % — SIGNIFICANT CHANGE UP
% BETA: 11.6 % — SIGNIFICANT CHANGE UP
% GAMMA: 13.7 % — SIGNIFICANT CHANGE UP
ALBUMIN SERPL ELPH-MCNC: 2.2 G/DL — LOW (ref 3.3–5)
ALBUMIN SERPL ELPH-MCNC: 2.6 G/DL — LOW (ref 3.6–5.5)
ALBUMIN/GLOB SERPL ELPH: 1.1 RATIO — SIGNIFICANT CHANGE UP
ALP SERPL-CCNC: 38 U/L — LOW (ref 40–120)
ALPHA1 GLOB SERPL ELPH-MCNC: 0.4 G/DL — SIGNIFICANT CHANGE UP (ref 0.1–0.4)
ALPHA2 GLOB SERPL ELPH-MCNC: 0.7 G/DL — SIGNIFICANT CHANGE UP (ref 0.5–1)
ALT FLD-CCNC: 9 U/L — LOW (ref 12–78)
ANION GAP SERPL CALC-SCNC: 11 MMOL/L — SIGNIFICANT CHANGE UP (ref 5–17)
APPEARANCE UR: CLEAR — SIGNIFICANT CHANGE UP
AST SERPL-CCNC: 13 U/L — LOW (ref 15–37)
B-GLOBULIN SERPL ELPH-MCNC: 0.6 G/DL — SIGNIFICANT CHANGE UP (ref 0.5–1)
BACTERIA # UR AUTO: ABNORMAL
BILIRUB SERPL-MCNC: 0.3 MG/DL — SIGNIFICANT CHANGE UP (ref 0.2–1.2)
BILIRUB UR-MCNC: NEGATIVE — SIGNIFICANT CHANGE UP
BUN SERPL-MCNC: 130 MG/DL — HIGH (ref 7–23)
C3 SERPL-MCNC: 81 MG/DL — SIGNIFICANT CHANGE UP (ref 80–180)
CALCIUM SERPL-MCNC: 9.2 MG/DL — SIGNIFICANT CHANGE UP (ref 8.5–10.1)
CHLORIDE SERPL-SCNC: 104 MMOL/L — SIGNIFICANT CHANGE UP (ref 96–108)
CO2 SERPL-SCNC: 21 MMOL/L — LOW (ref 22–31)
COLOR SPEC: YELLOW — SIGNIFICANT CHANGE UP
CREAT ?TM UR-MCNC: 35 MG/DL — SIGNIFICANT CHANGE UP
CREAT SERPL-MCNC: 5.99 MG/DL — HIGH (ref 0.5–1.3)
CULTURE RESULTS: NO GROWTH — SIGNIFICANT CHANGE UP
DIFF PNL FLD: ABNORMAL
EPI CELLS # UR: SIGNIFICANT CHANGE UP
GAMMA GLOBULIN: 0.7 G/DL — SIGNIFICANT CHANGE UP (ref 0.6–1.6)
GLUCOSE SERPL-MCNC: 117 MG/DL — HIGH (ref 70–99)
GLUCOSE UR QL: NEGATIVE MG/DL — SIGNIFICANT CHANGE UP
HCT VFR BLD CALC: 22.4 % — LOW (ref 39–50)
HGB BLD-MCNC: 7.4 G/DL — LOW (ref 13–17)
IGA FLD-MCNC: 141 MG/DL — SIGNIFICANT CHANGE UP (ref 68–378)
IGG FLD-MCNC: 796 MG/DL — SIGNIFICANT CHANGE UP (ref 694–1618)
IGM SERPL-MCNC: 27 MG/DL — LOW (ref 40–230)
INTERPRETATION SERPL IFE-IMP: SIGNIFICANT CHANGE UP
KAPPA LC SER QL IFE: 18.8 MG/DL — HIGH (ref 0.33–1.94)
KAPPA/LAMBDA FREE LIGHT CHAIN RATIO, SERUM: 4.27 RATIO — HIGH (ref 0.26–1.65)
KETONES UR-MCNC: NEGATIVE — SIGNIFICANT CHANGE UP
LAMBDA LC SER QL IFE: 4.4 MG/DL — HIGH (ref 0.57–2.63)
LEUKOCYTE ESTERASE UR-ACNC: NEGATIVE — SIGNIFICANT CHANGE UP
MAGNESIUM SERPL-MCNC: 2.1 MG/DL — SIGNIFICANT CHANGE UP (ref 1.6–2.6)
MCHC RBC-ENTMCNC: 30.3 PG — SIGNIFICANT CHANGE UP (ref 27–34)
MCHC RBC-ENTMCNC: 33 GM/DL — SIGNIFICANT CHANGE UP (ref 32–36)
MCV RBC AUTO: 91.8 FL — SIGNIFICANT CHANGE UP (ref 80–100)
NITRITE UR-MCNC: NEGATIVE — SIGNIFICANT CHANGE UP
PH UR: 5 — SIGNIFICANT CHANGE UP (ref 5–8)
PHOSPHATE SERPL-MCNC: 2.3 MG/DL — LOW (ref 2.5–4.5)
PLATELET # BLD AUTO: 102 K/UL — LOW (ref 150–400)
POTASSIUM SERPL-MCNC: 5.3 MMOL/L — SIGNIFICANT CHANGE UP (ref 3.5–5.3)
POTASSIUM SERPL-SCNC: 5.3 MMOL/L — SIGNIFICANT CHANGE UP (ref 3.5–5.3)
PROT ?TM UR-MCNC: 29 MG/DL — HIGH (ref 0–12)
PROT ?TM UR-MCNC: 30 MG/DL — HIGH (ref 0–12)
PROT PATTERN SERPL ELPH-IMP: SIGNIFICANT CHANGE UP
PROT SERPL-MCNC: 5.3 GM/DL — LOW (ref 6–8.3)
PROT UR-MCNC: 30 MG/DL
PROT/CREAT UR-RTO: 0.8 RATIO — HIGH (ref 0–0.2)
RBC # BLD: 2.44 M/UL — LOW (ref 4.2–5.8)
RBC # FLD: 13.7 % — SIGNIFICANT CHANGE UP (ref 11–15)
RBC CASTS # UR COMP ASSIST: SIGNIFICANT CHANGE UP /HPF (ref 0–4)
SODIUM SERPL-SCNC: 136 MMOL/L — SIGNIFICANT CHANGE UP (ref 135–145)
SP GR SPEC: 1 — LOW (ref 1.01–1.02)
SPECIMEN SOURCE: SIGNIFICANT CHANGE UP
UROBILINOGEN FLD QL: NEGATIVE MG/DL — SIGNIFICANT CHANGE UP
WBC # BLD: 5 K/UL — SIGNIFICANT CHANGE UP (ref 3.8–10.5)
WBC # FLD AUTO: 5 K/UL — SIGNIFICANT CHANGE UP (ref 3.8–10.5)
WBC UR QL: SIGNIFICANT CHANGE UP

## 2017-10-05 PROCEDURE — 99233 SBSQ HOSP IP/OBS HIGH 50: CPT

## 2017-10-05 RX ORDER — CHLORHEXIDINE GLUCONATE 213 G/1000ML
1 SOLUTION TOPICAL DAILY
Qty: 0 | Refills: 0 | Status: DISCONTINUED | OUTPATIENT
Start: 2017-10-05 | End: 2017-10-10

## 2017-10-05 RX ADMIN — HEPARIN SODIUM 5000 UNIT(S): 5000 INJECTION INTRAVENOUS; SUBCUTANEOUS at 05:03

## 2017-10-05 RX ADMIN — Medication 1 TABLET(S): at 05:03

## 2017-10-05 RX ADMIN — Medication 1 TABLET(S): at 09:13

## 2017-10-05 RX ADMIN — OXYCODONE AND ACETAMINOPHEN 1 TABLET(S): 5; 325 TABLET ORAL at 21:38

## 2017-10-05 RX ADMIN — OXYCODONE AND ACETAMINOPHEN 1 TABLET(S): 5; 325 TABLET ORAL at 05:46

## 2017-10-05 RX ADMIN — OXYCODONE AND ACETAMINOPHEN 1 TABLET(S): 5; 325 TABLET ORAL at 05:04

## 2017-10-05 RX ADMIN — Medication 1: at 15:38

## 2017-10-05 RX ADMIN — Medication 1 GRAM(S): at 12:42

## 2017-10-05 RX ADMIN — Medication 100 MILLIGRAM(S): at 05:03

## 2017-10-05 RX ADMIN — ATORVASTATIN CALCIUM 40 MILLIGRAM(S): 80 TABLET, FILM COATED ORAL at 21:10

## 2017-10-05 RX ADMIN — OXYCODONE AND ACETAMINOPHEN 1 TABLET(S): 5; 325 TABLET ORAL at 21:08

## 2017-10-05 RX ADMIN — Medication 325 MILLIGRAM(S): at 12:42

## 2017-10-05 RX ADMIN — Medication 100 MILLIGRAM(S): at 21:11

## 2017-10-05 RX ADMIN — Medication 100 MILLIGRAM(S): at 14:09

## 2017-10-05 RX ADMIN — Medication 1 TABLET(S): at 18:26

## 2017-10-05 RX ADMIN — Medication 100 MILLIGRAM(S): at 18:26

## 2017-10-05 RX ADMIN — Medication 1 GRAM(S): at 05:03

## 2017-10-05 RX ADMIN — Medication 1 TABLET(S): at 21:10

## 2017-10-05 RX ADMIN — Medication 1: at 12:41

## 2017-10-05 RX ADMIN — CLOPIDOGREL BISULFATE 75 MILLIGRAM(S): 75 TABLET, FILM COATED ORAL at 12:42

## 2017-10-05 RX ADMIN — Medication 1 TABLET(S): at 14:09

## 2017-10-05 RX ADMIN — INSULIN GLARGINE 10 UNIT(S): 100 INJECTION, SOLUTION SUBCUTANEOUS at 22:00

## 2017-10-05 RX ADMIN — Medication 1 GRAM(S): at 18:26

## 2017-10-05 NOTE — DIETITIAN INITIAL EVALUATION ADULT. - NS AS NUTRI INTERV MEALS SNACK
When medically feasible advance diet to CCHO c evening snack Dash/TLC as tolerated. Continue to monitor renal labs

## 2017-10-05 NOTE — DIETITIAN INITIAL EVALUATION ADULT. - ENERGY NEEDS
Ht= 73", Wt= 160.2 kg (10/5), IBW (adj for BKA)= 78.7 kg +/- 10%, %IBW= 185%, UBW= 150 kg, %UBW= 107%, BMI= 46.4

## 2017-10-05 NOTE — PHYSICAL THERAPY INITIAL EVALUATION ADULT - CRITERIA FOR SKILLED THERAPEUTIC INTERVENTIONS
anticipated discharge recommendation/functional limitations in following categories/impairments found/risk reduction/prevention/rehab potential

## 2017-10-05 NOTE — PHYSICAL THERAPY INITIAL EVALUATION ADULT - IMPAIRMENTS FOUND, PT EVAL
aerobic capacity/endurance/gross motor/integumentary integrity/joint integrity and mobility/muscle strength/posture/sensory integrity

## 2017-10-05 NOTE — PROGRESS NOTE ADULT - SUBJECTIVE AND OBJECTIVE BOX
Subjective: No SOB, nausea, vomiting.       MEDICATIONS  (STANDING):  aspirin 325 milliGRAM(s) Oral daily  atorvastatin 40 milliGRAM(s) Oral at bedtime  calcium carbonate 500 mG (Tums) Chewable 1 Tablet(s) Chew three times a day  clopidogrel Tablet 75 milliGRAM(s) Oral daily  dextrose 5%. 1000 milliLiter(s) (50 mL/Hr) IV Continuous <Continuous>  dextrose 50% Injectable 12.5 Gram(s) IV Push once  dextrose 50% Injectable 25 Gram(s) IV Push once  dextrose 50% Injectable 25 Gram(s) IV Push once  docusate sodium 100 milliGRAM(s) Oral three times a day  heparin  Injectable 5000 Unit(s) SubCutaneous every 12 hours  influenza   Vaccine 0.5 milliLiter(s) IntraMuscular once  insulin glargine Injectable (LANTUS) 10 Unit(s) SubCutaneous at bedtime  insulin lispro (HumaLOG) corrective regimen sliding scale   SubCutaneous three times a day before meals  lactobacillus acidophilus 1 Tablet(s) Oral two times a day with meals  pregabalin 100 milliGRAM(s) Oral two times a day  sodium chloride 0.9%. 1000 milliLiter(s) (80 mL/Hr) IV Continuous <Continuous>  sucralfate suspension 1 Gram(s) Oral four times a day    MEDICATIONS  (PRN):  aluminum hydroxide/magnesium hydroxide/simethicone Suspension 30 milliLiter(s) Oral every 6 hours PRN Dyspepsia  dextrose Gel 1 Dose(s) Oral once PRN Blood Glucose LESS THAN 70 milliGRAM(s)/deciliter  glucagon  Injectable 1 milliGRAM(s) IntraMuscular once PRN Glucose LESS THAN 70 milligrams/deciliter  oxyCODONE    5 mG/acetaminophen 325 mG 1 Tablet(s) Oral every 8 hours PRN Moderate Pain (4 - 6)          T(C): 36.6 (10-05-17 @ 05:27), Max: 36.7 (10-04-17 @ 19:53)  HR: 66 (10-05-17 @ 05:27) (62 - 71)  BP: 128/46 (10-05-17 @ 05:27) (109/44 - 128/46)  RR: 16 (10-05-17 @ 05:27) (16 - 18)  SpO2: 98% (10-05-17 @ 05:27) (97% - 98%)  Wt(kg): --        I&O's Detail    04 Oct 2017 07:01  -  05 Oct 2017 07:00  --------------------------------------------------------  IN:  Total IN: 0 mL    OUT:    Voided: 400 mL  Total OUT: 400 mL    Total NET: -400 mL               PHYSICAL EXAM:    GENERAL: comfortable  EYES: EOMI, PERRLA, conjunctiva and sclera clear  NECK: Supple, no inc in JVP  CHEST/LUNG: Clear  HEART: S1S2  ABDOMEN: Soft, Nontender, Nondistended; Bowel sounds present  EXTREMITIES:  L ankle no edema. R BKA  NEURO: no asterixis      LABS:  CBC Full  -  ( 05 Oct 2017 08:15 )  WBC Count : 5.0 K/uL  Hemoglobin : 7.4 g/dL  Hematocrit : 22.4 %  Platelet Count - Automated : 102 K/uL  Mean Cell Volume : 91.8 fl  Mean Cell Hemoglobin : 30.3 pg  Mean Cell Hemoglobin Concentration : 33.0 gm/dL  Auto Neutrophil # : x  Auto Lymphocyte # : x  Auto Monocyte # : x  Auto Eosinophil # : x  Auto Basophil # : x  Auto Neutrophil % : x  Auto Lymphocyte % : x  Auto Monocyte % : x  Auto Eosinophil % : x  Auto Basophil % : x    10-05    136  |  104  |  130<H>  ----------------------------<  117<H>  5.3   |  21<L>  |  5.99<H>    Ca    9.2      05 Oct 2017 08:15  Phos  2.3     10-05  Mg     2.1     10-05    TPro  5.3<L>  /  Alb  2.2<L>  /  TBili  0.3  /  DBili  x   /  AST  13<L>  /  ALT  9<L>  /  AlkPhos  38<L>  10-05      Urinalysis Basic - ( 05 Oct 2017 09:21 )    Color: Yellow / Appearance: Clear / S.005 / pH: x  Gluc: x / Ketone: Negative  / Bili: Negative / Urobili: Negative mg/dL   Blood: x / Protein: 30 mg/dL / Nitrite: Negative   Leuk Esterase: Negative / RBC: 0-2 /HPF / WBC 0-2   Sq Epi: x / Non Sq Epi: Few / Bacteria: Few      Culture Results:   No growth to date. (10-03 @ 12:48)  Culture Results:   No growth to date. (10-03 @ 12:48)      Impression:   * ASUNCION. Drug induced ATN vs AIN  * Diffuse vasculopath  * L foot wound MRSA, OM    Recommendations:  * No dialytic urgency. Cont to trend Bun/Cr with daily re-eval  * CrCl < 10cc/min  * Consider transfusion of PRBCs

## 2017-10-05 NOTE — DIETITIAN INITIAL EVALUATION ADULT. - PERTINENT MEDS FT
NaCl 0.9% @ 80 ml/hr, Plavix, Mylanta, Lipitor, Tums, Colace, Lantus, Humalog, Lactobacilus acidophilus, Percocet PRN

## 2017-10-05 NOTE — PHYSICAL THERAPY INITIAL EVALUATION ADULT - MODIFIED CLINICAL TEST OF SENSORY INTEGRATION IN BALANCE TEST
Barthel Index: Feeding Score _10__, Bathing Score _0__, Grooming Score _0__, Dressing Score _0__, Bowels Score _5_, Bladder Score _5_, Toilet Score _0__, Transfers Score _10_, Mobility Score _0 __, Stairs Score _0__,     Total Score __30_

## 2017-10-05 NOTE — PHYSICAL THERAPY INITIAL EVALUATION ADULT - ADDITIONAL COMMENTS
As per patient, lives c wife in private house c stair lift access to 1st floor. Has a hospital bed, wheelchair and transfer board. Family helps with transfers. As per patient, lives c wife in private house c stair lift access to 1st floor. Has a hospital bed, wheelchair, patient mechanical  (Pavel/ulpillo) and transfer board. Family helps with transfers.

## 2017-10-05 NOTE — PROGRESS NOTE ADULT - SUBJECTIVE AND OBJECTIVE BOX
Patient is a 62y old  Male who presents with a chief complaint of     INTERVAL HPI/OVERNIGHT EVENTS: transfused yesterday feels ok     MEDICATIONS  (STANDING):  aspirin 325 milliGRAM(s) Oral daily  atorvastatin 40 milliGRAM(s) Oral at bedtime  calcium carbonate 500 mG (Tums) Chewable 1 Tablet(s) Chew three times a day  chlorhexidine 4% Liquid 1 Application(s) Topical daily  clopidogrel Tablet 75 milliGRAM(s) Oral daily  dextrose 5%. 1000 milliLiter(s) (50 mL/Hr) IV Continuous <Continuous>  dextrose 50% Injectable 12.5 Gram(s) IV Push once  dextrose 50% Injectable 25 Gram(s) IV Push once  dextrose 50% Injectable 25 Gram(s) IV Push once  docusate sodium 100 milliGRAM(s) Oral three times a day  influenza   Vaccine 0.5 milliLiter(s) IntraMuscular once  insulin glargine Injectable (LANTUS) 10 Unit(s) SubCutaneous at bedtime  insulin lispro (HumaLOG) corrective regimen sliding scale   SubCutaneous three times a day before meals  lactobacillus acidophilus 1 Tablet(s) Oral two times a day with meals  pregabalin 100 milliGRAM(s) Oral two times a day  sodium chloride 0.9%. 1000 milliLiter(s) (80 mL/Hr) IV Continuous <Continuous>  sucralfate suspension 1 Gram(s) Oral four times a day    MEDICATIONS  (PRN):  aluminum hydroxide/magnesium hydroxide/simethicone Suspension 30 milliLiter(s) Oral every 6 hours PRN Dyspepsia  dextrose Gel 1 Dose(s) Oral once PRN Blood Glucose LESS THAN 70 milliGRAM(s)/deciliter  glucagon  Injectable 1 milliGRAM(s) IntraMuscular once PRN Glucose LESS THAN 70 milligrams/deciliter  oxyCODONE    5 mG/acetaminophen 325 mG 1 Tablet(s) Oral every 8 hours PRN Moderate Pain (4 - 6)      Allergies    No Known Allergies    Intolerances        REVIEW OF SYSTEMS:  CONSTITUTIONAL: No fever, weight loss, or fatigue  EYES: legally blind   ENMT:  No difficulty hearing, tinnitus, vertigo; No sinus or throat pain  NECK: No pain or stiffness  BREASTS: No pain, masses, or nipple discharge  RESPIRATORY: No cough, wheezing, chills or hemoptysis; No shortness of breath  CARDIOVASCULAR: No chest pain, palpitations, dizziness, or leg swelling  GASTROINTESTINAL: No abdominal or epigastric pain. No nausea, vomiting, or hematemesis; No diarrhea or constipation. No melena or hematochezia.  GENITOURINARY: No dysuria, frequency, hematuria, or incontinence  NEUROLOGICAL: No headaches, memory loss, loss of strength, numbness, or tremors  SKIN: No itching, burning, rashes, or lesions   LYMPH NODES: No enlarged glands  ENDOCRINE: No heat or cold intolerance; No hair loss  MUSCULOSKELETAL: No joint pain or swelling; No muscle, back, or extremity pain  PSYCHIATRIC: No depression, anxiety, mood swings, or difficulty sleeping  HEME/LYMPH: No easy bruising, or bleeding gums  ALLERGY AND IMMUNOLOGIC: No hives or eczema    Vital Signs Last 24 Hrs  T(C): 36.6 (05 Oct 2017 17:00), Max: 36.7 (04 Oct 2017 19:53)  T(F): 97.8 (05 Oct 2017 17:00), Max: 98 (04 Oct 2017 19:53)  HR: 70 (05 Oct 2017 17:00) (62 - 73)  BP: 141/56 (05 Oct 2017 17:00) (109/44 - 141/56)  BP(mean): --  RR: 17 (05 Oct 2017 17:00) (16 - 18)  SpO2: 98% (05 Oct 2017 17:00) (91% - 98%)    PHYSICAL EXAM:  GENERAL: NAD, well-groomed, well-developed  HEAD:  Atraumatic, Normocephalic  EYES: EOMI, PERRLA, conjunctiva and sclera clear  ENMT: No tonsillar erythema, exudates, or enlargement; Moist mucous membranes, Good dentition, No lesions  NECK: Supple, No JVD, Normal thyroid  NERVOUS SYSTEM:  Alert & Oriented X3, Good concentration; Motor Strength 5/5 B/L upper and lower extremities; DTRs 2+ intact and symmetric  CHEST/LUNG: Clear to percussion bilaterally; No rales, rhonchi, wheezing, or rubs  HEART: Regular rate and rhythm; No murmurs, rubs, or gallops  ABDOMEN: Soft, Nontender, Nondistended; Bowel sounds present  EXTREMITIES:  right BKA   LYMPH: No lymphadenopathy noted  SKIN: right bka wound dressed   LABS:                        7.4    5.0   )-----------( 102      ( 05 Oct 2017 08:15 )             22.4     10-05    136  |  104  |  130<H>  ----------------------------<  117<H>  5.3   |  21<L>  |  5.99<H>    Ca    9.2      05 Oct 2017 08:15  Phos  2.3     10  Mg     2.1     10    TPro  5.3<L>  /  Alb  2.2<L>  /  TBili  0.3  /  DBili  x   /  AST  13<L>  /  ALT  9<L>  /  AlkPhos  38<L>  10-      Urinalysis Basic - ( 05 Oct 2017 09:21 )    Color: Yellow / Appearance: Clear / S.005 / pH: x  Gluc: x / Ketone: Negative  / Bili: Negative / Urobili: Negative mg/dL   Blood: x / Protein: 30 mg/dL / Nitrite: Negative   Leuk Esterase: Negative / RBC: 0-2 /HPF / WBC 0-2   Sq Epi: x / Non Sq Epi: Few / Bacteria: Few      CAPILLARY BLOOD GLUCOSE  171 (05 Oct 2017 15:39)  176 (05 Oct 2017 11:30)  139 (05 Oct 2017 08:35)  168 (04 Oct 2017 23:36)          RADIOLOGY & ADDITIONAL TESTS:    Imaging Personally Reviewed:  [X ] YES  [ ] NO    Consultant(s) Notes Reviewed:  [X ] YES  [ ] NO    Care Discussed with Consultants/Other Providers [ X] YES  [ ] NO

## 2017-10-05 NOTE — PROGRESS NOTE ADULT - SUBJECTIVE AND OBJECTIVE BOX
Pt transfused 2 u prbc's yesterday - still anemia  no active bleeding      MEDICATIONS  (STANDING):  aspirin 325 milliGRAM(s) Oral daily  atorvastatin 40 milliGRAM(s) Oral at bedtime  calcium carbonate 500 mG (Tums) Chewable 1 Tablet(s) Chew three times a day  chlorhexidine 4% Liquid 1 Application(s) Topical daily  clopidogrel Tablet 75 milliGRAM(s) Oral daily  dextrose 5%. 1000 milliLiter(s) (50 mL/Hr) IV Continuous <Continuous>  dextrose 50% Injectable 12.5 Gram(s) IV Push once  dextrose 50% Injectable 25 Gram(s) IV Push once  dextrose 50% Injectable 25 Gram(s) IV Push once  docusate sodium 100 milliGRAM(s) Oral three times a day  influenza   Vaccine 0.5 milliLiter(s) IntraMuscular once  insulin glargine Injectable (LANTUS) 10 Unit(s) SubCutaneous at bedtime  insulin lispro (HumaLOG) corrective regimen sliding scale   SubCutaneous three times a day before meals  lactobacillus acidophilus 1 Tablet(s) Oral two times a day with meals  pregabalin 100 milliGRAM(s) Oral two times a day  sodium chloride 0.9%. 1000 milliLiter(s) (80 mL/Hr) IV Continuous <Continuous>  sucralfate suspension 1 Gram(s) Oral four times a day    MEDICATIONS  (PRN):  aluminum hydroxide/magnesium hydroxide/simethicone Suspension 30 milliLiter(s) Oral every 6 hours PRN Dyspepsia  dextrose Gel 1 Dose(s) Oral once PRN Blood Glucose LESS THAN 70 milliGRAM(s)/deciliter  glucagon  Injectable 1 milliGRAM(s) IntraMuscular once PRN Glucose LESS THAN 70 milligrams/deciliter  oxyCODONE    5 mG/acetaminophen 325 mG 1 Tablet(s) Oral every 8 hours PRN Moderate Pain (4 - 6)      Allergies    No Known Allergies    Intolerances        Vital Signs Last 24 Hrs  T(C): 36.6 (05 Oct 2017 17:00), Max: 36.7 (05 Oct 2017 11:23)  T(F): 97.8 (05 Oct 2017 17:00), Max: 98 (05 Oct 2017 11:23)  HR: 70 (05 Oct 2017 17:00) (63 - 73)  BP: 141/56 (05 Oct 2017 17:00) (115/73 - 141/56)  BP(mean): --  RR: 17 (05 Oct 2017 17:00) (16 - 18)  SpO2: 98% (05 Oct 2017 17:00) (91% - 98%)    PHYSICAL EXAM:  General: NAD.  CVS: S1, S2  Chest: air entry bilaterally present  Abd: BS present, soft, non-tender      LABS:                        7.4    5.0   )-----------( 102      ( 05 Oct 2017 08:15 )             22.4     10-05    136  |  104  |  130<H>  ----------------------------<  117<H>  5.3   |  21<L>  |  5.99<H>    Ca    9.2      05 Oct 2017 08:15  Phos  2.3     10-05  Mg     2.1     10-05    TPro  5.3<L>  /  Alb  2.2<L>  /  TBili  0.3  /  DBili  x   /  AST  13<L>  /  ALT  9<L>  /  AlkPhos  38<L>  10-05      repeat CBC in AM  stool for OB  renal f/u  consider endoscopy - will eventually need egd for evaluation of thickening of the esophagus

## 2017-10-05 NOTE — PHYSICAL THERAPY INITIAL EVALUATION ADULT - GENERAL OBSERVATIONS, REHAB EVAL
Patient encountered supine in bed, vital signs as charted. Attachments: cardiac  monitor. AAOx4. Legally blind. Denies pain or shortness of breath. Wound Evaluated by Dr. Peraza.

## 2017-10-05 NOTE — PHYSICAL THERAPY INITIAL EVALUATION ADULT - PLANNED THERAPY INTERVENTIONS, PT EVAL
strengthening/transfer training/stretching/gait training/wheelchair management/propulsion training/neuromuscular re-education/ROM/postural re-education/balance training

## 2017-10-05 NOTE — DIETITIAN INITIAL EVALUATION ADULT. - OTHER INFO
61 yo M seen for consult for BMI>40. Pt admitted on Tuesday 10/3, reports N/V starting Saturday, thought he was better on Sunday and tried to eat something solid but experienced vomiting after that. No episodes reported since then, pt has not eaten solid food since then (x 4 days). Pt c DM, eats a big breakfast which includes dry cereal such as Cheerios, an English muffin c peanut butter & farina. Has a snack such as fruit midday, then dinner "whatever my wife makes" (pt's wife does all the food shopping & cooking). States he does not eat a lot of bread.  Checks his BS once/day in the am, ~3x/week it goes above 150 and he takes insulin then. Last BM Sunday PTA 4 days ago. s/p 2 units PRBC's. Per nephrology 10/4 no emergent indication for HD.

## 2017-10-05 NOTE — DIETITIAN INITIAL EVALUATION ADULT. - ETIOLOGY
N/V PTA, inability to consume solid food x 4 days inability for physical activity s/p R BKA c excess CHO, calorie intake noted c undesirable meal patterns

## 2017-10-05 NOTE — DIETITIAN INITIAL EVALUATION ADULT. - PERTINENT LABORATORY DATA
10-05 Na136 mmol/L Glu 117 mg/dL<H> K+ 5.3 mmol/L Cr  5.99 mg/dL<H>  mg/dL<H> Phos 2.3 mg/dL<L> Alb 2.2 g/dL<L> PAB n/a, eGFR 10L. 10/5- RBC 2.44L, H/H 7.4L/22.4L. 08/22/17 HgbA1c= 6.8H

## 2017-10-05 NOTE — CONSULT NOTE ADULT - SUBJECTIVE AND OBJECTIVE BOX
Vascular Attending:  called to eval for the right BKA stump wound. Pt is s/p debridement and revision of th eright BKA stump few weeks go and was seen in [facility] 2 weeks ago. The wound ws healing ok. Now admitted with medical isssues.      HPI:  ·62 years old male by ems c/o persist nausea vomiting then diffused abd pain for 4 days and unable to take orally. Pt sts he has recent hx of left foot amputation with dx of MRSA from the surgical site and now is taking iv vancomyn 1 gram once a day but has not received it today. Pt denies headache, dizziness, sob, cough, chest pain, fever, chills, dysuria, hematuria, or irregular bowel movements. according to wife his vanco was stopped 4 days ago because of elevated vanco level but the emergency room gave him one dose of vanco on arrival to emergency room (03 Oct 2017 13:09)      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  Unilateral complete BKA, right, subsequent encounter  History of coronary artery stent placement        MEDICATIONS  (STANDING):  aspirin 325 milliGRAM(s) Oral daily  atorvastatin 40 milliGRAM(s) Oral at bedtime  calcium carbonate 500 mG (Tums) Chewable 1 Tablet(s) Chew three times a day  clopidogrel Tablet 75 milliGRAM(s) Oral daily  dextrose 5%. 1000 milliLiter(s) (50 mL/Hr) IV Continuous <Continuous>  dextrose 50% Injectable 12.5 Gram(s) IV Push once  dextrose 50% Injectable 25 Gram(s) IV Push once  dextrose 50% Injectable 25 Gram(s) IV Push once  docusate sodium 100 milliGRAM(s) Oral three times a day  heparin  Injectable 5000 Unit(s) SubCutaneous every 12 hours  influenza   Vaccine 0.5 milliLiter(s) IntraMuscular once  insulin glargine Injectable (LANTUS) 10 Unit(s) SubCutaneous at bedtime  insulin lispro (HumaLOG) corrective regimen sliding scale   SubCutaneous three times a day before meals  lactobacillus acidophilus 1 Tablet(s) Oral two times a day with meals  pregabalin 100 milliGRAM(s) Oral two times a day  sodium chloride 0.9%. 1000 milliLiter(s) (80 mL/Hr) IV Continuous <Continuous>  sucralfate suspension 1 Gram(s) Oral four times a day    MEDICATIONS  (PRN):  aluminum hydroxide/magnesium hydroxide/simethicone Suspension 30 milliLiter(s) Oral every 6 hours PRN Dyspepsia  dextrose Gel 1 Dose(s) Oral once PRN Blood Glucose LESS THAN 70 milliGRAM(s)/deciliter  glucagon  Injectable 1 milliGRAM(s) IntraMuscular once PRN Glucose LESS THAN 70 milligrams/deciliter  oxyCODONE    5 mG/acetaminophen 325 mG 1 Tablet(s) Oral every 8 hours PRN Moderate Pain (4 - 6)      Allergies    No Known Allergies    Intolerances        SOCIAL HISTORY:      Vital Signs Last 24 Hrs  T(C): 36.7 (05 Oct 2017 11:23), Max: 36.7 (04 Oct 2017 19:53)  T(F): 98 (05 Oct 2017 11:23), Max: 98 (04 Oct 2017 19:53)  HR: 73 (05 Oct 2017 11:23) (62 - 73)  BP: 115/73 (05 Oct 2017 11:23) (109/44 - 128/46)  BP(mean): --  RR: 18 (05 Oct 2017 11:23) (16 - 18)  SpO2: 98% (05 Oct 2017 11:23) (97% - 98%)    P/E:-   CAROTIDS:- Bilateral carotids with no Bruits. No scars of previous catheterisation.  UPPER EXTREMITIES:- Bilateral radial artery pulses are normal and no ischemia of the Hands. No edema of the arms.  ABDOMEN:- No pulsatile mass in the abdomen and no ascites.  LOWER EXTREMITIES:- Bilateral LE with No Edema and no CVI, No varicose veins, no ulcers.The arterial pulse are examined with palpation and Dopplers and the findings are as follows,    Wounds:- The pt has following wounds and measurements.   Right BKA stump, lateral margin 1x1x .5 cms. The dressing was changed by me.                                     LABS:                        7.4    5.0   )-----------( 102      ( 05 Oct 2017 08:15 )             22.4     10-05    136  |  104  |  130<H>  ----------------------------<  117<H>  5.3   |  21<L>  |  5.99<H>    Ca    9.2      05 Oct 2017 08:15  Phos  2.3     10-05  Mg     2.1     10-05    TPro  5.3<L>  /  Alb  2.2<L>  /  TBili  0.3  /  DBili  x   /  AST  13<L>  /  ALT  9<L>  /  AlkPhos  38<L>  10-05      Urinalysis Basic - ( 05 Oct 2017 09:21 )    Color: Yellow / Appearance: Clear / S.005 / pH: x  Gluc: x / Ketone: Negative  / Bili: Negative / Urobili: Negative mg/dL   Blood: x / Protein: 30 mg/dL / Nitrite: Negative   Leuk Esterase: Negative / RBC: 0-2 /HPF / WBC 0-2   Sq Epi: x / Non Sq Epi: Few / Bacteria: Few        RADIOLOGY & ADDITIONAL STUDIES    Impression and Plan: Local wound care with alginate daily to the right BKA stump. No need for debridement, the ulcer is healing well.

## 2017-10-06 DIAGNOSIS — E66.01 MORBID (SEVERE) OBESITY DUE TO EXCESS CALORIES: ICD-10-CM

## 2017-10-06 DIAGNOSIS — J18.9 PNEUMONIA, UNSPECIFIED ORGANISM: ICD-10-CM

## 2017-10-06 LAB
ANION GAP SERPL CALC-SCNC: 11 MMOL/L — SIGNIFICANT CHANGE UP (ref 5–17)
ANISOCYTOSIS BLD QL: SLIGHT — SIGNIFICANT CHANGE UP
BUN SERPL-MCNC: 122 MG/DL — HIGH (ref 7–23)
CALCIUM SERPL-MCNC: 9.5 MG/DL — SIGNIFICANT CHANGE UP (ref 8.5–10.1)
CHLORIDE SERPL-SCNC: 105 MMOL/L — SIGNIFICANT CHANGE UP (ref 96–108)
CO2 SERPL-SCNC: 21 MMOL/L — LOW (ref 22–31)
CREAT SERPL-MCNC: 6.13 MG/DL — HIGH (ref 0.5–1.3)
GLUCOSE SERPL-MCNC: 116 MG/DL — HIGH (ref 70–99)
HCT VFR BLD CALC: 23.6 % — LOW (ref 39–50)
HGB BLD-MCNC: 7.7 G/DL — LOW (ref 13–17)
LYMPHOCYTES # BLD AUTO: 21 % — SIGNIFICANT CHANGE UP (ref 13–44)
M PROTEIN 24H UR ELPH-MRATE: PRESENT
MACROCYTES BLD QL: SLIGHT — SIGNIFICANT CHANGE UP
MAGNESIUM SERPL-MCNC: 2.1 MG/DL — SIGNIFICANT CHANGE UP (ref 1.6–2.6)
MCHC RBC-ENTMCNC: 30.4 PG — SIGNIFICANT CHANGE UP (ref 27–34)
MCHC RBC-ENTMCNC: 32.4 GM/DL — SIGNIFICANT CHANGE UP (ref 32–36)
MCV RBC AUTO: 93.6 FL — SIGNIFICANT CHANGE UP (ref 80–100)
MONOCYTES NFR BLD AUTO: 7 % — SIGNIFICANT CHANGE UP (ref 2–14)
NEUTROPHILS NFR BLD AUTO: 72 % — SIGNIFICANT CHANGE UP (ref 43–77)
OVALOCYTES BLD QL SMEAR: SLIGHT — SIGNIFICANT CHANGE UP
PHOSPHATE SERPL-MCNC: 2.2 MG/DL — LOW (ref 2.5–4.5)
PLAT MORPH BLD: NORMAL — SIGNIFICANT CHANGE UP
PLATELET # BLD AUTO: 112 K/UL — LOW (ref 150–400)
POLYCHROMASIA BLD QL SMEAR: SLIGHT — SIGNIFICANT CHANGE UP
POTASSIUM SERPL-MCNC: 5.3 MMOL/L — SIGNIFICANT CHANGE UP (ref 3.5–5.3)
POTASSIUM SERPL-SCNC: 5.3 MMOL/L — SIGNIFICANT CHANGE UP (ref 3.5–5.3)
RBC # BLD: 2.52 M/UL — LOW (ref 4.2–5.8)
RBC # FLD: 13.6 % — SIGNIFICANT CHANGE UP (ref 11–15)
RBC BLD AUTO: ABNORMAL
SODIUM SERPL-SCNC: 137 MMOL/L — SIGNIFICANT CHANGE UP (ref 135–145)
WBC # BLD: 5.3 K/UL — SIGNIFICANT CHANGE UP (ref 3.8–10.5)
WBC # FLD AUTO: 5.3 K/UL — SIGNIFICANT CHANGE UP (ref 3.8–10.5)

## 2017-10-06 PROCEDURE — 99233 SBSQ HOSP IP/OBS HIGH 50: CPT

## 2017-10-06 RX ORDER — LACTULOSE 10 G/15ML
20 SOLUTION ORAL ONCE
Qty: 0 | Refills: 0 | Status: COMPLETED | OUTPATIENT
Start: 2017-10-06 | End: 2017-10-06

## 2017-10-06 RX ADMIN — SODIUM CHLORIDE 80 MILLILITER(S): 9 INJECTION INTRAMUSCULAR; INTRAVENOUS; SUBCUTANEOUS at 15:10

## 2017-10-06 RX ADMIN — Medication 1 GRAM(S): at 05:55

## 2017-10-06 RX ADMIN — Medication 100 MILLIGRAM(S): at 05:54

## 2017-10-06 RX ADMIN — Medication 100 MILLIGRAM(S): at 18:07

## 2017-10-06 RX ADMIN — Medication 325 MILLIGRAM(S): at 12:03

## 2017-10-06 RX ADMIN — Medication 1 TABLET(S): at 23:12

## 2017-10-06 RX ADMIN — CLOPIDOGREL BISULFATE 75 MILLIGRAM(S): 75 TABLET, FILM COATED ORAL at 12:03

## 2017-10-06 RX ADMIN — Medication 63.75 MILLIMOLE(S): at 16:08

## 2017-10-06 RX ADMIN — ATORVASTATIN CALCIUM 40 MILLIGRAM(S): 80 TABLET, FILM COATED ORAL at 23:12

## 2017-10-06 RX ADMIN — Medication 1: at 12:03

## 2017-10-06 RX ADMIN — Medication 100 MILLIGRAM(S): at 23:12

## 2017-10-06 RX ADMIN — CHLORHEXIDINE GLUCONATE 1 APPLICATION(S): 213 SOLUTION TOPICAL at 12:07

## 2017-10-06 RX ADMIN — Medication 1 GRAM(S): at 18:06

## 2017-10-06 RX ADMIN — Medication 1 GRAM(S): at 00:16

## 2017-10-06 RX ADMIN — Medication 1 GRAM(S): at 23:12

## 2017-10-06 RX ADMIN — SODIUM CHLORIDE 80 MILLILITER(S): 9 INJECTION INTRAMUSCULAR; INTRAVENOUS; SUBCUTANEOUS at 00:16

## 2017-10-06 RX ADMIN — INFLUENZA VIRUS VACCINE 0.5 MILLILITER(S): 15; 15; 15; 15 SUSPENSION INTRAMUSCULAR at 15:09

## 2017-10-06 RX ADMIN — INSULIN GLARGINE 10 UNIT(S): 100 INJECTION, SOLUTION SUBCUTANEOUS at 23:12

## 2017-10-06 RX ADMIN — Medication 100 MILLIGRAM(S): at 13:24

## 2017-10-06 RX ADMIN — Medication 1 TABLET(S): at 16:16

## 2017-10-06 RX ADMIN — Medication 1 TABLET(S): at 05:55

## 2017-10-06 RX ADMIN — Medication 1 TABLET(S): at 08:28

## 2017-10-06 RX ADMIN — Medication 1 TABLET(S): at 13:24

## 2017-10-06 RX ADMIN — LACTULOSE 20 GRAM(S): 10 SOLUTION ORAL at 15:09

## 2017-10-06 RX ADMIN — Medication 100 MILLIGRAM(S): at 05:55

## 2017-10-06 RX ADMIN — Medication 1: at 16:11

## 2017-10-06 RX ADMIN — Medication 1 GRAM(S): at 12:03

## 2017-10-06 NOTE — PROGRESS NOTE ADULT - SUBJECTIVE AND OBJECTIVE BOX
Patient is a 62y old  Male who presents with a chief complaint of     INTERVAL HPI/OVERNIGHT EVENTS: no acute events overnight feels constipated     MEDICATIONS  (STANDING):  aspirin 325 milliGRAM(s) Oral daily  atorvastatin 40 milliGRAM(s) Oral at bedtime  calcium carbonate 500 mG (Tums) Chewable 1 Tablet(s) Chew three times a day  chlorhexidine 4% Liquid 1 Application(s) Topical daily  clopidogrel Tablet 75 milliGRAM(s) Oral daily  dextrose 5%. 1000 milliLiter(s) (50 mL/Hr) IV Continuous <Continuous>  dextrose 50% Injectable 12.5 Gram(s) IV Push once  dextrose 50% Injectable 25 Gram(s) IV Push once  dextrose 50% Injectable 25 Gram(s) IV Push once  docusate sodium 100 milliGRAM(s) Oral three times a day  influenza   Vaccine 0.5 milliLiter(s) IntraMuscular once  insulin glargine Injectable (LANTUS) 10 Unit(s) SubCutaneous at bedtime  insulin lispro (HumaLOG) corrective regimen sliding scale   SubCutaneous three times a day before meals  lactobacillus acidophilus 1 Tablet(s) Oral two times a day with meals  lactulose Syrup 20 Gram(s) Oral once  levoFLOXacin  Tablet 250 milliGRAM(s) Oral every 24 hours  pregabalin 100 milliGRAM(s) Oral two times a day  sodium chloride 0.9%. 1000 milliLiter(s) (80 mL/Hr) IV Continuous <Continuous>  sodium phosphate IVPB 15 milliMole(s) IV Intermittent once  sucralfate suspension 1 Gram(s) Oral four times a day    MEDICATIONS  (PRN):  aluminum hydroxide/magnesium hydroxide/simethicone Suspension 30 milliLiter(s) Oral every 6 hours PRN Dyspepsia  dextrose Gel 1 Dose(s) Oral once PRN Blood Glucose LESS THAN 70 milliGRAM(s)/deciliter  glucagon  Injectable 1 milliGRAM(s) IntraMuscular once PRN Glucose LESS THAN 70 milligrams/deciliter  oxyCODONE    5 mG/acetaminophen 325 mG 1 Tablet(s) Oral every 8 hours PRN Moderate Pain (4 - 6)      Allergies    No Known Allergies    Intolerances        REVIEW OF SYSTEMS:  CONSTITUTIONAL: No fever, weight loss, or fatigue  EYES: No eye pain, visual disturbances, or discharge  ENMT:  No difficulty hearing, tinnitus, vertigo; No sinus or throat pain  NECK: No pain or stiffness  BREASTS: No pain, masses, or nipple discharge  RESPIRATORY: No cough, wheezing, chills or hemoptysis; No shortness of breath  CARDIOVASCULAR: No chest pain, palpitations, dizziness, or leg swelling  GASTROINTESTINAL: No abdominal or epigastric pain. No nausea, vomiting, or hematemesis; No diarrhea or constipation. No melena or hematochezia.  GENITOURINARY: No dysuria, frequency, hematuria, or incontinence  NEUROLOGICAL: No headaches, memory loss, loss of strength, numbness, or tremors  SKIN: No itching, burning, rashes, or lesions   LYMPH NODES: No enlarged glands  ENDOCRINE: No heat or cold intolerance; No hair loss  MUSCULOSKELETAL: mild right leg pain   PSYCHIATRIC: No depression, anxiety, mood swings, or difficulty sleeping  HEME/LYMPH: No easy bruising, or bleeding gums  ALLERGY AND IMMUNOLOGIC: No hives or eczema    Vital Signs Last 24 Hrs  T(C): 36.4 (06 Oct 2017 11:11), Max: 36.6 (05 Oct 2017 17:00)  T(F): 97.6 (06 Oct 2017 11:11), Max: 97.8 (05 Oct 2017 17:00)  HR: 84 (06 Oct 2017 11:11) (70 - 84)  BP: 130/71 (06 Oct 2017 11:11) (130/71 - 151/70)  BP(mean): --  RR: 17 (06 Oct 2017 11:11) (17 - 17)  SpO2: 97% (06 Oct 2017 11:11) (97% - 98%)    PHYSICAL EXAM:  GENERAL: NAD, well-groomed, well-developed  HEAD:  Atraumatic, Normocephalic  EYES: EOMI, PERRLA, conjunctiva and sclera clear  ENMT: No tonsillar erythema, exudates, or enlargement; Moist mucous membranes, Good dentition, No lesions  NECK: Supple, No JVD, Normal thyroid  NERVOUS SYSTEM:  Alert & Oriented X3, Good concentration; Motor Strength 5/5 B/L upper and lower extremities; DTRs 2+ intact and symmetric  CHEST/LUNG: Clear to percussion bilaterally; No rales, rhonchi, wheezing, or rubs  HEART: Regular rate and rhythm; No murmurs, rubs, or gallops  ABDOMEN: Soft, Nontender, Nondistended; Bowel sounds present  EXTREMITIES:  right BKA site with dressed wound   LYMPH: No lymphadenopathy noted  SKIN: No rashes or lesions    LABS:                        7.7    5.3   )-----------( 112      ( 06 Oct 2017 08:24 )             23.6     10    137  |  105  |  122<H>  ----------------------------<  116<H>  5.3   |  21<L>  |  6.13<H>    Ca    9.5      06 Oct 2017 08:24  Phos  2.2     10-06  Mg     2.1     10-06    TPro  5.3<L>  /  Alb  2.2<L>  /  TBili  0.3  /  DBili  x   /  AST  13<L>  /  ALT  9<L>  /  AlkPhos  38<L>  10      Urinalysis Basic - ( 05 Oct 2017 09:21 )    Color: Yellow / Appearance: Clear / S.005 / pH: x  Gluc: x / Ketone: Negative  / Bili: Negative / Urobili: Negative mg/dL   Blood: x / Protein: 30 mg/dL / Nitrite: Negative   Leuk Esterase: Negative / RBC: 0-2 /HPF / WBC 0-2   Sq Epi: x / Non Sq Epi: Few / Bacteria: Few      CAPILLARY BLOOD GLUCOSE  172 (06 Oct 2017 11:45)  146 (06 Oct 2017 08:00)  155 (05 Oct 2017 21:13)  171 (05 Oct 2017 15:39)          RADIOLOGY & ADDITIONAL TESTS:    < from: CT Chest No Cont (10.03.17 @ 12:27) >  IMPRESSION:     Diffuse esophageal thickening. Differential includes esophagitis.   However, correlation with endoscopy is recommended.    Bibasilar pneumonia.      < end of copied text >        Imaging Personally Reviewed:  [ X] YES  [ ] NO    Consultant(s) Notes Reviewed:  [ X] YES  [ ] NO    Care Discussed with Consultants/Other Providers [X ] YES  [ ] NO

## 2017-10-06 NOTE — PROGRESS NOTE ADULT - SUBJECTIVE AND OBJECTIVE BOX
pt stable-no active bleeding  Hgb 7.7; +Acute Renal failure      MEDICATIONS  (STANDING):  aspirin 325 milliGRAM(s) Oral daily  atorvastatin 40 milliGRAM(s) Oral at bedtime  calcium carbonate 500 mG (Tums) Chewable 1 Tablet(s) Chew three times a day  chlorhexidine 4% Liquid 1 Application(s) Topical daily  clopidogrel Tablet 75 milliGRAM(s) Oral daily  dextrose 5%. 1000 milliLiter(s) (50 mL/Hr) IV Continuous <Continuous>  dextrose 50% Injectable 12.5 Gram(s) IV Push once  dextrose 50% Injectable 25 Gram(s) IV Push once  dextrose 50% Injectable 25 Gram(s) IV Push once  docusate sodium 100 milliGRAM(s) Oral three times a day  insulin glargine Injectable (LANTUS) 10 Unit(s) SubCutaneous at bedtime  insulin lispro (HumaLOG) corrective regimen sliding scale   SubCutaneous three times a day before meals  lactobacillus acidophilus 1 Tablet(s) Oral two times a day with meals  levoFLOXacin  Tablet 250 milliGRAM(s) Oral every 24 hours  pregabalin 100 milliGRAM(s) Oral two times a day  sodium chloride 0.9%. 1000 milliLiter(s) (80 mL/Hr) IV Continuous <Continuous>  sucralfate suspension 1 Gram(s) Oral four times a day    MEDICATIONS  (PRN):  aluminum hydroxide/magnesium hydroxide/simethicone Suspension 30 milliLiter(s) Oral every 6 hours PRN Dyspepsia  dextrose Gel 1 Dose(s) Oral once PRN Blood Glucose LESS THAN 70 milliGRAM(s)/deciliter  glucagon  Injectable 1 milliGRAM(s) IntraMuscular once PRN Glucose LESS THAN 70 milligrams/deciliter  oxyCODONE    5 mG/acetaminophen 325 mG 1 Tablet(s) Oral every 8 hours PRN Moderate Pain (4 - 6)      Allergies    No Known Allergies    Intolerances        Vital Signs Last 24 Hrs  T(C): 36.6 (06 Oct 2017 16:58), Max: 36.6 (06 Oct 2017 16:58)  T(F): 97.8 (06 Oct 2017 16:58), Max: 97.8 (06 Oct 2017 16:58)  HR: 78 (06 Oct 2017 16:58) (71 - 84)  BP: 110/57 (06 Oct 2017 16:58) (110/57 - 151/70)  BP(mean): --  RR: 17 (06 Oct 2017 16:58) (17 - 17)  SpO2: 97% (06 Oct 2017 16:58) (97% - 98%)    PHYSICAL EXAM:  General: NAD.  CVS: S1, S2  Chest: air entry bilaterally present  Abd: BS present, soft, non-tender      LABS:                        7.7    5.3   )-----------( 112      ( 06 Oct 2017 08:24 )             23.6     10-06    137  |  105  |  122<H>  ----------------------------<  116<H>  5.3   |  21<L>  |  6.13<H>    Ca    9.5      06 Oct 2017 08:24  Phos  2.2     10-06  Mg     2.1     10-06    TPro  5.3<L>  /  Alb  2.2<L>  /  TBili  0.3  /  DBili  x   /  AST  13<L>  /  ALT  9<L>  /  AlkPhos  38<L>  10-05      Hgb stable - no bleeding  Creatinine elevated  as per renal -   possible egd to evaluate thickened esophagus  follow labs

## 2017-10-07 DIAGNOSIS — K59.01 SLOW TRANSIT CONSTIPATION: ICD-10-CM

## 2017-10-07 LAB
ALBUMIN SERPL ELPH-MCNC: 2.2 G/DL — LOW (ref 3.3–5)
ALP SERPL-CCNC: 52 U/L — SIGNIFICANT CHANGE UP (ref 40–120)
ALT FLD-CCNC: 9 U/L — LOW (ref 12–78)
ANION GAP SERPL CALC-SCNC: 10 MMOL/L — SIGNIFICANT CHANGE UP (ref 5–17)
AST SERPL-CCNC: 9 U/L — LOW (ref 15–37)
BILIRUB SERPL-MCNC: 0.3 MG/DL — SIGNIFICANT CHANGE UP (ref 0.2–1.2)
BUN SERPL-MCNC: 120 MG/DL — HIGH (ref 7–23)
CALCIUM SERPL-MCNC: 8.7 MG/DL — SIGNIFICANT CHANGE UP (ref 8.5–10.1)
CHLORIDE SERPL-SCNC: 106 MMOL/L — SIGNIFICANT CHANGE UP (ref 96–108)
CO2 SERPL-SCNC: 23 MMOL/L — SIGNIFICANT CHANGE UP (ref 22–31)
CREAT SERPL-MCNC: 6.06 MG/DL — HIGH (ref 0.5–1.3)
GLUCOSE SERPL-MCNC: 135 MG/DL — HIGH (ref 70–99)
HCT VFR BLD CALC: 23.6 % — LOW (ref 39–50)
HGB BLD-MCNC: 7.6 G/DL — LOW (ref 13–17)
MAGNESIUM SERPL-MCNC: 2.1 MG/DL — SIGNIFICANT CHANGE UP (ref 1.6–2.6)
MCHC RBC-ENTMCNC: 29.9 PG — SIGNIFICANT CHANGE UP (ref 27–34)
MCHC RBC-ENTMCNC: 32.3 GM/DL — SIGNIFICANT CHANGE UP (ref 32–36)
MCV RBC AUTO: 92.5 FL — SIGNIFICANT CHANGE UP (ref 80–100)
PHOSPHATE SERPL-MCNC: 3.3 MG/DL — SIGNIFICANT CHANGE UP (ref 2.5–4.5)
PLATELET # BLD AUTO: 130 K/UL — LOW (ref 150–400)
POTASSIUM SERPL-MCNC: 5 MMOL/L — SIGNIFICANT CHANGE UP (ref 3.5–5.3)
POTASSIUM SERPL-SCNC: 5 MMOL/L — SIGNIFICANT CHANGE UP (ref 3.5–5.3)
PROT SERPL-MCNC: 5.6 GM/DL — LOW (ref 6–8.3)
RBC # BLD: 2.56 M/UL — LOW (ref 4.2–5.8)
RBC # FLD: 13.6 % — SIGNIFICANT CHANGE UP (ref 11–15)
SODIUM SERPL-SCNC: 139 MMOL/L — SIGNIFICANT CHANGE UP (ref 135–145)
WBC # BLD: 4.8 K/UL — SIGNIFICANT CHANGE UP (ref 3.8–10.5)
WBC # FLD AUTO: 4.8 K/UL — SIGNIFICANT CHANGE UP (ref 3.8–10.5)

## 2017-10-07 PROCEDURE — 99233 SBSQ HOSP IP/OBS HIGH 50: CPT

## 2017-10-07 RX ORDER — POLYETHYLENE GLYCOL 3350 17 G/17G
17 POWDER, FOR SOLUTION ORAL
Qty: 0 | Refills: 0 | Status: DISCONTINUED | OUTPATIENT
Start: 2017-10-07 | End: 2017-10-09

## 2017-10-07 RX ADMIN — Medication 100 MILLIGRAM(S): at 15:03

## 2017-10-07 RX ADMIN — Medication 1: at 12:27

## 2017-10-07 RX ADMIN — Medication 1 TABLET(S): at 22:53

## 2017-10-07 RX ADMIN — Medication 1 GRAM(S): at 17:06

## 2017-10-07 RX ADMIN — Medication 1: at 08:20

## 2017-10-07 RX ADMIN — ATORVASTATIN CALCIUM 40 MILLIGRAM(S): 80 TABLET, FILM COATED ORAL at 22:41

## 2017-10-07 RX ADMIN — Medication 1: at 17:10

## 2017-10-07 RX ADMIN — Medication 100 MILLIGRAM(S): at 06:43

## 2017-10-07 RX ADMIN — OXYCODONE AND ACETAMINOPHEN 1 TABLET(S): 5; 325 TABLET ORAL at 23:26

## 2017-10-07 RX ADMIN — Medication 100 MILLIGRAM(S): at 22:41

## 2017-10-07 RX ADMIN — Medication 1 ENEMA: at 18:05

## 2017-10-07 RX ADMIN — Medication 1 GRAM(S): at 06:43

## 2017-10-07 RX ADMIN — Medication 1 GRAM(S): at 22:53

## 2017-10-07 RX ADMIN — INSULIN GLARGINE 10 UNIT(S): 100 INJECTION, SOLUTION SUBCUTANEOUS at 22:41

## 2017-10-07 RX ADMIN — POLYETHYLENE GLYCOL 3350 17 GRAM(S): 17 POWDER, FOR SOLUTION ORAL at 18:04

## 2017-10-07 RX ADMIN — Medication 1 GRAM(S): at 12:28

## 2017-10-07 RX ADMIN — Medication 100 MILLIGRAM(S): at 17:06

## 2017-10-07 RX ADMIN — CLOPIDOGREL BISULFATE 75 MILLIGRAM(S): 75 TABLET, FILM COATED ORAL at 12:28

## 2017-10-07 RX ADMIN — Medication 325 MILLIGRAM(S): at 12:27

## 2017-10-07 RX ADMIN — Medication 1 TABLET(S): at 15:03

## 2017-10-07 RX ADMIN — Medication 1 TABLET(S): at 17:06

## 2017-10-07 RX ADMIN — Medication 1 TABLET(S): at 08:21

## 2017-10-07 RX ADMIN — Medication 1 TABLET(S): at 06:43

## 2017-10-07 RX ADMIN — OXYCODONE AND ACETAMINOPHEN 1 TABLET(S): 5; 325 TABLET ORAL at 22:40

## 2017-10-07 NOTE — PROGRESS NOTE ADULT - SUBJECTIVE AND OBJECTIVE BOX
Subjective: increasing urination subjectively. No n/v. Denies SOB      MEDICATIONS  (STANDING):  aspirin 325 milliGRAM(s) Oral daily  atorvastatin 40 milliGRAM(s) Oral at bedtime  calcium carbonate 500 mG (Tums) Chewable 1 Tablet(s) Chew three times a day  chlorhexidine 4% Liquid 1 Application(s) Topical daily  clopidogrel Tablet 75 milliGRAM(s) Oral daily  dextrose 5%. 1000 milliLiter(s) (50 mL/Hr) IV Continuous <Continuous>  dextrose 50% Injectable 12.5 Gram(s) IV Push once  dextrose 50% Injectable 25 Gram(s) IV Push once  dextrose 50% Injectable 25 Gram(s) IV Push once  docusate sodium 100 milliGRAM(s) Oral three times a day  insulin glargine Injectable (LANTUS) 10 Unit(s) SubCutaneous at bedtime  insulin lispro (HumaLOG) corrective regimen sliding scale   SubCutaneous three times a day before meals  lactobacillus acidophilus 1 Tablet(s) Oral two times a day with meals  levoFLOXacin  Tablet 250 milliGRAM(s) Oral every 24 hours  pregabalin 100 milliGRAM(s) Oral two times a day  sodium chloride 0.9%. 1000 milliLiter(s) (80 mL/Hr) IV Continuous <Continuous>  sucralfate suspension 1 Gram(s) Oral four times a day    MEDICATIONS  (PRN):  aluminum hydroxide/magnesium hydroxide/simethicone Suspension 30 milliLiter(s) Oral every 6 hours PRN Dyspepsia  dextrose Gel 1 Dose(s) Oral once PRN Blood Glucose LESS THAN 70 milliGRAM(s)/deciliter  glucagon  Injectable 1 milliGRAM(s) IntraMuscular once PRN Glucose LESS THAN 70 milligrams/deciliter  oxyCODONE    5 mG/acetaminophen 325 mG 1 Tablet(s) Oral every 8 hours PRN Moderate Pain (4 - 6)          T(C): 36.2 (10-07-17 @ 06:19), Max: 36.8 (10-06-17 @ 23:34)  HR: 90 (10-07-17 @ 06:19) (78 - 90)  BP: 99/70 (10-07-17 @ 06:19) (99/70 - 130/71)  RR: 16 (10-07-17 @ 06:19) (16 - 18)  SpO2: 98% (10-07-17 @ 06:19) (96% - 98%)  Wt(kg): --        I&O's Detail    06 Oct 2017 07:01  -  07 Oct 2017 07:00  --------------------------------------------------------  IN:    Oral Fluid: 980 mL  Total IN: 980 mL    OUT:    Voided: 700 mL  Total OUT: 700 mL    Total NET: 280 mL               PHYSICAL EXAM:    GENERAL: comfortable  EYES: EOMI, PERRLA, conjunctiva and sclera clear  NECK: Supple, no inc in JVP  CHEST/LUNG: Clear  HEART: S1S2  ABDOMEN: Soft, Nontender, Nondistended; Bowel sounds present  EXTREMITIES:  R BKA. L min edema  NEURO: no asterixis      LABS:  CBC Full  -  ( 06 Oct 2017 08:24 )  WBC Count : 5.3 K/uL  Hemoglobin : 7.7 g/dL  Hematocrit : 23.6 %  Platelet Count - Automated : 112 K/uL  Mean Cell Volume : 93.6 fl  Mean Cell Hemoglobin : 30.4 pg  Mean Cell Hemoglobin Concentration : 32.4 gm/dL  Auto Neutrophil # : x  Auto Lymphocyte # : x  Auto Monocyte # : x  Auto Eosinophil # : x  Auto Basophil # : x  Auto Neutrophil % : 72.0 %  Auto Lymphocyte % : 21.0 %  Auto Monocyte % : 7.0 %  Auto Eosinophil % : x  Auto Basophil % : x    10-06    137  |  105  |  122<H>  ----------------------------<  116<H>  5.3   |  21<L>  |  6.13<H>    Ca    9.5      06 Oct 2017 08:24  Phos  2.2     10-06  Mg     2.1     10-06        Urinalysis Basic - ( 05 Oct 2017 09:21 )    Color: Yellow / Appearance: Clear / S.005 / pH: x  Gluc: x / Ketone: Negative  / Bili: Negative / Urobili: Negative mg/dL   Blood: x / Protein: 30 mg/dL / Nitrite: Negative   Leuk Esterase: Negative / RBC: 0-2 /HPF / WBC 0-2   Sq Epi: x / Non Sq Epi: Few / Bacteria: Few      Culture Results:   No growth (10-04 @ 09:59)        Impression:   * ASUNCION. Drug induced ATN vs AIN  * Diffuse vasculopath  * L foot wound MRSA, OM    Recommendations:  * No dialytic urgency. Cont to trend Bun/Cr with daily re-eval  * Low K diet. Kayexalate PRN K > 5.5  * CrCl < 10cc/min  * Consider transfusion of PRBCs

## 2017-10-07 NOTE — PROGRESS NOTE ADULT - SUBJECTIVE AND OBJECTIVE BOX
Patient is a 62y old  Male who presents with a chief complaint of     INTERVAL HPI/OVERNIGHT EVENTS: no acute events still complains of constipation no other complaints     MEDICATIONS  (STANDING):  aspirin 325 milliGRAM(s) Oral daily  atorvastatin 40 milliGRAM(s) Oral at bedtime  calcium carbonate 500 mG (Tums) Chewable 1 Tablet(s) Chew three times a day  chlorhexidine 4% Liquid 1 Application(s) Topical daily  clopidogrel Tablet 75 milliGRAM(s) Oral daily  dextrose 5%. 1000 milliLiter(s) (50 mL/Hr) IV Continuous <Continuous>  dextrose 50% Injectable 12.5 Gram(s) IV Push once  dextrose 50% Injectable 25 Gram(s) IV Push once  dextrose 50% Injectable 25 Gram(s) IV Push once  docusate sodium 100 milliGRAM(s) Oral three times a day  insulin glargine Injectable (LANTUS) 10 Unit(s) SubCutaneous at bedtime  insulin lispro (HumaLOG) corrective regimen sliding scale   SubCutaneous three times a day before meals  lactobacillus acidophilus 1 Tablet(s) Oral two times a day with meals  levoFLOXacin  Tablet 250 milliGRAM(s) Oral every 24 hours  pregabalin 100 milliGRAM(s) Oral two times a day  sodium chloride 0.9%. 1000 milliLiter(s) (80 mL/Hr) IV Continuous <Continuous>  sucralfate suspension 1 Gram(s) Oral four times a day    MEDICATIONS  (PRN):  aluminum hydroxide/magnesium hydroxide/simethicone Suspension 30 milliLiter(s) Oral every 6 hours PRN Dyspepsia  dextrose Gel 1 Dose(s) Oral once PRN Blood Glucose LESS THAN 70 milliGRAM(s)/deciliter  glucagon  Injectable 1 milliGRAM(s) IntraMuscular once PRN Glucose LESS THAN 70 milligrams/deciliter  oxyCODONE    5 mG/acetaminophen 325 mG 1 Tablet(s) Oral every 8 hours PRN Moderate Pain (4 - 6)      Allergies    No Known Allergies    Intolerances        REVIEW OF SYSTEMS:  CONSTITUTIONAL: No fever, weight loss, or fatigue  EYES: No eye pain, visual disturbances, or discharge  ENMT:  No difficulty hearing, tinnitus, vertigo; No sinus or throat pain  NECK: No pain or stiffness  BREASTS: No pain, masses, or nipple discharge  RESPIRATORY: No cough, wheezing, chills or hemoptysis; No shortness of breath  CARDIOVASCULAR: No chest pain, palpitations, dizziness, or leg swelling  GASTROINTESTINAL:  constipation  GENITOURINARY: No dysuria, frequency, hematuria, or incontinence  NEUROLOGICAL: No headaches, memory loss, loss of strength, numbness, or tremors  SKIN: No itching, burning, rashes, or lesions   LYMPH NODES: No enlarged glands  ENDOCRINE: No heat or cold intolerance; No hair loss  MUSCULOSKELETAL: No joint pain or swelling; No muscle, back, or extremity pain  PSYCHIATRIC: No depression, anxiety, mood swings, or difficulty sleeping  HEME/LYMPH: No easy bruising, or bleeding gums  ALLERGY AND IMMUNOLOGIC: No hives or eczema    Vital Signs Last 24 Hrs  T(C): 36.8 (07 Oct 2017 12:15), Max: 36.8 (06 Oct 2017 23:34)  T(F): 98.2 (07 Oct 2017 12:15), Max: 98.2 (06 Oct 2017 23:34)  HR: 89 (07 Oct 2017 12:15) (78 - 90)  BP: 122/54 (07 Oct 2017 12:15) (99/70 - 127/53)  BP(mean): --  RR: 16 (07 Oct 2017 12:15) (16 - 18)  SpO2: 97% (07 Oct 2017 12:15) (96% - 98%)    PHYSICAL EXAM:  GENERAL: NAD, well-groomed, well-developed  HEAD:  Atraumatic, Normocephalic  EYES: EOMI, PERRLA, conjunctiva and sclera clear  ENMT: No tonsillar erythema, exudates, or enlargement; Moist mucous membranes, Good dentition, No lesions  NECK: Supple, No JVD, Normal thyroid  NERVOUS SYSTEM:  Alert & Oriented X3, Good concentration; Motor Strength 5/5 B/L upper and lower extremities; DTRs 2+ intact and symmetric  CHEST/LUNG: Clear to percussion bilaterally; No rales, rhonchi, wheezing, or rubs  HEART: Regular rate and rhythm; No murmurs, rubs, or gallops  ABDOMEN: Soft, Nontender, Nondistended; Bowel sounds present morbid obesity   EXTREMITIES:  riigfht bka wound dressed   LYMPH: No lymphadenopathy noted  SKIN: No rashes or lesions    LABS:                        7.6    4.8   )-----------( 130      ( 07 Oct 2017 08:15 )             23.6     10-07    139  |  106  |  120<H>  ----------------------------<  135<H>  5.0   |  23  |  6.06<H>    Ca    8.7      07 Oct 2017 08:15  Phos  3.3     10-07  Mg     2.1     10-07    TPro  5.6<L>  /  Alb  2.2<L>  /  TBili  0.3  /  DBili  x   /  AST  9<L>  /  ALT  9<L>  /  AlkPhos  52  10-07        CAPILLARY BLOOD GLUCOSE  191 (07 Oct 2017 12:28)  165 (07 Oct 2017 08:24)  169 (06 Oct 2017 16:12)          RADIOLOGY & ADDITIONAL TESTS:    Imaging Personally Reviewed:  [X ] YES  [ ] NO    Consultant(s) Notes Reviewed:  [ X] YES  [ ] NO    Care Discussed with Consultants/Other Providers [ X] YES  [ ] NO

## 2017-10-08 LAB
ANION GAP SERPL CALC-SCNC: 15 MMOL/L — SIGNIFICANT CHANGE UP (ref 5–17)
BUN SERPL-MCNC: 110 MG/DL — HIGH (ref 7–23)
CALCIUM SERPL-MCNC: 8.7 MG/DL — SIGNIFICANT CHANGE UP (ref 8.5–10.1)
CHLORIDE SERPL-SCNC: 107 MMOL/L — SIGNIFICANT CHANGE UP (ref 96–108)
CO2 SERPL-SCNC: 21 MMOL/L — LOW (ref 22–31)
CREAT SERPL-MCNC: 5.64 MG/DL — HIGH (ref 0.5–1.3)
CULTURE RESULTS: SIGNIFICANT CHANGE UP
CULTURE RESULTS: SIGNIFICANT CHANGE UP
GLUCOSE SERPL-MCNC: 188 MG/DL — HIGH (ref 70–99)
HCT VFR BLD CALC: 24.3 % — LOW (ref 39–50)
HGB BLD-MCNC: 7.9 G/DL — LOW (ref 13–17)
MCHC RBC-ENTMCNC: 30.4 PG — SIGNIFICANT CHANGE UP (ref 27–34)
MCHC RBC-ENTMCNC: 32.5 GM/DL — SIGNIFICANT CHANGE UP (ref 32–36)
MCV RBC AUTO: 93.5 FL — SIGNIFICANT CHANGE UP (ref 80–100)
PLATELET # BLD AUTO: 133 K/UL — LOW (ref 150–400)
POTASSIUM SERPL-MCNC: 5.2 MMOL/L — SIGNIFICANT CHANGE UP (ref 3.5–5.3)
POTASSIUM SERPL-SCNC: 5.2 MMOL/L — SIGNIFICANT CHANGE UP (ref 3.5–5.3)
RBC # BLD: 2.6 M/UL — LOW (ref 4.2–5.8)
RBC # FLD: 13.8 % — SIGNIFICANT CHANGE UP (ref 11–15)
SODIUM SERPL-SCNC: 143 MMOL/L — SIGNIFICANT CHANGE UP (ref 135–145)
SPECIMEN SOURCE: SIGNIFICANT CHANGE UP
SPECIMEN SOURCE: SIGNIFICANT CHANGE UP
WBC # BLD: 6.2 K/UL — SIGNIFICANT CHANGE UP (ref 3.8–10.5)
WBC # FLD AUTO: 6.2 K/UL — SIGNIFICANT CHANGE UP (ref 3.8–10.5)

## 2017-10-08 PROCEDURE — 99233 SBSQ HOSP IP/OBS HIGH 50: CPT

## 2017-10-08 RX ADMIN — CHLORHEXIDINE GLUCONATE 1 APPLICATION(S): 213 SOLUTION TOPICAL at 14:23

## 2017-10-08 RX ADMIN — CLOPIDOGREL BISULFATE 75 MILLIGRAM(S): 75 TABLET, FILM COATED ORAL at 14:13

## 2017-10-08 RX ADMIN — Medication 1 TABLET(S): at 06:31

## 2017-10-08 RX ADMIN — Medication 1 TABLET(S): at 23:20

## 2017-10-08 RX ADMIN — Medication 2: at 14:09

## 2017-10-08 RX ADMIN — Medication 30 MILLILITER(S): at 01:42

## 2017-10-08 RX ADMIN — Medication 1 TABLET(S): at 16:48

## 2017-10-08 RX ADMIN — Medication 100 MILLIGRAM(S): at 06:31

## 2017-10-08 RX ADMIN — Medication 1 GRAM(S): at 23:20

## 2017-10-08 RX ADMIN — Medication 100 MILLIGRAM(S): at 14:13

## 2017-10-08 RX ADMIN — Medication 1 TABLET(S): at 07:59

## 2017-10-08 RX ADMIN — Medication 1 GRAM(S): at 14:17

## 2017-10-08 RX ADMIN — Medication 1 TABLET(S): at 15:12

## 2017-10-08 RX ADMIN — ATORVASTATIN CALCIUM 40 MILLIGRAM(S): 80 TABLET, FILM COATED ORAL at 23:20

## 2017-10-08 RX ADMIN — Medication 1: at 16:47

## 2017-10-08 RX ADMIN — Medication 100 MILLIGRAM(S): at 23:20

## 2017-10-08 RX ADMIN — Medication 325 MILLIGRAM(S): at 14:12

## 2017-10-08 RX ADMIN — Medication 2: at 07:57

## 2017-10-08 RX ADMIN — Medication 1 GRAM(S): at 17:23

## 2017-10-08 RX ADMIN — Medication 1 GRAM(S): at 06:31

## 2017-10-08 RX ADMIN — Medication 100 MILLIGRAM(S): at 17:23

## 2017-10-08 NOTE — PROGRESS NOTE ADULT - SUBJECTIVE AND OBJECTIVE BOX
Patient is a 62y old  Male who presents with a chief complaint of     INTERVAL HPI/OVERNIGHT EVENTS: no acute events had bowel movement     MEDICATIONS  (STANDING):  aspirin 325 milliGRAM(s) Oral daily  atorvastatin 40 milliGRAM(s) Oral at bedtime  calcium carbonate 500 mG (Tums) Chewable 1 Tablet(s) Chew three times a day  chlorhexidine 4% Liquid 1 Application(s) Topical daily  clopidogrel Tablet 75 milliGRAM(s) Oral daily  dextrose 5%. 1000 milliLiter(s) (50 mL/Hr) IV Continuous <Continuous>  dextrose 50% Injectable 12.5 Gram(s) IV Push once  dextrose 50% Injectable 25 Gram(s) IV Push once  dextrose 50% Injectable 25 Gram(s) IV Push once  docusate sodium 100 milliGRAM(s) Oral three times a day  insulin glargine Injectable (LANTUS) 10 Unit(s) SubCutaneous at bedtime  insulin lispro (HumaLOG) corrective regimen sliding scale   SubCutaneous three times a day before meals  lactobacillus acidophilus 1 Tablet(s) Oral two times a day with meals  levoFLOXacin  Tablet 250 milliGRAM(s) Oral every 24 hours  pregabalin 100 milliGRAM(s) Oral two times a day  sodium chloride 0.9%. 1000 milliLiter(s) (80 mL/Hr) IV Continuous <Continuous>  sucralfate suspension 1 Gram(s) Oral four times a day    MEDICATIONS  (PRN):  aluminum hydroxide/magnesium hydroxide/simethicone Suspension 30 milliLiter(s) Oral every 6 hours PRN Dyspepsia  dextrose Gel 1 Dose(s) Oral once PRN Blood Glucose LESS THAN 70 milliGRAM(s)/deciliter  glucagon  Injectable 1 milliGRAM(s) IntraMuscular once PRN Glucose LESS THAN 70 milligrams/deciliter  oxyCODONE    5 mG/acetaminophen 325 mG 1 Tablet(s) Oral every 8 hours PRN Moderate Pain (4 - 6)      Allergies    No Known Allergies    Intolerances        REVIEW OF SYSTEMS:  CONSTITUTIONAL: No fever, weight loss, or fatigue  EYES: No eye pain, visual disturbances, or discharge  ENMT:  No difficulty hearing, tinnitus, vertigo; No sinus or throat pain  NECK: No pain or stiffness  BREASTS: No pain, masses, or nipple discharge  RESPIRATORY: No cough, wheezing, chills or hemoptysis; No shortness of breath  CARDIOVASCULAR: No chest pain, palpitations, dizziness, or leg swelling  GASTROINTESTINAL:  constipation  GENITOURINARY: No dysuria, frequency, hematuria, or incontinence  NEUROLOGICAL: No headaches, memory loss, loss of strength, numbness, or tremors  SKIN: No itching, burning, rashes, or lesions   LYMPH NODES: No enlarged glands  ENDOCRINE: No heat or cold intolerance; No hair loss  MUSCULOSKELETAL: No joint pain or swelling; No muscle, back, or extremity pain  PSYCHIATRIC: No depression, anxiety, mood swings, or difficulty sleeping  HEME/LYMPH: No easy bruising, or bleeding gums  ALLERGY AND IMMUNOLOGIC: No hives or eczema    Vital Signs Last 24 Hrs  T(C): 36.3 (08 Oct 2017 12:17), Max: 37.1 (07 Oct 2017 18:46)  T(F): 97.4 (08 Oct 2017 12:17), Max: 98.8 (07 Oct 2017 18:46)  HR: 90 (08 Oct 2017 12:17) (90 - 118)  BP: 129/54 (08 Oct 2017 12:17) (121/545 - 129/54)  BP(mean): --  RR: 18 (08 Oct 2017 12:17) (18 - 18)  SpO2: 95% (08 Oct 2017 12:17) (95% - 98%)    PHYSICAL EXAM:  GENERAL: NAD, well-groomed, well-developed  HEAD:  Atraumatic, Normocephalic  EYES: EOMI, PERRLA, conjunctiva and sclera clear  ENMT: No tonsillar erythema, exudates, or enlargement; Moist mucous membranes, Good dentition, No lesions  NECK: Supple, No JVD, Normal thyroid  NERVOUS SYSTEM:  Alert & Oriented X3, Good concentration; Motor Strength 5/5 B/L upper and lower extremities; DTRs 2+ intact and symmetric  CHEST/LUNG: Clear to percussion bilaterally; No rales, rhonchi, wheezing, or rubs  HEART: Regular rate and rhythm; No murmurs, rubs, or gallops  ABDOMEN: Soft, Nontender, Nondistended; Bowel sounds present morbid obesity   EXTREMITIES:  right bka wound dressed  LYMPH: No lymphadenopathy noted  SKIN: No rashes or lesions    LABS:                        7.6    4.8   )-----------( 130      ( 07 Oct 2017 08:15 )             23.6     10-07    139  |  106  |  120<H>  ----------------------------<  135<H>  5.0   |  23  |  6.06<H>    Ca    8.7      07 Oct 2017 08:15  Phos  3.3     10-07  Mg     2.1     10-07    TPro  5.6<L>  /  Alb  2.2<L>  /  TBili  0.3  /  DBili  x   /  AST  9<L>  /  ALT  9<L>  /  AlkPhos  52  10-07        CAPILLARY BLOOD GLUCOSE  191 (07 Oct 2017 12:28)  165 (07 Oct 2017 08:24)  169 (06 Oct 2017 16:12)          RADIOLOGY & ADDITIONAL TESTS:    Imaging Personally Reviewed:  [X ] YES  [ ] NO    Consultant(s) Notes Reviewed:  [ X] YES  [ ] NO    Care Discussed with Consultants/Other Providers [ X] YES  [ ] NO

## 2017-10-08 NOTE — PROGRESS NOTE ADULT - SUBJECTIVE AND OBJECTIVE BOX
Subjective: c/o rectal, lower abd pressure. No BM for few days      MEDICATIONS  (STANDING):  aspirin 325 milliGRAM(s) Oral daily  atorvastatin 40 milliGRAM(s) Oral at bedtime  calcium carbonate 500 mG (Tums) Chewable 1 Tablet(s) Chew three times a day  chlorhexidine 4% Liquid 1 Application(s) Topical daily  clopidogrel Tablet 75 milliGRAM(s) Oral daily  dextrose 5%. 1000 milliLiter(s) (50 mL/Hr) IV Continuous <Continuous>  dextrose 50% Injectable 12.5 Gram(s) IV Push once  dextrose 50% Injectable 25 Gram(s) IV Push once  dextrose 50% Injectable 25 Gram(s) IV Push once  docusate sodium 100 milliGRAM(s) Oral three times a day  insulin glargine Injectable (LANTUS) 10 Unit(s) SubCutaneous at bedtime  insulin lispro (HumaLOG) corrective regimen sliding scale   SubCutaneous three times a day before meals  lactobacillus acidophilus 1 Tablet(s) Oral two times a day with meals  levoFLOXacin  Tablet 250 milliGRAM(s) Oral every 24 hours  pregabalin 100 milliGRAM(s) Oral two times a day  sodium chloride 0.9%. 1000 milliLiter(s) (80 mL/Hr) IV Continuous <Continuous>  sucralfate suspension 1 Gram(s) Oral four times a day    MEDICATIONS  (PRN):  aluminum hydroxide/magnesium hydroxide/simethicone Suspension 30 milliLiter(s) Oral every 6 hours PRN Dyspepsia  dextrose Gel 1 Dose(s) Oral once PRN Blood Glucose LESS THAN 70 milliGRAM(s)/deciliter  glucagon  Injectable 1 milliGRAM(s) IntraMuscular once PRN Glucose LESS THAN 70 milligrams/deciliter  oxyCODONE    5 mG/acetaminophen 325 mG 1 Tablet(s) Oral every 8 hours PRN Moderate Pain (4 - 6)  polyethylene glycol 3350 17 Gram(s) Oral two times a day PRN Constipation          T(C): 37.1 (10-08-17 @ 04:29), Max: 37.1 (10-07-17 @ 18:46)  HR: 118 (10-08-17 @ 04:29) (89 - 118)  BP: 121/545 (10-08-17 @ 04:29) (121/545 - 125/54)  RR: 18 (10-08-17 @ 04:29) (16 - 18)  SpO2: 95% (10-08-17 @ 04:29) (95% - 98%)  Wt(kg): --        I&O's Detail    07 Oct 2017 07:01  -  08 Oct 2017 07:00  --------------------------------------------------------  IN:    Oral Fluid: 500 mL  Total IN: 500 mL    OUT:    Voided: 300 mL  Total OUT: 300 mL    Total NET: 200 mL               PHYSICAL EXAM:    GENERAL: anxious  EYES: EOMI, PERRLA, conjunctiva and sclera clear  NECK: Supple, no inc in JVP  CHEST/LUNG: Clear  HEART: S1S2  ABDOMEN: Soft, Nontender, Nondistended; Bowel sounds present  EXTREMITIES:  R BKA. L min edema  NEURO: no asterixis        LABS:  CBC Full  -  ( 08 Oct 2017 07:05 )  WBC Count : 6.2 K/uL  Hemoglobin : 7.9 g/dL  Hematocrit : 24.3 %  Platelet Count - Automated : 133 K/uL  Mean Cell Volume : 93.5 fl  Mean Cell Hemoglobin : 30.4 pg  Mean Cell Hemoglobin Concentration : 32.5 gm/dL  Auto Neutrophil # : x  Auto Lymphocyte # : x  Auto Monocyte # : x  Auto Eosinophil # : x  Auto Basophil # : x  Auto Neutrophil % : x  Auto Lymphocyte % : x  Auto Monocyte % : x  Auto Eosinophil % : x  Auto Basophil % : x    10-08    143  |  107  |  110<H>  ----------------------------<  188<H>  5.2   |  21<L>  |  5.64<H>    Ca    8.7      08 Oct 2017 07:05  Phos  3.3     10-07  Mg     2.1     10-07    TPro  5.6<L>  /  Alb  2.2<L>  /  TBili  0.3  /  DBili  x   /  AST  9<L>  /  ALT  9<L>  /  AlkPhos  52  10-07          Impression:   * ASUNCION. Drug induced ATN vs AIN. Somewhat better over 24h. ? early recovery  * Diffuse vasculopath  * L foot wound MRSA, OM    Recommendations:  * No dialytic urgency. Cont to trend Bun/Cr with daily re-eval  * Low K diet. Kayexalate PRN K > 5.5  * CrCl < 10cc/min  * Consider transfusion of PRBCs

## 2017-10-08 NOTE — PROGRESS NOTE ADULT - SUBJECTIVE AND OBJECTIVE BOX
Pt stable - no active bleeding  acute renal failure      MEDICATIONS  (STANDING):  aspirin 325 milliGRAM(s) Oral daily  atorvastatin 40 milliGRAM(s) Oral at bedtime  calcium carbonate 500 mG (Tums) Chewable 1 Tablet(s) Chew three times a day  chlorhexidine 4% Liquid 1 Application(s) Topical daily  clopidogrel Tablet 75 milliGRAM(s) Oral daily  dextrose 5%. 1000 milliLiter(s) (50 mL/Hr) IV Continuous <Continuous>  dextrose 50% Injectable 12.5 Gram(s) IV Push once  dextrose 50% Injectable 25 Gram(s) IV Push once  dextrose 50% Injectable 25 Gram(s) IV Push once  docusate sodium 100 milliGRAM(s) Oral three times a day  insulin glargine Injectable (LANTUS) 10 Unit(s) SubCutaneous at bedtime  insulin lispro (HumaLOG) corrective regimen sliding scale   SubCutaneous three times a day before meals  lactobacillus acidophilus 1 Tablet(s) Oral two times a day with meals  levoFLOXacin  Tablet 250 milliGRAM(s) Oral every 24 hours  pregabalin 100 milliGRAM(s) Oral two times a day  sodium chloride 0.9%. 1000 milliLiter(s) (80 mL/Hr) IV Continuous <Continuous>  sucralfate suspension 1 Gram(s) Oral four times a day    MEDICATIONS  (PRN):  aluminum hydroxide/magnesium hydroxide/simethicone Suspension 30 milliLiter(s) Oral every 6 hours PRN Dyspepsia  dextrose Gel 1 Dose(s) Oral once PRN Blood Glucose LESS THAN 70 milliGRAM(s)/deciliter  glucagon  Injectable 1 milliGRAM(s) IntraMuscular once PRN Glucose LESS THAN 70 milligrams/deciliter  oxyCODONE    5 mG/acetaminophen 325 mG 1 Tablet(s) Oral every 8 hours PRN Moderate Pain (4 - 6)  polyethylene glycol 3350 17 Gram(s) Oral two times a day PRN Constipation      Allergies    No Known Allergies    Intolerances        Vital Signs Last 24 Hrs  T(C): 37.1 (08 Oct 2017 16:29), Max: 37.1 (08 Oct 2017 04:29)  T(F): 98.8 (08 Oct 2017 16:29), Max: 98.8 (08 Oct 2017 04:29)  HR: 81 (08 Oct 2017 16:29) (81 - 118)  BP: 130/57 (08 Oct 2017 16:29) (121/545 - 130/57)  BP(mean): --  RR: 18 (08 Oct 2017 16:29) (18 - 18)  SpO2: 97% (08 Oct 2017 16:29) (95% - 97%)    PHYSICAL EXAM:  General: NAD.  CVS: S1, S2  Chest: air entry bilaterally present  Abd: BS present, soft, non-tender      LABS:                        7.9    6.2   )-----------( 133      ( 08 Oct 2017 07:05 )             24.3     10-08    143  |  107  |  110<H>  ----------------------------<  188<H>  5.2   |  21<L>  |  5.64<H>    Ca    8.7      08 Oct 2017 07:05  Phos  3.3     10-07  Mg     2.1     10-07    TPro  5.6<L>  /  Alb  2.2<L>  /  TBili  0.3  /  DBili  x   /  AST  9<L>  /  ALT  9<L>  /  AlkPhos  52  10-07      H/H stable  no bleeding  labs in AM

## 2017-10-09 LAB
ANION GAP SERPL CALC-SCNC: 13 MMOL/L — SIGNIFICANT CHANGE UP (ref 5–17)
BLD GP AB SCN SERPL QL: SIGNIFICANT CHANGE UP
BUN SERPL-MCNC: 98 MG/DL — HIGH (ref 7–23)
CALCIUM SERPL-MCNC: 8.4 MG/DL — LOW (ref 8.5–10.1)
CHLORIDE SERPL-SCNC: 110 MMOL/L — HIGH (ref 96–108)
CO2 SERPL-SCNC: 18 MMOL/L — LOW (ref 22–31)
CREAT SERPL-MCNC: 5.36 MG/DL — HIGH (ref 0.5–1.3)
GLUCOSE SERPL-MCNC: 101 MG/DL — HIGH (ref 70–99)
HCT VFR BLD CALC: 21.9 % — LOW (ref 39–50)
HGB BLD-MCNC: 7.2 G/DL — LOW (ref 13–17)
MAGNESIUM SERPL-MCNC: 2.1 MG/DL — SIGNIFICANT CHANGE UP (ref 1.6–2.6)
MCHC RBC-ENTMCNC: 30.6 PG — SIGNIFICANT CHANGE UP (ref 27–34)
MCHC RBC-ENTMCNC: 33 GM/DL — SIGNIFICANT CHANGE UP (ref 32–36)
MCV RBC AUTO: 92.9 FL — SIGNIFICANT CHANGE UP (ref 80–100)
PHOSPHATE SERPL-MCNC: 3.2 MG/DL — SIGNIFICANT CHANGE UP (ref 2.5–4.5)
PLATELET # BLD AUTO: 139 K/UL — LOW (ref 150–400)
POTASSIUM SERPL-MCNC: 4.7 MMOL/L — SIGNIFICANT CHANGE UP (ref 3.5–5.3)
POTASSIUM SERPL-SCNC: 4.7 MMOL/L — SIGNIFICANT CHANGE UP (ref 3.5–5.3)
RBC # BLD: 2.36 M/UL — LOW (ref 4.2–5.8)
RBC # FLD: 14 % — SIGNIFICANT CHANGE UP (ref 11–15)
SODIUM SERPL-SCNC: 141 MMOL/L — SIGNIFICANT CHANGE UP (ref 135–145)
WBC # BLD: 5.5 K/UL — SIGNIFICANT CHANGE UP (ref 3.8–10.5)
WBC # FLD AUTO: 5.5 K/UL — SIGNIFICANT CHANGE UP (ref 3.8–10.5)

## 2017-10-09 PROCEDURE — 99233 SBSQ HOSP IP/OBS HIGH 50: CPT

## 2017-10-09 RX ORDER — POLYETHYLENE GLYCOL 3350 17 G/17G
17 POWDER, FOR SOLUTION ORAL DAILY
Qty: 0 | Refills: 0 | Status: DISCONTINUED | OUTPATIENT
Start: 2017-10-09 | End: 2017-10-10

## 2017-10-09 RX ADMIN — Medication 100 MILLIGRAM(S): at 14:28

## 2017-10-09 RX ADMIN — Medication 1 GRAM(S): at 06:49

## 2017-10-09 RX ADMIN — CHLORHEXIDINE GLUCONATE 1 APPLICATION(S): 213 SOLUTION TOPICAL at 11:47

## 2017-10-09 RX ADMIN — CLOPIDOGREL BISULFATE 75 MILLIGRAM(S): 75 TABLET, FILM COATED ORAL at 11:47

## 2017-10-09 RX ADMIN — Medication 1: at 11:46

## 2017-10-09 RX ADMIN — Medication 1 TABLET(S): at 08:59

## 2017-10-09 RX ADMIN — POLYETHYLENE GLYCOL 3350 17 GRAM(S): 17 POWDER, FOR SOLUTION ORAL at 11:47

## 2017-10-09 RX ADMIN — Medication 100 MILLIGRAM(S): at 22:27

## 2017-10-09 RX ADMIN — Medication 1 GRAM(S): at 23:09

## 2017-10-09 RX ADMIN — Medication 1 TABLET(S): at 06:49

## 2017-10-09 RX ADMIN — Medication 1 GRAM(S): at 17:17

## 2017-10-09 RX ADMIN — Medication 1 TABLET(S): at 14:28

## 2017-10-09 RX ADMIN — Medication 100 MILLIGRAM(S): at 17:17

## 2017-10-09 RX ADMIN — Medication 1 TABLET(S): at 17:16

## 2017-10-09 RX ADMIN — Medication 100 MILLIGRAM(S): at 06:49

## 2017-10-09 RX ADMIN — ATORVASTATIN CALCIUM 40 MILLIGRAM(S): 80 TABLET, FILM COATED ORAL at 22:27

## 2017-10-09 RX ADMIN — INSULIN GLARGINE 10 UNIT(S): 100 INJECTION, SOLUTION SUBCUTANEOUS at 22:27

## 2017-10-09 RX ADMIN — Medication 1 GRAM(S): at 14:29

## 2017-10-09 RX ADMIN — Medication 325 MILLIGRAM(S): at 11:47

## 2017-10-09 RX ADMIN — SODIUM CHLORIDE 80 MILLILITER(S): 9 INJECTION INTRAMUSCULAR; INTRAVENOUS; SUBCUTANEOUS at 17:19

## 2017-10-09 NOTE — PROGRESS NOTE ADULT - SUBJECTIVE AND OBJECTIVE BOX
Patient seen in follow up for ASUNCION, feels well, urinating without difficulties.    MEDICATIONS  (STANDING):  aspirin 325 milliGRAM(s) Oral daily  atorvastatin 40 milliGRAM(s) Oral at bedtime  calcium carbonate 500 mG (Tums) Chewable 1 Tablet(s) Chew three times a day  chlorhexidine 4% Liquid 1 Application(s) Topical daily  clopidogrel Tablet 75 milliGRAM(s) Oral daily  dextrose 5%. 1000 milliLiter(s) (50 mL/Hr) IV Continuous <Continuous>  dextrose 50% Injectable 12.5 Gram(s) IV Push once  dextrose 50% Injectable 25 Gram(s) IV Push once  dextrose 50% Injectable 25 Gram(s) IV Push once  docusate sodium 100 milliGRAM(s) Oral three times a day  insulin glargine Injectable (LANTUS) 10 Unit(s) SubCutaneous at bedtime  insulin lispro (HumaLOG) corrective regimen sliding scale   SubCutaneous three times a day before meals  lactobacillus acidophilus 1 Tablet(s) Oral two times a day with meals  polyethylene glycol 3350 17 Gram(s) Oral daily  pregabalin 100 milliGRAM(s) Oral two times a day  sodium chloride 0.9%. 1000 milliLiter(s) (80 mL/Hr) IV Continuous <Continuous>  sucralfate suspension 1 Gram(s) Oral four times a day    MEDICATIONS  (PRN):  dextrose Gel 1 Dose(s) Oral once PRN Blood Glucose LESS THAN 70 milliGRAM(s)/deciliter  glucagon  Injectable 1 milliGRAM(s) IntraMuscular once PRN Glucose LESS THAN 70 milligrams/deciliter  oxyCODONE    5 mG/acetaminophen 325 mG 1 Tablet(s) Oral every 8 hours PRN Moderate Pain (4 - 6)    PHYSICAL EXAM:      T(C): 36.7 (10-09-17 @ 11:43), Max: 37.1 (10-08-17 @ 16:29)  HR: 67 (10-09-17 @ 11:43) (67 - 82)  BP: 132/61 (10-09-17 @ 11:43) (124/55 - 143/67)  RR: 18 (10-09-17 @ 11:43) (16 - 18)  SpO2: 98% (10-09-17 @ 11:43) (94% - 98%)  Wt(kg): --  Respiratory: clear anteriorly, decreased BS at bases  Cardiovascular: S1 S2  Gastrointestinal: soft NT ND +BS  Extremities:  tr edema                                    7.2    5.5   )-----------( 139      ( 09 Oct 2017 07:08 )             21.9     10-09    141  |  110<H>  |  98<H>  ----------------------------<  101<H>  4.7   |  18<L>  |  5.36<H>    Ca    8.4<L>      09 Oct 2017 07:08  Phos  3.2     10-09  Mg     2.1     10-09            Assessment and Plan:    ASUNCION, AIN, ATN; stable with improving trend.  Will follow.

## 2017-10-09 NOTE — PROGRESS NOTE ADULT - SUBJECTIVE AND OBJECTIVE BOX
Pt with acute renal insufficiency; no definite bleeding  on carafate    MEDICATIONS  (STANDING):  aspirin 325 milliGRAM(s) Oral daily  atorvastatin 40 milliGRAM(s) Oral at bedtime  chlorhexidine 4% Liquid 1 Application(s) Topical daily  clopidogrel Tablet 75 milliGRAM(s) Oral daily  dextrose 5%. 1000 milliLiter(s) (50 mL/Hr) IV Continuous <Continuous>  dextrose 50% Injectable 12.5 Gram(s) IV Push once  dextrose 50% Injectable 25 Gram(s) IV Push once  dextrose 50% Injectable 25 Gram(s) IV Push once  docusate sodium 100 milliGRAM(s) Oral three times a day  insulin glargine Injectable (LANTUS) 10 Unit(s) SubCutaneous at bedtime  insulin lispro (HumaLOG) corrective regimen sliding scale   SubCutaneous three times a day before meals  lactobacillus acidophilus 1 Tablet(s) Oral two times a day with meals  polyethylene glycol 3350 17 Gram(s) Oral daily  pregabalin 100 milliGRAM(s) Oral two times a day  sodium chloride 0.9%. 1000 milliLiter(s) (80 mL/Hr) IV Continuous <Continuous>  sucralfate suspension 1 Gram(s) Oral four times a day    MEDICATIONS  (PRN):  dextrose Gel 1 Dose(s) Oral once PRN Blood Glucose LESS THAN 70 milliGRAM(s)/deciliter  glucagon  Injectable 1 milliGRAM(s) IntraMuscular once PRN Glucose LESS THAN 70 milligrams/deciliter  oxyCODONE    5 mG/acetaminophen 325 mG 1 Tablet(s) Oral every 8 hours PRN Moderate Pain (4 - 6)      Allergies    No Known Allergies    Intolerances        Vital Signs Last 24 Hrs  T(C): 36.7 (09 Oct 2017 11:43), Max: 36.9 (08 Oct 2017 23:22)  T(F): 98 (09 Oct 2017 11:43), Max: 98.4 (08 Oct 2017 23:22)  HR: 67 (09 Oct 2017 11:43) (67 - 82)  BP: 132/61 (09 Oct 2017 11:43) (124/55 - 143/67)  BP(mean): --  RR: 18 (09 Oct 2017 11:43) (16 - 18)  SpO2: 98% (09 Oct 2017 11:43) (94% - 98%)    PHYSICAL EXAM:  General: NAD.  CVS: S1, S2  Chest: air entry bilaterally present  Abd: BS present, soft, non-tender      LABS:                        7.2    5.5   )-----------( 139      ( 09 Oct 2017 07:08 )             21.9     10-09    141  |  110<H>  |  98<H>  ----------------------------<  101<H>  4.7   |  18<L>  |  5.36<H>    Ca    8.4<L>      09 Oct 2017 07:08  Phos  3.2     10-09  Mg     2.1     10-09      Continue present treatment  repeat CBC in AM  consider egd Pt with acute renal insufficiency; no definite bleeding  on carafate    MEDICATIONS  (STANDING):  aspirin 325 milliGRAM(s) Oral daily  atorvastatin 40 milliGRAM(s) Oral at bedtime  chlorhexidine 4% Liquid 1 Application(s) Topical daily  clopidogrel Tablet 75 milliGRAM(s) Oral daily  dextrose 5%. 1000 milliLiter(s) (50 mL/Hr) IV Continuous <Continuous>  dextrose 50% Injectable 12.5 Gram(s) IV Push once  dextrose 50% Injectable 25 Gram(s) IV Push once  dextrose 50% Injectable 25 Gram(s) IV Push once  docusate sodium 100 milliGRAM(s) Oral three times a day  insulin glargine Injectable (LANTUS) 10 Unit(s) SubCutaneous at bedtime  insulin lispro (HumaLOG) corrective regimen sliding scale   SubCutaneous three times a day before meals  lactobacillus acidophilus 1 Tablet(s) Oral two times a day with meals  polyethylene glycol 3350 17 Gram(s) Oral daily  pregabalin 100 milliGRAM(s) Oral two times a day  sodium chloride 0.9%. 1000 milliLiter(s) (80 mL/Hr) IV Continuous <Continuous>  sucralfate suspension 1 Gram(s) Oral four times a day    MEDICATIONS  (PRN):  dextrose Gel 1 Dose(s) Oral once PRN Blood Glucose LESS THAN 70 milliGRAM(s)/deciliter  glucagon  Injectable 1 milliGRAM(s) IntraMuscular once PRN Glucose LESS THAN 70 milligrams/deciliter  oxyCODONE    5 mG/acetaminophen 325 mG 1 Tablet(s) Oral every 8 hours PRN Moderate Pain (4 - 6)      Allergies    No Known Allergies    Intolerances        Vital Signs Last 24 Hrs  T(C): 36.7 (09 Oct 2017 11:43), Max: 36.9 (08 Oct 2017 23:22)  T(F): 98 (09 Oct 2017 11:43), Max: 98.4 (08 Oct 2017 23:22)  HR: 67 (09 Oct 2017 11:43) (67 - 82)  BP: 132/61 (09 Oct 2017 11:43) (124/55 - 143/67)  BP(mean): --  RR: 18 (09 Oct 2017 11:43) (16 - 18)  SpO2: 98% (09 Oct 2017 11:43) (94% - 98%)    PHYSICAL EXAM:  General: NAD.  CVS: S1, S2  Chest: air entry bilaterally present  Abd: BS present, soft, non-tender      LABS:                        7.2    5.5   )-----------( 139      ( 09 Oct 2017 07:08 )             21.9     10-09    141  |  110<H>  |  98<H>  ----------------------------<  101<H>  4.7   |  18<L>  |  5.36<H>    Ca    8.4<L>      09 Oct 2017 07:08  Phos  3.2     10-09  Mg     2.1     10-09      Continue present treatment  repeat CBC in AM  possible egd in AM  transfuse 1 u PRBC's

## 2017-10-10 ENCOUNTER — RESULT REVIEW (OUTPATIENT)
Age: 62
End: 2017-10-10

## 2017-10-10 LAB
ANION GAP SERPL CALC-SCNC: 11 MMOL/L — SIGNIFICANT CHANGE UP (ref 5–17)
BUN SERPL-MCNC: 93 MG/DL — HIGH (ref 7–23)
CALCIUM SERPL-MCNC: 8.3 MG/DL — LOW (ref 8.5–10.1)
CHLORIDE SERPL-SCNC: 111 MMOL/L — HIGH (ref 96–108)
CO2 SERPL-SCNC: 19 MMOL/L — LOW (ref 22–31)
CREAT SERPL-MCNC: 5.2 MG/DL — HIGH (ref 0.5–1.3)
EOSINOPHIL NFR URNS MANUAL: NEGATIVE — SIGNIFICANT CHANGE UP
GLUCOSE SERPL-MCNC: 122 MG/DL — HIGH (ref 70–99)
HCT VFR BLD CALC: 25.8 % — LOW (ref 39–50)
HGB BLD-MCNC: 8.4 G/DL — LOW (ref 13–17)
MCHC RBC-ENTMCNC: 30.4 PG — SIGNIFICANT CHANGE UP (ref 27–34)
MCHC RBC-ENTMCNC: 32.7 GM/DL — SIGNIFICANT CHANGE UP (ref 32–36)
MCV RBC AUTO: 92.9 FL — SIGNIFICANT CHANGE UP (ref 80–100)
PLATELET # BLD AUTO: 151 K/UL — SIGNIFICANT CHANGE UP (ref 150–400)
POTASSIUM SERPL-MCNC: 5 MMOL/L — SIGNIFICANT CHANGE UP (ref 3.5–5.3)
POTASSIUM SERPL-SCNC: 5 MMOL/L — SIGNIFICANT CHANGE UP (ref 3.5–5.3)
RBC # BLD: 2.78 M/UL — LOW (ref 4.2–5.8)
RBC # FLD: 14.3 % — SIGNIFICANT CHANGE UP (ref 11–15)
SODIUM SERPL-SCNC: 141 MMOL/L — SIGNIFICANT CHANGE UP (ref 135–145)
WBC # BLD: 5.3 K/UL — SIGNIFICANT CHANGE UP (ref 3.8–10.5)
WBC # FLD AUTO: 5.3 K/UL — SIGNIFICANT CHANGE UP (ref 3.8–10.5)

## 2017-10-10 PROCEDURE — 88305 TISSUE EXAM BY PATHOLOGIST: CPT | Mod: 26

## 2017-10-10 PROCEDURE — 88312 SPECIAL STAINS GROUP 1: CPT | Mod: 26

## 2017-10-10 PROCEDURE — 99233 SBSQ HOSP IP/OBS HIGH 50: CPT

## 2017-10-10 RX ORDER — ATORVASTATIN CALCIUM 80 MG/1
40 TABLET, FILM COATED ORAL AT BEDTIME
Qty: 0 | Refills: 0 | Status: DISCONTINUED | OUTPATIENT
Start: 2017-10-10 | End: 2017-10-13

## 2017-10-10 RX ORDER — PANTOPRAZOLE SODIUM 20 MG/1
40 TABLET, DELAYED RELEASE ORAL
Qty: 0 | Refills: 0 | Status: DISCONTINUED | OUTPATIENT
Start: 2017-10-10 | End: 2017-10-13

## 2017-10-10 RX ORDER — SODIUM CHLORIDE 9 MG/ML
1000 INJECTION, SOLUTION INTRAVENOUS
Qty: 0 | Refills: 0 | Status: DISCONTINUED | OUTPATIENT
Start: 2017-10-10 | End: 2017-10-13

## 2017-10-10 RX ORDER — CLOPIDOGREL BISULFATE 75 MG/1
75 TABLET, FILM COATED ORAL DAILY
Qty: 0 | Refills: 0 | Status: DISCONTINUED | OUTPATIENT
Start: 2017-10-10 | End: 2017-10-13

## 2017-10-10 RX ORDER — ASPIRIN/CALCIUM CARB/MAGNESIUM 324 MG
325 TABLET ORAL DAILY
Qty: 0 | Refills: 0 | Status: DISCONTINUED | OUTPATIENT
Start: 2017-10-10 | End: 2017-10-13

## 2017-10-10 RX ORDER — SODIUM CHLORIDE 9 MG/ML
1000 INJECTION INTRAMUSCULAR; INTRAVENOUS; SUBCUTANEOUS
Qty: 0 | Refills: 0 | Status: DISCONTINUED | OUTPATIENT
Start: 2017-10-10 | End: 2017-10-13

## 2017-10-10 RX ORDER — DEXTROSE 50 % IN WATER 50 %
12.5 SYRINGE (ML) INTRAVENOUS ONCE
Qty: 0 | Refills: 0 | Status: DISCONTINUED | OUTPATIENT
Start: 2017-10-10 | End: 2017-10-13

## 2017-10-10 RX ORDER — CHLORHEXIDINE GLUCONATE 213 G/1000ML
1 SOLUTION TOPICAL DAILY
Qty: 0 | Refills: 0 | Status: DISCONTINUED | OUTPATIENT
Start: 2017-10-10 | End: 2017-10-13

## 2017-10-10 RX ORDER — POLYETHYLENE GLYCOL 3350 17 G/17G
17 POWDER, FOR SOLUTION ORAL DAILY
Qty: 0 | Refills: 0 | Status: DISCONTINUED | OUTPATIENT
Start: 2017-10-10 | End: 2017-10-11

## 2017-10-10 RX ORDER — DOCUSATE SODIUM 100 MG
100 CAPSULE ORAL THREE TIMES A DAY
Qty: 0 | Refills: 0 | Status: DISCONTINUED | OUTPATIENT
Start: 2017-10-10 | End: 2017-10-13

## 2017-10-10 RX ORDER — DEXTROSE 50 % IN WATER 50 %
1 SYRINGE (ML) INTRAVENOUS ONCE
Qty: 0 | Refills: 0 | Status: DISCONTINUED | OUTPATIENT
Start: 2017-10-10 | End: 2017-10-13

## 2017-10-10 RX ORDER — SUCRALFATE 1 G
1 TABLET ORAL
Qty: 0 | Refills: 0 | Status: DISCONTINUED | OUTPATIENT
Start: 2017-10-10 | End: 2017-10-13

## 2017-10-10 RX ORDER — SODIUM CHLORIDE 9 MG/ML
1000 INJECTION INTRAMUSCULAR; INTRAVENOUS; SUBCUTANEOUS
Qty: 0 | Refills: 0 | Status: DISCONTINUED | OUTPATIENT
Start: 2017-10-10 | End: 2017-10-10

## 2017-10-10 RX ADMIN — OXYCODONE AND ACETAMINOPHEN 1 TABLET(S): 5; 325 TABLET ORAL at 03:15

## 2017-10-10 RX ADMIN — POLYETHYLENE GLYCOL 3350 17 GRAM(S): 17 POWDER, FOR SOLUTION ORAL at 12:03

## 2017-10-10 RX ADMIN — OXYCODONE AND ACETAMINOPHEN 1 TABLET(S): 5; 325 TABLET ORAL at 02:21

## 2017-10-10 RX ADMIN — PANTOPRAZOLE SODIUM 40 MILLIGRAM(S): 20 TABLET, DELAYED RELEASE ORAL at 12:10

## 2017-10-10 RX ADMIN — SODIUM CHLORIDE 50 MILLILITER(S): 9 INJECTION INTRAMUSCULAR; INTRAVENOUS; SUBCUTANEOUS at 19:16

## 2017-10-10 RX ADMIN — Medication 100 MILLIGRAM(S): at 13:27

## 2017-10-10 RX ADMIN — Medication 100 MILLIGRAM(S): at 21:53

## 2017-10-10 RX ADMIN — Medication 325 MILLIGRAM(S): at 12:02

## 2017-10-10 RX ADMIN — SODIUM CHLORIDE 50 MILLILITER(S): 9 INJECTION INTRAMUSCULAR; INTRAVENOUS; SUBCUTANEOUS at 13:27

## 2017-10-10 RX ADMIN — Medication 1 GRAM(S): at 17:24

## 2017-10-10 RX ADMIN — Medication 1 GRAM(S): at 12:11

## 2017-10-10 RX ADMIN — CLOPIDOGREL BISULFATE 75 MILLIGRAM(S): 75 TABLET, FILM COATED ORAL at 12:02

## 2017-10-10 RX ADMIN — CHLORHEXIDINE GLUCONATE 1 APPLICATION(S): 213 SOLUTION TOPICAL at 13:27

## 2017-10-10 RX ADMIN — SODIUM CHLORIDE 80 MILLILITER(S): 9 INJECTION INTRAMUSCULAR; INTRAVENOUS; SUBCUTANEOUS at 02:21

## 2017-10-10 RX ADMIN — ATORVASTATIN CALCIUM 40 MILLIGRAM(S): 80 TABLET, FILM COATED ORAL at 21:53

## 2017-10-10 NOTE — PROGRESS NOTE ADULT - SUBJECTIVE AND OBJECTIVE BOX
Patient is a 62y old  Male who presents with ASUNCION on CKD      SUBJECTIVE / OVERNIGHT EVENTS: no new events, s/p EGD this AM; results discussed with pt; s/p 1U PRBC last PM      MEDICATIONS  (STANDING):  pantoprazole    Tablet 40 milliGRAM(s) Oral before breakfast  sucralfate 1 Gram(s) Oral four times a day    MEDICATIONS  (PRN):      Vital Signs Last 24 Hrs  T(F): 97.3 (10 Oct 2017 10:38), Max: 98.2 (09 Oct 2017 23:15)  HR: 82 (10 Oct 2017 10:38) (67 - 96)  BP: 160/78 (10 Oct 2017 10:38) (124/58 - 160/78)  RR: 13 (10 Oct 2017 10:38) (13 - 18)  SpO2: 95% (10 Oct 2017 10:38) (95% - 98%)  CAPILLARY BLOOD GLUCOSE  149 (10 Oct 2017 10:08)  149 (10 Oct 2017 08:15)  165 (09 Oct 2017 22:25)  149 (09 Oct 2017 16:21)  164 (09 Oct 2017 11:40)          PHYSICAL EXAM  GENERAL: NAD, well-developed  HEAD:  Atraumatic, Normocephalic  EYES: opacified cornea b/l  CHEST/LUNG: non labored  ABDOMEN: obese  EXTREMITIES:  R BKA with dressing to lat stump  PSYCH: AAOx3  SKIN: No rashes or lesions    LABS:                        8.4    5.3   )-----------( 151      ( 10 Oct 2017 08:41 )             25.8     10-10    141  |  111<H>  |  93<H>  ----------------------------<  122<H>  5.0   |  19<L>  |  5.20<H>    Ca    8.3<L>      10 Oct 2017 08:41  Phos  3.2     10-09  Mg     2.1     10-09            D/w Dr Rawls this AM about EGD plan

## 2017-10-10 NOTE — PROGRESS NOTE ADULT - SUBJECTIVE AND OBJECTIVE BOX
Patient seen in follow up for ASUNCION; post endo this am; feels well.    MEDICATIONS  (STANDING):  aspirin 325 milliGRAM(s) Oral daily  atorvastatin 40 milliGRAM(s) Oral at bedtime  chlorhexidine 4% Liquid 1 Application(s) Topical daily  clopidogrel Tablet 75 milliGRAM(s) Oral daily  dextrose 5%. 1000 milliLiter(s) (50 mL/Hr) IV Continuous <Continuous>  dextrose 50% Injectable 12.5 Gram(s) IV Push once  docusate sodium 100 milliGRAM(s) Oral three times a day  pantoprazole    Tablet 40 milliGRAM(s) Oral before breakfast  polyethylene glycol 3350 17 Gram(s) Oral daily  sodium chloride 0.9%. 1000 milliLiter(s) (50 mL/Hr) IV Continuous <Continuous>  sucralfate 1 Gram(s) Oral four times a day    MEDICATIONS  (PRN):  dextrose Gel 1 Dose(s) Oral once PRN Blood Glucose LESS THAN 70 milliGRAM(s)/deciliter    PHYSICAL EXAM:      T(C): 36.3 (10-10-17 @ 10:38), Max: 36.8 (10-09-17 @ 23:15)  HR: 82 (10-10-17 @ 10:38) (75 - 96)  BP: 160/78 (10-10-17 @ 10:38) (124/58 - 160/78)  RR: 13 (10-10-17 @ 10:38) (13 - 18)  SpO2: 95% (10-10-17 @ 10:38) (95% - 98%)  Wt(kg): --  Respiratory: clear anteriorly, decreased BS at bases  Cardiovascular: S1 S2  Gastrointestinal: soft NT ND +BS  Extremities:   bka edema                                    8.4    5.3   )-----------( 151      ( 10 Oct 2017 08:41 )             25.8     10-10    141  |  111<H>  |  93<H>  ----------------------------<  122<H>  5.0   |  19<L>  |  5.20<H>    Ca    8.3<L>      10 Oct 2017 08:41  Phos  3.2     10-09  Mg     2.1     10-09            Assessment and Plan:    ASUNCION, AIN, ATN; slowly improved.  Supportive care. Avoid Nephrotoxins, NSAIDs; ACE and ARBs.

## 2017-10-10 NOTE — CHART NOTE - NSCHARTNOTEFT_GEN_A_CORE
Called by RN/Nurse manager to evaluate PICC line and possible remove cath. Catheter with a broken piece of iv tubing in hub. Catheter cleansed with alcohol and chlorhexidine wipe, sterile clamp used to remove piece of broken tubing, catheter with good blood return and flushes easily. Catheter with sterile dressing covering venotomy site. Pt tolerated well.

## 2017-10-10 NOTE — PROGRESS NOTE ADULT - SUBJECTIVE AND OBJECTIVE BOX
see Gastro report    Imp:  Gastritis; Small gastric ulcer at GE junction    Plan:  carafate; Protonix 40mg PO daily; resume diet; follow CBC

## 2017-10-11 ENCOUNTER — TRANSCRIPTION ENCOUNTER (OUTPATIENT)
Age: 62
End: 2017-10-11

## 2017-10-11 LAB
ANION GAP SERPL CALC-SCNC: 11 MMOL/L — SIGNIFICANT CHANGE UP (ref 5–17)
BUN SERPL-MCNC: 83 MG/DL — HIGH (ref 7–23)
CALCIUM SERPL-MCNC: 8.1 MG/DL — LOW (ref 8.5–10.1)
CHLORIDE SERPL-SCNC: 110 MMOL/L — HIGH (ref 96–108)
CO2 SERPL-SCNC: 20 MMOL/L — LOW (ref 22–31)
CREAT SERPL-MCNC: 4.9 MG/DL — HIGH (ref 0.5–1.3)
GLUCOSE SERPL-MCNC: 89 MG/DL — SIGNIFICANT CHANGE UP (ref 70–99)
HCT VFR BLD CALC: 24.9 % — LOW (ref 39–50)
HGB BLD-MCNC: 8.3 G/DL — LOW (ref 13–17)
KAPPA LC SER QL IFE: 12.8 MG/DL — HIGH (ref 0.33–1.94)
KAPPA/LAMBDA FREE LIGHT CHAIN RATIO, SERUM: 3.17 RATIO — HIGH (ref 0.26–1.65)
LAMBDA LC SER QL IFE: 4.04 MG/DL — HIGH (ref 0.57–2.63)
MCHC RBC-ENTMCNC: 30.4 PG — SIGNIFICANT CHANGE UP (ref 27–34)
MCHC RBC-ENTMCNC: 33.5 GM/DL — SIGNIFICANT CHANGE UP (ref 32–36)
MCV RBC AUTO: 90.8 FL — SIGNIFICANT CHANGE UP (ref 80–100)
PLATELET # BLD AUTO: 157 K/UL — SIGNIFICANT CHANGE UP (ref 150–400)
POTASSIUM SERPL-MCNC: 4.9 MMOL/L — SIGNIFICANT CHANGE UP (ref 3.5–5.3)
POTASSIUM SERPL-SCNC: 4.9 MMOL/L — SIGNIFICANT CHANGE UP (ref 3.5–5.3)
RBC # BLD: 2.74 M/UL — LOW (ref 4.2–5.8)
RBC # FLD: 13.9 % — SIGNIFICANT CHANGE UP (ref 11–15)
SODIUM SERPL-SCNC: 141 MMOL/L — SIGNIFICANT CHANGE UP (ref 135–145)
SURGICAL PATHOLOGY FINAL REPORT - CH: SIGNIFICANT CHANGE UP
WBC # BLD: 6.1 K/UL — SIGNIFICANT CHANGE UP (ref 3.8–10.5)
WBC # FLD AUTO: 6.1 K/UL — SIGNIFICANT CHANGE UP (ref 3.8–10.5)

## 2017-10-11 PROCEDURE — 99233 SBSQ HOSP IP/OBS HIGH 50: CPT

## 2017-10-11 RX ORDER — IRON SUCROSE 20 MG/ML
100 INJECTION, SOLUTION INTRAVENOUS ONCE
Qty: 0 | Refills: 0 | Status: COMPLETED | OUTPATIENT
Start: 2017-10-11 | End: 2017-10-11

## 2017-10-11 RX ORDER — POLYETHYLENE GLYCOL 3350 17 G/17G
17 POWDER, FOR SOLUTION ORAL
Qty: 0 | Refills: 0 | Status: DISCONTINUED | OUTPATIENT
Start: 2017-10-11 | End: 2017-10-13

## 2017-10-11 RX ADMIN — Medication 1 GRAM(S): at 17:40

## 2017-10-11 RX ADMIN — Medication 1 GRAM(S): at 00:07

## 2017-10-11 RX ADMIN — CHLORHEXIDINE GLUCONATE 1 APPLICATION(S): 213 SOLUTION TOPICAL at 11:15

## 2017-10-11 RX ADMIN — Medication 1 GRAM(S): at 11:14

## 2017-10-11 RX ADMIN — IRON SUCROSE 210 MILLIGRAM(S): 20 INJECTION, SOLUTION INTRAVENOUS at 17:39

## 2017-10-11 RX ADMIN — Medication 100 MILLIGRAM(S): at 05:47

## 2017-10-11 RX ADMIN — ATORVASTATIN CALCIUM 40 MILLIGRAM(S): 80 TABLET, FILM COATED ORAL at 23:26

## 2017-10-11 RX ADMIN — Medication 325 MILLIGRAM(S): at 11:16

## 2017-10-11 RX ADMIN — SODIUM CHLORIDE 50 MILLILITER(S): 9 INJECTION INTRAMUSCULAR; INTRAVENOUS; SUBCUTANEOUS at 17:39

## 2017-10-11 RX ADMIN — POLYETHYLENE GLYCOL 3350 17 GRAM(S): 17 POWDER, FOR SOLUTION ORAL at 17:39

## 2017-10-11 RX ADMIN — Medication 100 MILLIGRAM(S): at 23:26

## 2017-10-11 RX ADMIN — Medication 1 GRAM(S): at 23:26

## 2017-10-11 RX ADMIN — CLOPIDOGREL BISULFATE 75 MILLIGRAM(S): 75 TABLET, FILM COATED ORAL at 11:15

## 2017-10-11 RX ADMIN — PANTOPRAZOLE SODIUM 40 MILLIGRAM(S): 20 TABLET, DELAYED RELEASE ORAL at 09:03

## 2017-10-11 RX ADMIN — Medication 1 GRAM(S): at 05:47

## 2017-10-11 RX ADMIN — Medication 100 MILLIGRAM(S): at 11:15

## 2017-10-11 NOTE — DISCHARGE NOTE ADULT - CARE PLAN
Principal Discharge DX:	ASUNCION (acute kidney injury)  Goal:	resolution to baseline  Secondary Diagnosis:	Esophagitis  Instructions for follow-up, activity and diet:	endoscopy done revealed small ulcer  Secondary Diagnosis:	Essential hypertension  Secondary Diagnosis:	Morbid obesity  Instructions for follow-up, activity and diet:	healthy food choices  Secondary Diagnosis:	MRSA infection  Instructions for follow-up, activity and diet:	completed treatment Principal Discharge DX:	ASUNCION (acute kidney injury)  Goal:	resolution to baseline  Instructions for follow-up, activity and diet:	your lisinopril is on hold until your kidney function returns to a stable level  your lyrica is also on hold for the same reason.  If needed you may use percocet for neuropathic pain or tylenol.  NO IBUPROFEN/ADVIL/MOTRIN/ALEVE  Secondary Diagnosis:	Esophagitis  Instructions for follow-up, activity and diet:	endoscopy done revealed small ulcer  you will take the carafate and protonix for this.  For easier consumption, you may dissolve the carafate in a small cup of water and drink once dissolved.  Secondary Diagnosis:	Essential hypertension  Instructions for follow-up, activity and diet:	has been adequately controlled without medication  please make sure your blood pressure is monitored  Secondary Diagnosis:	Morbid obesity  Instructions for follow-up, activity and diet:	healthy food choices  Secondary Diagnosis:	MRSA infection  Instructions for follow-up, activity and diet:	completed treatment

## 2017-10-11 NOTE — PROGRESS NOTE ADULT - SUBJECTIVE AND OBJECTIVE BOX
Pt stable - tolerated EGD well  no bleeding      MEDICATIONS  (STANDING):  aspirin 325 milliGRAM(s) Oral daily  atorvastatin 40 milliGRAM(s) Oral at bedtime  chlorhexidine 4% Liquid 1 Application(s) Topical daily  clopidogrel Tablet 75 milliGRAM(s) Oral daily  dextrose 5%. 1000 milliLiter(s) (50 mL/Hr) IV Continuous <Continuous>  dextrose 50% Injectable 12.5 Gram(s) IV Push once  docusate sodium 100 milliGRAM(s) Oral three times a day  pantoprazole    Tablet 40 milliGRAM(s) Oral before breakfast  polyethylene glycol 3350 17 Gram(s) Oral two times a day  sodium chloride 0.9%. 1000 milliLiter(s) (50 mL/Hr) IV Continuous <Continuous>  sucralfate 1 Gram(s) Oral four times a day    MEDICATIONS  (PRN):  dextrose Gel 1 Dose(s) Oral once PRN Blood Glucose LESS THAN 70 milliGRAM(s)/deciliter      Allergies    No Known Allergies    Intolerances        Vital Signs Last 24 Hrs  T(C): 36.6 (11 Oct 2017 05:26), Max: 37.9 (10 Oct 2017 17:57)  T(F): 97.8 (11 Oct 2017 05:26), Max: 100.2 (10 Oct 2017 17:57)  HR: 82 (11 Oct 2017 05:26) (82 - 101)  BP: 147/78 (11 Oct 2017 05:26) (147/78 - 160/78)  BP(mean): --  RR: 18 (11 Oct 2017 05:26) (13 - 18)  SpO2: 98% (11 Oct 2017 05:26) (95% - 98%)    PHYSICAL EXAM:  General: NAD.  CVS: S1, S2  Chest: air entry bilaterally present  Abd: BS present, soft, non-tender      LABS:                        8.3    6.1   )-----------( 157      ( 11 Oct 2017 08:05 )             24.9     10-11    141  |  110<H>  |  83<H>  ----------------------------<  89  4.9   |  20<L>  |  4.90<H>    Ca    8.1<L>      11 Oct 2017 08:05        follow CBC  carafate/protonix  slow improvement in BUN/Creat

## 2017-10-11 NOTE — DISCHARGE NOTE ADULT - HOSPITAL COURSE
nephrology, ID, vasc, GI eval pt   ASUNCION likely due to ATN, slowly improving, did not require HD  abx completed for OM  GI, EGD revealed small GE ulcer  pt medically stable  Dr Calderon to arrage home blood draws to monitor Cr at home with the assistance of Dr Ley for any concerns.  35 min spent with d/c planning

## 2017-10-11 NOTE — DISCHARGE NOTE ADULT - MEDICATION SUMMARY - MEDICATIONS TO STOP TAKING
I will STOP taking the medications listed below when I get home from the hospital:    calcium carbonate 500 mg (200 mg elemental calcium) oral tablet, chewable  -- 2 tab(s) by mouth 3 times a day    Lyrica 100 mg oral capsule  -- 1 cap(s) by mouth 2 times a day MDD:2 tab per day    cefepime  -- 1 gram(s) intravenous every 12 hours    vancomycin  -- 1 gram(s) intravenous once a day    lisinopril 40 mg oral tablet  -- 1 tab(s) by mouth once a day

## 2017-10-11 NOTE — PROGRESS NOTE ADULT - SUBJECTIVE AND OBJECTIVE BOX
Patient seen in follow up for ASUNCION; no distress.    MEDICATIONS  (STANDING):  aspirin 325 milliGRAM(s) Oral daily  atorvastatin 40 milliGRAM(s) Oral at bedtime  chlorhexidine 4% Liquid 1 Application(s) Topical daily  clopidogrel Tablet 75 milliGRAM(s) Oral daily  dextrose 5%. 1000 milliLiter(s) (50 mL/Hr) IV Continuous <Continuous>  dextrose 50% Injectable 12.5 Gram(s) IV Push once  docusate sodium 100 milliGRAM(s) Oral three times a day  pantoprazole    Tablet 40 milliGRAM(s) Oral before breakfast  polyethylene glycol 3350 17 Gram(s) Oral two times a day  sodium chloride 0.9%. 1000 milliLiter(s) (50 mL/Hr) IV Continuous <Continuous>  sucralfate 1 Gram(s) Oral four times a day    MEDICATIONS  (PRN):  dextrose Gel 1 Dose(s) Oral once PRN Blood Glucose LESS THAN 70 milliGRAM(s)/deciliter    PHYSICAL EXAM:      T(C): 36.2 (10-11-17 @ 10:56), Max: 37.9 (10-10-17 @ 17:57)  HR: 85 (10-11-17 @ 10:56) (82 - 101)  BP: 133/60 (10-11-17 @ 10:56) (133/60 - 156/70)  RR: 18 (10-11-17 @ 10:56) (16 - 18)  SpO2: 99% (10-11-17 @ 10:56) (96% - 99%)  Wt(kg): --  Respiratory: clear anteriorly, decreased BS at bases  Cardiovascular: S1 S2  Gastrointestinal: soft NT ND +BS  Extremities:    bka 1 edema                                    8.3    6.1   )-----------( 157      ( 11 Oct 2017 08:05 )             24.9     10-11    141  |  110<H>  |  83<H>  ----------------------------<  89  4.9   |  20<L>  |  4.90<H>    Ca    8.1<L>      11 Oct 2017 08:05            Assessment and Plan:  ASUNCION, ATN suspected;   Anemia; Fe def element; will dose Venofer;  If discharged, will need weekly basic metabolic panel to follow CrCL with PMD as patient with limited access to outpatient doctor and blood draw visits.

## 2017-10-11 NOTE — DISCHARGE NOTE ADULT - VISION (WITH CORRECTIVE LENSES IF THE PATIENT USUALLY WEARS THEM):
Severely impaired: cannot locate objects without hearing or touching them or patient nonresponsive./Legally blind both eyes

## 2017-10-11 NOTE — DISCHARGE NOTE ADULT - CARE PROVIDER_API CALL
Dr Monica Calderon,   Phone: [phone]  Fax: (   )    -    Jose Martin Ley (DO), Nephrology  [address] [facility]  [address] [place] [address] [id number]  Phone: [phone]  Fax: [phone]

## 2017-10-11 NOTE — PROGRESS NOTE ADULT - SUBJECTIVE AND OBJECTIVE BOX
Patient is a 62y old  Male who presents with a chief complaint of ASUNCION on CKD, likely medication induced      SUBJECTIVE / OVERNIGHT EVENTS: c/o constipation, otherwise no complaints      MEDICATIONS  (STANDING):  aspirin 325 milliGRAM(s) Oral daily  atorvastatin 40 milliGRAM(s) Oral at bedtime  chlorhexidine 4% Liquid 1 Application(s) Topical daily  clopidogrel Tablet 75 milliGRAM(s) Oral daily  dextrose 5%. 1000 milliLiter(s) (50 mL/Hr) IV Continuous <Continuous>  dextrose 50% Injectable 12.5 Gram(s) IV Push once  docusate sodium 100 milliGRAM(s) Oral three times a day  pantoprazole    Tablet 40 milliGRAM(s) Oral before breakfast  polyethylene glycol 3350 17 Gram(s) Oral two times a day  sodium chloride 0.9%. 1000 milliLiter(s) (50 mL/Hr) IV Continuous <Continuous>  sucralfate 1 Gram(s) Oral four times a day    MEDICATIONS  (PRN):  dextrose Gel 1 Dose(s) Oral once PRN Blood Glucose LESS THAN 70 milliGRAM(s)/deciliter      Vital Signs Last 24 Hrs  T(F): 97.8 (11 Oct 2017 05:26), Max: 100.2 (10 Oct 2017 17:57)  HR: 82 (11 Oct 2017 05:26) (82 - 101)  BP: 147/78 (11 Oct 2017 05:26) (147/78 - 156/70)  RR: 18 (11 Oct 2017 05:26) (16 - 18)  SpO2: 98% (11 Oct 2017 05:26) (96% - 98%)  CAPILLARY BLOOD GLUCOSE  116 (10 Oct 2017 21:50)  POCT Blood Glucose.: 110 mg/dL (11 Oct 2017 09:02)  POCT Blood Glucose.: 121 mg/dL (10 Oct 2017 16:09)  POCT Blood Glucose.: 131 mg/dL (10 Oct 2017 12:01)    I&O's Summary    10 Oct 2017 07:01  -  11 Oct 2017 07:00  --------------------------------------------------------  IN: 1340 mL / OUT: 1975 mL / NET: -635 mL          PHYSICAL EXAM  GENERAL: NAD, well-developed  HEAD:  Atraumatic, Normocephalic  EYES: opacified cornea b/l  CHEST/LUNG: non labored  ABDOMEN:obese  EXTREMITIES:  R BKA with small dressing c/d/i  PSYCH: AAOx3      LABS:                        8.3    6.1   )-----------( 157      ( 11 Oct 2017 08:05 )             24.9     10-11    141  |  110<H>  |  83<H>  ----------------------------<  89  4.9   |  20<L>  |  4.90<H>    Ca    8.1<L>      11 Oct 2017 08:05          Care Discussed with Consultants/Other Providers: D/w Dr Crocker, Dr Uribe

## 2017-10-11 NOTE — DISCHARGE NOTE ADULT - PATIENT PORTAL LINK FT
“You can access the Follow[employer] Patient Portal, offered by [facility], by registering with the following website: [email or web address]”

## 2017-10-11 NOTE — DISCHARGE NOTE ADULT - PLAN OF CARE
resolution to baseline endoscopy done revealed small ulcer healthy food choices completed treatment your lisinopril is on hold until your kidney function returns to a stable level  your lyrica is also on hold for the same reason.  If needed you may use percocet for neuropathic pain or tylenol.  NO IBUPROFEN/ADVIL/MOTRIN/ALEVE endoscopy done revealed small ulcer  you will take the carafate and protonix for this.  For easier consumption, you may dissolve the carafate in a small cup of water and drink once dissolved. has been adequately controlled without medication  please make sure your blood pressure is monitored

## 2017-10-11 NOTE — DISCHARGE NOTE ADULT - MEDICATION SUMMARY - MEDICATIONS TO TAKE
I will START or STAY ON the medications listed below when I get home from the hospital:    aspirin 325 mg oral tablet  -- 1 tab(s) by mouth once a day  -- Indication: For Preventative    Percocet 5/325 oral tablet  -- 1 tab(s) by mouth every 8 hours, As Needed, Moderate Pain (4 - 6) MDD:maximum daily dose 3 tabs  -- Indication: For Pain    insulin glargine  -- 10 unit(s) subcutaneous once a day (at bedtime)  -- Indication: For Diabetes mellitus     atorvastatin 40 mg oral tablet  -- 1 tab(s) by mouth once a day (at bedtime)  -- Indication: For hyperlipidemia    clopidogrel 75 mg oral tablet  -- 1 tab(s) by mouth once a day  -- Indication: For Preventative    docusate sodium 100 mg oral capsule  -- 1 cap(s) by mouth 3 times a day  -- Indication: For Constipation by delayed colonic transit    sucralfate 1 g oral tablet  -- 1 tab(s) by mouth 4 times a day  -- Indication: For GI protection/ulcer    lactobacillus acidophilus oral tablet  -- 1 tab(s) by mouth 2 times a day  -- Indication: For Preventative    pantoprazole 40 mg oral delayed release tablet  -- 1 tab(s) by mouth once a day (before a meal)  -- Indication: For GI protection/ulcer

## 2017-10-12 LAB
ANION GAP SERPL CALC-SCNC: 9 MMOL/L — SIGNIFICANT CHANGE UP (ref 5–17)
BUN SERPL-MCNC: 76 MG/DL — HIGH (ref 7–23)
CALCIUM SERPL-MCNC: 7.6 MG/DL — LOW (ref 8.5–10.1)
CHLORIDE SERPL-SCNC: 110 MMOL/L — HIGH (ref 96–108)
CO2 SERPL-SCNC: 23 MMOL/L — SIGNIFICANT CHANGE UP (ref 22–31)
CREAT SERPL-MCNC: 4.87 MG/DL — HIGH (ref 0.5–1.3)
GLUCOSE SERPL-MCNC: 168 MG/DL — HIGH (ref 70–99)
POTASSIUM SERPL-MCNC: 4.8 MMOL/L — SIGNIFICANT CHANGE UP (ref 3.5–5.3)
POTASSIUM SERPL-SCNC: 4.8 MMOL/L — SIGNIFICANT CHANGE UP (ref 3.5–5.3)
SODIUM SERPL-SCNC: 142 MMOL/L — SIGNIFICANT CHANGE UP (ref 135–145)

## 2017-10-12 PROCEDURE — 99233 SBSQ HOSP IP/OBS HIGH 50: CPT

## 2017-10-12 RX ADMIN — Medication 100 MILLIGRAM(S): at 06:19

## 2017-10-12 RX ADMIN — Medication 100 MILLIGRAM(S): at 23:04

## 2017-10-12 RX ADMIN — Medication 100 MILLIGRAM(S): at 13:57

## 2017-10-12 RX ADMIN — POLYETHYLENE GLYCOL 3350 17 GRAM(S): 17 POWDER, FOR SOLUTION ORAL at 06:19

## 2017-10-12 RX ADMIN — ATORVASTATIN CALCIUM 40 MILLIGRAM(S): 80 TABLET, FILM COATED ORAL at 23:04

## 2017-10-12 RX ADMIN — PANTOPRAZOLE SODIUM 40 MILLIGRAM(S): 20 TABLET, DELAYED RELEASE ORAL at 06:19

## 2017-10-12 RX ADMIN — Medication 325 MILLIGRAM(S): at 11:37

## 2017-10-12 RX ADMIN — CHLORHEXIDINE GLUCONATE 1 APPLICATION(S): 213 SOLUTION TOPICAL at 11:36

## 2017-10-12 RX ADMIN — POLYETHYLENE GLYCOL 3350 17 GRAM(S): 17 POWDER, FOR SOLUTION ORAL at 17:25

## 2017-10-12 RX ADMIN — Medication 1 GRAM(S): at 17:25

## 2017-10-12 RX ADMIN — Medication 1 GRAM(S): at 11:38

## 2017-10-12 RX ADMIN — Medication 1 GRAM(S): at 06:19

## 2017-10-12 RX ADMIN — Medication 1 GRAM(S): at 23:04

## 2017-10-12 RX ADMIN — CLOPIDOGREL BISULFATE 75 MILLIGRAM(S): 75 TABLET, FILM COATED ORAL at 11:37

## 2017-10-12 RX ADMIN — SODIUM CHLORIDE 50 MILLILITER(S): 9 INJECTION INTRAMUSCULAR; INTRAVENOUS; SUBCUTANEOUS at 17:33

## 2017-10-12 NOTE — PROGRESS NOTE ADULT - PROBLEM SELECTOR PROBLEM 7
Abnormal chest xray

## 2017-10-12 NOTE — PROGRESS NOTE ADULT - PROBLEM SELECTOR PROBLEM 5
Abnormal chest xray
Essential hypertension
Abnormal chest xray
Essential hypertension

## 2017-10-12 NOTE — PROGRESS NOTE ADULT - PROBLEM SELECTOR PROBLEM 1
Acute renal failure, unspecified acute renal failure type

## 2017-10-12 NOTE — PROGRESS NOTE ADULT - PROBLEM SELECTOR PROBLEM 2
ATN (acute tubular necrosis)
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

## 2017-10-12 NOTE — PROGRESS NOTE ADULT - PROBLEM SELECTOR PLAN 10
inc miralax to BID  cont colace
inc miralax to BID  cont colace
will dose Miralax and fleet's enema
will dose Miralax and fleet's enema prn
will dose Miralax daily  cont colace
will dose Miralax daily  cont colace  +BM last PM

## 2017-10-12 NOTE — PROGRESS NOTE ADULT - PROBLEM SELECTOR PROBLEM 3
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
Essential hypertension
Essential hypertension

## 2017-10-12 NOTE — PROGRESS NOTE ADULT - PROBLEM SELECTOR PLAN 2
as above  Bence uribe protein positive     Renal following no need for dialysis as of now
lantus/riss
lantus/riss

## 2017-10-12 NOTE — PROGRESS NOTE ADULT - PROBLEM SELECTOR PLAN 4
protonix  gastroenterologist consult  diet advanced
will treat with Levaquin, completed course
protonix  gastroenterologist consult
will treat with Levaquin x 3 days
will treat with Levaquin, completed course

## 2017-10-12 NOTE — PROGRESS NOTE ADULT - PROBLEM SELECTOR PLAN 9
diet and nutrition consult appreciated

## 2017-10-12 NOTE — PROGRESS NOTE ADULT - PROBLEM SELECTOR PROBLEM 6
MRSA infection
Esophagitis
MRSA infection

## 2017-10-12 NOTE — PROGRESS NOTE ADULT - ASSESSMENT
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
63 yo male with ho DM, morbid obesity, CKD 3, a/w ASUNCION/ATN on CKD likely medication induced
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
61 yo male with ho DM, morbid obesity, CKD 3, a/w ASUNCION on CKD likely medication induced
61 yo male with ho DM, morbid obesity, CKD 3, a/w ASUNCION/ATN on CKD likely medication induced
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     Renal function now improving plan for EGD dr Rawls on Tuesday to evaluate stomach
63 yo male with ho DM, morbid obesity, CKD 3, a/w ASUNCION/AIN/ATN on CKD likely medication induced
Legally blind acute renal failure with low symptomatic anemia chronic mrsa infection
Legally blind acute renal failure with lsymptomatic anemia   chronic mrsa infection

## 2017-10-12 NOTE — PROGRESS NOTE ADULT - SUBJECTIVE AND OBJECTIVE BOX
Subjective: comfortable. No SOB.       MEDICATIONS  (STANDING):  aspirin 325 milliGRAM(s) Oral daily  atorvastatin 40 milliGRAM(s) Oral at bedtime  chlorhexidine 4% Liquid 1 Application(s) Topical daily  clopidogrel Tablet 75 milliGRAM(s) Oral daily  dextrose 5%. 1000 milliLiter(s) (50 mL/Hr) IV Continuous <Continuous>  dextrose 50% Injectable 12.5 Gram(s) IV Push once  docusate sodium 100 milliGRAM(s) Oral three times a day  pantoprazole    Tablet 40 milliGRAM(s) Oral before breakfast  polyethylene glycol 3350 17 Gram(s) Oral two times a day  sodium chloride 0.9%. 1000 milliLiter(s) (50 mL/Hr) IV Continuous <Continuous>  sucralfate 1 Gram(s) Oral four times a day    MEDICATIONS  (PRN):  dextrose Gel 1 Dose(s) Oral once PRN Blood Glucose LESS THAN 70 milliGRAM(s)/deciliter          T(C): 36.6 (10-12-17 @ 06:02), Max: 36.6 (10-11-17 @ 17:10)  HR: 84 (10-12-17 @ 06:02) (71 - 85)  BP: 159/77 (10-12-17 @ 06:02) (125/50 - 159/77)  RR: 16 (10-12-17 @ 06:02) (16 - 18)  SpO2: 97% (10-12-17 @ 06:02) (95% - 99%)  Wt(kg): --        I&O's Detail    11 Oct 2017 07:01  -  12 Oct 2017 07:00  --------------------------------------------------------  IN:    Oral Fluid: 1090 mL    sodium chloride 0.9%.: 550 mL  Total IN: 1640 mL    OUT:    Voided: 3350 mL  Total OUT: 3350 mL    Total NET: -1710 mL      12 Oct 2017 07:01  -  12 Oct 2017 09:42  --------------------------------------------------------  IN:    Oral Fluid: 360 mL  Total IN: 360 mL    OUT:  Total OUT: 0 mL    Total NET: 360 mL               PHYSICAL EXAM:    GENERAL: comfortable  EYES: EOMI, PERRLA, conjunctiva and sclera clear  NECK: Supple, no inc in JVP  CHEST/LUNG: Clear  HEART: S1S2  ABDOMEN: Soft, Nontender, Nondistended; Bowel sounds present  EXTREMITIES:  R BKA. L min edema  NEURO: no asterixis        LABS:  CBC Full  -  ( 11 Oct 2017 08:05 )  WBC Count : 6.1 K/uL  Hemoglobin : 8.3 g/dL  Hematocrit : 24.9 %  Platelet Count - Automated : 157 K/uL  Mean Cell Volume : 90.8 fl  Mean Cell Hemoglobin : 30.4 pg  Mean Cell Hemoglobin Concentration : 33.5 gm/dL  Auto Neutrophil # : x  Auto Lymphocyte # : x  Auto Monocyte # : x  Auto Eosinophil # : x  Auto Basophil # : x  Auto Neutrophil % : x  Auto Lymphocyte % : x  Auto Monocyte % : x  Auto Eosinophil % : x  Auto Basophil % : x    10-11    141  |  110<H>  |  83<H>  ----------------------------<  89  4.9   |  20<L>  |  4.90<H>    Ca    8.1<L>      11 Oct 2017 08:05          Impression:   * ASUNCION. Drug induced ATN vs AIN. Resolving  * Diffuse vasculopath  * L foot wound MRSA, OM  * HyperK resolved    Recommendations:  * Cont to trend Bun/Cr with daily re-eval  * Low K diet. Kayexalate PRN K > 5.5  * CrCl  10-15cc/min

## 2017-10-12 NOTE — PROGRESS NOTE ADULT - PROBLEM SELECTOR PROBLEM 9
Morbid obesity

## 2017-10-12 NOTE — PROGRESS NOTE ADULT - SUBJECTIVE AND OBJECTIVE BOX
pt stable - no bleeding      MEDICATIONS  (STANDING):  aspirin 325 milliGRAM(s) Oral daily  atorvastatin 40 milliGRAM(s) Oral at bedtime  chlorhexidine 4% Liquid 1 Application(s) Topical daily  clopidogrel Tablet 75 milliGRAM(s) Oral daily  dextrose 5%. 1000 milliLiter(s) (50 mL/Hr) IV Continuous <Continuous>  dextrose 50% Injectable 12.5 Gram(s) IV Push once  docusate sodium 100 milliGRAM(s) Oral three times a day  pantoprazole    Tablet 40 milliGRAM(s) Oral before breakfast  polyethylene glycol 3350 17 Gram(s) Oral two times a day  sodium chloride 0.9%. 1000 milliLiter(s) (50 mL/Hr) IV Continuous <Continuous>  sucralfate 1 Gram(s) Oral four times a day    MEDICATIONS  (PRN):  dextrose Gel 1 Dose(s) Oral once PRN Blood Glucose LESS THAN 70 milliGRAM(s)/deciliter      Allergies    No Known Allergies    Intolerances        Vital Signs Last 24 Hrs  T(C): 36.2 (12 Oct 2017 11:19), Max: 36.6 (11 Oct 2017 17:10)  T(F): 97.2 (12 Oct 2017 11:19), Max: 97.8 (11 Oct 2017 17:10)  HR: 75 (12 Oct 2017 11:19) (71 - 84)  BP: 133/64 (12 Oct 2017 11:19) (125/50 - 159/77)  BP(mean): --  RR: 17 (12 Oct 2017 11:19) (16 - 18)  SpO2: 98% (12 Oct 2017 11:19) (95% - 99%)    PHYSICAL EXAM:  General: NAD.  CVS: S1, S2  Chest: air entry bilaterally present  Abd: BS present, soft, non-tender      LABS:                        8.3    6.1   )-----------( 157      ( 11 Oct 2017 08:05 )             24.9     10-12    142  |  110<H>  |  76<H>  ----------------------------<  168<H>  4.8   |  23  |  4.87<H>    Ca    7.6<L>      12 Oct 2017 11:15      continue protonix  repeat CBC in AM

## 2017-10-12 NOTE — PROGRESS NOTE ADULT - PROBLEM SELECTOR PROBLEM 10
Constipation by delayed colonic transit

## 2017-10-12 NOTE — PROGRESS NOTE ADULT - PROBLEM SELECTOR PLAN 1
gurpreet secondary to abx use ATN  renal consult  appreciated   indices pending no current need for dialysis for now   intravenous hydration
likely secondary to abx use ATN  renal consult  appreciated   no current need for dialysis, Cr cont to slowly trend down  intravenous hydration
check vanco trough  renal consult   intravenous hydration
gurpreet secondary to abx use ATN  renal consult  appreciated   indices pending no current need for dialysis for now   intravenous hydration
gurpreet secondary to abx use ATN  renal consult  appreciated   indices pending no current need for dialysis for now   intravenous hydration
likely secondary to abx use ATN  renal consult  appreciated   no current need for dialysis   intravenous hydration
likely secondary to abx use ATN  renal consult  appreciated   no current need for dialysis  intravenous hydration
likely secondary to abx use ATN  renal consult  appreciated   no current need for dialysis, will d/w renal for ideal dispo timing  intravenous hydration
gurpreet secondary to abx use ATN  renal consult   intravenous hydration

## 2017-10-12 NOTE — PROGRESS NOTE ADULT - PROBLEM SELECTOR PLAN 8
infectious disease consult appreciated   pt completed 6 weeks abx  no further abx needed
infectious disease consult appreciated   hold vanco
infectious disease consult appreciated   pt completed 6 weeks abx  no further abx needed
infectious disease consult appreciated   pt completed 6 weeks abx  no further abx needed

## 2017-10-12 NOTE — PROGRESS NOTE ADULT - PROBLEM SELECTOR PLAN 6
infectious disease consult hold vanco  off abx?
Protonix  diet advanced, pt prefers clears
Protonix  diet advanced, pt prefers clears
Protonix  gastroenterologist consult Dr Rawls appreciated   diet advanced  will need endoscopy at some point unsure if it will be performed this admission
Protonix  gastroenterologist consult Dr Rawls appreciated, EGD this admission poss  diet advanced
Protonix  gastroenterologist consult Dr Rawls appreciated, EGD this admission poss  diet advanced
infectious disease consult hold vanco  continue cefepime

## 2017-10-12 NOTE — PROGRESS NOTE ADULT - PROBLEM SELECTOR PROBLEM 8
MRSA infection

## 2017-10-12 NOTE — PROGRESS NOTE ADULT - PROBLEM SELECTOR PLAN 5
will need nonemergent ct
currently stable ACE I on hold secondary to worsening renal function
will need nonemergent ct

## 2017-10-12 NOTE — PROGRESS NOTE ADULT - ATTENDING COMMENTS
spoke with pt's PMD Dr Calderon   He will f/u pts labs as outpt.  D/w nephrology who rec blood work 10/16 and 10/20, then can be weekly if down trend continues.  Dr Ley's number was given to Dr Calderon for point of contact .    Anticipate d/c 10/13
discussed in [facility] rounds pt may benefit from house calls program as he is essentially home bound and will require further outpt management for his ASUNCION and lab monitoring
will d/w renal and vasc for d/c plans

## 2017-10-12 NOTE — PROGRESS NOTE ADULT - PROBLEM SELECTOR PLAN 3
hold ace
Lantus 10 units QHS   Insulin sliding scale currently stable
Lantus 10 units QHS   Insulin slidding scale currently stable
Lantus 10 units QHS   Insulin sliding scale currently stable
hold ace

## 2017-10-12 NOTE — PROGRESS NOTE ADULT - SUBJECTIVE AND OBJECTIVE BOX
Patient is a 62y old  Male who presents with a chief complaint of ASUNCION likely medication related      SUBJECTIVE / OVERNIGHT EVENTS: doing well, no complaints, reports good urine output      MEDICATIONS  (STANDING):  aspirin 325 milliGRAM(s) Oral daily  atorvastatin 40 milliGRAM(s) Oral at bedtime  chlorhexidine 4% Liquid 1 Application(s) Topical daily  clopidogrel Tablet 75 milliGRAM(s) Oral daily  dextrose 5%. 1000 milliLiter(s) (50 mL/Hr) IV Continuous <Continuous>  dextrose 50% Injectable 12.5 Gram(s) IV Push once  docusate sodium 100 milliGRAM(s) Oral three times a day  pantoprazole    Tablet 40 milliGRAM(s) Oral before breakfast  polyethylene glycol 3350 17 Gram(s) Oral two times a day  sodium chloride 0.9%. 1000 milliLiter(s) (50 mL/Hr) IV Continuous <Continuous>  sucralfate 1 Gram(s) Oral four times a day    MEDICATIONS  (PRN):  dextrose Gel 1 Dose(s) Oral once PRN Blood Glucose LESS THAN 70 milliGRAM(s)/deciliter      Vital Signs Last 24 Hrs  T(F): 97.8 (12 Oct 2017 06:02), Max: 97.8 (11 Oct 2017 17:10)  HR: 84 (12 Oct 2017 06:02) (71 - 85)  BP: 159/77 (12 Oct 2017 06:02) (125/50 - 159/77)  RR: 16 (12 Oct 2017 06:02) (16 - 18)  SpO2: 97% (12 Oct 2017 06:02) (95% - 99%)  CAPILLARY BLOOD GLUCOSE  POCT Blood Glucose.: 172 mg/dL (11 Oct 2017 17:12)  POCT Blood Glucose.: 165 mg/dL (11 Oct 2017 11:18)            PHYSICAL EXAM  GENERAL: NAD, well-developed  HEAD:  Atraumatic, Normocephalic  EYES: opacified cornea b/l  CHEST/LUNG: non labored  ABDOMEN: obese  EXTREMITIES:  R BKA, small wound covered on lat stump  PSYCH: AAOx3        Care Discussed with Consultants/Other Providers: d/e Dr Ley

## 2017-10-12 NOTE — PROGRESS NOTE ADULT - PROBLEM SELECTOR PROBLEM 4
Esophagitis
Pneumonia of both lower lobes due to infectious organism
Esophagitis
Pneumonia of both lower lobes due to infectious organism

## 2017-10-13 VITALS
OXYGEN SATURATION: 98 % | TEMPERATURE: 97 F | SYSTOLIC BLOOD PRESSURE: 145 MMHG | HEART RATE: 93 BPM | DIASTOLIC BLOOD PRESSURE: 76 MMHG | RESPIRATION RATE: 17 BRPM

## 2017-10-13 LAB
ANION GAP SERPL CALC-SCNC: 10 MMOL/L — SIGNIFICANT CHANGE UP (ref 5–17)
BUN SERPL-MCNC: 66 MG/DL — HIGH (ref 7–23)
CALCIUM SERPL-MCNC: 7.8 MG/DL — LOW (ref 8.5–10.1)
CHLORIDE SERPL-SCNC: 113 MMOL/L — HIGH (ref 96–108)
CO2 SERPL-SCNC: 21 MMOL/L — LOW (ref 22–31)
CREAT SERPL-MCNC: 4.34 MG/DL — HIGH (ref 0.5–1.3)
GLUCOSE SERPL-MCNC: 104 MG/DL — HIGH (ref 70–99)
POTASSIUM SERPL-MCNC: 4.7 MMOL/L — SIGNIFICANT CHANGE UP (ref 3.5–5.3)
POTASSIUM SERPL-SCNC: 4.7 MMOL/L — SIGNIFICANT CHANGE UP (ref 3.5–5.3)
SODIUM SERPL-SCNC: 144 MMOL/L — SIGNIFICANT CHANGE UP (ref 135–145)

## 2017-10-13 PROCEDURE — 99239 HOSP IP/OBS DSCHRG MGMT >30: CPT

## 2017-10-13 RX ORDER — PANTOPRAZOLE SODIUM 20 MG/1
1 TABLET, DELAYED RELEASE ORAL
Qty: 30 | Refills: 0
Start: 2017-10-13 | End: 2017-11-12

## 2017-10-13 RX ORDER — SUCRALFATE 1 G
1 TABLET ORAL
Qty: 120 | Refills: 0
Start: 2017-10-13 | End: 2017-11-12

## 2017-10-13 RX ADMIN — Medication 1 GRAM(S): at 05:31

## 2017-10-13 RX ADMIN — POLYETHYLENE GLYCOL 3350 17 GRAM(S): 17 POWDER, FOR SOLUTION ORAL at 05:31

## 2017-10-13 RX ADMIN — PANTOPRAZOLE SODIUM 40 MILLIGRAM(S): 20 TABLET, DELAYED RELEASE ORAL at 05:31

## 2017-10-13 RX ADMIN — Medication 100 MILLIGRAM(S): at 05:31

## 2017-10-13 NOTE — PROGRESS NOTE ADULT - SUBJECTIVE AND OBJECTIVE BOX
Pt stable - no GI complaints      MEDICATIONS  (STANDING):  aspirin 325 milliGRAM(s) Oral daily  atorvastatin 40 milliGRAM(s) Oral at bedtime  chlorhexidine 4% Liquid 1 Application(s) Topical daily  clopidogrel Tablet 75 milliGRAM(s) Oral daily  dextrose 5%. 1000 milliLiter(s) (50 mL/Hr) IV Continuous <Continuous>  dextrose 50% Injectable 12.5 Gram(s) IV Push once  docusate sodium 100 milliGRAM(s) Oral three times a day  pantoprazole    Tablet 40 milliGRAM(s) Oral before breakfast  polyethylene glycol 3350 17 Gram(s) Oral two times a day  sodium chloride 0.9%. 1000 milliLiter(s) (50 mL/Hr) IV Continuous <Continuous>  sucralfate 1 Gram(s) Oral four times a day    MEDICATIONS  (PRN):  dextrose Gel 1 Dose(s) Oral once PRN Blood Glucose LESS THAN 70 milliGRAM(s)/deciliter      Allergies    No Known Allergies    Intolerances        Vital Signs Last 24 Hrs  T(C): 36.6 (13 Oct 2017 05:21), Max: 36.7 (12 Oct 2017 16:33)  T(F): 97.8 (13 Oct 2017 05:21), Max: 98 (12 Oct 2017 16:33)  HR: 85 (13 Oct 2017 05:21) (69 - 85)  BP: 160/73 (13 Oct 2017 05:21) (130/60 - 160/73)  BP(mean): --  RR: 18 (13 Oct 2017 05:21) (16 - 18)  SpO2: 99% (13 Oct 2017 05:21) (98% - 99%)    PHYSICAL EXAM:  General: NAD.  CVS: S1, S2  Chest: air entry bilaterally present  Abd: BS present, soft, non-tender      LABS:    10-13    144  |  113<H>  |  66<H>  ----------------------------<  104<H>  4.7   |  21<L>  |  4.34<H>    Ca    7.8<L>      13 Oct 2017 08:09        slight improvement in BUN/Creat  no active bleeding noted   cbc

## 2017-10-13 NOTE — PROGRESS NOTE ADULT - PROVIDER SPECIALTY LIST ADULT
Gastroenterology
Hospitalist
Nephrology
Hospitalist

## 2017-10-13 NOTE — CHART NOTE - NSCHARTNOTEFT_GEN_A_CORE
Patient seen for PICC line removal. PICC  Line 43 cm in total length removed. Pressure applied x 10 minutes, no bleeding to site noted.  DSD applied. Patient tolerated procedure well, no c/o pain or respiratory distress noted.

## 2017-10-17 DIAGNOSIS — I25.10 ATHEROSCLEROTIC HEART DISEASE OF NATIVE CORONARY ARTERY WITHOUT ANGINA PECTORIS: ICD-10-CM

## 2017-10-17 DIAGNOSIS — Z89.511 ACQUIRED ABSENCE OF RIGHT LEG BELOW KNEE: ICD-10-CM

## 2017-10-17 DIAGNOSIS — N18.3 CHRONIC KIDNEY DISEASE, STAGE 3 (MODERATE): ICD-10-CM

## 2017-10-17 DIAGNOSIS — E87.5 HYPERKALEMIA: ICD-10-CM

## 2017-10-17 DIAGNOSIS — E08.22 DIABETES MELLITUS DUE TO UNDERLYING CONDITION WITH DIABETIC CHRONIC KIDNEY DISEASE: ICD-10-CM

## 2017-10-17 DIAGNOSIS — E66.01 MORBID (SEVERE) OBESITY DUE TO EXCESS CALORIES: ICD-10-CM

## 2017-10-17 DIAGNOSIS — Z79.82 LONG TERM (CURRENT) USE OF ASPIRIN: ICD-10-CM

## 2017-10-17 DIAGNOSIS — E08.40 DIABETES MELLITUS DUE TO UNDERLYING CONDITION WITH DIABETIC NEUROPATHY, UNSPECIFIED: ICD-10-CM

## 2017-10-17 DIAGNOSIS — K59.01 SLOW TRANSIT CONSTIPATION: ICD-10-CM

## 2017-10-17 DIAGNOSIS — K20.9 ESOPHAGITIS, UNSPECIFIED: ICD-10-CM

## 2017-10-17 DIAGNOSIS — I12.9 HYPERTENSIVE CHRONIC KIDNEY DISEASE WITH STAGE 1 THROUGH STAGE 4 CHRONIC KIDNEY DISEASE, OR UNSPECIFIED CHRONIC KIDNEY DISEASE: ICD-10-CM

## 2017-10-17 DIAGNOSIS — Z95.5 PRESENCE OF CORONARY ANGIOPLASTY IMPLANT AND GRAFT: ICD-10-CM

## 2017-10-17 DIAGNOSIS — D50.9 IRON DEFICIENCY ANEMIA, UNSPECIFIED: ICD-10-CM

## 2017-10-17 DIAGNOSIS — Z79.4 LONG TERM (CURRENT) USE OF INSULIN: ICD-10-CM

## 2017-10-17 DIAGNOSIS — N17.0 ACUTE KIDNEY FAILURE WITH TUBULAR NECROSIS: ICD-10-CM

## 2017-10-17 DIAGNOSIS — N17.9 ACUTE KIDNEY FAILURE, UNSPECIFIED: ICD-10-CM

## 2017-10-17 DIAGNOSIS — T36.8X5A ADVERSE EFFECT OF OTHER SYSTEMIC ANTIBIOTICS, INITIAL ENCOUNTER: ICD-10-CM

## 2017-10-17 DIAGNOSIS — J18.9 PNEUMONIA, UNSPECIFIED ORGANISM: ICD-10-CM

## 2017-10-17 DIAGNOSIS — K29.70 GASTRITIS, UNSPECIFIED, WITHOUT BLEEDING: ICD-10-CM

## 2017-10-17 DIAGNOSIS — K25.9 GASTRIC ULCER, UNSPECIFIED AS ACUTE OR CHRONIC, WITHOUT HEMORRHAGE OR PERFORATION: ICD-10-CM

## 2017-10-17 DIAGNOSIS — H54.3 UNQUALIFIED VISUAL LOSS, BOTH EYES: ICD-10-CM

## 2017-10-17 DIAGNOSIS — B95.62 METHICILLIN RESISTANT STAPHYLOCOCCUS AUREUS INFECTION AS THE CAUSE OF DISEASES CLASSIFIED ELSEWHERE: ICD-10-CM

## 2017-10-18 ENCOUNTER — INPATIENT (INPATIENT)
Facility: HOSPITAL | Age: 62
LOS: 6 days | Discharge: HOME | End: 2017-10-25
Attending: INTERNAL MEDICINE | Admitting: INTERNAL MEDICINE
Payer: MEDICARE

## 2017-10-18 VITALS
WEIGHT: 315 LBS | HEIGHT: 73 IN | RESPIRATION RATE: 20 BRPM | TEMPERATURE: 98 F | HEART RATE: 101 BPM | OXYGEN SATURATION: 97 %

## 2017-10-18 DIAGNOSIS — Z95.5 PRESENCE OF CORONARY ANGIOPLASTY IMPLANT AND GRAFT: Chronic | ICD-10-CM

## 2017-10-18 DIAGNOSIS — S88.111D COMPLETE TRAUMATIC AMPUTATION AT LEVEL BETWEEN KNEE AND ANKLE, RIGHT LOWER LEG, SUBSEQUENT ENCOUNTER: Chronic | ICD-10-CM

## 2017-10-18 DIAGNOSIS — E08.8 DIABETES MELLITUS DUE TO UNDERLYING CONDITION WITH UNSPECIFIED COMPLICATIONS: ICD-10-CM

## 2017-10-18 DIAGNOSIS — I10 ESSENTIAL (PRIMARY) HYPERTENSION: ICD-10-CM

## 2017-10-18 DIAGNOSIS — K92.2 GASTROINTESTINAL HEMORRHAGE, UNSPECIFIED: ICD-10-CM

## 2017-10-18 DIAGNOSIS — G62.9 POLYNEUROPATHY, UNSPECIFIED: ICD-10-CM

## 2017-10-18 LAB
ALBUMIN SERPL ELPH-MCNC: 3.2 G/DL — LOW (ref 3.3–5)
ALP SERPL-CCNC: 78 U/L — SIGNIFICANT CHANGE UP (ref 40–120)
ALT FLD-CCNC: 11 U/L — LOW (ref 12–78)
AMYLASE P1 CFR SERPL: 26 U/L — SIGNIFICANT CHANGE UP (ref 25–115)
ANION GAP SERPL CALC-SCNC: 15 MMOL/L — SIGNIFICANT CHANGE UP (ref 5–17)
ANISOCYTOSIS BLD QL: SLIGHT — SIGNIFICANT CHANGE UP
APPEARANCE UR: CLEAR — SIGNIFICANT CHANGE UP
AST SERPL-CCNC: 17 U/L — SIGNIFICANT CHANGE UP (ref 15–37)
BACTERIA # UR AUTO: ABNORMAL
BILIRUB DIRECT SERPL-MCNC: 0.15 MG/DL — SIGNIFICANT CHANGE UP (ref 0.05–0.2)
BILIRUB INDIRECT FLD-MCNC: 0.4 MG/DL — SIGNIFICANT CHANGE UP (ref 0.2–1)
BILIRUB SERPL-MCNC: 0.6 MG/DL — SIGNIFICANT CHANGE UP (ref 0.2–1.2)
BILIRUB UR-MCNC: NEGATIVE — SIGNIFICANT CHANGE UP
BUN SERPL-MCNC: 41 MG/DL — HIGH (ref 7–23)
CALCIUM SERPL-MCNC: 8.7 MG/DL — SIGNIFICANT CHANGE UP (ref 8.5–10.1)
CHLORIDE SERPL-SCNC: 105 MMOL/L — SIGNIFICANT CHANGE UP (ref 96–108)
CO2 SERPL-SCNC: 21 MMOL/L — LOW (ref 22–31)
COLOR SPEC: YELLOW — SIGNIFICANT CHANGE UP
COMMENT - URINE: SIGNIFICANT CHANGE UP
CREAT SERPL-MCNC: 3.15 MG/DL — HIGH (ref 0.5–1.3)
DIFF PNL FLD: ABNORMAL
EOSINOPHIL NFR BLD AUTO: 1 % — SIGNIFICANT CHANGE UP (ref 0–6)
EPI CELLS # UR: SIGNIFICANT CHANGE UP
GLUCOSE BLDC GLUCOMTR-MCNC: 112 MG/DL — HIGH (ref 70–99)
GLUCOSE BLDC GLUCOMTR-MCNC: 99 MG/DL — SIGNIFICANT CHANGE UP (ref 70–99)
GLUCOSE SERPL-MCNC: 91 MG/DL — SIGNIFICANT CHANGE UP (ref 70–99)
GLUCOSE UR QL: NEGATIVE MG/DL — SIGNIFICANT CHANGE UP
HCT VFR BLD CALC: 30.8 % — LOW (ref 39–50)
HGB BLD-MCNC: 10 G/DL — LOW (ref 13–17)
KETONES UR-MCNC: ABNORMAL
LEUKOCYTE ESTERASE UR-ACNC: NEGATIVE — SIGNIFICANT CHANGE UP
LIDOCAIN IGE QN: 45 U/L — LOW (ref 73–393)
LYMPHOCYTES # BLD AUTO: 6 % — LOW (ref 13–44)
MCHC RBC-ENTMCNC: 29.9 PG — SIGNIFICANT CHANGE UP (ref 27–34)
MCHC RBC-ENTMCNC: 32.5 GM/DL — SIGNIFICANT CHANGE UP (ref 32–36)
MCV RBC AUTO: 92 FL — SIGNIFICANT CHANGE UP (ref 80–100)
MONOCYTES NFR BLD AUTO: 7 % — SIGNIFICANT CHANGE UP (ref 2–14)
NEUTROPHILS NFR BLD AUTO: 85 % — HIGH (ref 43–77)
NEUTS BAND # BLD: 1 % — SIGNIFICANT CHANGE UP (ref 0–8)
NITRITE UR-MCNC: NEGATIVE — SIGNIFICANT CHANGE UP
OB PNL STL: POSITIVE
PH UR: 5 — SIGNIFICANT CHANGE UP (ref 5–8)
PLAT MORPH BLD: NORMAL — SIGNIFICANT CHANGE UP
PLATELET # BLD AUTO: 225 K/UL — SIGNIFICANT CHANGE UP (ref 150–400)
POTASSIUM SERPL-MCNC: 4.3 MMOL/L — SIGNIFICANT CHANGE UP (ref 3.5–5.3)
POTASSIUM SERPL-SCNC: 4.3 MMOL/L — SIGNIFICANT CHANGE UP (ref 3.5–5.3)
PROT SERPL-MCNC: 7.4 GM/DL — SIGNIFICANT CHANGE UP (ref 6–8.3)
PROT UR-MCNC: 100 MG/DL
RBC # BLD: 3.35 M/UL — LOW (ref 4.2–5.8)
RBC # FLD: 13.5 % — SIGNIFICANT CHANGE UP (ref 11–15)
RBC BLD AUTO: ABNORMAL
RBC CASTS # UR COMP ASSIST: ABNORMAL /HPF (ref 0–4)
SODIUM SERPL-SCNC: 141 MMOL/L — SIGNIFICANT CHANGE UP (ref 135–145)
SP GR SPEC: 1.01 — SIGNIFICANT CHANGE UP (ref 1.01–1.02)
UROBILINOGEN FLD QL: NEGATIVE MG/DL — SIGNIFICANT CHANGE UP
WBC # BLD: 9.1 K/UL — SIGNIFICANT CHANGE UP (ref 3.8–10.5)
WBC # FLD AUTO: 9.1 K/UL — SIGNIFICANT CHANGE UP (ref 3.8–10.5)
WBC UR QL: SIGNIFICANT CHANGE UP

## 2017-10-18 PROCEDURE — 99223 1ST HOSP IP/OBS HIGH 75: CPT

## 2017-10-18 PROCEDURE — 74176 CT ABD & PELVIS W/O CONTRAST: CPT | Mod: 26

## 2017-10-18 PROCEDURE — 99285 EMERGENCY DEPT VISIT HI MDM: CPT

## 2017-10-18 RX ORDER — OXYCODONE AND ACETAMINOPHEN 5; 325 MG/1; MG/1
1 TABLET ORAL EVERY 8 HOURS
Qty: 0 | Refills: 0 | Status: DISCONTINUED | OUTPATIENT
Start: 2017-10-18 | End: 2017-10-23

## 2017-10-18 RX ORDER — INSULIN LISPRO 100/ML
VIAL (ML) SUBCUTANEOUS AT BEDTIME
Qty: 0 | Refills: 0 | Status: DISCONTINUED | OUTPATIENT
Start: 2017-10-18 | End: 2017-10-25

## 2017-10-18 RX ORDER — DEXTROSE 50 % IN WATER 50 %
1 SYRINGE (ML) INTRAVENOUS ONCE
Qty: 0 | Refills: 0 | Status: DISCONTINUED | OUTPATIENT
Start: 2017-10-18 | End: 2017-10-25

## 2017-10-18 RX ORDER — SODIUM CHLORIDE 9 MG/ML
1000 INJECTION INTRAMUSCULAR; INTRAVENOUS; SUBCUTANEOUS
Qty: 0 | Refills: 0 | Status: DISCONTINUED | OUTPATIENT
Start: 2017-10-18 | End: 2017-10-21

## 2017-10-18 RX ORDER — SODIUM CHLORIDE 9 MG/ML
1000 INJECTION, SOLUTION INTRAVENOUS
Qty: 0 | Refills: 0 | Status: DISCONTINUED | OUTPATIENT
Start: 2017-10-18 | End: 2017-10-25

## 2017-10-18 RX ORDER — DEXTROSE 50 % IN WATER 50 %
25 SYRINGE (ML) INTRAVENOUS ONCE
Qty: 0 | Refills: 0 | Status: DISCONTINUED | OUTPATIENT
Start: 2017-10-18 | End: 2017-10-25

## 2017-10-18 RX ORDER — IOHEXOL 300 MG/ML
30 INJECTION, SOLUTION INTRAVENOUS ONCE
Qty: 0 | Refills: 0 | Status: COMPLETED | OUTPATIENT
Start: 2017-10-18 | End: 2017-10-18

## 2017-10-18 RX ORDER — INSULIN LISPRO 100/ML
VIAL (ML) SUBCUTANEOUS
Qty: 0 | Refills: 0 | Status: DISCONTINUED | OUTPATIENT
Start: 2017-10-18 | End: 2017-10-25

## 2017-10-18 RX ORDER — CLOPIDOGREL BISULFATE 75 MG/1
75 TABLET, FILM COATED ORAL DAILY
Qty: 0 | Refills: 0 | Status: DISCONTINUED | OUTPATIENT
Start: 2017-10-18 | End: 2017-10-25

## 2017-10-18 RX ORDER — DOCUSATE SODIUM 100 MG
100 CAPSULE ORAL THREE TIMES A DAY
Qty: 0 | Refills: 0 | Status: DISCONTINUED | OUTPATIENT
Start: 2017-10-18 | End: 2017-10-22

## 2017-10-18 RX ORDER — ONDANSETRON 8 MG/1
4 TABLET, FILM COATED ORAL EVERY 8 HOURS
Qty: 0 | Refills: 0 | Status: DISCONTINUED | OUTPATIENT
Start: 2017-10-18 | End: 2017-10-25

## 2017-10-18 RX ORDER — PANTOPRAZOLE SODIUM 20 MG/1
40 TABLET, DELAYED RELEASE ORAL EVERY 12 HOURS
Qty: 0 | Refills: 0 | Status: DISCONTINUED | OUTPATIENT
Start: 2017-10-18 | End: 2017-10-23

## 2017-10-18 RX ORDER — ATORVASTATIN CALCIUM 80 MG/1
40 TABLET, FILM COATED ORAL AT BEDTIME
Qty: 0 | Refills: 0 | Status: DISCONTINUED | OUTPATIENT
Start: 2017-10-18 | End: 2017-10-25

## 2017-10-18 RX ORDER — FAMOTIDINE 10 MG/ML
20 INJECTION INTRAVENOUS ONCE
Qty: 0 | Refills: 0 | Status: COMPLETED | OUTPATIENT
Start: 2017-10-18 | End: 2017-10-18

## 2017-10-18 RX ORDER — GLUCAGON INJECTION, SOLUTION 0.5 MG/.1ML
1 INJECTION, SOLUTION SUBCUTANEOUS ONCE
Qty: 0 | Refills: 0 | Status: DISCONTINUED | OUTPATIENT
Start: 2017-10-18 | End: 2017-10-25

## 2017-10-18 RX ORDER — ONDANSETRON 8 MG/1
8 TABLET, FILM COATED ORAL ONCE
Qty: 0 | Refills: 0 | Status: COMPLETED | OUTPATIENT
Start: 2017-10-18 | End: 2017-10-18

## 2017-10-18 RX ORDER — LACTOBACILLUS ACIDOPHILUS 100MM CELL
1 CAPSULE ORAL
Qty: 0 | Refills: 0 | Status: DISCONTINUED | OUTPATIENT
Start: 2017-10-18 | End: 2017-10-25

## 2017-10-18 RX ORDER — SUCRALFATE 1 G
1 TABLET ORAL
Qty: 0 | Refills: 0 | Status: DISCONTINUED | OUTPATIENT
Start: 2017-10-18 | End: 2017-10-25

## 2017-10-18 RX ORDER — DEXTROSE 50 % IN WATER 50 %
12.5 SYRINGE (ML) INTRAVENOUS ONCE
Qty: 0 | Refills: 0 | Status: DISCONTINUED | OUTPATIENT
Start: 2017-10-18 | End: 2017-10-25

## 2017-10-18 RX ADMIN — FAMOTIDINE 20 MILLIGRAM(S): 10 INJECTION INTRAVENOUS at 13:26

## 2017-10-18 RX ADMIN — IOHEXOL 30 MILLILITER(S): 300 INJECTION, SOLUTION INTRAVENOUS at 13:26

## 2017-10-18 RX ADMIN — ONDANSETRON 8 MILLIGRAM(S): 8 TABLET, FILM COATED ORAL at 13:26

## 2017-10-18 RX ADMIN — SODIUM CHLORIDE 100 MILLILITER(S): 9 INJECTION INTRAMUSCULAR; INTRAVENOUS; SUBCUTANEOUS at 21:02

## 2017-10-18 NOTE — ED PROVIDER NOTE - CARE PLAN
Principal Discharge DX:	Lower GI bleed  Secondary Diagnosis:	UTI (urinary tract infection)  Secondary Diagnosis:	Pneumonia

## 2017-10-18 NOTE — H&P ADULT - NSHPLABSRESULTS_GEN_ALL_CORE
10.0   9.1   )-----------( 225      ( 18 Oct 2017 12:19 )             30.8   10-18    141  |  105  |  41<H>  ----------------------------<  91  4.3   |  21<L>  |  3.15<H>    Ca    8.7      18 Oct 2017 12:19    TPro  7.4  /  Alb  3.2<L>  /  TBili  0.6  /  DBili  .15  /  AST  17  /  ALT  11<L>  /  AlkPhos  78  10-18

## 2017-10-18 NOTE — H&P ADULT - NSHPPHYSICALEXAM_GEN_ALL_CORE
GENERAL: NAD well-developed  HEAD:  Atraumatic, Normocephalic  EYES:  bilateral  cataracts   ENMT: No tonsillar erythema, exudates, or enlargement; Moist mucous membranes, Good dentition, No lesions  NECK: Supple, No JVD, Normal thyroid  NERVOUS SYSTEM:  Alert & Oriented X3, Good concentration; Motor Strength 5/5 B/L upper and lower extremities; DTRs 2+ intact and symmetric  CHEST/LUNG: Clear to percussion bilaterally; No rales, rhonchi, wheezing, or rubs  HEART: Regular rate and rhythm; No murmurs, rubs, or gallops  ABDOMEN: Soft, Nontender, Nondistended; Bowel sounds present  EXTREMITIES:  2+ Peripheral Pulses, No clubbing, cyanosis, or edema  LYMPH: No lymphadenopathy   SKIN: No rashes or lesions

## 2017-10-18 NOTE — H&P ADULT - ASSESSMENT
IMPROVE VTE Individual Risk Assessment          RISK                                                          Points    [  ] Previous VTE                                                3    [  ] Thrombophilia                                             2    [  ] Lower limb paralysis                                    2        (unable to hold up >15 seconds)      [  ] Current Cancer                                             2         (within 6 months)    [ x ] Immobilization > 24 hrs                              1    [  ] ICU/CCU stay > 24 hours                            1    [x  ] Age > 60                                                    1    IMPROVE VTE Score ___2______  no anticoagulation because of active bleeding

## 2017-10-18 NOTE — ED ADULT NURSE NOTE - OBJECTIVE STATEMENT
Reports having abdominal pain, nausea and vomiting starting sunday night, was discharged from [facility] on friday for "Kidney problems"

## 2017-10-18 NOTE — ED PROVIDER NOTE - OBJECTIVE STATEMENT
62 year old male with h/o HTN, DM, right BKA, CAD, neuropathy, and blindness presents today c/o abdominal pain and vomiting this morning, pt was recently discharged from [facility] on [address] renal failure due to antibiotics as per his wife, pt was being treated for infection his wife states that he was doing well until this morning, (-) fevers or chills

## 2017-10-18 NOTE — H&P ADULT - HISTORY OF PRESENT ILLNESS
This is a 62 years old male by ems c/o persistent  nausea vomiting for 1 days and unable to take orally. He had been discharged from the hospital l5 days ago. 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 Pt denies headache, dizziness, sob, cough, chest pain, fever, chills, dysuria, hematuria, or irregular bowel movements.

## 2017-10-18 NOTE — ED ADULT NURSE REASSESSMENT NOTE - NS ED NURSE REASSESS COMMENT FT1
Pt continue to be monitored awaiting bed assignment. No apparent respiratory distress noted at this time

## 2017-10-19 DIAGNOSIS — K92.2 GASTROINTESTINAL HEMORRHAGE, UNSPECIFIED: ICD-10-CM

## 2017-10-19 LAB
ANION GAP SERPL CALC-SCNC: 15 MMOL/L — SIGNIFICANT CHANGE UP (ref 5–17)
BUN SERPL-MCNC: 37 MG/DL — HIGH (ref 7–23)
CALCIUM SERPL-MCNC: 8.3 MG/DL — LOW (ref 8.5–10.1)
CHLORIDE SERPL-SCNC: 107 MMOL/L — SIGNIFICANT CHANGE UP (ref 96–108)
CO2 SERPL-SCNC: 21 MMOL/L — LOW (ref 22–31)
CREAT SERPL-MCNC: 2.97 MG/DL — HIGH (ref 0.5–1.3)
CULTURE RESULTS: NO GROWTH — SIGNIFICANT CHANGE UP
GLUCOSE BLDC GLUCOMTR-MCNC: 110 MG/DL — HIGH (ref 70–99)
GLUCOSE BLDC GLUCOMTR-MCNC: 148 MG/DL — HIGH (ref 70–99)
GLUCOSE BLDC GLUCOMTR-MCNC: 175 MG/DL — HIGH (ref 70–99)
GLUCOSE SERPL-MCNC: 107 MG/DL — HIGH (ref 70–99)
HCT VFR BLD CALC: 27.9 % — LOW (ref 39–50)
HGB BLD-MCNC: 8.9 G/DL — LOW (ref 13–17)
MCHC RBC-ENTMCNC: 29.1 PG — SIGNIFICANT CHANGE UP (ref 27–34)
MCHC RBC-ENTMCNC: 31.7 GM/DL — LOW (ref 32–36)
MCV RBC AUTO: 91.7 FL — SIGNIFICANT CHANGE UP (ref 80–100)
MYELOPEROXIDASE AB SER-ACNC: <5 UNITS — SIGNIFICANT CHANGE UP
MYELOPEROXIDASE CELLS FLD QL: NEGATIVE — SIGNIFICANT CHANGE UP
PLATELET # BLD AUTO: 189 K/UL — SIGNIFICANT CHANGE UP (ref 150–400)
POTASSIUM SERPL-MCNC: 3.9 MMOL/L — SIGNIFICANT CHANGE UP (ref 3.5–5.3)
POTASSIUM SERPL-SCNC: 3.9 MMOL/L — SIGNIFICANT CHANGE UP (ref 3.5–5.3)
PROTEINASE3 AB FLD-ACNC: <5 UNITS — SIGNIFICANT CHANGE UP
PROTEINASE3 AB SER-ACNC: NEGATIVE — SIGNIFICANT CHANGE UP
RBC # BLD: 3.05 M/UL — LOW (ref 4.2–5.8)
RBC # FLD: 13.6 % — SIGNIFICANT CHANGE UP (ref 11–15)
SODIUM SERPL-SCNC: 143 MMOL/L — SIGNIFICANT CHANGE UP (ref 135–145)
SPECIMEN SOURCE: SIGNIFICANT CHANGE UP
WBC # BLD: 6.4 K/UL — SIGNIFICANT CHANGE UP (ref 3.8–10.5)
WBC # FLD AUTO: 6.4 K/UL — SIGNIFICANT CHANGE UP (ref 3.8–10.5)

## 2017-10-19 PROCEDURE — 99233 SBSQ HOSP IP/OBS HIGH 50: CPT

## 2017-10-19 PROCEDURE — 93010 ELECTROCARDIOGRAM REPORT: CPT

## 2017-10-19 RX ORDER — POLYETHYLENE GLYCOL 3350 17 G/17G
17 POWDER, FOR SOLUTION ORAL
Qty: 0 | Refills: 0 | Status: DISCONTINUED | OUTPATIENT
Start: 2017-10-19 | End: 2017-10-22

## 2017-10-19 RX ORDER — ONDANSETRON 8 MG/1
4 TABLET, FILM COATED ORAL ONCE
Qty: 0 | Refills: 0 | Status: COMPLETED | OUTPATIENT
Start: 2017-10-19 | End: 2017-10-19

## 2017-10-19 RX ADMIN — Medication 2: at 18:15

## 2017-10-19 RX ADMIN — PANTOPRAZOLE SODIUM 40 MILLIGRAM(S): 20 TABLET, DELAYED RELEASE ORAL at 18:15

## 2017-10-19 RX ADMIN — SODIUM CHLORIDE 100 MILLILITER(S): 9 INJECTION INTRAMUSCULAR; INTRAVENOUS; SUBCUTANEOUS at 12:37

## 2017-10-19 RX ADMIN — ONDANSETRON 4 MILLIGRAM(S): 8 TABLET, FILM COATED ORAL at 21:32

## 2017-10-19 RX ADMIN — ATORVASTATIN CALCIUM 40 MILLIGRAM(S): 80 TABLET, FILM COATED ORAL at 21:32

## 2017-10-19 RX ADMIN — CLOPIDOGREL BISULFATE 75 MILLIGRAM(S): 75 TABLET, FILM COATED ORAL at 12:37

## 2017-10-19 RX ADMIN — Medication 1 GRAM(S): at 23:42

## 2017-10-19 RX ADMIN — Medication 1 GRAM(S): at 05:24

## 2017-10-19 RX ADMIN — Medication 1 GRAM(S): at 12:37

## 2017-10-19 RX ADMIN — Medication 1 GRAM(S): at 00:43

## 2017-10-19 RX ADMIN — PANTOPRAZOLE SODIUM 40 MILLIGRAM(S): 20 TABLET, DELAYED RELEASE ORAL at 05:24

## 2017-10-19 RX ADMIN — Medication 1 TABLET(S): at 18:15

## 2017-10-19 RX ADMIN — Medication 1 TABLET(S): at 12:37

## 2017-10-19 RX ADMIN — Medication 1 GRAM(S): at 18:15

## 2017-10-19 NOTE — PROGRESS NOTE ADULT - PROBLEM SELECTOR PROBLEM 3
Pneumonia of both lower lobes due to infectious organism Diabetes mellitus due to underlying condition with complications

## 2017-10-19 NOTE — PROGRESS NOTE ADULT - SUBJECTIVE AND OBJECTIVE BOX
Patient is a 62y old  Male who presents with a chief complaint of GI bleed (18 Oct 2017 23:30)      OVERNIGHT EVENTS: Patient is a 62y old  Male who was readmitted 1-2 weeks after last admission for eval of nausea, dec intake, diarrhea. Clinically better since yest. Nausea has resolved. Diarrhea persists. Seen by Renal as outpt for severe ASUNCION due to Vanco/nephrotoxic + septic ATN. Cr was 4.34 at discharge on 10/13.     REVIEW OF SYSTEMS: denies chest pain/SOB, diaphoresis, no F/C, cough, dizziness, headache, blurry vision, vomiting, abdominal pain. All others review of systems negative     MEDICATIONS  (STANDING):  atorvastatin 40 milliGRAM(s) Oral at bedtime  clopidogrel Tablet 75 milliGRAM(s) Oral daily  dextrose 5%. 1000 milliLiter(s) (50 mL/Hr) IV Continuous <Continuous>  dextrose 50% Injectable 12.5 Gram(s) IV Push once  dextrose 50% Injectable 25 Gram(s) IV Push once  dextrose 50% Injectable 25 Gram(s) IV Push once  docusate sodium 100 milliGRAM(s) Oral three times a day  insulin lispro (HumaLOG) corrective regimen sliding scale   SubCutaneous three times a day before meals  insulin lispro (HumaLOG) corrective regimen sliding scale   SubCutaneous at bedtime  lactobacillus acidophilus 1 Tablet(s) Oral two times a day with meals  pantoprazole  Injectable 40 milliGRAM(s) IV Push every 12 hours  sodium chloride 0.9%. 1000 milliLiter(s) (100 mL/Hr) IV Continuous <Continuous>  sucralfate 1 Gram(s) Oral four times a day    MEDICATIONS  (PRN):  dextrose Gel 1 Dose(s) Oral once PRN Blood Glucose LESS THAN 70 milliGRAM(s)/deciliter  glucagon  Injectable 1 milliGRAM(s) IntraMuscular once PRN Glucose LESS THAN 70 milligrams/deciliter  ondansetron Injectable 4 milliGRAM(s) IV Push every 8 hours PRN Nausea and/or Vomiting  oxyCODONE    5 mG/acetaminophen 325 mG 1 Tablet(s) Oral every 8 hours PRN Moderate Pain (4 - 6)      Allergies    No Known Allergies    Intolerances        T(F): 98 (10-19-17 @ 05:58), Max: 98.2 (10-18-17 @ 15:34)  HR: 94 (10-19-17 @ 05:58) (88 - 96)  BP: 160/73 (10-19-17 @ 05:58) (160/73 - 168/78)  RR: 20 (10-19-17 @ 05:58) (17 - 20)  SpO2: 94% (10-19-17 @ 05:58) (94% - 98%)  Wt(kg): --    PHYSICAL EXAM:  GENERAL: NAD, well-groomed, well-developed  HEAD:  Atraumatic, Normocephalic  EYES: EOMI, PERRLA, conjunctiva and sclera clear  ENMT: No tonsillar erythema, exudates, or enlargement; Moist mucous membranes, Good dentition, No lesions  NECK: Supple, No JVD, Normal thyroid  NERVOUS SYSTEM:  Alert & Oriented X3, Good concentration; Motor Strength 5/5 B/L upper and lower extremities; DTRs 2+ intact and symmetric  CHEST/LUNG: Clear to percussion bilaterally; No rales, rhonchi, wheezing, or rubs BL  HEART: Regular rate and rhythm; No murmurs, rubs, or gallops  ABDOMEN: Soft, mild tenderness to palpation, Nondistended; Bowel sounds present  EXTREMITIES:  R BKA, L trace edema  LYMPH: No lymphadenopathy noted  SKIN: No rashes or lesions    LABS:                        8.9    6.4   )-----------( 189      ( 19 Oct 2017 10:43 )             27.9     10-    143  |  107  |  37<H>  ----------------------------<  107<H>  3.9   |  21<L>  |  2.97<H>    Ca    8.3<L>      19 Oct 2017 10:43    TPro  7.4  /  Alb  3.2<L>  /  TBili  0.6  /  DBili  .15  /  AST  17  /  ALT  11<L>  /  AlkPhos  78  10-18      Urinalysis Basic - ( 18 Oct 2017 12:19 )    Color: Yellow / Appearance: Clear / S.015 / pH: x  Gluc: x / Ketone: Moderate  / Bili: Negative / Urobili: Negative mg/dL   Blood: x / Protein: 100 mg/dL / Nitrite: Negative   Leuk Esterase: Negative / RBC: 11-25 /HPF / WBC 0-2   Sq Epi: x / Non Sq Epi: Few / Bacteria: Moderate      Cultures;   CAPILLARY BLOOD GLUCOSE      POCT Blood Glucose.: 110 mg/dL (19 Oct 2017 09:02)  POCT Blood Glucose.: 99 mg/dL (18 Oct 2017 23:53)    Lipid panel:           RADIOLOGY & ADDITIONAL TESTS:    Imaging Personally Reviewed:  [x ] YES      Consultant(s) Notes Reviewed:  [x ] YES     Care Discussed with [x ] Consultants [X ] Patient [ ] Family  [x ]    [x ]  Other; RN Patient is a 62y old  Male who presents with a chief complaint of GI bleed (18 Oct 2017 23:30)      OVERNIGHT EVENTS: Patient is a 62y old  Male who was readmitted 1-2 weeks after last admission for eval of nausea, dec intake, diarrhea. Clinically better since yest. Nausea has resolved. Diarrhea persists. Seen by Renal as outpt for severe ASUNCION due to Vanco/nephrotoxic + septic ATN. Cr was 4.34 at discharge on 10/13.     REVIEW OF SYSTEMS: denies chest pain/SOB, diaphoresis, no F/C, cough, dizziness, headache, blurry vision, vomiting, abdominal pain. All others review of systems negative     MEDICATIONS  (STANDING):  atorvastatin 40 milliGRAM(s) Oral at bedtime  clopidogrel Tablet 75 milliGRAM(s) Oral daily  dextrose 5%. 1000 milliLiter(s) (50 mL/Hr) IV Continuous <Continuous>  dextrose 50% Injectable 12.5 Gram(s) IV Push once  dextrose 50% Injectable 25 Gram(s) IV Push once  dextrose 50% Injectable 25 Gram(s) IV Push once  docusate sodium 100 milliGRAM(s) Oral three times a day  insulin lispro (HumaLOG) corrective regimen sliding scale   SubCutaneous three times a day before meals  insulin lispro (HumaLOG) corrective regimen sliding scale   SubCutaneous at bedtime  lactobacillus acidophilus 1 Tablet(s) Oral two times a day with meals  pantoprazole  Injectable 40 milliGRAM(s) IV Push every 12 hours  sodium chloride 0.9%. 1000 milliLiter(s) (100 mL/Hr) IV Continuous <Continuous>  sucralfate 1 Gram(s) Oral four times a day    MEDICATIONS  (PRN):  dextrose Gel 1 Dose(s) Oral once PRN Blood Glucose LESS THAN 70 milliGRAM(s)/deciliter  glucagon  Injectable 1 milliGRAM(s) IntraMuscular once PRN Glucose LESS THAN 70 milligrams/deciliter  ondansetron Injectable 4 milliGRAM(s) IV Push every 8 hours PRN Nausea and/or Vomiting  oxyCODONE    5 mG/acetaminophen 325 mG 1 Tablet(s) Oral every 8 hours PRN Moderate Pain (4 - 6)      Allergies    No Known Allergies    Intolerances        T(F): 98 (10-19-17 @ 05:58), Max: 98.2 (10-18-17 @ 15:34)  HR: 94 (10-19-17 @ 05:58) (88 - 96)  BP: 160/73 (10-19-17 @ 05:58) (160/73 - 168/78)  RR: 20 (10-19-17 @ 05:58) (17 - 20)  SpO2: 94% (10-19-17 @ 05:58) (94% - 98%)  Wt(kg): --    PHYSICAL EXAM:  GENERAL: NAD, well-groomed, well-developed  HEAD:  Atraumatic, Normocephalic  EYES: EOMI, PERRLA, conjunctiva and sclera clear  ENMT: No tonsillar erythema, exudates, or enlargement; Moist mucous membranes, Good dentition, No lesions  NECK: Supple, No JVD, Normal thyroid  NERVOUS SYSTEM:  Alert & Oriented X3, Good concentration; Motor Strength 5/5 B/L upper and lower extremities; DTRs 2+ intact and symmetric  CHEST/LUNG: Clear to percussion bilaterally; No rales, rhonchi, wheezing, or rubs BL  HEART: Regular rate and rhythm; No murmurs, rubs, or gallops  ABDOMEN: Soft, mild tenderness to palpation, Nondistended; Bowel sounds present  EXTREMITIES:  R BKA, L trace edema  LYMPH: No lymphadenopathy noted  SKIN: No rashes or lesions    LABS:                        8.9    6.4   )-----------( 189      ( 19 Oct 2017 10:43 )             27.9     10-    143  |  107  |  37<H>  ----------------------------<  107<H>  3.9   |  21<L>  |  2.97<H>    Ca    8.3<L>      19 Oct 2017 10:43    TPro  7.4  /  Alb  3.2<L>  /  TBili  0.6  /  DBili  .15  /  AST  17  /  ALT  11<L>  /  AlkPhos  78  10-18      Urinalysis Basic - ( 18 Oct 2017 12:19 )    Color: Yellow / Appearance: Clear / S.015 / pH: x  Gluc: x / Ketone: Moderate  / Bili: Negative / Urobili: Negative mg/dL   Blood: x / Protein: 100 mg/dL / Nitrite: Negative   Leuk Esterase: Negative / RBC: 11-25 /HPF / WBC 0-2   Sq Epi: x / Non Sq Epi: Few / Bacteria: Moderate      Cultures;   CAPILLARY BLOOD GLUCOSE      POCT Blood Glucose.: 110 mg/dL (19 Oct 2017 09:02)  POCT Blood Glucose.: 99 mg/dL (18 Oct 2017 23:53)    Lipid panel:       RADIOLOGY & ADDITIONAL TESTS:    Imaging Personally Reviewed:  [x ] YES      Consultant(s) Notes Reviewed:  [x ] YES     Care Discussed with [x ] Consultants [X ] Patient [ ] Family  [x ]    [x ]  Other; RN Patient is a 62y old  Male who presents with a chief complaint of GI bleed (18 Oct 2017 23:30)      OVERNIGHT EVENTS: Patient is a 62y old  Male who was readmitted 1-2 weeks after last admission for eval of nausea, dec intake, diarrhea. Clinically better since yest. Nausea has resolved. Diarrhea persists. Seen by Renal as outpt for severe ASUNCION due to Vanco/nephrotoxic + septic ATN. Cr was 4.34 at discharge on 10/13.     REVIEW OF SYSTEMS: denies chest pain/SOB, diaphoresis, no F/C, cough, dizziness, headache, blurry vision, vomiting, abdominal pain. All others review of systems negative     MEDICATIONS  (STANDING):  atorvastatin 40 milliGRAM(s) Oral at bedtime  clopidogrel Tablet 75 milliGRAM(s) Oral daily  dextrose 5%. 1000 milliLiter(s) (50 mL/Hr) IV Continuous <Continuous>  dextrose 50% Injectable 12.5 Gram(s) IV Push once  dextrose 50% Injectable 25 Gram(s) IV Push once  dextrose 50% Injectable 25 Gram(s) IV Push once  docusate sodium 100 milliGRAM(s) Oral three times a day  insulin lispro (HumaLOG) corrective regimen sliding scale   SubCutaneous three times a day before meals  insulin lispro (HumaLOG) corrective regimen sliding scale   SubCutaneous at bedtime  lactobacillus acidophilus 1 Tablet(s) Oral two times a day with meals  pantoprazole  Injectable 40 milliGRAM(s) IV Push every 12 hours  sodium chloride 0.9%. 1000 milliLiter(s) (100 mL/Hr) IV Continuous <Continuous>  sucralfate 1 Gram(s) Oral four times a day    MEDICATIONS  (PRN):  dextrose Gel 1 Dose(s) Oral once PRN Blood Glucose LESS THAN 70 milliGRAM(s)/deciliter  glucagon  Injectable 1 milliGRAM(s) IntraMuscular once PRN Glucose LESS THAN 70 milligrams/deciliter  ondansetron Injectable 4 milliGRAM(s) IV Push every 8 hours PRN Nausea and/or Vomiting  oxyCODONE    5 mG/acetaminophen 325 mG 1 Tablet(s) Oral every 8 hours PRN Moderate Pain (4 - 6)      Allergies    No Known Allergies    Intolerances      T(F): 98 (10-19-17 @ 05:58), Max: 98.2 (10-18-17 @ 15:34)  HR: 94 (10-19-17 @ 05:58) (88 - 96)  BP: 160/73 (10-19-17 @ 05:58) (160/73 - 168/78)  RR: 20 (10-19-17 @ 05:58) (17 - 20)  SpO2: 94% (10-19-17 @ 05:58) (94% - 98%)  Wt(kg): --    PHYSICAL EXAM:  GENERAL: NAD, well-groomed, well-developed  HEAD:  Atraumatic, Normocephalic  EYES: EOMI, PERRLA, conjunctiva and sclera clear  ENMT: No tonsillar erythema, exudates, or enlargement; Moist mucous membranes, Good dentition, No lesions  NECK: Supple, No JVD, Normal thyroid  NERVOUS SYSTEM:  Alert & Oriented X3, Good concentration; Motor Strength 5/5 B/L upper and lower extremities; DTRs 2+ intact and symmetric  CHEST/LUNG: Clear to percussion bilaterally; No rales, rhonchi, wheezing, or rubs BL  HEART: Regular rate and rhythm; No murmurs, rubs, or gallops  ABDOMEN: Soft, mild tenderness to palpation, Nondistended; Bowel sounds present  EXTREMITIES:  R BKA, L trace edema  LYMPH: No lymphadenopathy noted  SKIN: No rashes or lesions    LABS:                        8.9    6.4   )-----------( 189      ( 19 Oct 2017 10:43 )             27.9     10-    143  |  107  |  37<H>  ----------------------------<  107<H>  3.9   |  21<L>  |  2.97<H>    Ca    8.3<L>      19 Oct 2017 10:43    TPro  7.4  /  Alb  3.2<L>  /  TBili  0.6  /  DBili  .15  /  AST  17  /  ALT  11<L>  /  AlkPhos  78  10-18      Urinalysis Basic - ( 18 Oct 2017 12:19 )    Color: Yellow / Appearance: Clear / S.015 / pH: x  Gluc: x / Ketone: Moderate  / Bili: Negative / Urobili: Negative mg/dL   Blood: x / Protein: 100 mg/dL / Nitrite: Negative   Leuk Esterase: Negative / RBC: 11-25 /HPF / WBC 0-2   Sq Epi: x / Non Sq Epi: Few / Bacteria: Moderate      Cultures;   CAPILLARY BLOOD GLUCOSE      POCT Blood Glucose.: 110 mg/dL (19 Oct 2017 09:02)  POCT Blood Glucose.: 99 mg/dL (18 Oct 2017 23:53)    Lipid panel:       RADIOLOGY & ADDITIONAL TESTS:    Imaging Personally Reviewed:  [x ] YES      Consultant(s) Notes Reviewed:  [x ] YES     Care Discussed with [x ] Consultants [X ] Patient [ ] Family  [x ]    [x ]  Other; RN

## 2017-10-19 NOTE — DIETITIAN INITIAL EVALUATION ADULT. - OTHER INFO
Pt seen today due to RN referral for BMI > 40. Pt lives @ home w/ his wife. He has been seen here before and educated on Dash/TLC,  Diabetic diet.  His wife does the food shopping/cooking, he denies food allergies. His appetite and intake PTA was liquids only since he could not keep down only solids. Vomiting has subsided. Currently on Clear liquid diet and tolerated juice so far.  Pt checks his FS 1x/d in the AM.

## 2017-10-19 NOTE — PROGRESS NOTE ADULT - PROBLEM SELECTOR PLAN 4
currently stable ACE I on hold secondary to worsening renal function -currently stable ACE I on hold secondary to worsening renal function

## 2017-10-19 NOTE — DIETITIAN INITIAL EVALUATION ADULT. - PERTINENT LABORATORY DATA
10-19 Na143 mmol/L Glu 107 mg/dL<H> K+ 3.9 mmol/L Cr  2.97 mg/dL<H> BUN 37 mg/dL<H> Phos n/a   Alb n/a   PAB n/a   FS- 9:02 - 110, 8/22- HgbA1c = 6.8

## 2017-10-19 NOTE — DIETITIAN INITIAL EVALUATION ADULT. - NS AS NUTRI INTERV ED CONTENT
1800 calorie, wt loss tips, low fiber, Type 2 DM nutrition therapy, Dash/TLC/Survival information/Purpose of the nutrition education

## 2017-10-19 NOTE — PROGRESS NOTE ADULT - PROBLEM SELECTOR PROBLEM 1
ATN (acute tubular necrosis) Gastrointestinal hemorrhage, unspecified gastrointestinal hemorrhage type

## 2017-10-19 NOTE — PROGRESS NOTE ADULT - PROBLEM SELECTOR PLAN 6
Pneumonia seen on chest ct no correlation with original chest xray  s/p rx -pt completed 6 weeks abx, no further abx needed

## 2017-10-19 NOTE — PROGRESS NOTE ADULT - PROBLEM SELECTOR PLAN 7
infectious disease consult appreciated   pt completed 6 weeks abx  no further abx needed -diet and nutrition consult

## 2017-10-19 NOTE — CONSULT NOTE ADULT - SUBJECTIVE AND OBJECTIVE BOX
HPI:  Patient is a 62y old  Male who was readmitted 1-2 weeks after last admission for eval of nausea, dec intake, diarrhea. Clinically better since yest. Nausea has resolved. Diarrhea persists.   Was seen by our group during last admission for severe ASUNCION due to Vanco/nephrotoxic + septic ATN. Cr was 4.34 at discharge on 10/13.             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 1000 milliLiter(s) (50 mL/Hr) IV Continuous <Continuous>  dextrose 50% Injectable 12.5 Gram(s) IV Push once  dextrose 50% Injectable 25 Gram(s) IV Push once  dextrose 50% Injectable 25 Gram(s) IV Push once  docusate sodium 100 milliGRAM(s) Oral three times a day  insulin lispro (HumaLOG) corrective regimen sliding scale   SubCutaneous three times a day before meals  insulin lispro (HumaLOG) corrective regimen sliding scale   SubCutaneous at bedtime  lactobacillus acidophilus 1 Tablet(s) Oral two times a day with meals  pantoprazole  Injectable 40 milliGRAM(s) IV Push every 12 hours  sodium chloride 0.9%. 1000 milliLiter(s) (100 mL/Hr) IV Continuous <Continuous>  sucralfate 1 Gram(s) Oral four times a day    MEDICATIONS  (PRN):  dextrose Gel 1 Dose(s) Oral once PRN Blood Glucose LESS THAN 70 milliGRAM(s)/deciliter  glucagon  Injectable 1 milliGRAM(s) IntraMuscular once PRN Glucose LESS THAN 70 milligrams/deciliter  ondansetron Injectable 4 milliGRAM(s) IV Push every 8 hours PRN Nausea and/or Vomiting  oxyCODONE    5 mG/acetaminophen 325 mG 1 Tablet(s) Oral every 8 hours PRN Moderate Pain (4 - 6)      Daily Height in cm: 175.26 (18 Oct 2017 23:27)    Daily     Drug Dosing Weight  Height (cm): 175.26 (18 Oct 2017 23:27)  Weight (kg): 156.5 (18 Oct 2017 23:27)  BMI (kg/m2): 51 (18 Oct 2017 23:27)  BSA (m2): 2.6 (18 Oct 2017 23:27)      REVIEW OF SYSTEMS:    CONSTITUTIONAL: No fever, weight loss. POS fatigue  ENMT:  No difficulty hearing, tinnitus, vertigo; No sinus or throat pain  NECK: No pain or stiffness  RESPIRATORY: No cough, wheezing, chills or hemoptysis; No shortness of breath  CARDIOVASCULAR: No chest pain, palpitations, dizziness, or leg swelling  GASTROINTESTINAL: POS abd cramps. POS nausea, vomiting. POS diarrhea  GENITOURINARY: No dysuria, frequency, hematuria, or incontinence  SKIN: No itching, burning, rashes, or lesions   LYMPH NODES: No enlarged glands  NEURO: no asterixis            I&O's Detail    18 Oct 2017 07:01  -  19 Oct 2017 07:00  --------------------------------------------------------  IN:    Oral Fluid: 120 mL    sodium chloride 0.9%.: 1000 mL  Total IN: 1120 mL    OUT:    Voided: 800 mL  Total OUT: 800 mL    Total NET: 320 mL          10-18 @ 07:01  -  10-19 @ 07:00  --------------------------------------------------------  IN: 1120 mL / OUT: 800 mL / NET: 320 mL        PHYSICAL EXAM:    GENERAL: NAD  HEAD:  Atraumatic, Normocephalic  EYES: EOMI, PERRLA, conjunctiva and sclera clear  ENMT: No tonsillar erythema, exudates, or enlargement; Moist mucous membranes, Good dentition, No lesions  NECK: Supple, No JVD, Normal thyroid  NERVOUS SYSTEM:  Alert & Oriented X3, Good concentration; Motor Strength 5/5 B/L upper and lower extremities; DTRs 2+ intact and symmetric  CHEST/LUNG: Clear to percussion bilaterally; No rales, rhonchi, wheezing, or rubs  HEART: Regular rate and rhythm; No murmurs, rubs, or gallops  ABDOMEN: mild tenderness to palpation  EXTREMITIES:   R BKA, L trace edema  LYMPH: No lymphadenopathy noted  SKIN: No rashes or lesions    LABS:  CBC Full  -  ( 19 Oct 2017 10:43 )  WBC Count : 6.4 K/uL  Hemoglobin : 8.9 g/dL  Hematocrit : 27.9 %  Platelet Count - Automated : 189 K/uL  Mean Cell Volume : 91.7 fl  Mean Cell Hemoglobin : 29.1 pg  Mean Cell Hemoglobin Concentration : 31.7 gm/dL  Auto Neutrophil # : x  Auto Lymphocyte # : x  Auto Monocyte # : x  Auto Eosinophil # : x  Auto Basophil # : x  Auto Neutrophil % : x  Auto Lymphocyte % : x  Auto Monocyte % : x  Auto Eosinophil % : x  Auto Basophil % : x    10-19    143  |  107  |  37<H>  ----------------------------<  107<H>  3.9   |  21<L>  |  2.97<H>    Ca    8.3<L>      19 Oct 2017 10:43    TPro  7.4  /  Alb  3.2<L>  /  TBili  0.6  /  DBili  .15  /  AST  17  /  ALT  11<L>  /  AlkPhos  78  10-18    CAPILLARY BLOOD GLUCOSE      POCT Blood Glucose.: 110 mg/dL (19 Oct 2017 09:02)      Urinalysis Basic - ( 18 Oct 2017 12:19 )    Color: Yellow / Appearance: Clear / S.015 / pH: x  Gluc: x / Ketone: Moderate  / Bili: Negative / Urobili: Negative mg/dL   Blood: x / Protein: 100 mg/dL / Nitrite: Negative   Leuk Esterase: Negative / RBC: 11-25 /HPF / WBC 0-2   Sq Epi: x / Non Sq Epi: Few / Bacteria: Moderate      Impression:  * ASUNCION -- multifactorial ATN. ? pre-renal component. Resolving  * Upper GI bleed  Recommendations:  * Cont gentle saline  * Daily BMP  * Nausea is not of uremic etiology

## 2017-10-19 NOTE — DIETITIAN INITIAL EVALUATION ADULT. - NS AS NUTRI INTERV MEALS SNACK
Recommend: Advance p.o. diet as medially feasible -> -> full liquid, CCHO -> low fiber, CCHO w/ evening snack , low fiber, Dash/TLC/Other (specify)

## 2017-10-19 NOTE — PROGRESS NOTE ADULT - PROBLEM SELECTOR PLAN 2
-monitor BG with Insulin sliding scale   -CHO diet -likely secondary to abx use ATN  -renal consult  appreciated   -no current need for dialysis per renal, Cr to trend  -intravenous hydration

## 2017-10-19 NOTE — PROGRESS NOTE ADULT - ASSESSMENT
63 yo male with ho DM2, morbid obesity, CKD 3, a/w ASUNCION/ATN on CKD likely medication induced 61 yo male with ho DM2, morbid obesity, CKD 3, a/w ASUNCION/ATN on CKD likely medication induced. FOBT positive. CT abd- 1. Bilateral lower lobe disease with patchy infiltrates in both lungs and increased interstitial markings. Small bilateral pleural effusions. 2. Dense calcification in the region of both distal ureters, possibly representing bilateral distal ureteral chronic stones. Mild fullness right renal collecting system. Atrophic changes of both kidneys. 3. Extensive fecal retention with probable fecal impaction in the rectum and sigmoid. No acute GI phlegmon abscess or obstruction.

## 2017-10-19 NOTE — PROGRESS NOTE ADULT - PROBLEM SELECTOR PLAN 1
-likely secondary to abx use ATN  -renal consult  appreciated   -no current need for dialysis per renal, Cr to trend  -intravenous hydration -GI consulted  -trend CBC, BMP  -Transfuse PRN  -PPI and Sucralfate

## 2017-10-20 ENCOUNTER — TRANSCRIPTION ENCOUNTER (OUTPATIENT)
Age: 62
End: 2017-10-20

## 2017-10-20 LAB
% ALBUMIN: 55.6 % — SIGNIFICANT CHANGE UP
% ALPHA 1: 6.1 % — SIGNIFICANT CHANGE UP
% ALPHA 2: 14.4 % — SIGNIFICANT CHANGE UP
% BETA: 10.3 % — SIGNIFICANT CHANGE UP
% GAMMA: 13.6 % — SIGNIFICANT CHANGE UP
ALBUMIN SERPL ELPH-MCNC: 3.3 G/DL — LOW (ref 3.6–5.5)
ALBUMIN/GLOB SERPL ELPH: 1.3 RATIO — SIGNIFICANT CHANGE UP
ALPHA1 GLOB SERPL ELPH-MCNC: 0.4 G/DL — SIGNIFICANT CHANGE UP (ref 0.1–0.4)
ALPHA2 GLOB SERPL ELPH-MCNC: 0.8 G/DL — SIGNIFICANT CHANGE UP (ref 0.5–1)
ANION GAP SERPL CALC-SCNC: 12 MMOL/L — SIGNIFICANT CHANGE UP (ref 5–17)
B-GLOBULIN SERPL ELPH-MCNC: 0.6 G/DL — SIGNIFICANT CHANGE UP (ref 0.5–1)
BUN SERPL-MCNC: 32 MG/DL — HIGH (ref 7–23)
CALCIUM SERPL-MCNC: 8.3 MG/DL — LOW (ref 8.5–10.1)
CHLORIDE SERPL-SCNC: 109 MMOL/L — HIGH (ref 96–108)
CO2 SERPL-SCNC: 23 MMOL/L — SIGNIFICANT CHANGE UP (ref 22–31)
CREAT SERPL-MCNC: 2.71 MG/DL — HIGH (ref 0.5–1.3)
GAMMA GLOBULIN: 0.8 G/DL — SIGNIFICANT CHANGE UP (ref 0.6–1.6)
GBM IGG SER-ACNC: <0.2 U — SIGNIFICANT CHANGE UP
GLUCOSE BLDC GLUCOMTR-MCNC: 146 MG/DL — HIGH (ref 70–99)
GLUCOSE BLDC GLUCOMTR-MCNC: 160 MG/DL — HIGH (ref 70–99)
GLUCOSE BLDC GLUCOMTR-MCNC: 179 MG/DL — HIGH (ref 70–99)
GLUCOSE BLDC GLUCOMTR-MCNC: 183 MG/DL — HIGH (ref 70–99)
GLUCOSE SERPL-MCNC: 143 MG/DL — HIGH (ref 70–99)
HCT VFR BLD CALC: 27.4 % — LOW (ref 39–50)
HGB BLD-MCNC: 8.9 G/DL — LOW (ref 13–17)
MCHC RBC-ENTMCNC: 30.1 PG — SIGNIFICANT CHANGE UP (ref 27–34)
MCHC RBC-ENTMCNC: 32.5 GM/DL — SIGNIFICANT CHANGE UP (ref 32–36)
MCV RBC AUTO: 92.7 FL — SIGNIFICANT CHANGE UP (ref 80–100)
PLATELET # BLD AUTO: 163 K/UL — SIGNIFICANT CHANGE UP (ref 150–400)
POTASSIUM SERPL-MCNC: 3.3 MMOL/L — LOW (ref 3.5–5.3)
POTASSIUM SERPL-SCNC: 3.3 MMOL/L — LOW (ref 3.5–5.3)
PROT PATTERN SERPL ELPH-IMP: SIGNIFICANT CHANGE UP
PROT SERPL-MCNC: 5.9 G/DL — LOW (ref 6–8.3)
PROT SERPL-MCNC: 5.9 G/DL — LOW (ref 6–8.3)
RBC # BLD: 2.95 M/UL — LOW (ref 4.2–5.8)
RBC # FLD: 13.1 % — SIGNIFICANT CHANGE UP (ref 11–15)
SODIUM SERPL-SCNC: 144 MMOL/L — SIGNIFICANT CHANGE UP (ref 135–145)
WBC # BLD: 5 K/UL — SIGNIFICANT CHANGE UP (ref 3.8–10.5)
WBC # FLD AUTO: 5 K/UL — SIGNIFICANT CHANGE UP (ref 3.8–10.5)

## 2017-10-20 PROCEDURE — 99233 SBSQ HOSP IP/OBS HIGH 50: CPT

## 2017-10-20 RX ORDER — PANTOPRAZOLE SODIUM 20 MG/1
40 TABLET, DELAYED RELEASE ORAL
Qty: 0 | Refills: 0 | Status: COMPLETED | OUTPATIENT
Start: 2017-10-20 | End: 2017-10-20

## 2017-10-20 RX ORDER — HYDROMORPHONE HYDROCHLORIDE 2 MG/ML
2 INJECTION INTRAMUSCULAR; INTRAVENOUS; SUBCUTANEOUS EVERY 4 HOURS
Qty: 0 | Refills: 0 | Status: DISCONTINUED | OUTPATIENT
Start: 2017-10-20 | End: 2017-10-25

## 2017-10-20 RX ORDER — ONDANSETRON 8 MG/1
4 TABLET, FILM COATED ORAL ONCE
Qty: 0 | Refills: 0 | Status: COMPLETED | OUTPATIENT
Start: 2017-10-20 | End: 2017-10-20

## 2017-10-20 RX ADMIN — ONDANSETRON 4 MILLIGRAM(S): 8 TABLET, FILM COATED ORAL at 17:08

## 2017-10-20 RX ADMIN — PANTOPRAZOLE SODIUM 40 MILLIGRAM(S): 20 TABLET, DELAYED RELEASE ORAL at 07:27

## 2017-10-20 RX ADMIN — Medication: at 19:21

## 2017-10-20 RX ADMIN — Medication 100 MILLIGRAM(S): at 23:10

## 2017-10-20 RX ADMIN — CLOPIDOGREL BISULFATE 75 MILLIGRAM(S): 75 TABLET, FILM COATED ORAL at 13:29

## 2017-10-20 RX ADMIN — ATORVASTATIN CALCIUM 40 MILLIGRAM(S): 80 TABLET, FILM COATED ORAL at 23:10

## 2017-10-20 RX ADMIN — Medication 2: at 07:49

## 2017-10-20 RX ADMIN — PANTOPRAZOLE SODIUM 40 MILLIGRAM(S): 20 TABLET, DELAYED RELEASE ORAL at 18:00

## 2017-10-20 RX ADMIN — Medication 1 GRAM(S): at 07:27

## 2017-10-20 RX ADMIN — Medication 1 GRAM(S): at 23:10

## 2017-10-20 RX ADMIN — Medication 10 MILLIGRAM(S): at 13:29

## 2017-10-20 RX ADMIN — Medication 1 GRAM(S): at 17:08

## 2017-10-20 RX ADMIN — SODIUM CHLORIDE 100 MILLILITER(S): 9 INJECTION INTRAMUSCULAR; INTRAVENOUS; SUBCUTANEOUS at 17:09

## 2017-10-20 RX ADMIN — Medication 1 TABLET(S): at 17:08

## 2017-10-20 RX ADMIN — Medication 1 TABLET(S): at 07:48

## 2017-10-20 RX ADMIN — Medication 2: at 13:32

## 2017-10-20 RX ADMIN — ONDANSETRON 4 MILLIGRAM(S): 8 TABLET, FILM COATED ORAL at 07:27

## 2017-10-20 RX ADMIN — Medication 1 GRAM(S): at 13:30

## 2017-10-20 NOTE — DISCHARGE NOTE ADULT - HOSPITAL COURSE
63 yo male with ho DM2, morbid obesity, CKD 3, a/w ASUNCION/ATN on CKD likely medication induced. FOBT positive. CT abd- 1. Bilateral lower lobe disease with patchy infiltrates in both lungs and increased interstitial markings. Small bilateral pleural effusions. 2. Dense calcification in the region of both distal ureters, possibly representing bilateral distal ureteral chronic stones. Mild fullness right renal collecting system. Atrophic changes of both kidneys. 3. Extensive fecal retention with probable fecal impaction in the rectum and sigmoid. No acute GI phlegmon abscess or obstruction. Seen by GI. Also evaluated by renal, aggressive hydration. Renal function stable and improving.

## 2017-10-20 NOTE — PROGRESS NOTE ADULT - PROBLEM SELECTOR PLAN 2
-likely secondary to abx use ATN  -renal is following up  -no current need for dialysis per renal, Cr to trend  -intravenous hydration

## 2017-10-20 NOTE — PROGRESS NOTE ADULT - SUBJECTIVE AND OBJECTIVE BOX
Patient seen in follow up for ASUNCION; feels better; no distress.    MEDICATIONS  (STANDING):  atorvastatin 40 milliGRAM(s) Oral at bedtime  bisacodyl 10 milliGRAM(s) Oral once  clopidogrel Tablet 75 milliGRAM(s) Oral daily  dextrose 5%. 1000 milliLiter(s) (50 mL/Hr) IV Continuous <Continuous>  dextrose 50% Injectable 12.5 Gram(s) IV Push once  dextrose 50% Injectable 25 Gram(s) IV Push once  dextrose 50% Injectable 25 Gram(s) IV Push once  docusate sodium 100 milliGRAM(s) Oral three times a day  insulin lispro (HumaLOG) corrective regimen sliding scale   SubCutaneous three times a day before meals  insulin lispro (HumaLOG) corrective regimen sliding scale   SubCutaneous at bedtime  lactobacillus acidophilus 1 Tablet(s) Oral two times a day with meals  pantoprazole  Injectable 40 milliGRAM(s) IV Push every 12 hours  polyethylene glycol 3350 17 Gram(s) Oral two times a day  sodium chloride 0.9%. 1000 milliLiter(s) (100 mL/Hr) IV Continuous <Continuous>  sucralfate 1 Gram(s) Oral four times a day    MEDICATIONS  (PRN):  dextrose Gel 1 Dose(s) Oral once PRN Blood Glucose LESS THAN 70 milliGRAM(s)/deciliter  glucagon  Injectable 1 milliGRAM(s) IntraMuscular once PRN Glucose LESS THAN 70 milligrams/deciliter  HYDROmorphone  Injectable 2 milliGRAM(s) IV Push every 4 hours PRN Moderate Pain (4 - 6)  ondansetron Injectable 4 milliGRAM(s) IV Push every 8 hours PRN Nausea and/or Vomiting  oxyCODONE    5 mG/acetaminophen 325 mG 1 Tablet(s) Oral every 8 hours PRN Moderate Pain (4 - 6)    PHYSICAL EXAM:      T(C): 37.2 (10-20-17 @ 11:45), Max: 37.2 (10-20-17 @ 11:45)  HR: 90 (10-20-17 @ 11:45) (78 - 90)  BP: 145/55 (10-20-17 @ 11:45) (145/55 - 161/81)  RR: 18 (10-20-17 @ 11:45) (18 - 20)  SpO2: 95% (10-20-17 @ 11:45) (95% - 96%)  Wt(kg): --  Respiratory: clear anteriorly, decreased BS at bases  Cardiovascular: S1 S2  Gastrointestinal: soft NT ND +BS  Extremities:   bka   tr edema                                    8.9    5.0   )-----------( 163      ( 20 Oct 2017 08:01 )             27.4     10-20    144  |  109<H>  |  32<H>  ----------------------------<  143<H>  3.3<L>   |  23  |  2.71<H>    Ca    8.3<L>      20 Oct 2017 08:01    TPro  5.9<L>  /  Alb  3.3<L>  /  TBili  x   /  DBili  x   /  AST  x   /  ALT  x   /  AlkPhos  x   10-19      LIVER FUNCTIONS - ( 19 Oct 2017 14:49 )  Alb: 3.3 g/dL / Pro: 5.9 g/dL / ALK PHOS: x     / ALT: x     / AST: x     / GGT: x             Assessment and Plan:    ASUNCION, sepsis, ATN; improving trend.  Supportive care, avoid nephrotoxins.

## 2017-10-20 NOTE — DISCHARGE NOTE ADULT - PATIENT PORTAL LINK FT
“You can access the [employer] Patient Portal, offered by [facility], by registering with the following website: [email or web address]”

## 2017-10-20 NOTE — PROGRESS NOTE ADULT - SUBJECTIVE AND OBJECTIVE BOX
Patient is a 62y old  Male who presents with a chief complaint of GI bleed (20 Oct 2017 09:35)      OVERNIGHT EVENTS: +cramping abd pain.     REVIEW OF SYSTEMS: denies chest pain/SOB, diaphoresis, no F/C, cough, dizziness, headache, blurry vision, nausea, vomiting. All others review of systems negative     MEDICATIONS  (STANDING):  atorvastatin 40 milliGRAM(s) Oral at bedtime  bisacodyl 10 milliGRAM(s) Oral once  clopidogrel Tablet 75 milliGRAM(s) Oral daily  dextrose 5%. 1000 milliLiter(s) (50 mL/Hr) IV Continuous <Continuous>  dextrose 50% Injectable 12.5 Gram(s) IV Push once  dextrose 50% Injectable 25 Gram(s) IV Push once  dextrose 50% Injectable 25 Gram(s) IV Push once  docusate sodium 100 milliGRAM(s) Oral three times a day  insulin lispro (HumaLOG) corrective regimen sliding scale   SubCutaneous three times a day before meals  insulin lispro (HumaLOG) corrective regimen sliding scale   SubCutaneous at bedtime  lactobacillus acidophilus 1 Tablet(s) Oral two times a day with meals  pantoprazole  Injectable 40 milliGRAM(s) IV Push every 12 hours  polyethylene glycol 3350 17 Gram(s) Oral two times a day  sodium chloride 0.9%. 1000 milliLiter(s) (100 mL/Hr) IV Continuous <Continuous>  sucralfate 1 Gram(s) Oral four times a day    MEDICATIONS  (PRN):  dextrose Gel 1 Dose(s) Oral once PRN Blood Glucose LESS THAN 70 milliGRAM(s)/deciliter  glucagon  Injectable 1 milliGRAM(s) IntraMuscular once PRN Glucose LESS THAN 70 milligrams/deciliter  HYDROmorphone  Injectable 2 milliGRAM(s) IV Push every 4 hours PRN Moderate Pain (4 - 6)  ondansetron Injectable 4 milliGRAM(s) IV Push every 8 hours PRN Nausea and/or Vomiting  oxyCODONE    5 mG/acetaminophen 325 mG 1 Tablet(s) Oral every 8 hours PRN Moderate Pain (4 - 6)      Allergies    No Known Allergies    Intolerances        T(F): 97.4 (10-20-17 @ 06:02), Max: 98.8 (10-19-17 @ 17:05)  HR: 89 (10-20-17 @ 06:02) (78 - 89)  BP: 161/81 (10-20-17 @ 06:02) (146/70 - 161/81)  RR: 18 (10-20-17 @ 06:02) (18 - 20)  SpO2: 96% (10-20-17 @ 06:02) (95% - 96%)  Wt(kg): --    PHYSICAL EXAM:  GENERAL: NAD, well-groomed, well-developed  HEAD:  Atraumatic, Normocephalic  EYES: BL legally blind  ENMT: No tonsillar erythema, exudates, or enlargement; Moist mucous membranes, Good dentition, No lesions  NECK: Supple, No JVD, Normal thyroid  NERVOUS SYSTEM:  Alert & Oriented X3, Good concentration; Motor Strength 5/5 B/L upper and lower extremities; DTRs 2+ intact and symmetric  CHEST/LUNG: Clear to percussion bilaterally; No rales, rhonchi, wheezing, or rubs BL  HEART: Regular rate and rhythm; No murmurs, rubs, or gallops  ABDOMEN: Soft, mild tenderness to palpation, Nondistended; Bowel sounds present  EXTREMITIES:  R BKA, L trace edema  LYMPH: No lymphadenopathy noted  SKIN: No rashes or lesions    LABS:                        8.9    5.0   )-----------( 163      ( 20 Oct 2017 08:01 )             27.4     10-20    144  |  109<H>  |  32<H>  ----------------------------<  143<H>  3.3<L>   |  23  |  2.71<H>    Ca    8.3<L>      20 Oct 2017 08:01    TPro  5.9<L>  /  Alb  x   /  TBili  x   /  DBili  x   /  AST  x   /  ALT  x   /  AlkPhos  x   10-19      Urinalysis Basic - ( 18 Oct 2017 12:19 )    Color: Yellow / Appearance: Clear / S.015 / pH: x  Gluc: x / Ketone: Moderate  / Bili: Negative / Urobili: Negative mg/dL   Blood: x / Protein: 100 mg/dL / Nitrite: Negative   Leuk Esterase: Negative / RBC: 11-25 /HPF / WBC 0-2   Sq Epi: x / Non Sq Epi: Few / Bacteria: Moderate      Cultures;   bcx- Culture Results:   No growth at 5 days. (10.03.17 @ 12:48)    ucx- Culture Results:   No growth (10.18.17 @ 14:40)      CAPILLARY BLOOD GLUCOSE      POCT Blood Glucose.: 160 mg/dL (20 Oct 2017 07:48)  POCT Blood Glucose.: 148 mg/dL (19 Oct 2017 21:26)  POCT Blood Glucose.: 175 mg/dL (19 Oct 2017 18:12)      RADIOLOGY & ADDITIONAL TESTS:    Imaging Personally Reviewed:  [x ] YES      Consultant(s) Notes Reviewed:  [ x] YES     Care Discussed with [x ] Consultants [X ] Patient [ ] Family  [x ]    [x ]  Other; RN

## 2017-10-20 NOTE — DISCHARGE NOTE ADULT - SECONDARY DIAGNOSIS.
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

## 2017-10-20 NOTE — DISCHARGE NOTE ADULT - CARE PROVIDER_API CALL
Lupillo Uribe), Internal Medicine; Nephrology  [address] [facility]  [address] [place] [id number]  Phone: [phone]  Fax: [phone]    Stalin Rawls), Gastroenterology; Internal Medicine  [address] [facility]  [address] [place] [id number]  Phone: [phone]  Fax: [phone]

## 2017-10-20 NOTE — PROGRESS NOTE ADULT - ASSESSMENT
61 yo male with ho DM2, morbid obesity, CKD 3, a/w ASUNCION/ATN on CKD likely medication induced. FOBT positive. CT abd- 1. Bilateral lower lobe disease with patchy infiltrates in both lungs and increased interstitial markings. Small bilateral pleural effusions. 2. Dense calcification in the region of both distal ureters, possibly representing bilateral distal ureteral chronic stones. Mild fullness right renal collecting system. Atrophic changes of both kidneys. 3. Extensive fecal retention with probable fecal impaction in the rectum and sigmoid. No acute GI phlegmon abscess or obstruction. Having bm.

## 2017-10-20 NOTE — DISCHARGE NOTE ADULT - MEDICATION SUMMARY - MEDICATIONS TO TAKE
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    ondansetron 4 mg oral tablet, disintegrating  -- 1 tab(s) by mouth every 8 hours, As Needed -for nausea   -- Indication: For Nausea    atorvastatin 40 mg oral tablet  -- 1 tab(s) by mouth once a day (at bedtime)  -- Indication: For HLD    clopidogrel 75 mg oral tablet  -- 1 tab(s) by mouth once a day  -- Indication: For Essential hypertension    metoprolol tartrate 25 mg oral tablet  -- 1 tab(s) by mouth 2 times a day  -- Indication: For Essential hypertension    docusate sodium 100 mg oral capsule  -- 1 cap(s) by mouth 3 times a day  -- Indication: For stool softener    sucralfate 1 g oral tablet  -- 1 tab(s) by mouth 4 times a day  -- Indication: For Gastrointestinal hemorrhage, unspecified gastrointestinal hemorrhage type    lactobacillus acidophilus oral tablet  -- 1 tab(s) by mouth 2 times a day  -- Indication: For supplement    pantoprazole 40 mg oral delayed release tablet  -- 1 tab(s) by mouth once a day (before a meal)  -- Indication: For GERD

## 2017-10-20 NOTE — DISCHARGE NOTE ADULT - CARE PLAN
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

## 2017-10-20 NOTE — PROGRESS NOTE ADULT - SUBJECTIVE AND OBJECTIVE BOX
Pt denies any abdominal pain  had some diarrhea but CT shows fecal retention  Pt will be given dulcolax tabs and fleet enema - will order KUB for AM      MEDICATIONS  (STANDING):  atorvastatin 40 milliGRAM(s) Oral at bedtime  clopidogrel Tablet 75 milliGRAM(s) Oral daily  dextrose 5%. 1000 milliLiter(s) (50 mL/Hr) IV Continuous <Continuous>  dextrose 50% Injectable 12.5 Gram(s) IV Push once  dextrose 50% Injectable 25 Gram(s) IV Push once  dextrose 50% Injectable 25 Gram(s) IV Push once  docusate sodium 100 milliGRAM(s) Oral three times a day  insulin lispro (HumaLOG) corrective regimen sliding scale   SubCutaneous three times a day before meals  insulin lispro (HumaLOG) corrective regimen sliding scale   SubCutaneous at bedtime  lactobacillus acidophilus 1 Tablet(s) Oral two times a day with meals  pantoprazole  Injectable 40 milliGRAM(s) IV Push every 12 hours  polyethylene glycol 3350 17 Gram(s) Oral two times a day  sodium chloride 0.9%. 1000 milliLiter(s) (100 mL/Hr) IV Continuous <Continuous>  sucralfate 1 Gram(s) Oral four times a day    MEDICATIONS  (PRN):  dextrose Gel 1 Dose(s) Oral once PRN Blood Glucose LESS THAN 70 milliGRAM(s)/deciliter  glucagon  Injectable 1 milliGRAM(s) IntraMuscular once PRN Glucose LESS THAN 70 milligrams/deciliter  HYDROmorphone  Injectable 2 milliGRAM(s) IV Push every 4 hours PRN Moderate Pain (4 - 6)  ondansetron Injectable 4 milliGRAM(s) IV Push every 8 hours PRN Nausea and/or Vomiting  oxyCODONE    5 mG/acetaminophen 325 mG 1 Tablet(s) Oral every 8 hours PRN Moderate Pain (4 - 6)      Allergies    No Known Allergies    Intolerances        Vital Signs Last 24 Hrs  T(C): 36.3 (20 Oct 2017 06:02), Max: 37.1 (19 Oct 2017 17:05)  T(F): 97.4 (20 Oct 2017 06:02), Max: 98.8 (19 Oct 2017 17:05)  HR: 89 (20 Oct 2017 06:02) (78 - 89)  BP: 161/81 (20 Oct 2017 06:02) (146/70 - 161/81)  BP(mean): --  RR: 18 (20 Oct 2017 06:02) (18 - 20)  SpO2: 96% (20 Oct 2017 06:02) (95% - 96%)    PHYSICAL EXAM:  General: NAD.  CVS: S1, S2  Chest: air entry bilaterally present  Abd: BS present, soft, non-tender      LABS:                        8.9    5.0   )-----------( 163      ( 20 Oct 2017 08:01 )             27.4     10-20    144  |  109<H>  |  32<H>  ----------------------------<  143<H>  3.3<L>   |  23  |  2.71<H>    Ca    8.3<L>      20 Oct 2017 08:01    TPro  5.9<L>  /  Alb  x   /  TBili  x   /  DBili  x   /  AST  x   /  ALT  x   /  AlkPhos  x   10-19      Dulcolax 2 tabs x1   fleet enema  KUB in AM

## 2017-10-21 LAB
ANION GAP SERPL CALC-SCNC: 10 MMOL/L — SIGNIFICANT CHANGE UP (ref 5–17)
BUN SERPL-MCNC: 24 MG/DL — HIGH (ref 7–23)
CALCIUM SERPL-MCNC: 8.2 MG/DL — LOW (ref 8.5–10.1)
CHLORIDE SERPL-SCNC: 108 MMOL/L — SIGNIFICANT CHANGE UP (ref 96–108)
CO2 SERPL-SCNC: 25 MMOL/L — SIGNIFICANT CHANGE UP (ref 22–31)
CREAT SERPL-MCNC: 2.29 MG/DL — HIGH (ref 0.5–1.3)
CREATININE, URINE RESULT: 65 MG/DL — SIGNIFICANT CHANGE UP
GLUCOSE BLDC GLUCOMTR-MCNC: 144 MG/DL — HIGH (ref 70–99)
GLUCOSE BLDC GLUCOMTR-MCNC: 163 MG/DL — HIGH (ref 70–99)
GLUCOSE BLDC GLUCOMTR-MCNC: 164 MG/DL — HIGH (ref 70–99)
GLUCOSE BLDC GLUCOMTR-MCNC: 172 MG/DL — HIGH (ref 70–99)
GLUCOSE SERPL-MCNC: 164 MG/DL — HIGH (ref 70–99)
HCT VFR BLD CALC: 29 % — LOW (ref 39–50)
HGB BLD-MCNC: 9.3 G/DL — LOW (ref 13–17)
MCHC RBC-ENTMCNC: 29.3 PG — SIGNIFICANT CHANGE UP (ref 27–34)
MCHC RBC-ENTMCNC: 31.9 GM/DL — LOW (ref 32–36)
MCV RBC AUTO: 91.9 FL — SIGNIFICANT CHANGE UP (ref 80–100)
PLATELET # BLD AUTO: 186 K/UL — SIGNIFICANT CHANGE UP (ref 150–400)
POTASSIUM SERPL-MCNC: 3.5 MMOL/L — SIGNIFICANT CHANGE UP (ref 3.5–5.3)
POTASSIUM SERPL-SCNC: 3.5 MMOL/L — SIGNIFICANT CHANGE UP (ref 3.5–5.3)
PROT ?TM UR-MCNC: 161 MG/DL — HIGH (ref 0–12)
RBC # BLD: 3.16 M/UL — LOW (ref 4.2–5.8)
RBC # FLD: 13.2 % — SIGNIFICANT CHANGE UP (ref 11–15)
SODIUM SERPL-SCNC: 143 MMOL/L — SIGNIFICANT CHANGE UP (ref 135–145)
WBC # BLD: 5.2 K/UL — SIGNIFICANT CHANGE UP (ref 3.8–10.5)
WBC # FLD AUTO: 5.2 K/UL — SIGNIFICANT CHANGE UP (ref 3.8–10.5)

## 2017-10-21 PROCEDURE — 74000: CPT | Mod: 26

## 2017-10-21 PROCEDURE — 99233 SBSQ HOSP IP/OBS HIGH 50: CPT

## 2017-10-21 RX ORDER — POTASSIUM CHLORIDE 20 MEQ
40 PACKET (EA) ORAL ONCE
Qty: 0 | Refills: 0 | Status: COMPLETED | OUTPATIENT
Start: 2017-10-21 | End: 2017-10-21

## 2017-10-21 RX ORDER — SODIUM CHLORIDE 9 MG/ML
1000 INJECTION, SOLUTION INTRAVENOUS
Qty: 0 | Refills: 0 | Status: DISCONTINUED | OUTPATIENT
Start: 2017-10-21 | End: 2017-10-24

## 2017-10-21 RX ADMIN — PANTOPRAZOLE SODIUM 40 MILLIGRAM(S): 20 TABLET, DELAYED RELEASE ORAL at 06:57

## 2017-10-21 RX ADMIN — Medication 100 MILLIGRAM(S): at 06:59

## 2017-10-21 RX ADMIN — Medication 1 GRAM(S): at 17:36

## 2017-10-21 RX ADMIN — ATORVASTATIN CALCIUM 40 MILLIGRAM(S): 80 TABLET, FILM COATED ORAL at 23:02

## 2017-10-21 RX ADMIN — Medication 100 MILLIGRAM(S): at 13:11

## 2017-10-21 RX ADMIN — Medication 2: at 13:11

## 2017-10-21 RX ADMIN — PANTOPRAZOLE SODIUM 40 MILLIGRAM(S): 20 TABLET, DELAYED RELEASE ORAL at 17:37

## 2017-10-21 RX ADMIN — SODIUM CHLORIDE 50 MILLILITER(S): 9 INJECTION, SOLUTION INTRAVENOUS at 17:35

## 2017-10-21 RX ADMIN — Medication 40 MILLIEQUIVALENT(S): at 07:55

## 2017-10-21 RX ADMIN — Medication 2: at 07:55

## 2017-10-21 RX ADMIN — Medication 1 GRAM(S): at 06:58

## 2017-10-21 RX ADMIN — POLYETHYLENE GLYCOL 3350 17 GRAM(S): 17 POWDER, FOR SOLUTION ORAL at 06:58

## 2017-10-21 RX ADMIN — Medication 100 MILLIGRAM(S): at 23:02

## 2017-10-21 RX ADMIN — Medication 1 TABLET(S): at 17:35

## 2017-10-21 RX ADMIN — SODIUM CHLORIDE 50 MILLILITER(S): 9 INJECTION, SOLUTION INTRAVENOUS at 13:11

## 2017-10-21 RX ADMIN — Medication 2: at 17:36

## 2017-10-21 RX ADMIN — POLYETHYLENE GLYCOL 3350 17 GRAM(S): 17 POWDER, FOR SOLUTION ORAL at 17:35

## 2017-10-21 RX ADMIN — Medication 1 GRAM(S): at 13:10

## 2017-10-21 RX ADMIN — Medication 1 TABLET(S): at 07:56

## 2017-10-21 RX ADMIN — CLOPIDOGREL BISULFATE 75 MILLIGRAM(S): 75 TABLET, FILM COATED ORAL at 13:10

## 2017-10-21 RX ADMIN — ONDANSETRON 4 MILLIGRAM(S): 8 TABLET, FILM COATED ORAL at 07:55

## 2017-10-21 RX ADMIN — Medication 1 GRAM(S): at 23:02

## 2017-10-21 NOTE — PROGRESS NOTE ADULT - SUBJECTIVE AND OBJECTIVE BOX
Patient is a 62y old  Male who presents with a chief complaint of GI bleed (20 Oct 2017 09:35)      OVERNIGHT EVENTS: Had 'bad night' due to abdominal pain. Had 2 large bm yesterday    REVIEW OF SYSTEMS: denies chest pain/SOB, diaphoresis, no F/C, cough, dizziness, headache, blurry vision, nausea, vomiting. All others review of systems negative     MEDICATIONS  (STANDING):  atorvastatin 40 milliGRAM(s) Oral at bedtime  clopidogrel Tablet 75 milliGRAM(s) Oral daily  dextrose 5%. 1000 milliLiter(s) (50 mL/Hr) IV Continuous <Continuous>  dextrose 50% Injectable 12.5 Gram(s) IV Push once  dextrose 50% Injectable 25 Gram(s) IV Push once  dextrose 50% Injectable 25 Gram(s) IV Push once  docusate sodium 100 milliGRAM(s) Oral three times a day  insulin lispro (HumaLOG) corrective regimen sliding scale   SubCutaneous three times a day before meals  insulin lispro (HumaLOG) corrective regimen sliding scale   SubCutaneous at bedtime  lactobacillus acidophilus 1 Tablet(s) Oral two times a day with meals  pantoprazole  Injectable 40 milliGRAM(s) IV Push every 12 hours  polyethylene glycol 3350 17 Gram(s) Oral two times a day  sodium chloride 0.45%. 1000 milliLiter(s) (50 mL/Hr) IV Continuous <Continuous>  sucralfate 1 Gram(s) Oral four times a day    MEDICATIONS  (PRN):  dextrose Gel 1 Dose(s) Oral once PRN Blood Glucose LESS THAN 70 milliGRAM(s)/deciliter  glucagon  Injectable 1 milliGRAM(s) IntraMuscular once PRN Glucose LESS THAN 70 milligrams/deciliter  HYDROmorphone  Injectable 2 milliGRAM(s) IV Push every 4 hours PRN Moderate Pain (4 - 6)  ondansetron Injectable 4 milliGRAM(s) IV Push every 8 hours PRN Nausea and/or Vomiting  oxyCODONE    5 mG/acetaminophen 325 mG 1 Tablet(s) Oral every 8 hours PRN Moderate Pain (4 - 6)      Allergies    No Known Allergies    Intolerances        T(F): 98.8 (10-21-17 @ 05:16), Max: 99 (10-20-17 @ 11:45)  HR: 92 (10-21-17 @ 05:16) (83 - 92)  BP: 178/92 (10-21-17 @ 05:16) (145/55 - 178/92)  RR: 18 (10-21-17 @ 05:16) (18 - 18)  SpO2: 97% (10-21-17 @ 05:16) (95% - 97%)  Wt(kg): --    PHYSICAL EXAM:    GENERAL: NAD  HEAD:  Atraumatic, Normocephalic  EYES: EOMI, PERRLA, conjunctiva and sclera clear  ENMT: No tonsillar erythema, exudates, or enlargement; Moist mucous membranes, Good dentition, No lesions  NECK: Supple, No JVD, Normal thyroid  NERVOUS SYSTEM:  Alert & Oriented X3, Good concentration; Motor Strength 5/5 B/L upper and lower extremities; DTRs 2+ intact and symmetric  CHEST/LUNG: Clear to percussion bilaterally; No rales, rhonchi, wheezing, or rubs  HEART: Regular rate and rhythm; No murmurs, rubs, or gallops  ABDOMEN: Soft, Nontender, Nondistended; Bowel sounds present  EXTREMITIES:   R BKA, L trace edema  LYMPH: No lymphadenopathy noted  SKIN: No rashes or lesion     LABS:                        9.3    5.2   )-----------( 186      ( 21 Oct 2017 10:54 )             29.0     10-21    143  |  108  |  24<H>  ----------------------------<  164<H>  3.5   |  25  |  2.29<H>    Ca    8.2<L>      21 Oct 2017 10:53    TPro  5.9<L>  /  Alb  3.3<L>  /  TBili  x   /  DBili  x   /  AST  x   /  ALT  x   /  AlkPhos  x   10-19        Cultures;   CAPILLARY BLOOD GLUCOSE      POCT Blood Glucose.: 164 mg/dL (21 Oct 2017 07:47)  POCT Blood Glucose.: 183 mg/dL (20 Oct 2017 23:08)  POCT Blood Glucose.: 146 mg/dL (20 Oct 2017 17:07)  POCT Blood Glucose.: 179 mg/dL (20 Oct 2017 11:58)    Lipid panel:       RADIOLOGY & ADDITIONAL TESTS:    Imaging Personally Reviewed:  [x ] YES      Consultant(s) Notes Reviewed:  [x ] YES     Care Discussed with [x ] Consultants [X ] Patient [ ] Family  [x ]    [x ]  Other; RN

## 2017-10-21 NOTE — PROGRESS NOTE ADULT - SUBJECTIVE AND OBJECTIVE BOX
Subjective: nausea has resolved. Dec diarrhea      MEDICATIONS  (STANDING):  atorvastatin 40 milliGRAM(s) Oral at bedtime  clopidogrel Tablet 75 milliGRAM(s) Oral daily  dextrose 5%. 1000 milliLiter(s) (50 mL/Hr) IV Continuous <Continuous>  dextrose 50% Injectable 12.5 Gram(s) IV Push once  dextrose 50% Injectable 25 Gram(s) IV Push once  dextrose 50% Injectable 25 Gram(s) IV Push once  docusate sodium 100 milliGRAM(s) Oral three times a day  insulin lispro (HumaLOG) corrective regimen sliding scale   SubCutaneous three times a day before meals  insulin lispro (HumaLOG) corrective regimen sliding scale   SubCutaneous at bedtime  lactobacillus acidophilus 1 Tablet(s) Oral two times a day with meals  pantoprazole  Injectable 40 milliGRAM(s) IV Push every 12 hours  polyethylene glycol 3350 17 Gram(s) Oral two times a day  potassium chloride   Powder 40 milliEquivalent(s) Oral once  sodium chloride 0.9%. 1000 milliLiter(s) (100 mL/Hr) IV Continuous <Continuous>  sucralfate 1 Gram(s) Oral four times a day    MEDICATIONS  (PRN):  dextrose Gel 1 Dose(s) Oral once PRN Blood Glucose LESS THAN 70 milliGRAM(s)/deciliter  glucagon  Injectable 1 milliGRAM(s) IntraMuscular once PRN Glucose LESS THAN 70 milligrams/deciliter  HYDROmorphone  Injectable 2 milliGRAM(s) IV Push every 4 hours PRN Moderate Pain (4 - 6)  ondansetron Injectable 4 milliGRAM(s) IV Push every 8 hours PRN Nausea and/or Vomiting  oxyCODONE    5 mG/acetaminophen 325 mG 1 Tablet(s) Oral every 8 hours PRN Moderate Pain (4 - 6)          T(C): 37.1 (10-21-17 @ 05:16), Max: 37.2 (10-20-17 @ 11:45)  HR: 92 (10-21-17 @ 05:16) (83 - 92)  BP: 178/92 (10-21-17 @ 05:16) (145/55 - 178/92)  RR: 18 (10-21-17 @ 05:16) (18 - 18)  SpO2: 97% (10-21-17 @ 05:16) (95% - 97%)  Wt(kg): --        I&O's Detail    20 Oct 2017 07:01  -  21 Oct 2017 07:00  --------------------------------------------------------  IN:    Oral Fluid: 180 mL  Total IN: 180 mL    OUT:    Voided: 500 mL  Total OUT: 500 mL    Total NET: -320 mL               PHYSICAL EXAM:    GENERAL: NAD  HEAD:  Atraumatic, Normocephalic  EYES: EOMI, PERRLA, conjunctiva and sclera clear  ENMT: No tonsillar erythema, exudates, or enlargement; Moist mucous membranes, Good dentition, No lesions  NECK: Supple, No JVD, Normal thyroid  NERVOUS SYSTEM:  Alert & Oriented X3, Good concentration; Motor Strength 5/5 B/L upper and lower extremities; DTRs 2+ intact and symmetric  CHEST/LUNG: Clear to percussion bilaterally; No rales, rhonchi, wheezing, or rubs  HEART: Regular rate and rhythm; No murmurs, rubs, or gallops  ABDOMEN: mild tenderness to palpation  EXTREMITIES:   R BKA, L trace edema  LYMPH: No lymphadenopathy noted  SKIN: No rashes or lesions      LABS:  CBC Full  -  ( 20 Oct 2017 08:01 )  WBC Count : 5.0 K/uL  Hemoglobin : 8.9 g/dL  Hematocrit : 27.4 %  Platelet Count - Automated : 163 K/uL  Mean Cell Volume : 92.7 fl  Mean Cell Hemoglobin : 30.1 pg  Mean Cell Hemoglobin Concentration : 32.5 gm/dL  Auto Neutrophil # : x  Auto Lymphocyte # : x  Auto Monocyte # : x  Auto Eosinophil # : x  Auto Basophil # : x  Auto Neutrophil % : x  Auto Lymphocyte % : x  Auto Monocyte % : x  Auto Eosinophil % : x  Auto Basophil % : x    10-20    144  |  109<H>  |  32<H>  ----------------------------<  143<H>  3.3<L>   |  23  |  2.71<H>    Ca    8.3<L>      20 Oct 2017 08:01    TPro  5.9<L>  /  Alb  3.3<L>  /  TBili  x   /  DBili  x   /  AST  x   /  ALT  x   /  AlkPhos  x   10-19        Culture Results:   No growth (10-18 @ 14:40)      Impression:  * ASUNCION -- multifactorial ATN. ? pre-renal component. Resolving  * Upper GI bleed  Recommendations:  * Dec saline to 50cc/h  * Daily BMP  * Nausea is not of uremic etiology

## 2017-10-21 NOTE — PROGRESS NOTE ADULT - SUBJECTIVE AND OBJECTIVE BOX
Nausea resolved; diarrhea improving      MEDICATIONS  (STANDING):  atorvastatin 40 milliGRAM(s) Oral at bedtime  clopidogrel Tablet 75 milliGRAM(s) Oral daily  dextrose 5%. 1000 milliLiter(s) (50 mL/Hr) IV Continuous <Continuous>  dextrose 50% Injectable 12.5 Gram(s) IV Push once  dextrose 50% Injectable 25 Gram(s) IV Push once  dextrose 50% Injectable 25 Gram(s) IV Push once  docusate sodium 100 milliGRAM(s) Oral three times a day  insulin lispro (HumaLOG) corrective regimen sliding scale   SubCutaneous three times a day before meals  insulin lispro (HumaLOG) corrective regimen sliding scale   SubCutaneous at bedtime  lactobacillus acidophilus 1 Tablet(s) Oral two times a day with meals  pantoprazole  Injectable 40 milliGRAM(s) IV Push every 12 hours  polyethylene glycol 3350 17 Gram(s) Oral two times a day  sodium chloride 0.45%. 1000 milliLiter(s) (50 mL/Hr) IV Continuous <Continuous>  sucralfate 1 Gram(s) Oral four times a day    MEDICATIONS  (PRN):  dextrose Gel 1 Dose(s) Oral once PRN Blood Glucose LESS THAN 70 milliGRAM(s)/deciliter  glucagon  Injectable 1 milliGRAM(s) IntraMuscular once PRN Glucose LESS THAN 70 milligrams/deciliter  HYDROmorphone  Injectable 2 milliGRAM(s) IV Push every 4 hours PRN Moderate Pain (4 - 6)  ondansetron Injectable 4 milliGRAM(s) IV Push every 8 hours PRN Nausea and/or Vomiting  oxyCODONE    5 mG/acetaminophen 325 mG 1 Tablet(s) Oral every 8 hours PRN Moderate Pain (4 - 6)      Allergies    No Known Allergies    Intolerances        Vital Signs Last 24 Hrs  T(C): 36.2 (21 Oct 2017 17:15), Max: 37.1 (21 Oct 2017 05:16)  T(F): 97.2 (21 Oct 2017 17:15), Max: 98.8 (21 Oct 2017 05:16)  HR: 80 (21 Oct 2017 17:15) (80 - 92)  BP: 170/80 (21 Oct 2017 17:15) (170/80 - 178/92)  BP(mean): --  RR: 20 (21 Oct 2017 17:15) (18 - 20)  SpO2: 97% (21 Oct 2017 17:15) (97% - 97%)    PHYSICAL EXAM:  General: NAD.  CVS: S1, S2  Chest: air entry bilaterally present  Abd: BS present, soft, non-tender      LABS:                        9.3    5.2   )-----------( 186      ( 21 Oct 2017 10:54 )             29.0     10-21    143  |  108  |  24<H>  ----------------------------<  164<H>  3.5   |  25  |  2.29<H>    Ca    8.2<L>      21 Oct 2017 10:53      on Miralax - will d/c if diarrhea persists. (Pt origionally was constipated))  carafate and protonix  follow cbc  on clear liquids -consider advancing tomorrow

## 2017-10-22 LAB
ANION GAP SERPL CALC-SCNC: 11 MMOL/L — SIGNIFICANT CHANGE UP (ref 5–17)
BUN SERPL-MCNC: 19 MG/DL — SIGNIFICANT CHANGE UP (ref 7–23)
CALCIUM SERPL-MCNC: 8 MG/DL — LOW (ref 8.5–10.1)
CHLORIDE SERPL-SCNC: 105 MMOL/L — SIGNIFICANT CHANGE UP (ref 96–108)
CO2 SERPL-SCNC: 25 MMOL/L — SIGNIFICANT CHANGE UP (ref 22–31)
CREAT SERPL-MCNC: 2.12 MG/DL — HIGH (ref 0.5–1.3)
GLUCOSE BLDC GLUCOMTR-MCNC: 131 MG/DL — HIGH (ref 70–99)
GLUCOSE BLDC GLUCOMTR-MCNC: 137 MG/DL — HIGH (ref 70–99)
GLUCOSE BLDC GLUCOMTR-MCNC: 156 MG/DL — HIGH (ref 70–99)
GLUCOSE BLDC GLUCOMTR-MCNC: 158 MG/DL — HIGH (ref 70–99)
GLUCOSE SERPL-MCNC: 143 MG/DL — HIGH (ref 70–99)
POTASSIUM SERPL-MCNC: 3.3 MMOL/L — LOW (ref 3.5–5.3)
POTASSIUM SERPL-SCNC: 3.3 MMOL/L — LOW (ref 3.5–5.3)
SODIUM SERPL-SCNC: 141 MMOL/L — SIGNIFICANT CHANGE UP (ref 135–145)

## 2017-10-22 PROCEDURE — 99233 SBSQ HOSP IP/OBS HIGH 50: CPT

## 2017-10-22 RX ORDER — METOPROLOL TARTRATE 50 MG
25 TABLET ORAL
Qty: 0 | Refills: 0 | Status: DISCONTINUED | OUTPATIENT
Start: 2017-10-22 | End: 2017-10-25

## 2017-10-22 RX ORDER — CHLORPROMAZINE HCL 10 MG
25 TABLET ORAL EVERY 8 HOURS
Qty: 0 | Refills: 0 | Status: DISCONTINUED | OUTPATIENT
Start: 2017-10-22 | End: 2017-10-25

## 2017-10-22 RX ORDER — LACTULOSE 10 G/15ML
30 SOLUTION ORAL
Qty: 0 | Refills: 0 | Status: DISCONTINUED | OUTPATIENT
Start: 2017-10-22 | End: 2017-10-25

## 2017-10-22 RX ORDER — CHLORPROMAZINE HCL 10 MG
10 TABLET ORAL EVERY 8 HOURS
Qty: 0 | Refills: 0 | Status: DISCONTINUED | OUTPATIENT
Start: 2017-10-22 | End: 2017-10-22

## 2017-10-22 RX ORDER — POTASSIUM CHLORIDE 20 MEQ
20 PACKET (EA) ORAL ONCE
Qty: 0 | Refills: 0 | Status: COMPLETED | OUTPATIENT
Start: 2017-10-22 | End: 2017-10-22

## 2017-10-22 RX ADMIN — CLOPIDOGREL BISULFATE 75 MILLIGRAM(S): 75 TABLET, FILM COATED ORAL at 12:16

## 2017-10-22 RX ADMIN — Medication 20 MILLIEQUIVALENT(S): at 15:22

## 2017-10-22 RX ADMIN — ATORVASTATIN CALCIUM 40 MILLIGRAM(S): 80 TABLET, FILM COATED ORAL at 21:07

## 2017-10-22 RX ADMIN — Medication 1 GRAM(S): at 12:16

## 2017-10-22 RX ADMIN — Medication 1 GRAM(S): at 17:22

## 2017-10-22 RX ADMIN — Medication 100 MILLIGRAM(S): at 06:46

## 2017-10-22 RX ADMIN — ONDANSETRON 4 MILLIGRAM(S): 8 TABLET, FILM COATED ORAL at 16:42

## 2017-10-22 RX ADMIN — Medication 25 MILLIGRAM(S): at 17:22

## 2017-10-22 RX ADMIN — Medication 1 GRAM(S): at 06:46

## 2017-10-22 RX ADMIN — POLYETHYLENE GLYCOL 3350 17 GRAM(S): 17 POWDER, FOR SOLUTION ORAL at 06:46

## 2017-10-22 RX ADMIN — OXYCODONE AND ACETAMINOPHEN 1 TABLET(S): 5; 325 TABLET ORAL at 00:59

## 2017-10-22 RX ADMIN — PANTOPRAZOLE SODIUM 40 MILLIGRAM(S): 20 TABLET, DELAYED RELEASE ORAL at 06:46

## 2017-10-22 RX ADMIN — ONDANSETRON 4 MILLIGRAM(S): 8 TABLET, FILM COATED ORAL at 08:34

## 2017-10-22 RX ADMIN — OXYCODONE AND ACETAMINOPHEN 1 TABLET(S): 5; 325 TABLET ORAL at 01:31

## 2017-10-22 RX ADMIN — PANTOPRAZOLE SODIUM 40 MILLIGRAM(S): 20 TABLET, DELAYED RELEASE ORAL at 17:22

## 2017-10-22 RX ADMIN — Medication 2: at 08:00

## 2017-10-22 NOTE — PROGRESS NOTE ADULT - PROBLEM SELECTOR PLAN 4
-currently stable ACE I on hold secondary to worsening renal function -add Metoprolol  -ACEI on hold secondary to worsening renal function

## 2017-10-22 NOTE — PROGRESS NOTE ADULT - SUBJECTIVE AND OBJECTIVE BOX
Patient is a 62y old  Male who presents with a chief complaint of GI bleed (20 Oct 2017 09:35)      OVERNIGHT EVENTS: +nause. +bowel movement +hiccup    REVIEW OF SYSTEMS: denies chest pain/SOB, diaphoresis, no F/C, cough, dizziness, headache, blurry vision, vomiting, abdominal pain. All others review of systems negative     MEDICATIONS  (STANDING):  atorvastatin 40 milliGRAM(s) Oral at bedtime  clopidogrel Tablet 75 milliGRAM(s) Oral daily  dextrose 5%. 1000 milliLiter(s) (50 mL/Hr) IV Continuous <Continuous>  dextrose 50% Injectable 12.5 Gram(s) IV Push once  dextrose 50% Injectable 25 Gram(s) IV Push once  dextrose 50% Injectable 25 Gram(s) IV Push once  docusate sodium 100 milliGRAM(s) Oral three times a day  insulin lispro (HumaLOG) corrective regimen sliding scale   SubCutaneous three times a day before meals  insulin lispro (HumaLOG) corrective regimen sliding scale   SubCutaneous at bedtime  lactobacillus acidophilus 1 Tablet(s) Oral two times a day with meals  pantoprazole  Injectable 40 milliGRAM(s) IV Push every 12 hours  polyethylene glycol 3350 17 Gram(s) Oral two times a day  potassium chloride    Tablet ER 20 milliEquivalent(s) Oral once  sodium chloride 0.45%. 1000 milliLiter(s) (50 mL/Hr) IV Continuous <Continuous>  sucralfate 1 Gram(s) Oral four times a day    MEDICATIONS  (PRN):  dextrose Gel 1 Dose(s) Oral once PRN Blood Glucose LESS THAN 70 milliGRAM(s)/deciliter  glucagon  Injectable 1 milliGRAM(s) IntraMuscular once PRN Glucose LESS THAN 70 milligrams/deciliter  HYDROmorphone  Injectable 2 milliGRAM(s) IV Push every 4 hours PRN Moderate Pain (4 - 6)  ondansetron Injectable 4 milliGRAM(s) IV Push every 8 hours PRN Nausea and/or Vomiting  oxyCODONE    5 mG/acetaminophen 325 mG 1 Tablet(s) Oral every 8 hours PRN Moderate Pain (4 - 6)      Allergies    No Known Allergies    Intolerances        T(F): 97.6 (10-22-17 @ 11:50), Max: 98 (10-22-17 @ 07:37)  HR: 76 (10-22-17 @ 11:50) (76 - 80)  BP: 175/85 (10-22-17 @ 11:50) (170/80 - 175/85)  RR: 18 (10-22-17 @ 11:50) (18 - 20)  SpO2: 97% (10-22-17 @ 11:50) (97% - 98%)  Wt(kg): --    PHYSICAL EXAM:  GENERAL: NAD  HEAD:  Atraumatic, Normocephalic  EYES: EOMI, PERRLA, conjunctiva and sclera clear  ENMT: No tonsillar erythema, exudates, or enlargement; Moist mucous membranes, Good dentition, No lesions  NECK: Supple, No JVD, Normal thyroid  NERVOUS SYSTEM:  Alert & Oriented X3, Good concentration; Motor Strength 5/5 B/L upper and lower extremities; DTRs 2+ intact and symmetric  CHEST/LUNG: Clear to percussion bilaterally; No rales, rhonchi, wheezing, or rubs  HEART: Regular rate and rhythm; No murmurs, rubs, or gallops  ABDOMEN: Soft, Nontender, Nondistended; Bowel sounds present  EXTREMITIES:   R BKA, L trace edema  LYMPH: No lymphadenopathy noted  SKIN: No rashes or lesion     LABS:                        9.3    5.2   )-----------( 186      ( 21 Oct 2017 10:54 )             29.0     10-22    141  |  105  |  19  ----------------------------<  143<H>  3.3<L>   |  25  |  2.12<H>    Ca    8.0<L>      22 Oct 2017 07:39          Cultures;   CAPILLARY BLOOD GLUCOSE  131 (22 Oct 2017 08:03)      POCT Blood Glucose.: 131 mg/dL (22 Oct 2017 10:54)  POCT Blood Glucose.: 156 mg/dL (22 Oct 2017 07:59)  POCT Blood Glucose.: 144 mg/dL (21 Oct 2017 23:01)  POCT Blood Glucose.: 163 mg/dL (21 Oct 2017 17:34)  POCT Blood Glucose.: 172 mg/dL (21 Oct 2017 13:08)    Lipid panel:           RADIOLOGY & ADDITIONAL TESTS:    Imaging Personally Reviewed:  [x ] YES      Consultant(s) Notes Reviewed:  [x ] YES     Care Discussed with [x ] Consultants [X ] Patient [ ] Family  [x ]    [x ]  Other; RN

## 2017-10-22 NOTE — PROGRESS NOTE ADULT - SUBJECTIVE AND OBJECTIVE BOX
Had some nausea/mild mid abd discomfort      MEDICATIONS  (STANDING):  atorvastatin 40 milliGRAM(s) Oral at bedtime  clopidogrel Tablet 75 milliGRAM(s) Oral daily  dextrose 5%. 1000 milliLiter(s) (50 mL/Hr) IV Continuous <Continuous>  dextrose 50% Injectable 12.5 Gram(s) IV Push once  dextrose 50% Injectable 25 Gram(s) IV Push once  dextrose 50% Injectable 25 Gram(s) IV Push once  insulin lispro (HumaLOG) corrective regimen sliding scale   SubCutaneous three times a day before meals  insulin lispro (HumaLOG) corrective regimen sliding scale   SubCutaneous at bedtime  lactobacillus acidophilus 1 Tablet(s) Oral two times a day with meals  lactulose Syrup 30 Gram(s) Oral two times a day  metoprolol 25 milliGRAM(s) Oral two times a day  pantoprazole  Injectable 40 milliGRAM(s) IV Push every 12 hours  sodium chloride 0.45%. 1000 milliLiter(s) (50 mL/Hr) IV Continuous <Continuous>  sucralfate 1 Gram(s) Oral four times a day    MEDICATIONS  (PRN):  chlorproMAZINE    Tablet 25 milliGRAM(s) Oral every 8 hours PRN hiccups  dextrose Gel 1 Dose(s) Oral once PRN Blood Glucose LESS THAN 70 milliGRAM(s)/deciliter  glucagon  Injectable 1 milliGRAM(s) IntraMuscular once PRN Glucose LESS THAN 70 milligrams/deciliter  HYDROmorphone  Injectable 2 milliGRAM(s) IV Push every 4 hours PRN Moderate Pain (4 - 6)  ondansetron Injectable 4 milliGRAM(s) IV Push every 8 hours PRN Nausea and/or Vomiting  oxyCODONE    5 mG/acetaminophen 325 mG 1 Tablet(s) Oral every 8 hours PRN Moderate Pain (4 - 6)      Allergies    No Known Allergies    Intolerances        Vital Signs Last 24 Hrs  T(C): 36.6 (22 Oct 2017 17:09), Max: 36.7 (22 Oct 2017 07:37)  T(F): 97.8 (22 Oct 2017 17:09), Max: 98 (22 Oct 2017 07:37)  HR: 80 (22 Oct 2017 17:09) (76 - 80)  BP: 169/83 (22 Oct 2017 17:09) (169/83 - 175/85)  BP(mean): --  RR: 18 (22 Oct 2017 17:09) (18 - 18)  SpO2: 97% (22 Oct 2017 17:09) (97% - 98%)    PHYSICAL EXAM:  General: NAD.  CVS: S1, S2  Chest: air entry bilaterally present  Abd: BS present, soft, non-tender      LABS:                        9.3    5.2   )-----------( 186      ( 21 Oct 2017 10:54 )             29.0     10-22    141  |  105  |  19  ----------------------------<  143<H>  3.3<L>   |  25  |  2.12<H>    Ca    8.0<L>      22 Oct 2017 07:39      on IV protonix / PO compazine, lactulose  repeat labs in AM

## 2017-10-22 NOTE — PROGRESS NOTE ADULT - ASSESSMENT
63 yo male with ho DM2, morbid obesity, CKD 3, a/w ASUNCION/ATN on CKD likely medication induced. FOBT positive. CT abd- 1. Bilateral lower lobe disease with patchy infiltrates in both lungs and increased interstitial markings. Small bilateral pleural effusions. 2. Dense calcification in the region of both distal ureters, possibly representing bilateral distal ureteral chronic stones. Mild fullness right renal collecting system. Atrophic changes of both kidneys. 3. Extensive fecal retention with probable fecal impaction in the rectum and sigmoid. No acute GI phlegmon abscess or obstruction. Having bm. AXR-The bowel gas pattern is nonobstructive. Air, oral contrast and stool is noted within the large bowel and rectum. There are no abnormal calcific   densities. The bones are intact.

## 2017-10-22 NOTE — PROGRESS NOTE ADULT - SUBJECTIVE AND OBJECTIVE BOX
Subjective: still with mild intermittent nausea.       MEDICATIONS  (STANDING):  atorvastatin 40 milliGRAM(s) Oral at bedtime  clopidogrel Tablet 75 milliGRAM(s) Oral daily  dextrose 5%. 1000 milliLiter(s) (50 mL/Hr) IV Continuous <Continuous>  dextrose 50% Injectable 12.5 Gram(s) IV Push once  dextrose 50% Injectable 25 Gram(s) IV Push once  dextrose 50% Injectable 25 Gram(s) IV Push once  docusate sodium 100 milliGRAM(s) Oral three times a day  insulin lispro (HumaLOG) corrective regimen sliding scale   SubCutaneous three times a day before meals  insulin lispro (HumaLOG) corrective regimen sliding scale   SubCutaneous at bedtime  lactobacillus acidophilus 1 Tablet(s) Oral two times a day with meals  pantoprazole  Injectable 40 milliGRAM(s) IV Push every 12 hours  polyethylene glycol 3350 17 Gram(s) Oral two times a day  sodium chloride 0.45%. 1000 milliLiter(s) (50 mL/Hr) IV Continuous <Continuous>  sucralfate 1 Gram(s) Oral four times a day    MEDICATIONS  (PRN):  dextrose Gel 1 Dose(s) Oral once PRN Blood Glucose LESS THAN 70 milliGRAM(s)/deciliter  glucagon  Injectable 1 milliGRAM(s) IntraMuscular once PRN Glucose LESS THAN 70 milligrams/deciliter  HYDROmorphone  Injectable 2 milliGRAM(s) IV Push every 4 hours PRN Moderate Pain (4 - 6)  ondansetron Injectable 4 milliGRAM(s) IV Push every 8 hours PRN Nausea and/or Vomiting  oxyCODONE    5 mG/acetaminophen 325 mG 1 Tablet(s) Oral every 8 hours PRN Moderate Pain (4 - 6)          T(C): 36.7 (10-22-17 @ 07:37), Max: 36.7 (10-22-17 @ 07:37)  HR: 80 (10-22-17 @ 07:37) (80 - 80)  BP: 175/80 (10-22-17 @ 07:37) (170/80 - 175/80)  RR: 18 (10-22-17 @ 07:37) (18 - 20)  SpO2: 98% (10-22-17 @ 07:37) (97% - 98%)  Wt(kg): --        I&O's Detail    21 Oct 2017 07:01  -  22 Oct 2017 07:00  --------------------------------------------------------  IN:  Total IN: 0 mL    OUT:    Voided: 300 mL  Total OUT: 300 mL    Total NET: -300 mL               PHYSICAL EXAM:    GENERAL: NAD  HEAD:  Atraumatic, Normocephalic  EYES: EOMI, PERRLA, conjunctiva and sclera clear  ENMT: No tonsillar erythema, exudates, or enlargement; Moist mucous membranes, Good dentition, No lesions  NECK: Supple, No JVD, Normal thyroid  NERVOUS SYSTEM:  Alert & Oriented X3, Good concentration; Motor Strength 5/5 B/L upper and lower extremities; DTRs 2+ intact and symmetric  CHEST/LUNG: Clear to percussion bilaterally; No rales, rhonchi, wheezing, or rubs  HEART: Regular rate and rhythm; No murmurs, rubs, or gallops  ABDOMEN: mild tenderness to palpation  EXTREMITIES:   R BKA, L trace edema  LYMPH: No lymphadenopathy noted  SKIN: No rashes or lesions      LABS:  CBC Full  -  ( 21 Oct 2017 10:54 )  WBC Count : 5.2 K/uL  Hemoglobin : 9.3 g/dL  Hematocrit : 29.0 %  Platelet Count - Automated : 186 K/uL  Mean Cell Volume : 91.9 fl  Mean Cell Hemoglobin : 29.3 pg  Mean Cell Hemoglobin Concentration : 31.9 gm/dL  Auto Neutrophil # : x  Auto Lymphocyte # : x  Auto Monocyte # : x  Auto Eosinophil # : x  Auto Basophil # : x  Auto Neutrophil % : x  Auto Lymphocyte % : x  Auto Monocyte % : x  Auto Eosinophil % : x  Auto Basophil % : x    10-21    143  |  108  |  24<H>  ----------------------------<  164<H>  3.5   |  25  |  2.29<H>    Ca    8.2<L>      21 Oct 2017 10:53      Impression:  * ASUNCION -- multifactorial ATN. ? pre-renal component. Resolving  * Upper GI bleed  Recommendations:  * Cont hypotonic saline at 50cc/h  * Daily BMP  * Nausea is not of uremic etiology

## 2017-10-23 LAB
% GAMMA, URINE: 10.1 % — SIGNIFICANT CHANGE UP
ALBUMIN 24H MFR UR ELPH: 75.2 % — SIGNIFICANT CHANGE UP
ALPHA1 GLOB 24H MFR UR ELPH: 7.4 % — SIGNIFICANT CHANGE UP
ALPHA2 GLOB 24H MFR UR ELPH: 4.2 % — SIGNIFICANT CHANGE UP
ANION GAP SERPL CALC-SCNC: 10 MMOL/L — SIGNIFICANT CHANGE UP (ref 5–17)
B-GLOBULIN 24H MFR UR ELPH: 3.1 % — SIGNIFICANT CHANGE UP
BUN SERPL-MCNC: 17 MG/DL — SIGNIFICANT CHANGE UP (ref 7–23)
CALCIUM SERPL-MCNC: 7.8 MG/DL — LOW (ref 8.5–10.1)
CHLORIDE SERPL-SCNC: 104 MMOL/L — SIGNIFICANT CHANGE UP (ref 96–108)
CO2 SERPL-SCNC: 27 MMOL/L — SIGNIFICANT CHANGE UP (ref 22–31)
CREAT SERPL-MCNC: 1.95 MG/DL — HIGH (ref 0.5–1.3)
GLUCOSE BLDC GLUCOMTR-MCNC: 135 MG/DL — HIGH (ref 70–99)
GLUCOSE BLDC GLUCOMTR-MCNC: 145 MG/DL — HIGH (ref 70–99)
GLUCOSE BLDC GLUCOMTR-MCNC: 150 MG/DL — HIGH (ref 70–99)
GLUCOSE BLDC GLUCOMTR-MCNC: 164 MG/DL — HIGH (ref 70–99)
GLUCOSE SERPL-MCNC: 144 MG/DL — HIGH (ref 70–99)
HCT VFR BLD CALC: 29.4 % — LOW (ref 39–50)
HGB BLD-MCNC: 9.6 G/DL — LOW (ref 13–17)
INTERPRETATION 24H UR IFE-IMP: SIGNIFICANT CHANGE UP
M PROTEIN 24H UR ELPH-MRATE: PRESENT
MCHC RBC-ENTMCNC: 29.4 PG — SIGNIFICANT CHANGE UP (ref 27–34)
MCHC RBC-ENTMCNC: 32.5 GM/DL — SIGNIFICANT CHANGE UP (ref 32–36)
MCV RBC AUTO: 90.5 FL — SIGNIFICANT CHANGE UP (ref 80–100)
PLATELET # BLD AUTO: 177 K/UL — SIGNIFICANT CHANGE UP (ref 150–400)
POTASSIUM SERPL-MCNC: 3.2 MMOL/L — LOW (ref 3.5–5.3)
POTASSIUM SERPL-SCNC: 3.2 MMOL/L — LOW (ref 3.5–5.3)
PROT ?TM UR-MCNC: 161 MG/DL — HIGH (ref 0–12)
PROT PATTERN 24H UR ELPH-IMP: SIGNIFICANT CHANGE UP
RBC # BLD: 3.25 M/UL — LOW (ref 4.2–5.8)
RBC # FLD: 13.3 % — SIGNIFICANT CHANGE UP (ref 11–15)
SODIUM SERPL-SCNC: 141 MMOL/L — SIGNIFICANT CHANGE UP (ref 135–145)
TOTAL VOLUME - 24 HOUR: SIGNIFICANT CHANGE UP
URINE CREATININE CALCULATION: SIGNIFICANT CHANGE UP G/24 H (ref 1–2)
WBC # BLD: 5.3 K/UL — SIGNIFICANT CHANGE UP (ref 3.8–10.5)
WBC # FLD AUTO: 5.3 K/UL — SIGNIFICANT CHANGE UP (ref 3.8–10.5)

## 2017-10-23 PROCEDURE — 99232 SBSQ HOSP IP/OBS MODERATE 35: CPT

## 2017-10-23 RX ORDER — POTASSIUM CHLORIDE 20 MEQ
40 PACKET (EA) ORAL ONCE
Qty: 0 | Refills: 0 | Status: COMPLETED | OUTPATIENT
Start: 2017-10-23 | End: 2017-10-23

## 2017-10-23 RX ORDER — PANTOPRAZOLE SODIUM 20 MG/1
40 TABLET, DELAYED RELEASE ORAL
Qty: 0 | Refills: 0 | Status: DISCONTINUED | OUTPATIENT
Start: 2017-10-23 | End: 2017-10-25

## 2017-10-23 RX ADMIN — Medication 1 GRAM(S): at 00:19

## 2017-10-23 RX ADMIN — Medication 40 MILLIEQUIVALENT(S): at 11:49

## 2017-10-23 RX ADMIN — Medication 1 GRAM(S): at 11:46

## 2017-10-23 RX ADMIN — Medication 1 GRAM(S): at 06:14

## 2017-10-23 RX ADMIN — Medication 1 TABLET(S): at 08:32

## 2017-10-23 RX ADMIN — SODIUM CHLORIDE 50 MILLILITER(S): 9 INJECTION, SOLUTION INTRAVENOUS at 18:22

## 2017-10-23 RX ADMIN — OXYCODONE AND ACETAMINOPHEN 1 TABLET(S): 5; 325 TABLET ORAL at 02:27

## 2017-10-23 RX ADMIN — OXYCODONE AND ACETAMINOPHEN 1 TABLET(S): 5; 325 TABLET ORAL at 03:20

## 2017-10-23 RX ADMIN — OXYCODONE AND ACETAMINOPHEN 1 TABLET(S): 5; 325 TABLET ORAL at 22:08

## 2017-10-23 RX ADMIN — Medication 25 MILLIGRAM(S): at 06:14

## 2017-10-23 RX ADMIN — ONDANSETRON 4 MILLIGRAM(S): 8 TABLET, FILM COATED ORAL at 22:08

## 2017-10-23 RX ADMIN — CLOPIDOGREL BISULFATE 75 MILLIGRAM(S): 75 TABLET, FILM COATED ORAL at 11:46

## 2017-10-23 RX ADMIN — ONDANSETRON 4 MILLIGRAM(S): 8 TABLET, FILM COATED ORAL at 08:34

## 2017-10-23 RX ADMIN — LACTULOSE 30 GRAM(S): 10 SOLUTION ORAL at 06:14

## 2017-10-23 RX ADMIN — PANTOPRAZOLE SODIUM 40 MILLIGRAM(S): 20 TABLET, DELAYED RELEASE ORAL at 08:32

## 2017-10-23 RX ADMIN — Medication 1 TABLET(S): at 18:21

## 2017-10-23 RX ADMIN — PANTOPRAZOLE SODIUM 40 MILLIGRAM(S): 20 TABLET, DELAYED RELEASE ORAL at 17:13

## 2017-10-23 RX ADMIN — Medication 1 GRAM(S): at 17:13

## 2017-10-23 RX ADMIN — Medication 25 MILLIGRAM(S): at 17:21

## 2017-10-23 RX ADMIN — OXYCODONE AND ACETAMINOPHEN 1 TABLET(S): 5; 325 TABLET ORAL at 20:17

## 2017-10-23 RX ADMIN — Medication 2: at 11:45

## 2017-10-23 NOTE — PROGRESS NOTE ADULT - ASSESSMENT
61 yo male with ho DM2, morbid obesity, CKD 3, a/w ASUNCION/ATN on CKD likely medication induced. FOBT positive. CT abd- 1. Bilateral lower lobe disease with patchy infiltrates in both lungs and increased interstitial markings. Small bilateral pleural effusions. 2. Dense calcification in the region of both distal ureters, possibly representing bilateral distal ureteral chronic stones. Mild fullness right renal collecting system. Atrophic changes of both kidneys. 3. Extensive fecal retention with probable fecal impaction in the rectum and sigmoid. No acute GI phlegmon abscess or obstruction. Having bm. AXR-The bowel gas pattern is nonobstructive. Air, oral contrast and stool is noted within the large bowel and rectum. There are no abnormal calcific   densities. The bones are intact.

## 2017-10-23 NOTE — PROGRESS NOTE ADULT - SUBJECTIVE AND OBJECTIVE BOX
Patient is a 62y old  Male who presents with a chief complaint of GI bleed (20 Oct 2017 09:35)      INTERVAL HPI/OVERNIGHT EVENTS:    MEDICATIONS  (STANDING):  atorvastatin 40 milliGRAM(s) Oral at bedtime  clopidogrel Tablet 75 milliGRAM(s) Oral daily  dextrose 5%. 1000 milliLiter(s) (50 mL/Hr) IV Continuous <Continuous>  dextrose 50% Injectable 12.5 Gram(s) IV Push once  dextrose 50% Injectable 25 Gram(s) IV Push once  dextrose 50% Injectable 25 Gram(s) IV Push once  insulin lispro (HumaLOG) corrective regimen sliding scale   SubCutaneous three times a day before meals  insulin lispro (HumaLOG) corrective regimen sliding scale   SubCutaneous at bedtime  lactobacillus acidophilus 1 Tablet(s) Oral two times a day with meals  lactulose Syrup 30 Gram(s) Oral two times a day  metoprolol 25 milliGRAM(s) Oral two times a day  pantoprazole    Tablet 40 milliGRAM(s) Oral two times a day before meals  sodium chloride 0.45%. 1000 milliLiter(s) (50 mL/Hr) IV Continuous <Continuous>  sucralfate 1 Gram(s) Oral four times a day    MEDICATIONS  (PRN):  chlorproMAZINE    Tablet 25 milliGRAM(s) Oral every 8 hours PRN hiccups  dextrose Gel 1 Dose(s) Oral once PRN Blood Glucose LESS THAN 70 milliGRAM(s)/deciliter  glucagon  Injectable 1 milliGRAM(s) IntraMuscular once PRN Glucose LESS THAN 70 milligrams/deciliter  HYDROmorphone  Injectable 2 milliGRAM(s) IV Push every 4 hours PRN Moderate Pain (4 - 6)  ondansetron Injectable 4 milliGRAM(s) IV Push every 8 hours PRN Nausea and/or Vomiting  oxyCODONE    5 mG/acetaminophen 325 mG 1 Tablet(s) Oral every 8 hours PRN Moderate Pain (4 - 6)      Allergies    No Known Allergies    Intolerances        REVIEW OF SYSTEMS:  CONSTITUTIONAL: No fever, generalized weakness, Fatigue, weight loss  EYES: No eye pain, visual disturbances, or discharge  ENMT:  No difficulty hearing, tinnitus, vertigo; No sinus or throat pain  NECK: No pain or stiffness  RESPIRATORY: No shortness of breath,  cough, wheezing, chills or hemoptysis;   CARDIOVASCULAR: No chest pain, palpitations, or leg swelling  GASTROINTESTINAL: No abdominal pain. No nausea, vomiting, diarrhea or constipation. No melena or hematochezia.  GENITOURINARY: No dysuria, frequency, hematuria, or incontinence  NEUROLOGICAL: No headaches, Dizziness, memory loss, loss of strength, numbness, or tremors  SKIN: No itching, burning, rashes, or lesions   MUSCULOSKELETAL: No joint pain or swelling; No muscle, back, or extremity pain  PSYCHIATRIC: No depression, anxiety, mood swings, or difficulty sleeping  HEME/LYMPH: No easy bruising, or bleeding gums  ALLERGY AND IMMUNOLOGIC: No hives or eczema    Vital Signs Last 24 Hrs  T(C): 36.3 (23 Oct 2017 22:40), Max: 36.6 (23 Oct 2017 00:36)  T(F): 97.4 (23 Oct 2017 22:40), Max: 97.8 (23 Oct 2017 00:36)  HR: 70 (23 Oct 2017 22:40) (70 - 75)  BP: 165/77 (23 Oct 2017 22:40) (160/76 - 166/81)  BP(mean): --  RR: 18 (23 Oct 2017 22:40) (18 - 18)  SpO2: 95% (23 Oct 2017 22:40) (95% - 97%)    PHYSICAL EXAM:  GENERAL: NAD, well-groomed, well-developed  HEAD:  Atraumatic, Normocephalic  EYES: EOMI, PERRLA, conjunctiva and sclera clear  ENMT: Moist mucous membranes  NECK: Supple, No JVD, Normal thyroid  CHEST/LUNG: Clear to percussion bilaterally; No rales, rhonchi, wheezing, or rubs  HEART: Regular rate and rhythm; No murmurs, rubs, or gallops  ABDOMEN: Soft, Nontender, Nondistended; Bowel sounds present  EXTREMITIES:  2+ Peripheral Pulses, No clubbing, cyanosis, or edema  LYMPH: No lymphadenopathy noted  SKIN: No rashes or lesions  NERVOUS SYSTEM:  Alert & Oriented X3, Good concentration; Motor Strength 5/5 B/L upper and lower extremities; DTRs 2+ intact and symmetric    LABS:                        9.6    5.3   )-----------( 177      ( 23 Oct 2017 07:36 )             29.4     10-23    141  |  104  |  17  ----------------------------<  144<H>  3.2<L>   |  27  |  1.95<H>    Ca    7.8<L>      23 Oct 2017 07:36          CAPILLARY BLOOD GLUCOSE  145 (23 Oct 2017 21:40)      POCT Blood Glucose.: 145 mg/dL (23 Oct 2017 21:38)  POCT Blood Glucose.: 135 mg/dL (23 Oct 2017 17:08)  POCT Blood Glucose.: 164 mg/dL (23 Oct 2017 11:44)  POCT Blood Glucose.: 150 mg/dL (23 Oct 2017 08:25)        Culture - Urine (collected 10-18-17)  Source: .Urine Clean Catch (Midstream)  Final Report (10-19-17):    No growth        RADIOLOGY & ADDITIONAL TESTS:          Imaging Personally Reviewed:  [ ] YES  [ ] NO    Consultant(s) Notes Reviewed:  [ ] YES  [ ] NO    Care Discussed with Consultants/Other Providers [ ] YES  [ ] NO Patient is a 62y old  Male who presents with a chief complaint of GI bleed (20 Oct 2017 09:35)      INTERVAL HPI/OVERNIGHT EVENTS: pt was seen and examined at bedside. Pt states that he is still having abdominal pain and is dry heaving whenever he eats. He denies fever.    MEDICATIONS  (STANDING):  atorvastatin 40 milliGRAM(s) Oral at bedtime  clopidogrel Tablet 75 milliGRAM(s) Oral daily  dextrose 5%. 1000 milliLiter(s) (50 mL/Hr) IV Continuous <Continuous>  dextrose 50% Injectable 12.5 Gram(s) IV Push once  dextrose 50% Injectable 25 Gram(s) IV Push once  dextrose 50% Injectable 25 Gram(s) IV Push once  insulin lispro (HumaLOG) corrective regimen sliding scale   SubCutaneous three times a day before meals  insulin lispro (HumaLOG) corrective regimen sliding scale   SubCutaneous at bedtime  lactobacillus acidophilus 1 Tablet(s) Oral two times a day with meals  lactulose Syrup 30 Gram(s) Oral two times a day  metoprolol 25 milliGRAM(s) Oral two times a day  pantoprazole    Tablet 40 milliGRAM(s) Oral two times a day before meals  sodium chloride 0.45%. 1000 milliLiter(s) (50 mL/Hr) IV Continuous <Continuous>  sucralfate 1 Gram(s) Oral four times a day    MEDICATIONS  (PRN):  chlorproMAZINE    Tablet 25 milliGRAM(s) Oral every 8 hours PRN hiccups  dextrose Gel 1 Dose(s) Oral once PRN Blood Glucose LESS THAN 70 milliGRAM(s)/deciliter  glucagon  Injectable 1 milliGRAM(s) IntraMuscular once PRN Glucose LESS THAN 70 milligrams/deciliter  HYDROmorphone  Injectable 2 milliGRAM(s) IV Push every 4 hours PRN Moderate Pain (4 - 6)  ondansetron Injectable 4 milliGRAM(s) IV Push every 8 hours PRN Nausea and/or Vomiting  oxyCODONE    5 mG/acetaminophen 325 mG 1 Tablet(s) Oral every 8 hours PRN Moderate Pain (4 - 6)      Allergies    No Known Allergies    Intolerances        REVIEW OF SYSTEMS:  CONSTITUTIONAL: No fever, generalized weakness, Fatigue, weight loss  EYES: No eye pain, visual disturbances, or discharge  ENMT:  No difficulty hearing, tinnitus, vertigo; No sinus or throat pain  NECK: No pain or stiffness  RESPIRATORY: No shortness of breath,  cough, wheezing, chills or hemoptysis;   CARDIOVASCULAR: No chest pain, palpitations, or leg swelling  GASTROINTESTINAL: +abdominal pain. + nausea, negative for vomiting, diarrhea or constipation. No melena or hematochezia.  GENITOURINARY: No dysuria, frequency, hematuria, or incontinence  NEUROLOGICAL: No headaches, Dizziness, memory loss, loss of strength, numbness, or tremors  SKIN: No itching, burning, rashes, or lesions   MUSCULOSKELETAL: No joint pain or swelling; No muscle, back, or extremity pain  PSYCHIATRIC: No depression, anxiety, mood swings, or difficulty sleeping  HEME/LYMPH: No easy bruising, or bleeding gums  ALLERGY AND IMMUNOLOGIC: No hives or eczema    Vital Signs Last 24 Hrs  T(C): 36.3 (23 Oct 2017 22:40), Max: 36.6 (23 Oct 2017 00:36)  T(F): 97.4 (23 Oct 2017 22:40), Max: 97.8 (23 Oct 2017 00:36)  HR: 70 (23 Oct 2017 22:40) (70 - 75)  BP: 165/77 (23 Oct 2017 22:40) (160/76 - 166/81)  BP(mean): --  RR: 18 (23 Oct 2017 22:40) (18 - 18)  SpO2: 95% (23 Oct 2017 22:40) (95% - 97%)    PHYSICAL EXAM:    GENERAL: NAD, well-groomed, well-developed  HEAD:  Atraumatic, Normocephalic  EYES: EOMI, PERRLA, conjunctiva and sclera clear  ENMT: Moist mucous membranes  NECK: Supple, No JVD, Normal thyroid  CHEST/LUNG: Clear to percussion bilaterally; No rales, rhonchi, wheezing, or rubs  HEART: Regular rate and rhythm; No murmurs, rubs, or gallops  ABDOMEN: Soft, Nontender, Nondistended; Bowel sounds present  EXTREMITIES:  right bka No clubbing, cyanosis, or edema  LYMPH: No lymphadenopathy noted  SKIN: No rashes or lesions  NERVOUS SYSTEM:  Alert & Oriented X3, Good concentration    LABS:                        9.6    5.3   )-----------( 177      ( 23 Oct 2017 07:36 )             29.4     10-23    141  |  104  |  17  ----------------------------<  144<H>  3.2<L>   |  27  |  1.95<H>    Ca    7.8<L>      23 Oct 2017 07:36          CAPILLARY BLOOD GLUCOSE  145 (23 Oct 2017 21:40)      POCT Blood Glucose.: 145 mg/dL (23 Oct 2017 21:38)  POCT Blood Glucose.: 135 mg/dL (23 Oct 2017 17:08)  POCT Blood Glucose.: 164 mg/dL (23 Oct 2017 11:44)  POCT Blood Glucose.: 150 mg/dL (23 Oct 2017 08:25)        Culture - Urine (collected 10-18-17)  Source: .Urine Clean Catch (Midstream)  Final Report (10-19-17):    No growth        RADIOLOGY & ADDITIONAL TESTS:          Imaging Personally Reviewed:  [x ] YES  [ ] NO    Consultant(s) Notes Reviewed:  [x ] YES  [ ] NO    Care Discussed with Consultants/Other Providers [x ] YES  [ ] NO

## 2017-10-24 DIAGNOSIS — Z29.9 ENCOUNTER FOR PROPHYLACTIC MEASURES, UNSPECIFIED: ICD-10-CM

## 2017-10-24 LAB
ANION GAP SERPL CALC-SCNC: 8 MMOL/L — SIGNIFICANT CHANGE UP (ref 5–17)
BUN SERPL-MCNC: 15 MG/DL — SIGNIFICANT CHANGE UP (ref 7–23)
CALCIUM SERPL-MCNC: 7.9 MG/DL — LOW (ref 8.5–10.1)
CHLORIDE SERPL-SCNC: 104 MMOL/L — SIGNIFICANT CHANGE UP (ref 96–108)
CO2 SERPL-SCNC: 29 MMOL/L — SIGNIFICANT CHANGE UP (ref 22–31)
CREAT SERPL-MCNC: 1.96 MG/DL — HIGH (ref 0.5–1.3)
GLUCOSE BLDC GLUCOMTR-MCNC: 126 MG/DL — HIGH (ref 70–99)
GLUCOSE BLDC GLUCOMTR-MCNC: 136 MG/DL — HIGH (ref 70–99)
GLUCOSE BLDC GLUCOMTR-MCNC: 154 MG/DL — HIGH (ref 70–99)
GLUCOSE BLDC GLUCOMTR-MCNC: 158 MG/DL — HIGH (ref 70–99)
GLUCOSE SERPL-MCNC: 140 MG/DL — HIGH (ref 70–99)
HCT VFR BLD CALC: 31.5 % — LOW (ref 39–50)
HGB BLD-MCNC: 10.2 G/DL — LOW (ref 13–17)
MAGNESIUM SERPL-MCNC: 1.4 MG/DL — LOW (ref 1.6–2.6)
MCHC RBC-ENTMCNC: 29.8 PG — SIGNIFICANT CHANGE UP (ref 27–34)
MCHC RBC-ENTMCNC: 32.3 GM/DL — SIGNIFICANT CHANGE UP (ref 32–36)
MCV RBC AUTO: 92 FL — SIGNIFICANT CHANGE UP (ref 80–100)
PLATELET # BLD AUTO: 196 K/UL — SIGNIFICANT CHANGE UP (ref 150–400)
POTASSIUM SERPL-MCNC: 3 MMOL/L — LOW (ref 3.5–5.3)
POTASSIUM SERPL-SCNC: 3 MMOL/L — LOW (ref 3.5–5.3)
RBC # BLD: 3.42 M/UL — LOW (ref 4.2–5.8)
RBC # FLD: 13.2 % — SIGNIFICANT CHANGE UP (ref 11–15)
SODIUM SERPL-SCNC: 141 MMOL/L — SIGNIFICANT CHANGE UP (ref 135–145)
WBC # BLD: 5.5 K/UL — SIGNIFICANT CHANGE UP (ref 3.8–10.5)
WBC # FLD AUTO: 5.5 K/UL — SIGNIFICANT CHANGE UP (ref 3.8–10.5)

## 2017-10-24 PROCEDURE — 99233 SBSQ HOSP IP/OBS HIGH 50: CPT

## 2017-10-24 RX ORDER — DEXTROSE MONOHYDRATE, SODIUM CHLORIDE, AND POTASSIUM CHLORIDE 50; .745; 4.5 G/1000ML; G/1000ML; G/1000ML
1000 INJECTION, SOLUTION INTRAVENOUS
Qty: 0 | Refills: 0 | Status: COMPLETED | OUTPATIENT
Start: 2017-10-24 | End: 2017-10-24

## 2017-10-24 RX ORDER — DEXTROSE 50 % IN WATER 50 %
1000 SYRINGE (ML) INTRAVENOUS
Qty: 0 | Refills: 0 | Status: DISCONTINUED | OUTPATIENT
Start: 2017-10-24 | End: 2017-10-24

## 2017-10-24 RX ORDER — POTASSIUM CHLORIDE 20 MEQ
10 PACKET (EA) ORAL ONCE
Qty: 0 | Refills: 0 | Status: COMPLETED | OUTPATIENT
Start: 2017-10-24 | End: 2017-10-24

## 2017-10-24 RX ORDER — POTASSIUM CHLORIDE 20 MEQ
40 PACKET (EA) ORAL EVERY 4 HOURS
Qty: 0 | Refills: 0 | Status: DISCONTINUED | OUTPATIENT
Start: 2017-10-24 | End: 2017-10-24

## 2017-10-24 RX ORDER — MAGNESIUM OXIDE 400 MG ORAL TABLET 241.3 MG
400 TABLET ORAL
Qty: 0 | Refills: 0 | Status: DISCONTINUED | OUTPATIENT
Start: 2017-10-24 | End: 2017-10-24

## 2017-10-24 RX ORDER — MAGNESIUM SULFATE 500 MG/ML
1 VIAL (ML) INJECTION ONCE
Qty: 0 | Refills: 0 | Status: DISCONTINUED | OUTPATIENT
Start: 2017-10-24 | End: 2017-10-24

## 2017-10-24 RX ORDER — MAGNESIUM SULFATE 500 MG/ML
2 VIAL (ML) INJECTION EVERY 4 HOURS
Qty: 0 | Refills: 0 | Status: COMPLETED | OUTPATIENT
Start: 2017-10-24 | End: 2017-10-24

## 2017-10-24 RX ADMIN — CLOPIDOGREL BISULFATE 75 MILLIGRAM(S): 75 TABLET, FILM COATED ORAL at 12:29

## 2017-10-24 RX ADMIN — Medication 1 TABLET(S): at 17:23

## 2017-10-24 RX ADMIN — Medication 25 MILLIGRAM(S): at 18:16

## 2017-10-24 RX ADMIN — Medication 1 GRAM(S): at 12:29

## 2017-10-24 RX ADMIN — Medication 25 GRAM(S): at 22:09

## 2017-10-24 RX ADMIN — Medication 25 MILLIGRAM(S): at 06:04

## 2017-10-24 RX ADMIN — LACTULOSE 30 GRAM(S): 10 SOLUTION ORAL at 18:17

## 2017-10-24 RX ADMIN — ONDANSETRON 4 MILLIGRAM(S): 8 TABLET, FILM COATED ORAL at 18:40

## 2017-10-24 RX ADMIN — Medication 25 GRAM(S): at 18:15

## 2017-10-24 RX ADMIN — Medication 1 GRAM(S): at 18:17

## 2017-10-24 RX ADMIN — PANTOPRAZOLE SODIUM 40 MILLIGRAM(S): 20 TABLET, DELAYED RELEASE ORAL at 17:23

## 2017-10-24 RX ADMIN — Medication 2: at 08:32

## 2017-10-24 RX ADMIN — ONDANSETRON 4 MILLIGRAM(S): 8 TABLET, FILM COATED ORAL at 09:16

## 2017-10-24 RX ADMIN — DEXTROSE MONOHYDRATE, SODIUM CHLORIDE, AND POTASSIUM CHLORIDE 50 MILLILITER(S): 50; .745; 4.5 INJECTION, SOLUTION INTRAVENOUS at 18:16

## 2017-10-24 RX ADMIN — Medication 100 MILLIEQUIVALENT(S): at 17:22

## 2017-10-24 RX ADMIN — Medication 1 GRAM(S): at 06:03

## 2017-10-24 RX ADMIN — Medication 1 GRAM(S): at 00:21

## 2017-10-24 NOTE — PROGRESS NOTE ADULT - PROBLEM SELECTOR PROBLEM 4
Essential hypertension
Preventive measure

## 2017-10-24 NOTE — PROGRESS NOTE ADULT - SUBJECTIVE AND OBJECTIVE BOX
Patient seen in follow up for ASUNCION; hypokalemia; feels well.    MEDICATIONS  (STANDING):  atorvastatin 40 milliGRAM(s) Oral at bedtime  clopidogrel Tablet 75 milliGRAM(s) Oral daily  dextrose 5%. 1000 milliLiter(s) (50 mL/Hr) IV Continuous <Continuous>  dextrose 50% Injectable 12.5 Gram(s) IV Push once  dextrose 50% Injectable 25 Gram(s) IV Push once  dextrose 50% Injectable 25 Gram(s) IV Push once  insulin lispro (HumaLOG) corrective regimen sliding scale   SubCutaneous three times a day before meals  insulin lispro (HumaLOG) corrective regimen sliding scale   SubCutaneous at bedtime  lactobacillus acidophilus 1 Tablet(s) Oral two times a day with meals  lactulose Syrup 30 Gram(s) Oral two times a day  magnesium oxide 400 milliGRAM(s) Oral three times a day with meals  metoprolol 25 milliGRAM(s) Oral two times a day  pantoprazole    Tablet 40 milliGRAM(s) Oral two times a day before meals  potassium chloride    Tablet ER 40 milliEquivalent(s) Oral every 4 hours  sucralfate 1 Gram(s) Oral four times a day    MEDICATIONS  (PRN):  chlorproMAZINE    Tablet 25 milliGRAM(s) Oral every 8 hours PRN hiccups  dextrose Gel 1 Dose(s) Oral once PRN Blood Glucose LESS THAN 70 milliGRAM(s)/deciliter  glucagon  Injectable 1 milliGRAM(s) IntraMuscular once PRN Glucose LESS THAN 70 milligrams/deciliter  HYDROmorphone  Injectable 2 milliGRAM(s) IV Push every 4 hours PRN Moderate Pain (4 - 6)  ondansetron Injectable 4 milliGRAM(s) IV Push every 8 hours PRN Nausea and/or Vomiting  oxyCODONE    5 mG/acetaminophen 325 mG 1 Tablet(s) Oral every 8 hours PRN Moderate Pain (4 - 6)    PHYSICAL EXAM:      T(C): 36.3 (10-23-17 @ 22:40), Max: 36.3 (10-23-17 @ 22:40)  HR: 68 (10-24-17 @ 11:52) (68 - 72)  BP: 159/76 (10-24-17 @ 11:52) (159/76 - 166/81)  RR: 18 (10-24-17 @ 11:52) (18 - 18)  SpO2: 96% (10-24-17 @ 11:52) (95% - 96%)  Wt(kg): --  Respiratory: clear anteriorly, decreased BS at bases  Cardiovascular: S1 S2  Gastrointestinal: soft NT ND +BS  Extremities:  tr  edema  bka                                    10.2   5.5   )-----------( 196      ( 24 Oct 2017 10:28 )             31.5     10-24    141  |  104  |  15  ----------------------------<  140<H>  3.0<L>   |  29  |  1.96<H>    Ca    7.9<L>      24 Oct 2017 10:28  Mg     1.4     10-24            Assessment and Plan:    ASUNCION improving; IV KCL for now;   Replete Mg. Patient seen in follow up for ASUNCION; hypokalemia; feels well other than some vomiting earlier.    MEDICATIONS  (STANDING):  atorvastatin 40 milliGRAM(s) Oral at bedtime  clopidogrel Tablet 75 milliGRAM(s) Oral daily  dextrose 5%. 1000 milliLiter(s) (50 mL/Hr) IV Continuous <Continuous>  dextrose 50% Injectable 12.5 Gram(s) IV Push once  dextrose 50% Injectable 25 Gram(s) IV Push once  dextrose 50% Injectable 25 Gram(s) IV Push once  insulin lispro (HumaLOG) corrective regimen sliding scale   SubCutaneous three times a day before meals  insulin lispro (HumaLOG) corrective regimen sliding scale   SubCutaneous at bedtime  lactobacillus acidophilus 1 Tablet(s) Oral two times a day with meals  lactulose Syrup 30 Gram(s) Oral two times a day  magnesium oxide 400 milliGRAM(s) Oral three times a day with meals  metoprolol 25 milliGRAM(s) Oral two times a day  pantoprazole    Tablet 40 milliGRAM(s) Oral two times a day before meals  potassium chloride    Tablet ER 40 milliEquivalent(s) Oral every 4 hours  sucralfate 1 Gram(s) Oral four times a day    MEDICATIONS  (PRN):  chlorproMAZINE    Tablet 25 milliGRAM(s) Oral every 8 hours PRN hiccups  dextrose Gel 1 Dose(s) Oral once PRN Blood Glucose LESS THAN 70 milliGRAM(s)/deciliter  glucagon  Injectable 1 milliGRAM(s) IntraMuscular once PRN Glucose LESS THAN 70 milligrams/deciliter  HYDROmorphone  Injectable 2 milliGRAM(s) IV Push every 4 hours PRN Moderate Pain (4 - 6)  ondansetron Injectable 4 milliGRAM(s) IV Push every 8 hours PRN Nausea and/or Vomiting  oxyCODONE    5 mG/acetaminophen 325 mG 1 Tablet(s) Oral every 8 hours PRN Moderate Pain (4 - 6)    PHYSICAL EXAM:      T(C): 36.3 (10-23-17 @ 22:40), Max: 36.3 (10-23-17 @ 22:40)  HR: 68 (10-24-17 @ 11:52) (68 - 72)  BP: 159/76 (10-24-17 @ 11:52) (159/76 - 166/81)  RR: 18 (10-24-17 @ 11:52) (18 - 18)  SpO2: 96% (10-24-17 @ 11:52) (95% - 96%)  Wt(kg): --  Respiratory: clear anteriorly, decreased BS at bases  Cardiovascular: S1 S2  Gastrointestinal: soft NT ND +BS  Extremities:  tr  edema  bka                                    10.2   5.5   )-----------( 196      ( 24 Oct 2017 10:28 )             31.5     10-24    141  |  104  |  15  ----------------------------<  140<H>  3.0<L>   |  29  |  1.96<H>    Ca    7.9<L>      24 Oct 2017 10:28  Mg     1.4     10-24            Assessment and Plan:    ASUNCION improving; IV KCL for now as po intake may not be reliable.  Replete Mg 2g X2  Will follow.

## 2017-10-24 NOTE — PROGRESS NOTE ADULT - SUBJECTIVE AND OBJECTIVE BOX
Patient is a 62y old  Male who presents with a chief complaint of GI bleed (20 Oct 2017 09:35)      INTERVAL HPI/OVERNIGHT EVENTS: pt was seen and examined at bedside. Pt states that he is still having abdominal pain and is dry heaving whenever he eats. He denies fever.     MEDICATIONS  (STANDING):  atorvastatin 40 milliGRAM(s) Oral at bedtime  clopidogrel Tablet 75 milliGRAM(s) Oral daily  dextrose 5%. 1000 milliLiter(s) (50 mL/Hr) IV Continuous <Continuous>  dextrose 50% Injectable 12.5 Gram(s) IV Push once  dextrose 50% Injectable 25 Gram(s) IV Push once  dextrose 50% Injectable 25 Gram(s) IV Push once  insulin lispro (HumaLOG) corrective regimen sliding scale   SubCutaneous three times a day before meals  insulin lispro (HumaLOG) corrective regimen sliding scale   SubCutaneous at bedtime  lactobacillus acidophilus 1 Tablet(s) Oral two times a day with meals  lactulose Syrup 30 Gram(s) Oral two times a day  metoprolol 25 milliGRAM(s) Oral two times a day  pantoprazole    Tablet 40 milliGRAM(s) Oral two times a day before meals  sodium chloride 0.45%. 1000 milliLiter(s) (50 mL/Hr) IV Continuous <Continuous>  sucralfate 1 Gram(s) Oral four times a day    MEDICATIONS  (PRN):  chlorproMAZINE    Tablet 25 milliGRAM(s) Oral every 8 hours PRN hiccups  dextrose Gel 1 Dose(s) Oral once PRN Blood Glucose LESS THAN 70 milliGRAM(s)/deciliter  glucagon  Injectable 1 milliGRAM(s) IntraMuscular once PRN Glucose LESS THAN 70 milligrams/deciliter  HYDROmorphone  Injectable 2 milliGRAM(s) IV Push every 4 hours PRN Moderate Pain (4 - 6)  ondansetron Injectable 4 milliGRAM(s) IV Push every 8 hours PRN Nausea and/or Vomiting  oxyCODONE    5 mG/acetaminophen 325 mG 1 Tablet(s) Oral every 8 hours PRN Moderate Pain (4 - 6)      Allergies    No Known Allergies    Intolerances        REVIEW OF SYSTEMS:    CONSTITUTIONAL: No fever, generalized weakness, Fatigue, weight loss  EYES: No eye pain, visual disturbances, or discharge  ENMT:  No difficulty hearing, tinnitus, vertigo; No sinus or throat pain  NECK: No pain or stiffness  RESPIRATORY: No shortness of breath,  cough, wheezing, chills or hemoptysis;   CARDIOVASCULAR: No chest pain, palpitations, or leg swelling  GASTROINTESTINAL: Positive abdominal pain and nausea, Negative for vomiting, diarrhea or constipation. No melena or hematochezia.  GENITOURINARY: No dysuria, frequency, hematuria, or incontinence  NEUROLOGICAL: No headaches, Dizziness, memory loss, loss of strength, numbness, or tremors  SKIN: No itching, burning, rashes, or lesions   MUSCULOSKELETAL: No joint pain or swelling; No muscle, back, or extremity pain  PSYCHIATRIC: No depression, anxiety, mood swings, or difficulty sleeping  HEME/LYMPH: No easy bruising, or bleeding gums  ALLERGY AND IMMUNOLOGIC: No hives or eczema    Vital Signs Last 24 Hrs  T(C): 36.3 (23 Oct 2017 22:40), Max: 36.6 (23 Oct 2017 00:36)  T(F): 97.4 (23 Oct 2017 22:40), Max: 97.8 (23 Oct 2017 00:36)  HR: 70 (23 Oct 2017 22:40) (70 - 75)  BP: 165/77 (23 Oct 2017 22:40) (160/76 - 166/81)  BP(mean): --  RR: 18 (23 Oct 2017 22:40) (18 - 18)  SpO2: 95% (23 Oct 2017 22:40) (95% - 97%)    PHYSICAL EXAM:  GENERAL: NAD, well-groomed, well-developed  HEAD:  Atraumatic, Normocephalic  EYES: blindness   ENMT: Moist mucous membranes  NECK: Supple, No JVD, Normal thyroid  CHEST/LUNG: Clear to percussion bilaterally; No rales, rhonchi, wheezing, or rubs  HEART: Regular rate and rhythm; No murmurs, rubs, or gallops  ABDOMEN: Soft, Nontender, Nondistended; Bowel sounds present  EXTREMITIES:  Right BKA,, No clubbing, cyanosis, or edema  SKIN: No rashes or lesions  NERVOUS SYSTEM:  Alert & Oriented X3, Good concentration    LABS:                        9.6    5.3   )-----------( 177      ( 23 Oct 2017 07:36 )             29.4     10-23    141  |  104  |  17  ----------------------------<  144<H>  3.2<L>   |  27  |  1.95<H>    Ca    7.8<L>      23 Oct 2017 07:36          CAPILLARY BLOOD GLUCOSE  145 (23 Oct 2017 21:40)      POCT Blood Glucose.: 145 mg/dL (23 Oct 2017 21:38)  POCT Blood Glucose.: 135 mg/dL (23 Oct 2017 17:08)  POCT Blood Glucose.: 164 mg/dL (23 Oct 2017 11:44)  POCT Blood Glucose.: 150 mg/dL (23 Oct 2017 08:25)        Culture - Urine (collected 10-18-17)  Source: .Urine Clean Catch (Midstream)  Final Report (10-19-17):    No growth        RADIOLOGY & ADDITIONAL TESTS:          Imaging Personally Reviewed:  [ ] YES  [ ] NO    Consultant(s) Notes Reviewed:  [ x] YES  [ ] NO    Care Discussed with Consultants/Other Providers [ x] YES  [ ] NO

## 2017-10-24 NOTE — PROGRESS NOTE ADULT - SUBJECTIVE AND OBJECTIVE BOX
Patient is a 62y old  Male who presents with a chief complaint of GI bleed (20 Oct 2017 09:35)      INTERVAL HPI/OVERNIGHT EVENTS: No significant overnight events. Pt denies any GI bleeding and there is no abdominal pain. He continues to have difficulties tolerated PO diet. having "dry heaves".     MEDICATIONS  (STANDING):  atorvastatin 40 milliGRAM(s) Oral at bedtime  clopidogrel Tablet 75 milliGRAM(s) Oral daily  dextrose 5%. 1000 milliLiter(s) (50 mL/Hr) IV Continuous <Continuous>  dextrose 50% Injectable 12.5 Gram(s) IV Push once  dextrose 50% Injectable 25 Gram(s) IV Push once  dextrose 50% Injectable 25 Gram(s) IV Push once  insulin lispro (HumaLOG) corrective regimen sliding scale   SubCutaneous three times a day before meals  insulin lispro (HumaLOG) corrective regimen sliding scale   SubCutaneous at bedtime  lactobacillus acidophilus 1 Tablet(s) Oral two times a day with meals  lactulose Syrup 30 Gram(s) Oral two times a day  magnesium sulfate  IVPB 1 Gram(s) IV Intermittent once  metoprolol 25 milliGRAM(s) Oral two times a day  pantoprazole    Tablet 40 milliGRAM(s) Oral two times a day before meals  sucralfate 1 Gram(s) Oral four times a day    MEDICATIONS  (PRN):  chlorproMAZINE    Tablet 25 milliGRAM(s) Oral every 8 hours PRN hiccups  dextrose Gel 1 Dose(s) Oral once PRN Blood Glucose LESS THAN 70 milliGRAM(s)/deciliter  glucagon  Injectable 1 milliGRAM(s) IntraMuscular once PRN Glucose LESS THAN 70 milligrams/deciliter  HYDROmorphone  Injectable 2 milliGRAM(s) IV Push every 4 hours PRN Moderate Pain (4 - 6)  ondansetron Injectable 4 milliGRAM(s) IV Push every 8 hours PRN Nausea and/or Vomiting  oxyCODONE    5 mG/acetaminophen 325 mG 1 Tablet(s) Oral every 8 hours PRN Moderate Pain (4 - 6)      Allergies    No Known Allergies    Intolerances        REVIEW OF SYSTEMS:    CONSTITUTIONAL: No fever, generalized weakness, Fatigue, weight loss  EYES: Blindness   RESPIRATORY: No shortness of breath,  cough, wheezing, chills or hemoptysis;   CARDIOVASCULAR: No chest pain, palpitations, or leg swelling  GASTROINTESTINAL: No abdominal pain. No nausea, vomiting, diarrhea or constipation. No melena or hematochezia.  GENITOURINARY: No dysuria, frequency, hematuria, or incontinence  NEUROLOGICAL: No headaches, Dizziness, memory loss, loss of strength, numbness, or tremors  SKIN: No itching, burning, rashes, or lesions   MUSCULOSKELETAL: right BKA, No joint pain or swelling; No muscle, back, or extremity pain  PSYCHIATRIC: No depression, anxiety, mood swings, or difficulty sleeping  HEME/LYMPH: No easy bruising, or bleeding gums  ALLERGY AND IMMUNOLOGIC: No hives or eczema    Vital Signs Last 24 Hrs  T(C): 36.3 (23 Oct 2017 22:40), Max: 36.3 (23 Oct 2017 22:40)  T(F): 97.4 (23 Oct 2017 22:40), Max: 97.4 (23 Oct 2017 22:40)  HR: 68 (24 Oct 2017 11:52) (68 - 72)  BP: 159/76 (24 Oct 2017 11:52) (159/76 - 166/81)  BP(mean): --  RR: 18 (24 Oct 2017 11:52) (18 - 18)  SpO2: 96% (24 Oct 2017 11:52) (95% - 96%)    PHYSICAL EXAM:    GENERAL: NAD, Obese, well-groomed, well-developed  HEAD:  Atraumatic, Normocephalic  EYES: blindness  ENMT: Moist mucous membranes  NECK: Supple, No JVD, Normal thyroid  CHEST/LUNG: Clear to percussion bilaterally; No rales, rhonchi, wheezing, or rubs  HEART: Regular rate and rhythm; No murmurs, rubs, or gallops  ABDOMEN: Soft, Nontender, Nondistended; Bowel sounds present  EXTREMITIES: right BKA,  2+ Peripheral Pulses, No clubbing, cyanosis, or edema  LYMPH: No lymphadenopathy noted  SKIN: No rashes or lesions  NERVOUS SYSTEM:  Alert & Oriented X3, Good concentration    LABS:                        10.2   5.5   )-----------( 196      ( 24 Oct 2017 10:28 )             31.5     10-24    141  |  104  |  15  ----------------------------<  140<H>  3.0<L>   |  29  |  1.96<H>    Ca    7.9<L>      24 Oct 2017 10:28  Mg     1.4     10-24          CAPILLARY BLOOD GLUCOSE  158 (24 Oct 2017 08:15)  145 (23 Oct 2017 21:40)      POCT Blood Glucose.: 136 mg/dL (24 Oct 2017 12:28)  POCT Blood Glucose.: 158 mg/dL (24 Oct 2017 08:15)  POCT Blood Glucose.: 145 mg/dL (23 Oct 2017 21:38)  POCT Blood Glucose.: 135 mg/dL (23 Oct 2017 17:08)        Culture - Urine (collected 10-18-17)  Source: .Urine Clean Catch (Midstream)  Final Report (10-19-17):    No growth        RADIOLOGY & ADDITIONAL TESTS:          Imaging Personally Reviewed:  [] YES  [ ] NO    Consultant(s) Notes Reviewed:  [x ] YES  [ ] NO    Care Discussed with Consultants/Other Providers [ x] YES  [ ] NO

## 2017-10-24 NOTE — PROGRESS NOTE ADULT - PROBLEM SELECTOR PLAN 2
with electrolytes imbalance, Renal function is no longer trending, possible CKD III, magnesium and potassium replaced, will repeat levels

## 2017-10-24 NOTE — PROGRESS NOTE ADULT - SUBJECTIVE AND OBJECTIVE BOX
Pt still c/o intermittent n/v  no specific abd pain      MEDICATIONS  (STANDING):  atorvastatin 40 milliGRAM(s) Oral at bedtime  clopidogrel Tablet 75 milliGRAM(s) Oral daily  dextrose 5%. 1000 milliLiter(s) (50 mL/Hr) IV Continuous <Continuous>  dextrose 50% Injectable 12.5 Gram(s) IV Push once  dextrose 50% Injectable 25 Gram(s) IV Push once  dextrose 50% Injectable 25 Gram(s) IV Push once  insulin lispro (HumaLOG) corrective regimen sliding scale   SubCutaneous three times a day before meals  insulin lispro (HumaLOG) corrective regimen sliding scale   SubCutaneous at bedtime  lactobacillus acidophilus 1 Tablet(s) Oral two times a day with meals  lactulose Syrup 30 Gram(s) Oral two times a day  metoprolol 25 milliGRAM(s) Oral two times a day  pantoprazole    Tablet 40 milliGRAM(s) Oral two times a day before meals  sodium chloride 0.45%. 1000 milliLiter(s) (50 mL/Hr) IV Continuous <Continuous>  sucralfate 1 Gram(s) Oral four times a day    MEDICATIONS  (PRN):  chlorproMAZINE    Tablet 25 milliGRAM(s) Oral every 8 hours PRN hiccups  dextrose Gel 1 Dose(s) Oral once PRN Blood Glucose LESS THAN 70 milliGRAM(s)/deciliter  glucagon  Injectable 1 milliGRAM(s) IntraMuscular once PRN Glucose LESS THAN 70 milligrams/deciliter  HYDROmorphone  Injectable 2 milliGRAM(s) IV Push every 4 hours PRN Moderate Pain (4 - 6)  ondansetron Injectable 4 milliGRAM(s) IV Push every 8 hours PRN Nausea and/or Vomiting  oxyCODONE    5 mG/acetaminophen 325 mG 1 Tablet(s) Oral every 8 hours PRN Moderate Pain (4 - 6)      Allergies    No Known Allergies    Intolerances        Vital Signs Last 24 Hrs  T(C): 36.3 (23 Oct 2017 22:40), Max: 36.3 (23 Oct 2017 22:40)  T(F): 97.4 (23 Oct 2017 22:40), Max: 97.4 (23 Oct 2017 22:40)  HR: 70 (23 Oct 2017 22:40) (70 - 72)  BP: 165/77 (23 Oct 2017 22:40) (165/77 - 166/81)  BP(mean): --  RR: 18 (23 Oct 2017 22:40) (18 - 18)  SpO2: 95% (23 Oct 2017 22:40) (95% - 95%)    PHYSICAL EXAM:  General: NAD.  CVS: S1, S2  Chest: air entry bilaterally present  Abd: BS present, soft, non-tender      LABS:                        10.2   5.5   )-----------( 196      ( 24 Oct 2017 10:28 )             31.5     10-24    141  |  104  |  15  ----------------------------<  140<H>  3.0<L>   |  29  |  1.96<H>    Ca    7.9<L>      24 Oct 2017 10:28      labs stable  on IV antibx  on clear liquid diet  cont protonix and carafate

## 2017-10-25 VITALS
OXYGEN SATURATION: 99 % | SYSTOLIC BLOOD PRESSURE: 179 MMHG | DIASTOLIC BLOOD PRESSURE: 82 MMHG | TEMPERATURE: 99 F | HEART RATE: 75 BPM | RESPIRATION RATE: 16 BRPM

## 2017-10-25 LAB
ANION GAP SERPL CALC-SCNC: 9 MMOL/L — SIGNIFICANT CHANGE UP (ref 5–17)
BUN SERPL-MCNC: 14 MG/DL — SIGNIFICANT CHANGE UP (ref 7–23)
CALCIUM SERPL-MCNC: 8 MG/DL — LOW (ref 8.5–10.1)
CHLORIDE SERPL-SCNC: 104 MMOL/L — SIGNIFICANT CHANGE UP (ref 96–108)
CO2 SERPL-SCNC: 27 MMOL/L — SIGNIFICANT CHANGE UP (ref 22–31)
CREAT SERPL-MCNC: 1.84 MG/DL — HIGH (ref 0.5–1.3)
GLUCOSE BLDC GLUCOMTR-MCNC: 178 MG/DL — HIGH (ref 70–99)
GLUCOSE BLDC GLUCOMTR-MCNC: 193 MG/DL — HIGH (ref 70–99)
GLUCOSE SERPL-MCNC: 181 MG/DL — HIGH (ref 70–99)
MAGNESIUM SERPL-MCNC: 2 MG/DL — SIGNIFICANT CHANGE UP (ref 1.6–2.6)
POTASSIUM SERPL-MCNC: 3.4 MMOL/L — LOW (ref 3.5–5.3)
POTASSIUM SERPL-SCNC: 3.4 MMOL/L — LOW (ref 3.5–5.3)
SODIUM SERPL-SCNC: 140 MMOL/L — SIGNIFICANT CHANGE UP (ref 135–145)

## 2017-10-25 PROCEDURE — 99238 HOSP IP/OBS DSCHRG MGMT 30/<: CPT

## 2017-10-25 RX ORDER — POTASSIUM CHLORIDE 20 MEQ
40 PACKET (EA) ORAL ONCE
Qty: 0 | Refills: 0 | Status: COMPLETED | OUTPATIENT
Start: 2017-10-25 | End: 2017-10-25

## 2017-10-25 RX ORDER — ONDANSETRON 8 MG/1
1 TABLET, FILM COATED ORAL
Qty: 30 | Refills: 0 | OUTPATIENT
Start: 2017-10-25 | End: 2017-11-04

## 2017-10-25 RX ORDER — METOCLOPRAMIDE HCL 10 MG
1 TABLET ORAL
Qty: 40 | Refills: 0 | OUTPATIENT
Start: 2017-10-25 | End: 2017-11-04

## 2017-10-25 RX ORDER — METOPROLOL TARTRATE 50 MG
1 TABLET ORAL
Qty: 60 | Refills: 0
Start: 2017-10-25 | End: 2017-11-24

## 2017-10-25 RX ADMIN — CLOPIDOGREL BISULFATE 75 MILLIGRAM(S): 75 TABLET, FILM COATED ORAL at 12:08

## 2017-10-25 RX ADMIN — Medication 2: at 12:11

## 2017-10-25 RX ADMIN — Medication 1 GRAM(S): at 12:08

## 2017-10-25 RX ADMIN — PANTOPRAZOLE SODIUM 40 MILLIGRAM(S): 20 TABLET, DELAYED RELEASE ORAL at 08:15

## 2017-10-25 RX ADMIN — Medication 2: at 08:14

## 2017-10-25 RX ADMIN — ONDANSETRON 4 MILLIGRAM(S): 8 TABLET, FILM COATED ORAL at 03:21

## 2017-10-25 RX ADMIN — Medication 40 MILLIEQUIVALENT(S): at 12:17

## 2017-10-25 RX ADMIN — ONDANSETRON 4 MILLIGRAM(S): 8 TABLET, FILM COATED ORAL at 13:44

## 2017-10-25 RX ADMIN — Medication 1 TABLET(S): at 08:15

## 2017-10-25 RX ADMIN — Medication 25 MILLIGRAM(S): at 06:06

## 2017-10-25 NOTE — CHART NOTE - NSCHARTNOTEFT_GEN_A_CORE
Upon Nutritional Assessment by the Registered Dietitian your patient was determined to meet criteria / has evidence of the following diagnosis/diagnoses:          [ ]  Mild Protein Calorie Malnutrition        [ ]  Moderate Protein Calorie Malnutrition        [x ] Severe Protein Calorie Malnutrition        [ ] Unspecified Protein Calorie Malnutrition        [ ] Underweight / BMI <19        [ ] Morbid Obesity / BMI > 40      Findings as based on:  •  Comprehensive nutrition assessment and consultation  •  Calorie counts (nutrient intake analysis)  •  Food acceptance and intake status from observations by staff  •  Follow up  •  Patient education  •  Intervention secondary to interdisciplinary rounds  •   concerns      Treatment:    The following diet has been recommended:  Adv po diet when medically feasible Full liquids/CCHO/Glucerna shake 1 can 2 x day(220kcal & 10gm protein)    PROVIDER Section:     By signing this assessment you are acknowledging and agree with the diagnosis/diagnoses assigned by the Registered Dietitian    Comments: Upon Nutritional Assessment by the Registered Dietitian your patient was determined to meet criteria / has evidence of the following diagnosis/diagnoses:          [ ]  Mild Protein Calorie Malnutrition        [ ]  Moderate Protein Calorie Malnutrition        [x ] Severe Protein Calorie Malnutrition        [ ] Unspecified Protein Calorie Malnutrition        [ ] Underweight / BMI <19        [ ] Morbid Obesity / BMI > 40      Findings as based on:  •  Comprehensive nutrition assessment and consultation  •  Calorie counts (nutrient intake analysis)  •  Food acceptance and intake status from observations by staff  •  Follow up  •  Patient education  •  Intervention secondary to interdisciplinary rounds  •   concerns      Treatment:    The following diet has been recommended:  Adv po diet when medically feasible Full liquids/CCHO/Glucerna shake 1 can 2 x day(220kcal & 10gm protein) & consider PPN if unable to advance po diet    PROVIDER Section:     By signing this assessment you are acknowledging and agree with the diagnosis/diagnoses assigned by the Registered Dietitian    Comments:

## 2017-10-25 NOTE — PROGRESS NOTE ADULT - SUBJECTIVE AND OBJECTIVE BOX
slow progress  on liquid diet      MEDICATIONS  (STANDING):  atorvastatin 40 milliGRAM(s) Oral at bedtime  clopidogrel Tablet 75 milliGRAM(s) Oral daily  dextrose 5%. 1000 milliLiter(s) (50 mL/Hr) IV Continuous <Continuous>  dextrose 50% Injectable 12.5 Gram(s) IV Push once  dextrose 50% Injectable 25 Gram(s) IV Push once  dextrose 50% Injectable 25 Gram(s) IV Push once  insulin lispro (HumaLOG) corrective regimen sliding scale   SubCutaneous three times a day before meals  insulin lispro (HumaLOG) corrective regimen sliding scale   SubCutaneous at bedtime  lactobacillus acidophilus 1 Tablet(s) Oral two times a day with meals  lactulose Syrup 30 Gram(s) Oral two times a day  metoprolol 25 milliGRAM(s) Oral two times a day  pantoprazole    Tablet 40 milliGRAM(s) Oral two times a day before meals  potassium chloride    Tablet ER 40 milliEquivalent(s) Oral once  sucralfate 1 Gram(s) Oral four times a day    MEDICATIONS  (PRN):  chlorproMAZINE    Tablet 25 milliGRAM(s) Oral every 8 hours PRN hiccups  dextrose Gel 1 Dose(s) Oral once PRN Blood Glucose LESS THAN 70 milliGRAM(s)/deciliter  glucagon  Injectable 1 milliGRAM(s) IntraMuscular once PRN Glucose LESS THAN 70 milligrams/deciliter  HYDROmorphone  Injectable 2 milliGRAM(s) IV Push every 4 hours PRN Moderate Pain (4 - 6)  ondansetron Injectable 4 milliGRAM(s) IV Push every 8 hours PRN Nausea and/or Vomiting  oxyCODONE    5 mG/acetaminophen 325 mG 1 Tablet(s) Oral every 8 hours PRN Moderate Pain (4 - 6)      Allergies    No Known Allergies    Intolerances        Vital Signs Last 24 Hrs  T(C): 36.9 (25 Oct 2017 05:50), Max: 36.9 (25 Oct 2017 05:50)  T(F): 98.4 (25 Oct 2017 05:50), Max: 98.4 (25 Oct 2017 05:50)  HR: 79 (25 Oct 2017 05:50) (68 - 79)  BP: 185/92 (25 Oct 2017 05:50) (159/76 - 185/92)  BP(mean): --  RR: 16 (25 Oct 2017 05:50) (16 - 18)  SpO2: 96% (25 Oct 2017 05:50) (95% - 97%)    PHYSICAL EXAM:  General: NAD.  CVS: S1, S2  Chest: air entry bilaterally present  Abd: BS present, soft, non-tender      LABS:                        10.2   5.5   )-----------( 196      ( 24 Oct 2017 10:28 )             31.5     10-25    140  |  104  |  14  ----------------------------<  181<H>  3.4<L>   |  27  |  1.84<H>    Ca    8.0<L>      25 Oct 2017 07:31  Mg     2.0     10-25        advance to full fluids

## 2017-10-25 NOTE — PROGRESS NOTE ADULT - PROVIDER SPECIALTY LIST ADULT
Gastroenterology
Hospitalist
Nephrology
Hospitalist

## 2017-10-25 NOTE — PROGRESS NOTE ADULT - SUBJECTIVE AND OBJECTIVE BOX
Patient seen in follow up for ASUNCION; hypokalemia; no distress.    MEDICATIONS  (STANDING):  atorvastatin 40 milliGRAM(s) Oral at bedtime  clopidogrel Tablet 75 milliGRAM(s) Oral daily  dextrose 5%. 1000 milliLiter(s) (50 mL/Hr) IV Continuous <Continuous>  dextrose 50% Injectable 12.5 Gram(s) IV Push once  dextrose 50% Injectable 25 Gram(s) IV Push once  dextrose 50% Injectable 25 Gram(s) IV Push once  insulin lispro (HumaLOG) corrective regimen sliding scale   SubCutaneous three times a day before meals  insulin lispro (HumaLOG) corrective regimen sliding scale   SubCutaneous at bedtime  lactobacillus acidophilus 1 Tablet(s) Oral two times a day with meals  lactulose Syrup 30 Gram(s) Oral two times a day  metoprolol 25 milliGRAM(s) Oral two times a day  pantoprazole    Tablet 40 milliGRAM(s) Oral two times a day before meals  sucralfate 1 Gram(s) Oral four times a day    MEDICATIONS  (PRN):  chlorproMAZINE    Tablet 25 milliGRAM(s) Oral every 8 hours PRN hiccups  dextrose Gel 1 Dose(s) Oral once PRN Blood Glucose LESS THAN 70 milliGRAM(s)/deciliter  glucagon  Injectable 1 milliGRAM(s) IntraMuscular once PRN Glucose LESS THAN 70 milligrams/deciliter  HYDROmorphone  Injectable 2 milliGRAM(s) IV Push every 4 hours PRN Moderate Pain (4 - 6)  ondansetron Injectable 4 milliGRAM(s) IV Push every 8 hours PRN Nausea and/or Vomiting  oxyCODONE    5 mG/acetaminophen 325 mG 1 Tablet(s) Oral every 8 hours PRN Moderate Pain (4 - 6)    PHYSICAL EXAM:      T(C): 37.1 (10-25-17 @ 12:11), Max: 37.1 (10-25-17 @ 12:11)  HR: 75 (10-25-17 @ 12:11) (74 - 79)  BP: 179/82 (10-25-17 @ 12:11) (168/63 - 185/92)  RR: 16 (10-25-17 @ 12:11) (16 - 18)  SpO2: 99% (10-25-17 @ 12:11) (95% - 99%)  Wt(kg): --  Respiratory: clear anteriorly, decreased BS at bases  Cardiovascular: S1 S2  Gastrointestinal: soft NT ND +BS  Extremities:   bka tr edema                                    10.2   5.5   )-----------( 196      ( 24 Oct 2017 10:28 )             31.5     10-25    140  |  104  |  14  ----------------------------<  181<H>  3.4<L>   |  27  |  1.84<H>    Ca    8.0<L>      25 Oct 2017 07:31  Mg     2.0     10-25        Assessment and Plan:    ASUNCION improving; K acceptable;   Po KCL today, no objection to d/c.

## 2017-10-27 DIAGNOSIS — N18.3 CHRONIC KIDNEY DISEASE, STAGE 3 (MODERATE): ICD-10-CM

## 2017-10-27 DIAGNOSIS — I25.10 ATHEROSCLEROTIC HEART DISEASE OF NATIVE CORONARY ARTERY WITHOUT ANGINA PECTORIS: ICD-10-CM

## 2017-10-27 DIAGNOSIS — Z79.4 LONG TERM (CURRENT) USE OF INSULIN: ICD-10-CM

## 2017-10-27 DIAGNOSIS — T36.95XA ADVERSE EFFECT OF UNSPECIFIED SYSTEMIC ANTIBIOTIC, INITIAL ENCOUNTER: ICD-10-CM

## 2017-10-27 DIAGNOSIS — K92.2 GASTROINTESTINAL HEMORRHAGE, UNSPECIFIED: ICD-10-CM

## 2017-10-27 DIAGNOSIS — H54.3 UNQUALIFIED VISUAL LOSS, BOTH EYES: ICD-10-CM

## 2017-10-27 DIAGNOSIS — N17.0 ACUTE KIDNEY FAILURE WITH TUBULAR NECROSIS: ICD-10-CM

## 2017-10-27 DIAGNOSIS — K20.9 ESOPHAGITIS, UNSPECIFIED: ICD-10-CM

## 2017-10-27 DIAGNOSIS — Z89.511 ACQUIRED ABSENCE OF RIGHT LEG BELOW KNEE: ICD-10-CM

## 2017-10-27 DIAGNOSIS — Z95.5 PRESENCE OF CORONARY ANGIOPLASTY IMPLANT AND GRAFT: ICD-10-CM

## 2017-10-27 DIAGNOSIS — E11.40 TYPE 2 DIABETES MELLITUS WITH DIABETIC NEUROPATHY, UNSPECIFIED: ICD-10-CM

## 2017-10-27 DIAGNOSIS — E66.01 MORBID (SEVERE) OBESITY DUE TO EXCESS CALORIES: ICD-10-CM

## 2017-10-27 DIAGNOSIS — Z79.02 LONG TERM (CURRENT) USE OF ANTITHROMBOTICS/ANTIPLATELETS: ICD-10-CM

## 2017-10-27 DIAGNOSIS — E87.6 HYPOKALEMIA: ICD-10-CM

## 2017-10-27 DIAGNOSIS — K59.01 SLOW TRANSIT CONSTIPATION: ICD-10-CM

## 2017-10-27 DIAGNOSIS — I12.9 HYPERTENSIVE CHRONIC KIDNEY DISEASE WITH STAGE 1 THROUGH STAGE 4 CHRONIC KIDNEY DISEASE, OR UNSPECIFIED CHRONIC KIDNEY DISEASE: ICD-10-CM

## 2017-10-27 DIAGNOSIS — R19.7 DIARRHEA, UNSPECIFIED: ICD-10-CM

## 2017-10-27 DIAGNOSIS — E43 UNSPECIFIED SEVERE PROTEIN-CALORIE MALNUTRITION: ICD-10-CM

## 2017-10-27 DIAGNOSIS — E11.22 TYPE 2 DIABETES MELLITUS WITH DIABETIC CHRONIC KIDNEY DISEASE: ICD-10-CM

## 2017-10-27 DIAGNOSIS — Z79.82 LONG TERM (CURRENT) USE OF ASPIRIN: ICD-10-CM

## 2018-03-16 NOTE — STUDENT SIGN OFF DOCUMENT - STUDENT DOCUMENT REVIEW
Laura Fraire, Dietetic Intern
Skin normal color for race, warm, dry and intact. No evidence of rash.

## 2018-04-11 NOTE — H&P ADULT - PROBLEM SELECTOR PLAN 6
Patient/Caregiver provided printed discharge information.
infectious disease consult hold vanco  continue cefepime

## 2018-04-19 ENCOUNTER — TRANSCRIPTION ENCOUNTER (OUTPATIENT)
Age: 63
End: 2018-04-19

## 2018-04-19 ENCOUNTER — EMERGENCY (EMERGENCY)
Facility: HOSPITAL | Age: 63
LOS: 1 days | Discharge: HOME | End: 2018-04-19
Attending: EMERGENCY MEDICINE | Admitting: INTERNAL MEDICINE
Payer: MEDICARE

## 2018-04-19 VITALS
DIASTOLIC BLOOD PRESSURE: 58 MMHG | TEMPERATURE: 98 F | RESPIRATION RATE: 16 BRPM | SYSTOLIC BLOOD PRESSURE: 153 MMHG | WEIGHT: 309.97 LBS | HEIGHT: 73 IN | OXYGEN SATURATION: 98 % | HEART RATE: 54 BPM

## 2018-04-19 VITALS
HEART RATE: 53 BPM | SYSTOLIC BLOOD PRESSURE: 124 MMHG | OXYGEN SATURATION: 100 % | DIASTOLIC BLOOD PRESSURE: 78 MMHG | RESPIRATION RATE: 17 BRPM | TEMPERATURE: 98 F

## 2018-04-19 DIAGNOSIS — Z95.5 PRESENCE OF CORONARY ANGIOPLASTY IMPLANT AND GRAFT: Chronic | ICD-10-CM

## 2018-04-19 DIAGNOSIS — Z29.9 ENCOUNTER FOR PROPHYLACTIC MEASURES, UNSPECIFIED: ICD-10-CM

## 2018-04-19 DIAGNOSIS — H54.8 LEGAL BLINDNESS, AS DEFINED IN USA: ICD-10-CM

## 2018-04-19 DIAGNOSIS — G62.9 POLYNEUROPATHY, UNSPECIFIED: ICD-10-CM

## 2018-04-19 DIAGNOSIS — I10 ESSENTIAL (PRIMARY) HYPERTENSION: ICD-10-CM

## 2018-04-19 DIAGNOSIS — E08.8 DIABETES MELLITUS DUE TO UNDERLYING CONDITION WITH UNSPECIFIED COMPLICATIONS: ICD-10-CM

## 2018-04-19 DIAGNOSIS — D64.9 ANEMIA, UNSPECIFIED: ICD-10-CM

## 2018-04-19 DIAGNOSIS — R42 DIZZINESS AND GIDDINESS: ICD-10-CM

## 2018-04-19 DIAGNOSIS — S88.111D COMPLETE TRAUMATIC AMPUTATION AT LEVEL BETWEEN KNEE AND ANKLE, RIGHT LOWER LEG, SUBSEQUENT ENCOUNTER: Chronic | ICD-10-CM

## 2018-04-19 DIAGNOSIS — Z79.82 LONG TERM (CURRENT) USE OF ASPIRIN: ICD-10-CM

## 2018-04-19 DIAGNOSIS — Z79.4 LONG TERM (CURRENT) USE OF INSULIN: ICD-10-CM

## 2018-04-19 DIAGNOSIS — I25.10 ATHEROSCLEROTIC HEART DISEASE OF NATIVE CORONARY ARTERY WITHOUT ANGINA PECTORIS: ICD-10-CM

## 2018-04-19 DIAGNOSIS — E11.9 TYPE 2 DIABETES MELLITUS WITHOUT COMPLICATIONS: ICD-10-CM

## 2018-04-19 LAB
ACETONE SERPL-MCNC: NEGATIVE — SIGNIFICANT CHANGE UP
ALBUMIN SERPL ELPH-MCNC: 3 G/DL — LOW (ref 3.3–5)
ALP SERPL-CCNC: 91 U/L — SIGNIFICANT CHANGE UP (ref 40–120)
ALT FLD-CCNC: 13 U/L — SIGNIFICANT CHANGE UP (ref 12–78)
ANION GAP SERPL CALC-SCNC: 7 MMOL/L — SIGNIFICANT CHANGE UP (ref 5–17)
APPEARANCE UR: ABNORMAL
APTT BLD: 26.2 SEC — LOW (ref 27.5–37.4)
AST SERPL-CCNC: 19 U/L — SIGNIFICANT CHANGE UP (ref 15–37)
BACTERIA # UR AUTO: ABNORMAL
BASOPHILS # BLD AUTO: 0.05 K/UL — SIGNIFICANT CHANGE UP (ref 0–0.2)
BASOPHILS NFR BLD AUTO: 0.8 % — SIGNIFICANT CHANGE UP (ref 0–2)
BILIRUB SERPL-MCNC: 0.3 MG/DL — SIGNIFICANT CHANGE UP (ref 0.2–1.2)
BILIRUB UR-MCNC: NEGATIVE — SIGNIFICANT CHANGE UP
BLD GP AB SCN SERPL QL: SIGNIFICANT CHANGE UP
BUN SERPL-MCNC: 38 MG/DL — HIGH (ref 7–23)
CALCIUM SERPL-MCNC: 8.2 MG/DL — LOW (ref 8.5–10.1)
CHLORIDE SERPL-SCNC: 110 MMOL/L — HIGH (ref 96–108)
CK MB BLD-MCNC: 1.8 % — SIGNIFICANT CHANGE UP (ref 0–3.5)
CK MB CFR SERPL CALC: 0.9 NG/ML — SIGNIFICANT CHANGE UP (ref 0.5–3.6)
CK SERPL-CCNC: 51 U/L — SIGNIFICANT CHANGE UP (ref 26–308)
CO2 SERPL-SCNC: 21 MMOL/L — LOW (ref 22–31)
COLOR SPEC: YELLOW — SIGNIFICANT CHANGE UP
COMMENT - URINE: SIGNIFICANT CHANGE UP
CREAT SERPL-MCNC: 1.7 MG/DL — HIGH (ref 0.5–1.3)
DIFF PNL FLD: NEGATIVE — SIGNIFICANT CHANGE UP
EOSINOPHIL # BLD AUTO: 0.1 K/UL — SIGNIFICANT CHANGE UP (ref 0–0.5)
EOSINOPHIL NFR BLD AUTO: 1.6 % — SIGNIFICANT CHANGE UP (ref 0–6)
EPI CELLS # UR: SIGNIFICANT CHANGE UP
GLUCOSE BLDC GLUCOMTR-MCNC: 149 MG/DL — HIGH (ref 70–99)
GLUCOSE SERPL-MCNC: 154 MG/DL — HIGH (ref 70–99)
GLUCOSE UR QL: NEGATIVE MG/DL — SIGNIFICANT CHANGE UP
HCT VFR BLD CALC: 27.1 % — LOW (ref 39–50)
HGB BLD-MCNC: 8.5 G/DL — LOW (ref 13–17)
HYALINE CASTS # UR AUTO: ABNORMAL /LPF
IMM GRANULOCYTES NFR BLD AUTO: 0.3 % — SIGNIFICANT CHANGE UP (ref 0–1.5)
INR BLD: 1.2 RATIO — HIGH (ref 0.88–1.16)
KETONES UR-MCNC: NEGATIVE — SIGNIFICANT CHANGE UP
LEUKOCYTE ESTERASE UR-ACNC: NEGATIVE — SIGNIFICANT CHANGE UP
LYMPHOCYTES # BLD AUTO: 1.09 K/UL — SIGNIFICANT CHANGE UP (ref 1–3.3)
LYMPHOCYTES # BLD AUTO: 17.8 % — SIGNIFICANT CHANGE UP (ref 13–44)
MCHC RBC-ENTMCNC: 27.2 PG — SIGNIFICANT CHANGE UP (ref 27–34)
MCHC RBC-ENTMCNC: 31.4 GM/DL — LOW (ref 32–36)
MCV RBC AUTO: 86.6 FL — SIGNIFICANT CHANGE UP (ref 80–100)
MONOCYTES # BLD AUTO: 0.49 K/UL — SIGNIFICANT CHANGE UP (ref 0–0.9)
MONOCYTES NFR BLD AUTO: 8 % — SIGNIFICANT CHANGE UP (ref 2–14)
NEUTROPHILS # BLD AUTO: 4.37 K/UL — SIGNIFICANT CHANGE UP (ref 1.8–7.4)
NEUTROPHILS NFR BLD AUTO: 71.5 % — SIGNIFICANT CHANGE UP (ref 43–77)
NITRITE UR-MCNC: NEGATIVE — SIGNIFICANT CHANGE UP
NRBC # BLD: 0 /100 WBCS — SIGNIFICANT CHANGE UP (ref 0–0)
OB PNL STL: NEGATIVE — SIGNIFICANT CHANGE UP
PH UR: 5 — SIGNIFICANT CHANGE UP (ref 5–8)
PLATELET # BLD AUTO: 170 K/UL — SIGNIFICANT CHANGE UP (ref 150–400)
POTASSIUM SERPL-MCNC: 4.7 MMOL/L — SIGNIFICANT CHANGE UP (ref 3.5–5.3)
POTASSIUM SERPL-SCNC: 4.7 MMOL/L — SIGNIFICANT CHANGE UP (ref 3.5–5.3)
PROT SERPL-MCNC: 6.5 GM/DL — SIGNIFICANT CHANGE UP (ref 6–8.3)
PROT UR-MCNC: 100 MG/DL
PROTHROM AB SERPL-ACNC: 13.1 SEC — HIGH (ref 9.8–12.7)
RBC # BLD: 3.13 M/UL — LOW (ref 4.2–5.8)
RBC # FLD: 13.4 % — SIGNIFICANT CHANGE UP (ref 10.3–14.5)
RBC CASTS # UR COMP ASSIST: SIGNIFICANT CHANGE UP /HPF (ref 0–4)
SODIUM SERPL-SCNC: 138 MMOL/L — SIGNIFICANT CHANGE UP (ref 135–145)
SP GR SPEC: 1.01 — SIGNIFICANT CHANGE UP (ref 1.01–1.02)
TROPONIN I SERPL-MCNC: <.015 NG/ML — SIGNIFICANT CHANGE UP (ref 0.01–0.04)
UROBILINOGEN FLD QL: NEGATIVE MG/DL — SIGNIFICANT CHANGE UP
WBC # BLD: 5.95 K/UL — SIGNIFICANT CHANGE UP (ref 3.8–10.5)
WBC # FLD AUTO: 5.95 K/UL — SIGNIFICANT CHANGE UP (ref 3.8–10.5)
WBC UR QL: SIGNIFICANT CHANGE UP

## 2018-04-19 PROCEDURE — 93010 ELECTROCARDIOGRAM REPORT: CPT

## 2018-04-19 PROCEDURE — 70450 CT HEAD/BRAIN W/O DYE: CPT | Mod: 26

## 2018-04-19 PROCEDURE — 99284 EMERGENCY DEPT VISIT MOD MDM: CPT

## 2018-04-19 RX ORDER — ONDANSETRON 8 MG/1
4 TABLET, FILM COATED ORAL ONCE
Qty: 0 | Refills: 0 | Status: COMPLETED | OUTPATIENT
Start: 2018-04-19 | End: 2018-04-19

## 2018-04-19 RX ORDER — SODIUM CHLORIDE 9 MG/ML
1000 INJECTION INTRAMUSCULAR; INTRAVENOUS; SUBCUTANEOUS ONCE
Qty: 0 | Refills: 0 | Status: COMPLETED | OUTPATIENT
Start: 2018-04-19 | End: 2018-04-19

## 2018-04-19 RX ORDER — SODIUM CHLORIDE 9 MG/ML
1000 INJECTION, SOLUTION INTRAVENOUS
Qty: 0 | Refills: 0 | Status: DISCONTINUED | OUTPATIENT
Start: 2018-04-19 | End: 2018-04-19

## 2018-04-19 RX ORDER — CLOPIDOGREL BISULFATE 75 MG/1
75 TABLET, FILM COATED ORAL DAILY
Qty: 0 | Refills: 0 | Status: DISCONTINUED | OUTPATIENT
Start: 2018-04-19 | End: 2018-04-19

## 2018-04-19 RX ORDER — PANTOPRAZOLE SODIUM 20 MG/1
40 TABLET, DELAYED RELEASE ORAL ONCE
Qty: 0 | Refills: 0 | Status: COMPLETED | OUTPATIENT
Start: 2018-04-19 | End: 2018-04-19

## 2018-04-19 RX ORDER — DEXTROSE 50 % IN WATER 50 %
25 SYRINGE (ML) INTRAVENOUS ONCE
Qty: 0 | Refills: 0 | Status: DISCONTINUED | OUTPATIENT
Start: 2018-04-19 | End: 2018-04-19

## 2018-04-19 RX ORDER — ASPIRIN/CALCIUM CARB/MAGNESIUM 324 MG
325 TABLET ORAL DAILY
Qty: 0 | Refills: 0 | Status: DISCONTINUED | OUTPATIENT
Start: 2018-04-19 | End: 2018-04-19

## 2018-04-19 RX ORDER — GLUCAGON INJECTION, SOLUTION 0.5 MG/.1ML
1 INJECTION, SOLUTION SUBCUTANEOUS ONCE
Qty: 0 | Refills: 0 | Status: DISCONTINUED | OUTPATIENT
Start: 2018-04-19 | End: 2018-04-19

## 2018-04-19 RX ORDER — MECLIZINE HCL 12.5 MG
50 TABLET ORAL ONCE
Qty: 0 | Refills: 0 | Status: COMPLETED | OUTPATIENT
Start: 2018-04-19 | End: 2018-04-19

## 2018-04-19 RX ORDER — LACTOBACILLUS ACIDOPHILUS 100MM CELL
1 CAPSULE ORAL
Qty: 0 | Refills: 0 | Status: DISCONTINUED | OUTPATIENT
Start: 2018-04-19 | End: 2018-04-19

## 2018-04-19 RX ORDER — DEXTROSE 50 % IN WATER 50 %
1 SYRINGE (ML) INTRAVENOUS ONCE
Qty: 0 | Refills: 0 | Status: DISCONTINUED | OUTPATIENT
Start: 2018-04-19 | End: 2018-04-19

## 2018-04-19 RX ORDER — PANTOPRAZOLE SODIUM 20 MG/1
40 TABLET, DELAYED RELEASE ORAL
Qty: 0 | Refills: 0 | Status: DISCONTINUED | OUTPATIENT
Start: 2018-04-19 | End: 2018-04-19

## 2018-04-19 RX ORDER — DEXTROSE 50 % IN WATER 50 %
12.5 SYRINGE (ML) INTRAVENOUS ONCE
Qty: 0 | Refills: 0 | Status: DISCONTINUED | OUTPATIENT
Start: 2018-04-19 | End: 2018-04-19

## 2018-04-19 RX ORDER — DOCUSATE SODIUM 100 MG
100 CAPSULE ORAL THREE TIMES A DAY
Qty: 0 | Refills: 0 | Status: DISCONTINUED | OUTPATIENT
Start: 2018-04-19 | End: 2018-04-19

## 2018-04-19 RX ORDER — INSULIN GLARGINE 100 [IU]/ML
10 INJECTION, SOLUTION SUBCUTANEOUS AT BEDTIME
Qty: 0 | Refills: 0 | Status: DISCONTINUED | OUTPATIENT
Start: 2018-04-19 | End: 2018-04-19

## 2018-04-19 RX ORDER — SUCRALFATE 1 G
1 TABLET ORAL
Qty: 0 | Refills: 0 | Status: DISCONTINUED | OUTPATIENT
Start: 2018-04-19 | End: 2018-04-19

## 2018-04-19 RX ORDER — ATORVASTATIN CALCIUM 80 MG/1
40 TABLET, FILM COATED ORAL AT BEDTIME
Qty: 0 | Refills: 0 | Status: DISCONTINUED | OUTPATIENT
Start: 2018-04-19 | End: 2018-04-19

## 2018-04-19 RX ORDER — METOPROLOL TARTRATE 50 MG
25 TABLET ORAL
Qty: 0 | Refills: 0 | Status: DISCONTINUED | OUTPATIENT
Start: 2018-04-19 | End: 2018-04-19

## 2018-04-19 RX ADMIN — SODIUM CHLORIDE 1000 MILLILITER(S): 9 INJECTION INTRAMUSCULAR; INTRAVENOUS; SUBCUTANEOUS at 11:59

## 2018-04-19 RX ADMIN — Medication 50 MILLIGRAM(S): at 12:00

## 2018-04-19 RX ADMIN — ONDANSETRON 4 MILLIGRAM(S): 8 TABLET, FILM COATED ORAL at 12:00

## 2018-04-19 RX ADMIN — PANTOPRAZOLE SODIUM 40 MILLIGRAM(S): 20 TABLET, DELAYED RELEASE ORAL at 12:00

## 2018-04-19 RX ADMIN — Medication 1 GRAM(S): at 18:46

## 2018-04-19 NOTE — H&P ADULT - NSHPPHYSICALEXAM_GEN_ALL_CORE
GENERAL: NAD, obese, well-developed  HEAD:  Atraumatic, Normocephalic  EYES: Bilateral eye blindness,  conjunctiva and sclera clear  ENMT: No tonsillar erythema, exudates, or enlargement; Moist mucous membranes  NECK: Supple, No JVD, Normal thyroid  NERVOUS SYSTEM:  Alert & Oriented X3, Good concentration; Motor Strength 5/5 B/L upper extremities. 5/5 left LE, right BKA  CHEST/LUNG: Clear to percussion bilaterally; No rales, rhonchi, wheezing, or rubs  HEART: Regular rate and rhythm; No murmurs, rubs, or gallops  ABDOMEN: Soft, Nontender, Nondistended; Bowel sounds present  EXTREMITIES:  Right BKA, Left LE 2+ Peripheral Pulse, No clubbing, cyanosis, or edema  LYMPH: No lymphadenopathy noted  SKIN: No rashes or lesions

## 2018-04-19 NOTE — DISCHARGE NOTE ADULT - CARE PLAN
Principal Discharge DX:	Anemia, unspecified type  Goal:	Follow up with PMD in 1 week  Assessment and plan of treatment:	Follow up with PMD in 1 week

## 2018-04-19 NOTE — H&P ADULT - NSHPLABSRESULTS_GEN_ALL_CORE
< from: CT Head No Cont (04.19.18 @ 11:29) >    Evaluation demonstrates no evidence of mass-effect or midline shift. No   intraparenchymal mass lesions or hemorrhage is identified.     There is   no evidence of hydrocephalus. No extra-axial collections are noted.    The bone windows demonstrate no gross osseous abnormalities. 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 Unremarkable noncontrast CT scan of the brain.          < end of copied text >

## 2018-04-19 NOTE — ED PROVIDER NOTE - CONSTITUTIONAL, MLM
normal... Well appearing, well nourished, awake, alert, oriented to person, place, time/situation and in no apparent distress. Speaking in clear full sentences no nasal flaring no shoulders retractions, smiling appears very comfortable lying in the stretcher

## 2018-04-19 NOTE — DISCHARGE NOTE ADULT - OTHER SIGNIFICANT FINDINGS
8.5    5.95  )-----------( 170      ( 19 Apr 2018 10:56 )             27.1   04-19    138  |  110<H>  |  38<H>  ----------------------------<  154<H>  4.7   |  21<L>  |  1.70<H>    Ca    8.2<L>      19 Apr 2018 11:47    TPro  6.5  /  Alb  3.0<L>  /  TBili  0.3  /  DBili  x   /  AST  19  /  ALT  13  /  AlkPhos  91  04-19

## 2018-04-19 NOTE — DISCHARGE NOTE ADULT - MEDICATION SUMMARY - MEDICATIONS TO TAKE
I will START or STAY ON the medications listed below when I get home from the hospital:    aspirin 325 mg oral tablet  -- 1 tab(s) by mouth once a day  -- Indication: For CAD    insulin glargine  -- 10 unit(s) subcutaneous once a day (at bedtime)  -- Indication: For DM    metoclopramide 5 mg oral tablet, disintegrating  -- 1 tab(s) by mouth 4 times a day (before meals and at bedtime), As Needed -for nausea   -- Check with your doctor before becoming pregnant.  Do not drink alcoholic beverages when taking this medication.  May cause drowsiness or dizziness.  Obtain medical advice before taking any non-prescription drugs as some may affect the action of this medication.  Take medication on an empty stomach 1 hour before or 2 to 3 hours after a meal unless otherwise directed by your doctor.  This drug may impair the ability to drive or operate machinery.  Use care until you become familiar with its effects.    -- Indication: For Nausea    atorvastatin 40 mg oral tablet  -- 1 tab(s) by mouth once a day (at bedtime)  -- Indication: For HLD    clopidogrel 75 mg oral tablet  -- 1 tab(s) by mouth once a day  -- Indication: For CAD    metoprolol tartrate 25 mg oral tablet  -- 1 tab(s) by mouth 2 times a day  -- Indication: For HTN    docusate sodium 100 mg oral capsule  -- 1 cap(s) by mouth 3 times a day  -- Indication: For Prevent     sucralfate 1 g oral tablet  -- 1 tab(s) by mouth 4 times a day  -- Indication: For GERD    lactobacillus acidophilus oral tablet  -- 1 tab(s) by mouth 2 times a day  -- Indication: For Preventive measure    pantoprazole 40 mg oral delayed release tablet  -- 1 tab(s) by mouth once a day (before a meal)  -- Indication: For GERD

## 2018-04-19 NOTE — H&P ADULT - NSHPSOCIALHISTORY_GEN_ALL_CORE
Patient lives at home with his wife. Denies ETOH and recreational drug use. Stopped smoking 30 years ago.

## 2018-04-19 NOTE — ED ADULT NURSE NOTE - OBJECTIVE STATEMENT
received pt lying on stretcher c/o dizzinees had blood work done a couple days ado pmd sent for eval blood count 8.1

## 2018-04-19 NOTE — ED ADULT NURSE NOTE - CHIEF COMPLAINT QUOTE
dizziness with nausea for 2 days, denies any headache. Pt had blood work done 2 days ago with low H&H , denies chest pain or sob

## 2018-04-19 NOTE — ED PROVIDER NOTE - OBJECTIVE STATEMENT
63 years old male by ems c/o two days of dizziness and nausea. Pt also sts he had blood test by visiting nurse two days ago with Hemoglobin of 8.1. Pt denies headache, dizziness, focal/distal weakness or numbness, cough, sob, chest pain, vomiting, fever, chills, abd pain, dysuria or irregular bowel movements. Pt has a hx of diabetes, bilateral blindness and right leg BKA.

## 2018-04-19 NOTE — H&P ADULT - ASSESSMENT
63 years old male admitted for transfusion of 1 unit PRBC and then will be discharged home. Patient symptoms has resolved but PMD is requesting for patient to be transfused PRBC.     IMPROVE VTE Individual Risk Assessment          RISK                                                          Points    [  ] Previous VTE                                                3    [  ] Thrombophilia                                             2    [  ] Lower limb paralysis                                    2        (unable to hold up >15 seconds)      [  ] Current Cancer                                             2         (within 6 months)    [  ] Immobilization > 24 hrs                              1    [  ] ICU/CCU stay > 24 hours                            1    [ x ] Age > 60                                                    1    IMPROVE VTE Score _____1____

## 2018-04-19 NOTE — H&P ADULT - HISTORY OF PRESENT ILLNESS
63 years old male  PMH CAD, DM, HTN, Neuropathy, Blindness of both eyes, Right BKA, presents to ED with c/o dizziness  x 2 days and nausea. Patient also reports  he had blood test by visiting nurse two days ago with Hemoglobin of 8.1.  At time of assessment patient verbalized that symptoms have resolved and has no complaints. Denies headache, dizziness, focal/distal weakness or numbness, cough, sob, chest pain, vomiting, fever, chills, abd pain, dysuria or irregular bowel movements.

## 2018-04-19 NOTE — H&P ADULT - NSHPREVIEWOFSYSTEMS_GEN_ALL_CORE
CONSTITUTIONAL: No fever, weight loss or fatigue  EYES: Bilateral blindness, No eye pain, or discharge  ENMT: Denies  difficulty hearing, tinnitis, vertigo, sinus or  throat pain   NECK: Denies pain or stiffness  BREAST: Denies pain, masses, or nipple discharge    RESP: Denies SOB, dyspnea, cough, wheezing, chills or hemoptysis   CV: Denies  chest pain, SOB, STOVALL, palpitations, dizziness, lightheadedness or leg swelling  GI: Denies abdominal  or epigastric pain, nausea, vomiting, hematemesis; diarrhea or changes in bowel pattern, no melena or hematochezia.  : Denies dysuria, urgency, frequency, retention, hematuria or incontinence  SKIN: Denies itching, burning, rashes or lesions  MSK: Right BKA, Denies edema, pain,  joint pain or swelling, muscle or back pain  NEURO: Denies weakness, numbness, tingling, loss of sensation, vision, headaches, memory loss  LYMPH: Denies pain or enlarged glands  ENDO: Denies heat or cold intolerances, hair loss  PSYCH: Denies depression, anxiety of SI  HEME: Denies easy brusing or bleeding gums  ALLERGY/IMMUNOLOGY: Denies hives, eczema

## 2018-04-19 NOTE — ED PROVIDER NOTE - PROGRESS NOTE DETAILS
Pt is alert and oriented x 3 sts he is feeling much better now denies headache, dizziness, cough, sob, chest pain, nausea, vomiting, fever, chills, abd pain. Pt is given and explained all test reports and advised to follow up with his doctor as soon as possible. Guaic stool negative. Pt is alert and oriented x 3 sts he is feeling much better now denies headache, dizziness, cough, sob, chest pain, nausea, vomiting, fever, chills, abd pain. Pt is given and explained all test reports and advised to follow up with his doctor as soon as possible. Guaic stool negative. pt sts his hemoglobin was 8.1 two days ago. Pt is alert and oriented x 3 sts he is feeling much better now denies headache, dizziness, cough, sob, chest pain, nausea, vomiting, fever, chills, abd pain. Pt 's pmd Dr. Mohr [phone] is called and sts pt's hemoglobin base line is 10 he recommends pt to be tranfused. Dr. Valdez is notified and admit pt.

## 2018-04-19 NOTE — ED PROVIDER NOTE - NONTENDER LOCATION
left upper quadrant/right upper quadrant/umbilical/right costovertebral angle/left costovertebral angle/right lower quadrant/periumbilical/left lower quadrant/suprapubic

## 2018-04-19 NOTE — DISCHARGE NOTE ADULT - PATIENT PORTAL LINK FT
You can access the [facility] Patient Portal, offered by [facility], by registering with the following website: [email or web address]

## 2018-04-20 LAB
CULTURE RESULTS: NO GROWTH — SIGNIFICANT CHANGE UP
SPECIMEN SOURCE: SIGNIFICANT CHANGE UP

## 2018-05-10 NOTE — H&P ADULT. - VASCULAR
Detail Level: Detailed Detail Level: Zone Equal and normal pulses (carotid, femoral, dorsalis pedis)

## 2018-08-27 ENCOUNTER — EMERGENCY (EMERGENCY)
Facility: HOSPITAL | Age: 63
LOS: 0 days | Discharge: HOME | End: 2018-08-27
Attending: EMERGENCY MEDICINE
Payer: MEDICARE

## 2018-08-27 VITALS
WEIGHT: 300.05 LBS | HEART RATE: 58 BPM | TEMPERATURE: 99 F | OXYGEN SATURATION: 98 % | RESPIRATION RATE: 16 BRPM | DIASTOLIC BLOOD PRESSURE: 73 MMHG | HEIGHT: 73 IN | SYSTOLIC BLOOD PRESSURE: 154 MMHG

## 2018-08-27 VITALS
TEMPERATURE: 98 F | DIASTOLIC BLOOD PRESSURE: 87 MMHG | OXYGEN SATURATION: 99 % | RESPIRATION RATE: 18 BRPM | SYSTOLIC BLOOD PRESSURE: 154 MMHG | HEART RATE: 68 BPM

## 2018-08-27 DIAGNOSIS — Z79.4 LONG TERM (CURRENT) USE OF INSULIN: ICD-10-CM

## 2018-08-27 DIAGNOSIS — D64.9 ANEMIA, UNSPECIFIED: ICD-10-CM

## 2018-08-27 DIAGNOSIS — I25.10 ATHEROSCLEROTIC HEART DISEASE OF NATIVE CORONARY ARTERY WITHOUT ANGINA PECTORIS: ICD-10-CM

## 2018-08-27 DIAGNOSIS — S88.111D COMPLETE TRAUMATIC AMPUTATION AT LEVEL BETWEEN KNEE AND ANKLE, RIGHT LOWER LEG, SUBSEQUENT ENCOUNTER: Chronic | ICD-10-CM

## 2018-08-27 DIAGNOSIS — Z95.5 PRESENCE OF CORONARY ANGIOPLASTY IMPLANT AND GRAFT: Chronic | ICD-10-CM

## 2018-08-27 DIAGNOSIS — Z79.02 LONG TERM (CURRENT) USE OF ANTITHROMBOTICS/ANTIPLATELETS: ICD-10-CM

## 2018-08-27 DIAGNOSIS — I10 ESSENTIAL (PRIMARY) HYPERTENSION: ICD-10-CM

## 2018-08-27 DIAGNOSIS — E10.8 TYPE 1 DIABETES MELLITUS WITH UNSPECIFIED COMPLICATIONS: ICD-10-CM

## 2018-08-27 DIAGNOSIS — Z89.511 ACQUIRED ABSENCE OF RIGHT LEG BELOW KNEE: ICD-10-CM

## 2018-08-27 LAB
ALBUMIN SERPL ELPH-MCNC: 3 G/DL — LOW (ref 3.3–5)
ALP SERPL-CCNC: 88 U/L — SIGNIFICANT CHANGE UP (ref 40–120)
ALT FLD-CCNC: 15 U/L — SIGNIFICANT CHANGE UP (ref 12–78)
ANION GAP SERPL CALC-SCNC: 9 MMOL/L — SIGNIFICANT CHANGE UP (ref 5–17)
ANISOCYTOSIS BLD QL: SLIGHT — SIGNIFICANT CHANGE UP
APTT BLD: 31.7 SEC — SIGNIFICANT CHANGE UP (ref 27.5–37.4)
AST SERPL-CCNC: 7 U/L — LOW (ref 15–37)
BASOPHILS # BLD AUTO: 0.03 K/UL — SIGNIFICANT CHANGE UP (ref 0–0.2)
BASOPHILS NFR BLD AUTO: 0.5 % — SIGNIFICANT CHANGE UP (ref 0–2)
BILIRUB SERPL-MCNC: 0.2 MG/DL — SIGNIFICANT CHANGE UP (ref 0.2–1.2)
BLD GP AB SCN SERPL QL: SIGNIFICANT CHANGE UP
BUN SERPL-MCNC: 36 MG/DL — HIGH (ref 7–23)
CALCIUM SERPL-MCNC: 8.2 MG/DL — LOW (ref 8.5–10.1)
CHLORIDE SERPL-SCNC: 109 MMOL/L — HIGH (ref 96–108)
CO2 SERPL-SCNC: 22 MMOL/L — SIGNIFICANT CHANGE UP (ref 22–31)
CREAT SERPL-MCNC: 1.93 MG/DL — HIGH (ref 0.5–1.3)
DACRYOCYTES BLD QL SMEAR: SLIGHT — SIGNIFICANT CHANGE UP
ELLIPTOCYTES BLD QL SMEAR: SLIGHT — SIGNIFICANT CHANGE UP
EOSINOPHIL # BLD AUTO: 0.1 K/UL — SIGNIFICANT CHANGE UP (ref 0–0.5)
EOSINOPHIL NFR BLD AUTO: 1.8 % — SIGNIFICANT CHANGE UP (ref 0–6)
GLUCOSE SERPL-MCNC: 166 MG/DL — HIGH (ref 70–99)
HCT VFR BLD CALC: 24.1 % — LOW (ref 39–50)
HGB BLD-MCNC: 7 G/DL — CRITICAL LOW (ref 13–17)
HYPOCHROMIA BLD QL: SLIGHT — SIGNIFICANT CHANGE UP
IMM GRANULOCYTES NFR BLD AUTO: 0.4 % — SIGNIFICANT CHANGE UP (ref 0–1.5)
INR BLD: 1.19 RATIO — HIGH (ref 0.88–1.16)
LG PLATELETS BLD QL AUTO: SLIGHT — SIGNIFICANT CHANGE UP
LYMPHOCYTES # BLD AUTO: 1.03 K/UL — SIGNIFICANT CHANGE UP (ref 1–3.3)
LYMPHOCYTES # BLD AUTO: 18.2 % — SIGNIFICANT CHANGE UP (ref 13–44)
MANUAL SMEAR VERIFICATION: SIGNIFICANT CHANGE UP
MCHC RBC-ENTMCNC: 22.7 PG — LOW (ref 27–34)
MCHC RBC-ENTMCNC: 29 GM/DL — LOW (ref 32–36)
MCV RBC AUTO: 78.2 FL — LOW (ref 80–100)
MICROCYTES BLD QL: SIGNIFICANT CHANGE UP
MONOCYTES # BLD AUTO: 0.53 K/UL — SIGNIFICANT CHANGE UP (ref 0–0.9)
MONOCYTES NFR BLD AUTO: 9.4 % — SIGNIFICANT CHANGE UP (ref 2–14)
NEUTROPHILS # BLD AUTO: 3.94 K/UL — SIGNIFICANT CHANGE UP (ref 1.8–7.4)
NEUTROPHILS NFR BLD AUTO: 69.7 % — SIGNIFICANT CHANGE UP (ref 43–77)
NRBC # BLD: 0 /100 WBCS — SIGNIFICANT CHANGE UP (ref 0–0)
OVALOCYTES BLD QL SMEAR: SLIGHT — SIGNIFICANT CHANGE UP
PLAT MORPH BLD: ABNORMAL
PLATELET # BLD AUTO: 257 K/UL — SIGNIFICANT CHANGE UP (ref 150–400)
POTASSIUM SERPL-MCNC: 4.8 MMOL/L — SIGNIFICANT CHANGE UP (ref 3.5–5.3)
POTASSIUM SERPL-SCNC: 4.8 MMOL/L — SIGNIFICANT CHANGE UP (ref 3.5–5.3)
PROT SERPL-MCNC: 6.9 GM/DL — SIGNIFICANT CHANGE UP (ref 6–8.3)
PROTHROM AB SERPL-ACNC: 13 SEC — HIGH (ref 9.8–12.7)
RBC # BLD: 3.08 M/UL — LOW (ref 4.2–5.8)
RBC # FLD: 15.5 % — HIGH (ref 10.3–14.5)
RBC BLD AUTO: ABNORMAL
ROULEAUX BLD QL SMEAR: PRESENT
SCHISTOCYTES BLD QL AUTO: SLIGHT — SIGNIFICANT CHANGE UP
SODIUM SERPL-SCNC: 140 MMOL/L — SIGNIFICANT CHANGE UP (ref 135–145)
SPHEROCYTES BLD QL SMEAR: SLIGHT — SIGNIFICANT CHANGE UP
WBC # BLD: 5.65 K/UL — SIGNIFICANT CHANGE UP (ref 3.8–10.5)
WBC # FLD AUTO: 5.65 K/UL — SIGNIFICANT CHANGE UP (ref 3.8–10.5)

## 2018-08-27 PROCEDURE — 99284 EMERGENCY DEPT VISIT MOD MDM: CPT

## 2018-08-27 NOTE — ED ADULT NURSE NOTE - NSIMPLEMENTINTERV_GEN_ALL_ED
Implemented All Universal Safety Interventions:  Luck to call system. Call bell, personal items and telephone within reach. Instruct patient to call for assistance. Room bathroom lighting operational. Non-slip footwear when patient is off stretcher. Physically safe environment: no spills, clutter or unnecessary equipment. Stretcher in lowest position, wheels locked, appropriate side rails in place.

## 2018-08-27 NOTE — ED PROVIDER NOTE - OBJECTIVE STATEMENT
Pt is a63 yo gentleman with a pmhx of HTN, CAD, rBKA, IDDM who presents to the ED with low hgb. He had routine lab work done at pmd and was called and told it was low, per patient 6.8. Has had low hgb before requiring blood transfusion, with no source found. Pt denies any n/v/d, no bloody stool, no hematuria. Otherwise is asymptomatic. Pt is a63 yo gentleman with a pmhx of HTN, CAD, rBKA, IDDM who presents to the ED with low hgb. He had routine lab work done at pmd and was called and told it was low, per patient 6.8. Has had low hgb before requiring blood transfusion, with no source found. Pt denies any n/v/d, no bloody stool, no hematuria. Otherwise is asymptomatic. Has baseline anemia, and has had 3 txfusions in the past.

## 2018-08-27 NOTE — ED ADULT NURSE NOTE - ED CARDIAC RATE
Guy Castro  : 1958  Primary: [place]  Secondary:  [address] at [facility] FIONA CULLEN  [facility], [address] [place] [address] [address] [facility] [address]  [phone]   [phone]          OUTPATIENT PHYSICAL THERAPY:cancellation 2018 8   ICD-10: Treatment Diagnosis: Low back pain (M54.5)  Precautions/Allergies:   Review of patient's allergies indicates no known allergies. Fall Risk Score: 3 (? 5 = High Risk)  MD Orders: PT- evaluate and treat, modalities, core strengthening and stretching MEDICAL/REFERRING DIAGNOSIS:  Spondylolisthesis, site unspecified [M43.10]  Other intervertebral disc degeneration, lumbar region [M51.36]  Spinal stenosis, lumbar region with neurogenic claudication [M48.062]  Other intervertebral disc displacement, lumbar region [M51.26]   DATE OF ONSET:  Initial injury . recurrence of symptoms  and increased severity   REFERRING PHYSICIAN: Zita Light PA  RETURN PHYSICIAN APPOINTMENT: 7/10/18     INITIAL ASSESSMENT:  Mr. Feliciana Spatz comes to therapy with complaints of sudden sharp low back pain with position changes and constant discomfort that progresses to pain and tingling to the right lateral calf. He reports decreasing tolerance to sitting, standing and now walking. He presents with core and bilateral hip weakness; very weak Quadratus lumborum muscles; significant bilateral hip soft tissue tightness and joint stiffness. Trunk ROM is limited by pain. Pt demonstrates very slow and deliberate compensatory movements during transfers. He will benefit from skilled therapy to address his deficits and assist in the return of functional gait, sitting and ADL's with less pain. PROBLEM LIST (Impacting functional limitations):  1. Decreased Strength  2. Decreased ADL/Functional Activities  3. Decreased Transfer Abilities  4. Decreased Ambulation Ability/Technique  5. Increased Pain  6. Decreased Activity Tolerance  7.  Decreased Flexibility/Joint Mobility INTERVENTIONS PLANNED:  1. Cold  2. Electrical Stimulation  3. Heat  4. Home Exercise Program (HEP)  5. Manual Therapy  6. Neuromuscular Re-education/Strengthening  7. Range of Motion (ROM)  8. Therapeutic Exercise/Strengthening  9. Ultrasound (US)  10. Aquatic exercises   TREATMENT PLAN:  Effective Dates: 6/18/2018 TO 9/17/2018 (90 days). Frequency/Duration: 2 to 3 times a week for 90 Days  GOALS: (Goals have been discussed and agreed upon with patient.)  Short-Term Functional Goals: Time Frame: 4 weeks    1. Independent with HEP   2. trunk and B hip AROM to WNL to allow independent ADL's with less compensation   3. Strength deficits improved by at least 1/3 muscle grade to allow safe transfers with less compensation  Discharge Goals: Time Frame: 90 days    1. Tolerate gait > 30 minutes for community integration    2. ADL's and transfers with pain < 4/10    3. Tolerate sitting > 45 minutes for community integration    4. Pt to demonstrate good body mechanics  Rehabilitation Potential For Stated Goals: Good  Regarding [facility]'s therapy, I certify that the treatment plan above will be carried out by a therapist or under their direction.   Thank you for this referral,  Sabine Romano, PT ,MSPT         Pt cancelled  appointment normal

## 2018-08-27 NOTE — ED PROVIDER NOTE - GASTROINTESTINAL, MLM
Abdomen soft, non-tender, no guarding. Abdomen soft, non-tender, no guarding. Guaiac negative, brown stool.

## 2018-08-27 NOTE — ED ADULT TRIAGE NOTE - CHIEF COMPLAINT QUOTE
Pt states he had blood work done on Friday with Low H&H, states sent for a blood transfusion. Pt states he is blind

## 2018-09-30 NOTE — DIETITIAN INITIAL EVALUATION ADULT. - ADHERENCE
poor/Pt reports to not knowing about the [facility] diet; pt does not follow any dietary restrictions; pt consumes minimal breakfast & lunch, large dinner.  Pt incorrectly recalled foods that raise BS (stated tomato sauce)
 Symptoms

## 2019-01-16 ENCOUNTER — EMERGENCY (EMERGENCY)
Facility: HOSPITAL | Age: 64
LOS: 0 days | Discharge: HOME | End: 2019-01-16
Attending: EMERGENCY MEDICINE
Payer: MEDICARE

## 2019-01-16 VITALS
RESPIRATION RATE: 19 BRPM | HEART RATE: 75 BPM | TEMPERATURE: 98 F | WEIGHT: 300.05 LBS | OXYGEN SATURATION: 97 % | HEIGHT: 73 IN | SYSTOLIC BLOOD PRESSURE: 135 MMHG | DIASTOLIC BLOOD PRESSURE: 71 MMHG

## 2019-01-16 VITALS
TEMPERATURE: 98 F | HEART RATE: 69 BPM | SYSTOLIC BLOOD PRESSURE: 149 MMHG | DIASTOLIC BLOOD PRESSURE: 79 MMHG | OXYGEN SATURATION: 98 % | RESPIRATION RATE: 19 BRPM

## 2019-01-16 DIAGNOSIS — S88.111D COMPLETE TRAUMATIC AMPUTATION AT LEVEL BETWEEN KNEE AND ANKLE, RIGHT LOWER LEG, SUBSEQUENT ENCOUNTER: Chronic | ICD-10-CM

## 2019-01-16 DIAGNOSIS — Z79.82 LONG TERM (CURRENT) USE OF ASPIRIN: ICD-10-CM

## 2019-01-16 DIAGNOSIS — R79.9 ABNORMAL FINDING OF BLOOD CHEMISTRY, UNSPECIFIED: ICD-10-CM

## 2019-01-16 DIAGNOSIS — D64.9 ANEMIA, UNSPECIFIED: ICD-10-CM

## 2019-01-16 DIAGNOSIS — Z79.4 LONG TERM (CURRENT) USE OF INSULIN: ICD-10-CM

## 2019-01-16 DIAGNOSIS — E11.9 TYPE 2 DIABETES MELLITUS WITHOUT COMPLICATIONS: ICD-10-CM

## 2019-01-16 DIAGNOSIS — Z95.5 PRESENCE OF CORONARY ANGIOPLASTY IMPLANT AND GRAFT: Chronic | ICD-10-CM

## 2019-01-16 DIAGNOSIS — I10 ESSENTIAL (PRIMARY) HYPERTENSION: ICD-10-CM

## 2019-01-16 DIAGNOSIS — I25.10 ATHEROSCLEROTIC HEART DISEASE OF NATIVE CORONARY ARTERY WITHOUT ANGINA PECTORIS: ICD-10-CM

## 2019-01-16 LAB
ALBUMIN SERPL ELPH-MCNC: 2.7 G/DL — LOW (ref 3.3–5)
ALP SERPL-CCNC: 102 U/L — SIGNIFICANT CHANGE UP (ref 40–120)
ALT FLD-CCNC: 15 U/L — SIGNIFICANT CHANGE UP (ref 12–78)
ANION GAP SERPL CALC-SCNC: 9 MMOL/L — SIGNIFICANT CHANGE UP (ref 5–17)
APTT BLD: 31.7 SEC — SIGNIFICANT CHANGE UP (ref 28.5–37)
AST SERPL-CCNC: 8 U/L — LOW (ref 15–37)
BILIRUB SERPL-MCNC: 0.2 MG/DL — SIGNIFICANT CHANGE UP (ref 0.2–1.2)
BUN SERPL-MCNC: 37 MG/DL — HIGH (ref 7–23)
CALCIUM SERPL-MCNC: 8.2 MG/DL — LOW (ref 8.5–10.1)
CHLORIDE SERPL-SCNC: 100 MMOL/L — SIGNIFICANT CHANGE UP (ref 96–108)
CO2 SERPL-SCNC: 25 MMOL/L — SIGNIFICANT CHANGE UP (ref 22–31)
CREAT SERPL-MCNC: 1.87 MG/DL — HIGH (ref 0.5–1.3)
GLUCOSE BLDC GLUCOMTR-MCNC: 246 MG/DL — HIGH (ref 70–99)
GLUCOSE BLDC GLUCOMTR-MCNC: 265 MG/DL — HIGH (ref 70–99)
GLUCOSE SERPL-MCNC: 247 MG/DL — HIGH (ref 70–99)
HCT VFR BLD CALC: 20.6 % — CRITICAL LOW (ref 39–50)
HGB BLD-MCNC: 5.7 G/DL — CRITICAL LOW (ref 13–17)
INR BLD: 1.22 RATIO — HIGH (ref 0.88–1.16)
MCHC RBC-ENTMCNC: 19.3 PG — LOW (ref 27–34)
MCHC RBC-ENTMCNC: 27.7 GM/DL — LOW (ref 32–36)
MCV RBC AUTO: 69.8 FL — LOW (ref 80–100)
NRBC # BLD: 0 /100 WBCS — SIGNIFICANT CHANGE UP (ref 0–0)
PLATELET # BLD AUTO: 306 K/UL — SIGNIFICANT CHANGE UP (ref 150–400)
POTASSIUM SERPL-MCNC: 4.4 MMOL/L — SIGNIFICANT CHANGE UP (ref 3.5–5.3)
POTASSIUM SERPL-SCNC: 4.4 MMOL/L — SIGNIFICANT CHANGE UP (ref 3.5–5.3)
PROT SERPL-MCNC: 6.5 GM/DL — SIGNIFICANT CHANGE UP (ref 6–8.3)
PROTHROM AB SERPL-ACNC: 13.7 SEC — HIGH (ref 10–12.9)
RBC # BLD: 2.95 M/UL — LOW (ref 4.2–5.8)
RBC # FLD: 17 % — HIGH (ref 10.3–14.5)
SODIUM SERPL-SCNC: 134 MMOL/L — LOW (ref 135–145)
WBC # BLD: 8.27 K/UL — SIGNIFICANT CHANGE UP (ref 3.8–10.5)
WBC # FLD AUTO: 8.27 K/UL — SIGNIFICANT CHANGE UP (ref 3.8–10.5)

## 2019-01-16 PROCEDURE — 99291 CRITICAL CARE FIRST HOUR: CPT

## 2019-01-16 PROCEDURE — 71045 X-RAY EXAM CHEST 1 VIEW: CPT | Mod: 26

## 2019-01-16 PROCEDURE — 93010 ELECTROCARDIOGRAM REPORT: CPT

## 2019-01-16 NOTE — ED ADULT NURSE REASSESSMENT NOTE - NS ED NURSE REASSESS COMMENT FT1
pt hgb 5.7, hct 20.6. first unit of PRBC started. unit [id number]. blood infusing through 20 gauge iv to right ac. site intact with no signs of infiltration. pt denies any signs of transfusion reaction. pt instructed on signs or symptoms to report.  will continue to monitor pt.

## 2019-01-16 NOTE — ED ADULT NURSE REASSESSMENT NOTE - NS ED NURSE REASSESS COMMENT FT1
blood transfusion continued. vital signs stable. no signs or symptoms of transfusion reaction. iv site to right ac intact with no signs of infiltration. will continue to monitor pt.

## 2019-01-16 NOTE — ED PROVIDER NOTE - OBJECTIVE STATEMENT
62 yo M with low HGB on routine lab testing.  pt. admits to chronic anemia, requires transfusions every so often.  Pt. states he's been here before.  He denies active bleeding.  No blood in urine or stool, no pain anywhere.  Pt. says he feels fine.  No complaints.   ROS: negative for fever, cough, headache, chest pain, shortness of breath, abd pain, nausea, vomiting, diarrhea, rash, paresthesia, and weakness--all other systems reviewed are negative.   PMH: HTN, CAD, rBKA, IDDM, chronic anemia; Meds: See EMR; SH: Denies smoking/drinking/drug use

## 2019-01-16 NOTE — ED PROVIDER NOTE - PROGRESS NOTE DETAILS
transfusion started for hgb <6, no active bleeding, pt. reports no untoward side effects, feels well Lee: Patient signed out by Dr. Okeefe pending completion of rbcs. rbcs completed and patient continues to be in good condition. patient now discharged with care instructions. patient will follow up with pmd. efrain here to transfer patient home. Discussed results and outcome of testing with the patient.  Patient advised to please follow up with their primary care doctor within the next 24 hours and return to the Emergency Department for worsening symptoms or any other concerns.  Patient advised that their doctor may call  to follow up on the specific results of the tests performed today in the emergency department.

## 2019-01-16 NOTE — ED ADULT TRIAGE NOTE - CHIEF COMPLAINT QUOTE
patient BIBA , patient stated " I was call by my Md because my blood counts is low , Hg level is 6.6 " patient  denied chest pain denied difficulty breathing denied headache

## 2019-01-16 NOTE — ED PROVIDER NOTE - MEDICAL DECISION MAKING DETAILS
62 yo M with HGB reportedly 6.6, no active bleeding  -repeat labs, type and screen, ekg, cxr, monitor, transfuse if necessary

## 2019-01-16 NOTE — ED ADULT NURSE NOTE - CHPI ED NUR SYMPTOMS NEG
no diaphoresis/no back pain/anemia/no shortness of breath/no fever/no syncope/no vomiting/no chest pain/no congestion/no chills/no nausea/no dizziness

## 2019-01-16 NOTE — ED ADULT NURSE NOTE - NSIMPLEMENTINTERV_GEN_ALL_ED
Implemented All Universal Safety Interventions:  [place] to call system. Call bell, personal items and telephone within reach. Instruct patient to call for assistance. Room bathroom lighting operational. Non-slip footwear when patient is off stretcher. Physically safe environment: no spills, clutter or unnecessary equipment. Stretcher in lowest position, wheels locked, appropriate side rails in place.

## 2019-01-21 NOTE — DIETITIAN INITIAL EVALUATION ADULT. - EST PROTEIN NEEDS3
Attempts made to contact pt's assisted living [facility] at [phone] to verify pt.'s baseline renal function, no answer x 3.  Voice mail left to contact NP upon receipt of VM.  Will update as chart note. 58.95

## 2019-05-09 ENCOUNTER — EMERGENCY (EMERGENCY)
Facility: HOSPITAL | Age: 64
LOS: 0 days | Discharge: HOME | End: 2019-05-09
Attending: EMERGENCY MEDICINE
Payer: MEDICARE

## 2019-05-09 ENCOUNTER — TRANSCRIPTION ENCOUNTER (OUTPATIENT)
Age: 64
End: 2019-05-09

## 2019-05-09 VITALS
HEIGHT: 73 IN | HEART RATE: 78 BPM | OXYGEN SATURATION: 99 % | RESPIRATION RATE: 18 BRPM | WEIGHT: 315 LBS | DIASTOLIC BLOOD PRESSURE: 64 MMHG | TEMPERATURE: 99 F | SYSTOLIC BLOOD PRESSURE: 132 MMHG

## 2019-05-09 VITALS
OXYGEN SATURATION: 100 % | TEMPERATURE: 98 F | SYSTOLIC BLOOD PRESSURE: 160 MMHG | RESPIRATION RATE: 17 BRPM | HEART RATE: 74 BPM | DIASTOLIC BLOOD PRESSURE: 70 MMHG

## 2019-05-09 DIAGNOSIS — I25.10 ATHEROSCLEROTIC HEART DISEASE OF NATIVE CORONARY ARTERY WITHOUT ANGINA PECTORIS: ICD-10-CM

## 2019-05-09 DIAGNOSIS — Z95.5 PRESENCE OF CORONARY ANGIOPLASTY IMPLANT AND GRAFT: Chronic | ICD-10-CM

## 2019-05-09 DIAGNOSIS — S88.111D COMPLETE TRAUMATIC AMPUTATION AT LEVEL BETWEEN KNEE AND ANKLE, RIGHT LOWER LEG, SUBSEQUENT ENCOUNTER: Chronic | ICD-10-CM

## 2019-05-09 DIAGNOSIS — Z79.02 LONG TERM (CURRENT) USE OF ANTITHROMBOTICS/ANTIPLATELETS: ICD-10-CM

## 2019-05-09 DIAGNOSIS — D64.9 ANEMIA, UNSPECIFIED: ICD-10-CM

## 2019-05-09 DIAGNOSIS — Z79.4 LONG TERM (CURRENT) USE OF INSULIN: ICD-10-CM

## 2019-05-09 DIAGNOSIS — I10 ESSENTIAL (PRIMARY) HYPERTENSION: ICD-10-CM

## 2019-05-09 DIAGNOSIS — H54.7 UNSPECIFIED VISUAL LOSS: ICD-10-CM

## 2019-05-09 DIAGNOSIS — Z79.82 LONG TERM (CURRENT) USE OF ASPIRIN: ICD-10-CM

## 2019-05-09 DIAGNOSIS — E11.9 TYPE 2 DIABETES MELLITUS WITHOUT COMPLICATIONS: ICD-10-CM

## 2019-05-09 DIAGNOSIS — Z89.511 ACQUIRED ABSENCE OF RIGHT LEG BELOW KNEE: ICD-10-CM

## 2019-05-09 LAB
ALBUMIN SERPL ELPH-MCNC: 2.7 G/DL — LOW (ref 3.3–5)
ALP SERPL-CCNC: 95 U/L — SIGNIFICANT CHANGE UP (ref 40–120)
ALT FLD-CCNC: 19 U/L — SIGNIFICANT CHANGE UP (ref 12–78)
ANION GAP SERPL CALC-SCNC: 7 MMOL/L — SIGNIFICANT CHANGE UP (ref 5–17)
ANISOCYTOSIS BLD QL: SLIGHT — SIGNIFICANT CHANGE UP
APTT BLD: 30.2 SEC — SIGNIFICANT CHANGE UP (ref 27.5–36.3)
AST SERPL-CCNC: 17 U/L — SIGNIFICANT CHANGE UP (ref 15–37)
BASOPHILS # BLD AUTO: 0.03 K/UL — SIGNIFICANT CHANGE UP (ref 0–0.2)
BASOPHILS NFR BLD AUTO: 0.6 % — SIGNIFICANT CHANGE UP (ref 0–2)
BILIRUB SERPL-MCNC: 0.4 MG/DL — SIGNIFICANT CHANGE UP (ref 0.2–1.2)
BLD GP AB SCN SERPL QL: SIGNIFICANT CHANGE UP
BUN SERPL-MCNC: 35 MG/DL — HIGH (ref 7–23)
CALCIUM SERPL-MCNC: 8.3 MG/DL — LOW (ref 8.5–10.1)
CHLORIDE SERPL-SCNC: 106 MMOL/L — SIGNIFICANT CHANGE UP (ref 96–108)
CO2 SERPL-SCNC: 24 MMOL/L — SIGNIFICANT CHANGE UP (ref 22–31)
CREAT SERPL-MCNC: 2.16 MG/DL — HIGH (ref 0.5–1.3)
EOSINOPHIL # BLD AUTO: 0.13 K/UL — SIGNIFICANT CHANGE UP (ref 0–0.5)
EOSINOPHIL NFR BLD AUTO: 2.5 % — SIGNIFICANT CHANGE UP (ref 0–6)
GLUCOSE SERPL-MCNC: 266 MG/DL — HIGH (ref 70–99)
HCT VFR BLD CALC: 21.9 % — LOW (ref 39–50)
HGB BLD-MCNC: 6.2 G/DL — CRITICAL LOW (ref 13–17)
HYPOCHROMIA BLD QL: SIGNIFICANT CHANGE UP
IMM GRANULOCYTES NFR BLD AUTO: 0.4 % — SIGNIFICANT CHANGE UP (ref 0–1.5)
INR BLD: 1.2 RATIO — HIGH (ref 0.88–1.16)
LYMPHOCYTES # BLD AUTO: 1.36 K/UL — SIGNIFICANT CHANGE UP (ref 1–3.3)
LYMPHOCYTES # BLD AUTO: 25.9 % — SIGNIFICANT CHANGE UP (ref 13–44)
MACROCYTES BLD QL: SLIGHT — SIGNIFICANT CHANGE UP
MANUAL SMEAR VERIFICATION: SIGNIFICANT CHANGE UP
MCHC RBC-ENTMCNC: 20.1 PG — LOW (ref 27–34)
MCHC RBC-ENTMCNC: 28.3 GM/DL — LOW (ref 32–36)
MCV RBC AUTO: 71.1 FL — LOW (ref 80–100)
MICROCYTES BLD QL: SLIGHT — SIGNIFICANT CHANGE UP
MONOCYTES # BLD AUTO: 0.79 K/UL — SIGNIFICANT CHANGE UP (ref 0–0.9)
MONOCYTES NFR BLD AUTO: 15 % — HIGH (ref 2–14)
NEUTROPHILS # BLD AUTO: 2.92 K/UL — SIGNIFICANT CHANGE UP (ref 1.8–7.4)
NEUTROPHILS NFR BLD AUTO: 55.6 % — SIGNIFICANT CHANGE UP (ref 43–77)
NRBC # BLD: 0 /100 WBCS — SIGNIFICANT CHANGE UP (ref 0–0)
OVALOCYTES BLD QL SMEAR: SLIGHT — SIGNIFICANT CHANGE UP
PLAT MORPH BLD: NORMAL — SIGNIFICANT CHANGE UP
PLATELET # BLD AUTO: 255 K/UL — SIGNIFICANT CHANGE UP (ref 150–400)
POLYCHROMASIA BLD QL SMEAR: SLIGHT — SIGNIFICANT CHANGE UP
POTASSIUM SERPL-MCNC: 4.7 MMOL/L — SIGNIFICANT CHANGE UP (ref 3.5–5.3)
POTASSIUM SERPL-SCNC: 4.7 MMOL/L — SIGNIFICANT CHANGE UP (ref 3.5–5.3)
PROT SERPL-MCNC: 6.4 GM/DL — SIGNIFICANT CHANGE UP (ref 6–8.3)
PROTHROM AB SERPL-ACNC: 13.5 SEC — HIGH (ref 10–12.9)
RBC # BLD: 3.08 M/UL — LOW (ref 4.2–5.8)
RBC # FLD: 15.9 % — HIGH (ref 10.3–14.5)
RBC BLD AUTO: ABNORMAL
SODIUM SERPL-SCNC: 137 MMOL/L — SIGNIFICANT CHANGE UP (ref 135–145)
STOMATOCYTES BLD QL SMEAR: SLIGHT — SIGNIFICANT CHANGE UP
WBC # BLD: 5.25 K/UL — SIGNIFICANT CHANGE UP (ref 3.8–10.5)
WBC # FLD AUTO: 5.25 K/UL — SIGNIFICANT CHANGE UP (ref 3.8–10.5)

## 2019-05-09 PROCEDURE — 99284 EMERGENCY DEPT VISIT MOD MDM: CPT

## 2019-05-09 NOTE — ED PROVIDER NOTE - PROGRESS NOTE DETAILS
Patient signed out by Dr. Rolon pending completion of blood transfusion. Tolerated well, mulitple transfusions in past without active bleeding, melena, continuing work up outpatient. Patient discharged home, tolerated transfusions without complication.

## 2019-05-09 NOTE — ED PROVIDER NOTE - CLINICAL SUMMARY MEDICAL DECISION MAKING FREE TEXT BOX
pt nontoxic, asymptomatic. received 1.5 U so far. signout pending completion of transfusion. Patient signed out to incoming physician.  All decisions regarding the progression of care will be made at their discretion.

## 2019-05-09 NOTE — ED PROVIDER NOTE - OBJECTIVE STATEMENT
63 yo male with pmh blind, HTN, CAD, rBKA, IDDM, chronic anemia, presents with ER for low Hb 6.4 with PMD. pt denies symptoms. per pt, wife monitors stool and no black/bloody BM.  Pt has been to ER for same requiring transfusions and dc.     ROS: No fever/chills. No photophobia/eye pain/changes in vision, No ear pain/sore throat/dysphagia, No chest pain/palpitations. No SOB/cough/stridor. No abdominal pain, N/V/D, no black/bloody bm. No dysuria/frequency/discharge, No headache. No Dizziness.  No rash.  No numbness/tingling/weakness.

## 2019-05-09 NOTE — ED ADULT NURSE NOTE - CHIEF COMPLAINT QUOTE
He needs blood transfusion Pt doctor called to go to hospital for low hemoglobin and hematocrit. Previous transfusions source of anemia is unknown. He denies bleeding, palpitation, SOB, or lightheadedness

## 2019-05-09 NOTE — ED ADULT NURSE NOTE - NSIMPLEMENTINTERV_GEN_ALL_ED
Implemented All Fall with Harm Risk Interventions:  [place] to call system. Call bell, personal items and telephone within reach. Instruct patient to call for assistance. Room bathroom lighting operational. Non-slip footwear when patient is off stretcher. Physically safe environment: no spills, clutter or unnecessary equipment. Stretcher in lowest position, wheels locked, appropriate side rails in place. Provide visual cue, wrist band, yellow gown, etc. Monitor gait and stability. Monitor for mental status changes and reorient to person, place, and time. Review medications for side effects contributing to fall risk. Reinforce activity limits and safety measures with patient and family. Provide visual clues: red socks.

## 2019-05-09 NOTE — ED ADULT TRIAGE NOTE - CHIEF COMPLAINT QUOTE
He needs blood transfusion Pt doctor called to go to hospital for low hemoglobin and hematocrit . He denies bleeding, SOB, or lightheadedness He needs blood transfusion Pt doctor called to go to hospital for low hemoglobin and hematocrit. Previous transfusions source of anemia is unknown. He denies bleeding, palpitation, SOB, or lightheadedness

## 2019-05-09 NOTE — ED ADULT NURSE NOTE - OBJECTIVE STATEMENT
pt a7o x3, pt received call today from pcp for  low hemoglobin and hematocrit. Previous transfusions source of anemia is unknown. Pt denies pain, dark stood, blood in stool. Pt denies bleeding, palpitation, SOB, or lightheadedness. pt appears pale, pmh blind, right leg amputee.

## 2019-07-25 ENCOUNTER — EMERGENCY (EMERGENCY)
Facility: HOSPITAL | Age: 64
LOS: 0 days | Discharge: HOME | End: 2019-07-25
Attending: EMERGENCY MEDICINE
Payer: MEDICARE

## 2019-07-25 VITALS
TEMPERATURE: 98 F | HEART RATE: 60 BPM | HEIGHT: 73 IN | DIASTOLIC BLOOD PRESSURE: 75 MMHG | RESPIRATION RATE: 19 BRPM | WEIGHT: 315 LBS | SYSTOLIC BLOOD PRESSURE: 150 MMHG | OXYGEN SATURATION: 100 %

## 2019-07-25 VITALS
HEART RATE: 66 BPM | TEMPERATURE: 97 F | SYSTOLIC BLOOD PRESSURE: 119 MMHG | RESPIRATION RATE: 19 BRPM | DIASTOLIC BLOOD PRESSURE: 50 MMHG

## 2019-07-25 DIAGNOSIS — R79.9 ABNORMAL FINDING OF BLOOD CHEMISTRY, UNSPECIFIED: ICD-10-CM

## 2019-07-25 DIAGNOSIS — Z95.5 PRESENCE OF CORONARY ANGIOPLASTY IMPLANT AND GRAFT: Chronic | ICD-10-CM

## 2019-07-25 DIAGNOSIS — S88.111D COMPLETE TRAUMATIC AMPUTATION AT LEVEL BETWEEN KNEE AND ANKLE, RIGHT LOWER LEG, SUBSEQUENT ENCOUNTER: Chronic | ICD-10-CM

## 2019-07-25 DIAGNOSIS — Z79.4 LONG TERM (CURRENT) USE OF INSULIN: ICD-10-CM

## 2019-07-25 DIAGNOSIS — E11.9 TYPE 2 DIABETES MELLITUS WITHOUT COMPLICATIONS: ICD-10-CM

## 2019-07-25 DIAGNOSIS — I25.10 ATHEROSCLEROTIC HEART DISEASE OF NATIVE CORONARY ARTERY WITHOUT ANGINA PECTORIS: ICD-10-CM

## 2019-07-25 DIAGNOSIS — I10 ESSENTIAL (PRIMARY) HYPERTENSION: ICD-10-CM

## 2019-07-25 DIAGNOSIS — G62.9 POLYNEUROPATHY, UNSPECIFIED: ICD-10-CM

## 2019-07-25 DIAGNOSIS — D64.9 ANEMIA, UNSPECIFIED: ICD-10-CM

## 2019-07-25 DIAGNOSIS — H54.3 UNQUALIFIED VISUAL LOSS, BOTH EYES: ICD-10-CM

## 2019-07-25 LAB
ALBUMIN SERPL ELPH-MCNC: 2.9 G/DL — LOW (ref 3.3–5)
ALP SERPL-CCNC: 94 U/L — SIGNIFICANT CHANGE UP (ref 40–120)
ALT FLD-CCNC: 16 U/L — SIGNIFICANT CHANGE UP (ref 12–78)
ANION GAP SERPL CALC-SCNC: 10 MMOL/L — SIGNIFICANT CHANGE UP (ref 5–17)
ANISOCYTOSIS BLD QL: SLIGHT — SIGNIFICANT CHANGE UP
APTT BLD: 31.4 SEC — SIGNIFICANT CHANGE UP (ref 27.5–36.3)
AST SERPL-CCNC: 6 U/L — LOW (ref 15–37)
BASOPHILS # BLD AUTO: 0.06 K/UL — SIGNIFICANT CHANGE UP (ref 0–0.2)
BASOPHILS NFR BLD AUTO: 0.8 % — SIGNIFICANT CHANGE UP (ref 0–2)
BILIRUB SERPL-MCNC: 0.2 MG/DL — SIGNIFICANT CHANGE UP (ref 0.2–1.2)
BLD GP AB SCN SERPL QL: SIGNIFICANT CHANGE UP
BUN SERPL-MCNC: 37 MG/DL — HIGH (ref 7–23)
CALCIUM SERPL-MCNC: 8.2 MG/DL — LOW (ref 8.5–10.1)
CHLORIDE SERPL-SCNC: 106 MMOL/L — SIGNIFICANT CHANGE UP (ref 96–108)
CO2 SERPL-SCNC: 21 MMOL/L — LOW (ref 22–31)
CREAT SERPL-MCNC: 2.03 MG/DL — HIGH (ref 0.5–1.3)
EOSINOPHIL # BLD AUTO: 0.14 K/UL — SIGNIFICANT CHANGE UP (ref 0–0.5)
EOSINOPHIL NFR BLD AUTO: 1.9 % — SIGNIFICANT CHANGE UP (ref 0–6)
GLUCOSE SERPL-MCNC: 225 MG/DL — HIGH (ref 70–99)
HCT VFR BLD CALC: 21.3 % — LOW (ref 39–50)
HGB BLD-MCNC: 6.2 G/DL — CRITICAL LOW (ref 13–17)
HYPOCHROMIA BLD QL: SIGNIFICANT CHANGE UP
IMM GRANULOCYTES NFR BLD AUTO: 0.3 % — SIGNIFICANT CHANGE UP (ref 0–1.5)
INR BLD: 1.17 RATIO — HIGH (ref 0.88–1.16)
LYMPHOCYTES # BLD AUTO: 1 K/UL — SIGNIFICANT CHANGE UP (ref 1–3.3)
LYMPHOCYTES # BLD AUTO: 13.7 % — SIGNIFICANT CHANGE UP (ref 13–44)
MANUAL SMEAR VERIFICATION: SIGNIFICANT CHANGE UP
MCHC RBC-ENTMCNC: 20.9 PG — LOW (ref 27–34)
MCHC RBC-ENTMCNC: 29.1 GM/DL — LOW (ref 32–36)
MCV RBC AUTO: 72 FL — LOW (ref 80–100)
MICROCYTES BLD QL: SLIGHT — SIGNIFICANT CHANGE UP
MONOCYTES # BLD AUTO: 0.65 K/UL — SIGNIFICANT CHANGE UP (ref 0–0.9)
MONOCYTES NFR BLD AUTO: 8.9 % — SIGNIFICANT CHANGE UP (ref 2–14)
NEUTROPHILS # BLD AUTO: 5.43 K/UL — SIGNIFICANT CHANGE UP (ref 1.8–7.4)
NEUTROPHILS NFR BLD AUTO: 74.4 % — SIGNIFICANT CHANGE UP (ref 43–77)
NRBC # BLD: 0 /100 WBCS — SIGNIFICANT CHANGE UP (ref 0–0)
OVALOCYTES BLD QL SMEAR: SLIGHT — SIGNIFICANT CHANGE UP
PLAT MORPH BLD: NORMAL — SIGNIFICANT CHANGE UP
PLATELET # BLD AUTO: 338 K/UL — SIGNIFICANT CHANGE UP (ref 150–400)
POLYCHROMASIA BLD QL SMEAR: SLIGHT — SIGNIFICANT CHANGE UP
POTASSIUM SERPL-MCNC: 4.9 MMOL/L — SIGNIFICANT CHANGE UP (ref 3.5–5.3)
POTASSIUM SERPL-SCNC: 4.9 MMOL/L — SIGNIFICANT CHANGE UP (ref 3.5–5.3)
PROT SERPL-MCNC: 6.8 GM/DL — SIGNIFICANT CHANGE UP (ref 6–8.3)
PROTHROM AB SERPL-ACNC: 13.2 SEC — HIGH (ref 10–12.9)
RBC # BLD: 2.96 M/UL — LOW (ref 4.2–5.8)
RBC # FLD: 17.4 % — HIGH (ref 10.3–14.5)
RBC BLD AUTO: SIGNIFICANT CHANGE UP
SODIUM SERPL-SCNC: 137 MMOL/L — SIGNIFICANT CHANGE UP (ref 135–145)
WBC # BLD: 7.3 K/UL — SIGNIFICANT CHANGE UP (ref 3.8–10.5)
WBC # FLD AUTO: 7.3 K/UL — SIGNIFICANT CHANGE UP (ref 3.8–10.5)

## 2019-07-25 PROCEDURE — 99284 EMERGENCY DEPT VISIT MOD MDM: CPT

## 2019-07-25 PROCEDURE — 93010 ELECTROCARDIOGRAM REPORT: CPT

## 2019-07-25 RX ORDER — PANTOPRAZOLE SODIUM 20 MG/1
40 TABLET, DELAYED RELEASE ORAL ONCE
Refills: 0 | Status: COMPLETED | OUTPATIENT
Start: 2019-07-25 | End: 2019-07-25

## 2019-07-25 RX ADMIN — PANTOPRAZOLE SODIUM 40 MILLIGRAM(S): 20 TABLET, DELAYED RELEASE ORAL at 14:50

## 2019-07-25 NOTE — ED ADULT TRIAGE NOTE - CHIEF COMPLAINT QUOTE
patient here for evaluation low Hg level 6.5 , history of blood transfusion , denied chest pain denied difficulty breathing , denied bleeding at this time

## 2019-07-25 NOTE — ED ADULT NURSE NOTE - CHPI ED NUR SYMPTOMS NEG
no weakness/no fever/no dizziness/no nausea/no chills/no tingling/no decreased eating/drinking/no pain/no vomiting

## 2019-07-25 NOTE — ED PROVIDER NOTE - PROGRESS NOTE DETAILS
PAtient signed out by Dr. Harry pending completion of blood transfusion.  Tolerated well, no complications. Discussed results and outcome of today's visit with the patient.  Patient advised to please follow up with another healthcare provider within the next 24 hours and return to the Emergency Department for worsening symptoms or any other concerns.  Patient advised that their doctor may call  to follow up on the specific results of the tests performed today in the emergency department.   Patient appears well on discharge.

## 2019-07-25 NOTE — ED ADULT NURSE NOTE - ED STAT RN HANDOFF DETAILS
Patient lying comfortably on stretcher and in no distress.  Patient received 1 unit PRBC.  To received 2nd unit prbc.  handoff given to Reuben LOPEZ

## 2019-07-25 NOTE — ED PROVIDER NOTE - CLINICAL SUMMARY MEDICAL DECISION MAKING FREE TEXT BOX
hx, exam, labs, pt's wife sts pt had blood tranfusion and discharged same day from the ED in May of this year.

## 2019-07-25 NOTE — ED ADULT NURSE REASSESSMENT NOTE - NS ED NURSE REASSESS COMMENT FT1
received pt from primary Rn alert and oriented x4 with PRBC infusing via alaris pump patent v/s monitor will continue to monitor pt.

## 2019-07-25 NOTE — ED PROVIDER NOTE - CROS ED ENMT ALL NEG
Patient Discharge Instructions    Jade Villalba / [id number] : 1981    Admitted 2017 Discharged: 2017         Procedure Impression:  1. Esophagitis. Biopsies taken to evaluate for EoE and infectious sources. 2. Otherwise normal EGD. Biopsies taken to rule out H. Pylori and celiac disease. Recommendation:  1. Resume regular diet. 2. Will contact with biopsy results in 2 weeks   3. Follow up in office in 4 weeks. Recommended Diet: Regular Diet    Recommended Activity:    1. Do not drink alcohol, drive or operate machinery for 12 hours   2. Call if any fever, abdominal pain or bleeding noted. Signed By: Kirti García MD     2017         DISCHARGE SUMMARY from Nurse    The following personal items are in your possession at time of discharge:    Dental Appliances: None  Visual Aid: None                            PATIENT INSTRUCTIONS:    After general anesthesia or intravenous sedation, for 24 hours or while taking prescription Narcotics:  · Limit your activities  · Do not drive and operate hazardous machinery  · Do not make important personal or business decisions  · Do  not drink alcoholic beverages  · If you have not urinated within 8 hours after discharge, please contact your surgeon on call. Report the following to your surgeon:  · Excessive pain, swelling, redness or odor of or around the surgical area  · Temperature over 100.5  · Nausea and vomiting lasting longer than 4 hours or if unable to take medications  · Any signs of decreased circulation or nerve impairment to extremity: change in color, persistent  numbness, tingling, coldness or increase pain  · Any questions        What to do at Home:      These are general instructions for a healthy lifestyle:    No smoking/ No tobacco products/ Avoid exposure to second hand smoke    Surgeon General's Warning:  Quitting smoking now greatly reduces serious risk to your health.     Obesity, smoking, and sedentary lifestyle greatly increases your risk for illness    A healthy diet, regular physical exercise & weight monitoring are important for maintaining a healthy lifestyle    You may be retaining fluid if you have a history of heart failure or if you experience any of the following symptoms:  Weight gain of 3 pounds or more overnight or 5 pounds in a week, increased swelling in our hands or feet or shortness of breath while lying flat in bed. Please call your doctor as soon as you notice any of these symptoms; do not wait until your next office visit. Recognize signs and symptoms of STROKE:    F-face looks uneven    A-arms unable to move or move unevenly    S-speech slurred or non-existent    T-time-call 911 as soon as signs and symptoms begin-DO NOT go       Back to bed or wait to see if you get better-TIME IS BRAIN. Warning Signs of HEART ATTACK     Call 911 if you have these symptoms:   Chest discomfort. Most heart attacks involve discomfort in the center of the chest that lasts more than a few minutes, or that goes away and comes back. It can feel like uncomfortable pressure, squeezing, fullness, or pain.  Discomfort in other areas of the upper body. Symptoms can include pain or discomfort in one or both arms, the back, neck, jaw, or stomach.  Shortness of breath with or without chest discomfort.  Other signs may include breaking out in a cold sweat, nausea, or lightheadedness. Don't wait more than five minutes to call 911 - MINUTES MATTER! Fast action can save your life. Calling 911 is almost always the fastest way to get lifesaving treatment. Emergency Medical Services staff can begin treatment when they arrive -- up to an hour sooner than if someone gets to the hospital by car. The discharge information has been reviewed with the patient. The patient verbalized understanding.     Discharge medications reviewed with the patient and appropriate educational materials and side effects teaching were provided. Patient armband removed and given to patient to take home.   Patient was informed of the privacy risks if armband lost or stolen negative...

## 2019-07-25 NOTE — ED ADULT NURSE NOTE - OBJECTIVE STATEMENT
patient here for evaluation low Hg level 6.5 , history of blood transfusion , denied chest pain denied difficulty breathing , denied bleeding at this time. PMH HTN/DM/legally blind/ righ [place] (2017)

## 2019-07-25 NOTE — ED PROVIDER NOTE - OBJECTIVE STATEMENT
64 years old male with wife here sts pt's hemoglobin was 6.5 yesterday. Pt and wife sts pt has a hx of anemia and gets blood transfusion regularly last was in May of this year. Pt however denies headache, dizziness, focal/distal weakness or numbness, cough, sob, palpitations, chest pain, nausea, vomiting, fever, chills, abd pain, dysuria or irregular bowel movements. Wife sts pt has normal stool color and no hematuria.

## 2019-07-25 NOTE — ED ADULT NURSE NOTE - NSIMPLEMENTINTERV_GEN_ALL_ED
Implemented All Fall with Harm Risk Interventions:  [place] to call system. Call bell, personal items and telephone within reach. Instruct patient to call for assistance. Room bathroom lighting operational. Non-slip footwear when patient is off stretcher. Physically safe environment: no spills, clutter or unnecessary equipment. Stretcher in lowest position, wheels locked, appropriate side rails in place. Provide visual cue, wrist band, yellow gown, etc. Monitor gait and stability. Monitor for mental status changes and reorient to person, place, and time. Review medications for side effects contributing to fall risk. Reinforce activity limits and safety measures with patient and family. Provide visual clues: red socks.

## 2019-07-25 NOTE — ED PROVIDER NOTE - CONSTITUTIONAL, MLM
normal... Well appearing, well nourished, awake, alert, oriented to person, place, time/situation and in no apparent distress. Speaking in clear full sentences no nasal flaring no shoulders retractions no diaphoresis appears very comfortable

## 2019-08-20 NOTE — PROGRESS NOTE ADULT - PROBLEM SELECTOR PLAN 1
Hydrocephalus    Movement disorder    GERD (gastroesophageal reflux disease)    Cerebral palsy, athetoid (HCC)    Tobacco abuse    H/O small bowel obstruction    Fecal impaction (HCC)    Hyperglycemia    Chronic idiopathic constipation    Septicemia (HCC)    Leukemoid reaction    S/P colostomy ([facility] [address]  Resolved Problems:    GI bleed    Status post left colectomy with colostomy  Postoperative ileus, resolving    Plan  1. Advance diet  2. DC TPN  3.  Possible discharge tomorrow or the day after        Teddy Gracia, [address] started on vanco and cefepime per ID consult   no ortho intervention,   still await picc line hopefully placed today    s/p I and D . f/u vascular for wound care . orders...

## 2019-09-05 ENCOUNTER — INPATIENT (INPATIENT)
Facility: HOSPITAL | Age: 64
LOS: 4 days | Discharge: HOME | End: 2019-09-10
Attending: INTERNAL MEDICINE | Admitting: INTERNAL MEDICINE
Payer: MEDICARE

## 2019-09-05 VITALS
HEART RATE: 69 BPM | SYSTOLIC BLOOD PRESSURE: 133 MMHG | OXYGEN SATURATION: 98 % | RESPIRATION RATE: 18 BRPM | TEMPERATURE: 98 F | DIASTOLIC BLOOD PRESSURE: 67 MMHG

## 2019-09-05 DIAGNOSIS — I10 ESSENTIAL (PRIMARY) HYPERTENSION: ICD-10-CM

## 2019-09-05 DIAGNOSIS — D64.9 ANEMIA, UNSPECIFIED: ICD-10-CM

## 2019-09-05 DIAGNOSIS — I25.10 ATHEROSCLEROTIC HEART DISEASE OF NATIVE CORONARY ARTERY WITHOUT ANGINA PECTORIS: ICD-10-CM

## 2019-09-05 DIAGNOSIS — Z95.5 PRESENCE OF CORONARY ANGIOPLASTY IMPLANT AND GRAFT: Chronic | ICD-10-CM

## 2019-09-05 DIAGNOSIS — E08.8 DIABETES MELLITUS DUE TO UNDERLYING CONDITION WITH UNSPECIFIED COMPLICATIONS: ICD-10-CM

## 2019-09-05 DIAGNOSIS — S88.111D COMPLETE TRAUMATIC AMPUTATION AT LEVEL BETWEEN KNEE AND ANKLE, RIGHT LOWER LEG, SUBSEQUENT ENCOUNTER: Chronic | ICD-10-CM

## 2019-09-05 LAB
ALBUMIN SERPL ELPH-MCNC: 2.5 G/DL — LOW (ref 3.3–5)
ALP SERPL-CCNC: 84 U/L — SIGNIFICANT CHANGE UP (ref 40–120)
ALT FLD-CCNC: 13 U/L — SIGNIFICANT CHANGE UP (ref 12–78)
ANION GAP SERPL CALC-SCNC: 7 MMOL/L — SIGNIFICANT CHANGE UP (ref 5–17)
APTT BLD: 29.9 SEC — SIGNIFICANT CHANGE UP (ref 27.5–36.3)
AST SERPL-CCNC: 7 U/L — LOW (ref 15–37)
BASOPHILS # BLD AUTO: 0.03 K/UL — SIGNIFICANT CHANGE UP (ref 0–0.2)
BASOPHILS NFR BLD AUTO: 0.4 % — SIGNIFICANT CHANGE UP (ref 0–2)
BILIRUB SERPL-MCNC: 0.2 MG/DL — SIGNIFICANT CHANGE UP (ref 0.2–1.2)
BLD GP AB SCN SERPL QL: SIGNIFICANT CHANGE UP
BUN SERPL-MCNC: 33 MG/DL — HIGH (ref 7–23)
CALCIUM SERPL-MCNC: 8.1 MG/DL — LOW (ref 8.5–10.1)
CHLORIDE SERPL-SCNC: 109 MMOL/L — HIGH (ref 96–108)
CO2 SERPL-SCNC: 22 MMOL/L — SIGNIFICANT CHANGE UP (ref 22–31)
CREAT SERPL-MCNC: 1.82 MG/DL — HIGH (ref 0.5–1.3)
EOSINOPHIL # BLD AUTO: 0.19 K/UL — SIGNIFICANT CHANGE UP (ref 0–0.5)
EOSINOPHIL NFR BLD AUTO: 2.5 % — SIGNIFICANT CHANGE UP (ref 0–6)
GLUCOSE BLDC GLUCOMTR-MCNC: 149 MG/DL — HIGH (ref 70–99)
GLUCOSE BLDC GLUCOMTR-MCNC: 150 MG/DL — HIGH (ref 70–99)
GLUCOSE SERPL-MCNC: 157 MG/DL — HIGH (ref 70–99)
HCT VFR BLD CALC: 18.5 % — CRITICAL LOW (ref 39–50)
HGB BLD-MCNC: 5.3 G/DL — CRITICAL LOW (ref 13–17)
IMM GRANULOCYTES NFR BLD AUTO: 0.3 % — SIGNIFICANT CHANGE UP (ref 0–1.5)
INR BLD: 1.18 RATIO — HIGH (ref 0.88–1.16)
LYMPHOCYTES # BLD AUTO: 0.97 K/UL — LOW (ref 1–3.3)
LYMPHOCYTES # BLD AUTO: 12.7 % — LOW (ref 13–44)
MCHC RBC-ENTMCNC: 20.6 PG — LOW (ref 27–34)
MCHC RBC-ENTMCNC: 28.6 GM/DL — LOW (ref 32–36)
MCV RBC AUTO: 72 FL — LOW (ref 80–100)
MONOCYTES # BLD AUTO: 0.73 K/UL — SIGNIFICANT CHANGE UP (ref 0–0.9)
MONOCYTES NFR BLD AUTO: 9.6 % — SIGNIFICANT CHANGE UP (ref 2–14)
NEUTROPHILS # BLD AUTO: 5.69 K/UL — SIGNIFICANT CHANGE UP (ref 1.8–7.4)
NEUTROPHILS NFR BLD AUTO: 74.5 % — SIGNIFICANT CHANGE UP (ref 43–77)
NRBC # BLD: 0 /100 WBCS — SIGNIFICANT CHANGE UP (ref 0–0)
OB PNL STL: POSITIVE
PLATELET # BLD AUTO: 262 K/UL — SIGNIFICANT CHANGE UP (ref 150–400)
POTASSIUM SERPL-MCNC: 4.4 MMOL/L — SIGNIFICANT CHANGE UP (ref 3.5–5.3)
POTASSIUM SERPL-SCNC: 4.4 MMOL/L — SIGNIFICANT CHANGE UP (ref 3.5–5.3)
PROT SERPL-MCNC: 6 GM/DL — SIGNIFICANT CHANGE UP (ref 6–8.3)
PROTHROM AB SERPL-ACNC: 13.3 SEC — HIGH (ref 10–12.9)
RBC # BLD: 2.57 M/UL — LOW (ref 4.2–5.8)
RBC # FLD: 17.3 % — HIGH (ref 10.3–14.5)
SODIUM SERPL-SCNC: 138 MMOL/L — SIGNIFICANT CHANGE UP (ref 135–145)
WBC # BLD: 7.63 K/UL — SIGNIFICANT CHANGE UP (ref 3.8–10.5)
WBC # FLD AUTO: 7.63 K/UL — SIGNIFICANT CHANGE UP (ref 3.8–10.5)

## 2019-09-05 PROCEDURE — 99285 EMERGENCY DEPT VISIT HI MDM: CPT

## 2019-09-05 PROCEDURE — 71045 X-RAY EXAM CHEST 1 VIEW: CPT | Mod: 26

## 2019-09-05 PROCEDURE — 93010 ELECTROCARDIOGRAM REPORT: CPT

## 2019-09-05 RX ORDER — GLUCAGON INJECTION, SOLUTION 0.5 MG/.1ML
1 INJECTION, SOLUTION SUBCUTANEOUS ONCE
Refills: 0 | Status: DISCONTINUED | OUTPATIENT
Start: 2019-09-05 | End: 2019-09-10

## 2019-09-05 RX ORDER — INSULIN LISPRO 100/ML
VIAL (ML) SUBCUTANEOUS AT BEDTIME
Refills: 0 | Status: DISCONTINUED | OUTPATIENT
Start: 2019-09-05 | End: 2019-09-10

## 2019-09-05 RX ORDER — INSULIN LISPRO 100/ML
VIAL (ML) SUBCUTANEOUS
Refills: 0 | Status: DISCONTINUED | OUTPATIENT
Start: 2019-09-05 | End: 2019-09-10

## 2019-09-05 RX ORDER — DULOXETINE HYDROCHLORIDE 30 MG/1
30 CAPSULE, DELAYED RELEASE ORAL DAILY
Refills: 0 | Status: DISCONTINUED | OUTPATIENT
Start: 2019-09-05 | End: 2019-09-10

## 2019-09-05 RX ORDER — DEXTROSE 50 % IN WATER 50 %
25 SYRINGE (ML) INTRAVENOUS ONCE
Refills: 0 | Status: DISCONTINUED | OUTPATIENT
Start: 2019-09-05 | End: 2019-09-10

## 2019-09-05 RX ORDER — PANTOPRAZOLE SODIUM 20 MG/1
40 TABLET, DELAYED RELEASE ORAL ONCE
Refills: 0 | Status: COMPLETED | OUTPATIENT
Start: 2019-09-05 | End: 2019-09-05

## 2019-09-05 RX ORDER — SODIUM CHLORIDE 9 MG/ML
1000 INJECTION, SOLUTION INTRAVENOUS
Refills: 0 | Status: DISCONTINUED | OUTPATIENT
Start: 2019-09-05 | End: 2019-09-10

## 2019-09-05 RX ORDER — ZALEPLON 10 MG
5 CAPSULE ORAL AT BEDTIME
Refills: 0 | Status: DISCONTINUED | OUTPATIENT
Start: 2019-09-05 | End: 2019-09-10

## 2019-09-05 RX ORDER — DEXTROSE 50 % IN WATER 50 %
12.5 SYRINGE (ML) INTRAVENOUS ONCE
Refills: 0 | Status: DISCONTINUED | OUTPATIENT
Start: 2019-09-05 | End: 2019-09-10

## 2019-09-05 RX ORDER — ZOLPIDEM TARTRATE 10 MG/1
5 TABLET ORAL AT BEDTIME
Refills: 0 | Status: DISCONTINUED | OUTPATIENT
Start: 2019-09-05 | End: 2019-09-05

## 2019-09-05 RX ORDER — PANTOPRAZOLE SODIUM 20 MG/1
40 TABLET, DELAYED RELEASE ORAL
Refills: 0 | Status: DISCONTINUED | OUTPATIENT
Start: 2019-09-05 | End: 2019-09-06

## 2019-09-05 RX ORDER — DEXTROSE 50 % IN WATER 50 %
15 SYRINGE (ML) INTRAVENOUS ONCE
Refills: 0 | Status: DISCONTINUED | OUTPATIENT
Start: 2019-09-05 | End: 2019-09-10

## 2019-09-05 RX ORDER — SENNA PLUS 8.6 MG/1
2 TABLET ORAL AT BEDTIME
Refills: 0 | Status: DISCONTINUED | OUTPATIENT
Start: 2019-09-05 | End: 2019-09-10

## 2019-09-05 RX ORDER — MAGNESIUM HYDROXIDE 400 MG/1
30 TABLET, CHEWABLE ORAL DAILY
Refills: 0 | Status: DISCONTINUED | OUTPATIENT
Start: 2019-09-05 | End: 2019-09-10

## 2019-09-05 RX ORDER — ATORVASTATIN CALCIUM 80 MG/1
40 TABLET, FILM COATED ORAL AT BEDTIME
Refills: 0 | Status: DISCONTINUED | OUTPATIENT
Start: 2019-09-05 | End: 2019-09-10

## 2019-09-05 RX ORDER — OXYCODONE AND ACETAMINOPHEN 5; 325 MG/1; MG/1
1 TABLET ORAL ONCE
Refills: 0 | Status: DISCONTINUED | OUTPATIENT
Start: 2019-09-05 | End: 2019-09-05

## 2019-09-05 RX ORDER — INSULIN GLARGINE 100 [IU]/ML
16 INJECTION, SOLUTION SUBCUTANEOUS AT BEDTIME
Refills: 0 | Status: DISCONTINUED | OUTPATIENT
Start: 2019-09-05 | End: 2019-09-10

## 2019-09-05 RX ORDER — METOPROLOL TARTRATE 50 MG
25 TABLET ORAL
Refills: 0 | Status: DISCONTINUED | OUTPATIENT
Start: 2019-09-05 | End: 2019-09-10

## 2019-09-05 RX ORDER — CARBAMAZEPINE 200 MG
200 TABLET ORAL
Refills: 0 | Status: DISCONTINUED | OUTPATIENT
Start: 2019-09-05 | End: 2019-09-10

## 2019-09-05 RX ORDER — INFLUENZA VIRUS VACCINE 15; 15; 15; 15 UG/.5ML; UG/.5ML; UG/.5ML; UG/.5ML
0.5 SUSPENSION INTRAMUSCULAR ONCE
Refills: 0 | Status: DISCONTINUED | OUTPATIENT
Start: 2019-09-05 | End: 2019-09-10

## 2019-09-05 RX ADMIN — Medication 200 MILLIGRAM(S): at 17:51

## 2019-09-05 RX ADMIN — Medication 5 MILLIGRAM(S): at 22:47

## 2019-09-05 RX ADMIN — Medication 25 MILLIGRAM(S): at 17:50

## 2019-09-05 RX ADMIN — OXYCODONE AND ACETAMINOPHEN 1 TABLET(S): 5; 325 TABLET ORAL at 21:50

## 2019-09-05 RX ADMIN — PANTOPRAZOLE SODIUM 40 MILLIGRAM(S): 20 TABLET, DELAYED RELEASE ORAL at 12:25

## 2019-09-05 RX ADMIN — ATORVASTATIN CALCIUM 40 MILLIGRAM(S): 80 TABLET, FILM COATED ORAL at 22:47

## 2019-09-05 RX ADMIN — SENNA PLUS 2 TABLET(S): 8.6 TABLET ORAL at 22:47

## 2019-09-05 RX ADMIN — OXYCODONE AND ACETAMINOPHEN 1 TABLET(S): 5; 325 TABLET ORAL at 20:50

## 2019-09-05 NOTE — ED PROVIDER NOTE - NONTENDER LOCATION
left lower quadrant/right lower quadrant/umbilical/suprapubic/periumbilical/left costovertebral angle/left upper quadrant/right upper quadrant/right costovertebral angle

## 2019-09-05 NOTE — H&P ADULT - NSHPPHYSICALEXAM_GEN_ALL_CORE
General: A/ox 3, No acute Distress  skin : pale  Head. Dario. No lacerations  Neck: Supple, NO JVD  Cardiac: S1 S2, No M/R/G  Pulmonary: CTAP, Breathing unlabored, No Rhonchi/Rales/Wheezing  Abdomen: Soft, Non -tender, +BS x 4 quads  Extremities: No Rashes, No edema. rt BKA  Neuro: A/o x 3, No focal deficits. Normal Motor and sensory exam  Vascular: Normal distal pulses.  Extremities:Rt BKA  Decubiti ; None

## 2019-09-05 NOTE — ED PROVIDER NOTE - CONSTITUTIONAL, MLM
normal... Well appearing, well nourished, awake, alert, oriented to person, place, time/situation and in no apparent distress. Speaking in clear full sentences no nasal flaring no shoulders retractions no diaphoresis, appears very comfortable

## 2019-09-05 NOTE — ED ADULT NURSE NOTE - NSIMPLEMENTINTERV_GEN_ALL_ED
Implemented All Universal Safety Interventions:  [place] to call system. Call bell, personal items and telephone within reach. Instruct patient to call for assistance. Room bathroom lighting operational. Non-slip footwear when patient is off stretcher. Physically safe environment: no spills, clutter or unnecessary equipment. Stretcher in lowest position, wheels locked, appropriate side rails in place.

## 2019-09-05 NOTE — ED ADULT NURSE REASSESSMENT NOTE - NS ED NURSE REASSESS COMMENT FT1
Report given to receiving RN Preeti on 2B, pts history, current condition and reason for admission discussed, safety concerns addressed and reviewed, pt currently in stable condition, IV flushes for patency and site shows no signs or symptoms of infiltrate, dressing is clean dry and intact, pt is aware of plan of care, blood is tranfusing and the consent is in the chart, administration record is running. Pt education deemed successful at time of report after patient demonstrates successful teach back for proficiency.

## 2019-09-05 NOTE — ED ADULT NURSE NOTE - OBJECTIVE STATEMENT
Pt is a 64YOM who is here with abdominal pain and states he is here for what he believes to be an abnormal lab result. Pt states he has a history of having chronically low hemoglobin and hematocrit, pt has a hx of DM, HTN, BKA on the right and blindness due to DM. Pt is having some abdominal discomfort, no n/v/d noted, pt states no pain when urinating, no change in bowel or urinary pattern. Pt states that he has an extensive history of being low.

## 2019-09-05 NOTE — H&P ADULT - NSICDXPASTMEDICALHX_GEN_ALL_CORE_FT
PAST MEDICAL HISTORY:  Blindness of both eyes     CAD (coronary artery disease)     Diabetes mellitus due to underlying condition with complications     Essential hypertension     Neuropathy

## 2019-09-05 NOTE — H&P ADULT - NSICDXPASTSURGICALHX_GEN_ALL_CORE_FT
PAST SURGICAL HISTORY:  History of coronary artery stent placement     Unilateral complete BKA, right, subsequent encounter

## 2019-09-05 NOTE — CONSULT NOTE ADULT - SUBJECTIVE AND OBJECTIVE BOX
full consult dictated    Plan: pT with multiple medical problems - admitted with severe anemia and heme + stools.  Pt started on IV protonix and carafate; transfuse 2u prbc's; clear liquids; Labs in AM; NPO after MN for EGD tomorrow full consult dictated    Plan: pT with multiple medical problems - admitted with severe anemia and heme + stools.  Pt started on IV protonix and carafate; transfuse 2u prbc's; Labs in AM; NPO after MN for EGD tomorrow; Hold plavix and ASA

## 2019-09-05 NOTE — ED ADULT NURSE REASSESSMENT NOTE - NS ED NURSE REASSESS COMMENT FT1
Pt consent for blood product was obtained by MD and witnessed, pt VS were recorded on the blood administration record/EMR and patient is in no distress or discomfort at this time. IV is patent and flushes with no difficulty. Pt has no noted transfusion reaction noted and has no reported side affects.

## 2019-09-05 NOTE — ED ADULT TRIAGE NOTE - CHIEF COMPLAINT QUOTE
pt sent in by doctors office to be evaluated for low hemoglobin. pt states he had lab work done yesterday and his hemoglobin was 5.7. denies any symptoms at triage.

## 2019-09-05 NOTE — H&P ADULT - NSHPLABSRESULTS_GEN_ALL_CORE
5.3                  138  | 22   | 33           7.63  >-----------< 262     ------------------------< 157                   18.5                 4.4  | 109  | 1.82                                         Ca 8.1   Mg x     Ph x      stool occult blood Positive   EKG sinus rhythm, Old IWI, Non specific t wave changes

## 2019-09-05 NOTE — H&P ADULT - NSHPREVIEWOFSYSTEMS_GEN_ALL_CORE
REVIEW OF SYSTEMS:    CONSTITUTIONAL: No weakness, fevers or chills  EYES/ENT: Blind in both eyes;  No vertigo or throat pain   NECK: No pain or stiffness  RESPIRATORY: No cough, wheezing, hemoptysis; No shortness of breath  CARDIOVASCULAR: No chest pain or palpitations [ ] CHF [ ] MI [ ] CABG [ ]  GASTROINTESTINAL: No abdominal or epigastric pain. No nausea, vomiting, or hematemesis; No diarrhea or constipation. No melena or hematochezia. [ ]abdominal surgery [ ] prostrate problems   GENITOURINARY: No dysuria, frequency or hematuria   NEUROLOGICAL: No numbness or weakness. No hx of seizures  SKIN: No itching, burning, rashes, or lesions   extremity- Rt BKA  All other review of systems is negative unless indicated above.

## 2019-09-05 NOTE — ED PROVIDER NOTE - MUSCULOSKELETAL, MLM
Spine appears normal, range of motion is not limited, no muscle or joint tenderness + right below knee amputation

## 2019-09-05 NOTE — ED PROVIDER NOTE - OBJECTIVE STATEMENT
64 years old male by ems with wife for low hemoglobin 5.7 which was done yesterday. Pt's wife sts pt has a hx of anemia has had multiple blood transfusions in the past. Wife sts pt is blind and s/p right below knee amputation and she is unable to get pt to doctors office for regular follow up. Pt is alert and oriented x 3 denies associated symptoms dizziness, sob, chest pain, cough, nausea, vomiting, abd pain, hematuria or abnormal stool color.

## 2019-09-05 NOTE — H&P ADULT - HISTORY OF PRESENT ILLNESS
64 years old male by ems with wife for low hemoglobin 5.7 which was done yesterday. Pt's wife sts pt has a hx of anemia has had multiple blood transfusions in the past. Wife sts pt is blind and s/p right below knee amputation and she is unable to get pt to doctors office for regular follow up. Pt is alert and oriented x 3 denies associated symptoms dizziness, sob, chest pain, cough, nausea, vomiting, abd pain, hematuria or abnormal stool color.   occult blood is positive in the Ed. No BRBPR

## 2019-09-05 NOTE — ED ADULT NURSE NOTE - HOW OFTEN DO YOU HAVE A DRINK CONTAINING ALCOHOL?
Patient is a 73 yo female with pmhx of AVR, arthritis, CAD, emphysema on 3L home O2, glaucoma, c. diff, PNA, HLD, HTN, hypothyroidism, anemia, PVD, presented to ED with multiple episodes of diarrhea and SOB, admit for sepsis likely 2/2 PNA, found to have KALYAN, r/o c.diff
Never

## 2019-09-06 ENCOUNTER — RESULT REVIEW (OUTPATIENT)
Age: 64
End: 2019-09-06

## 2019-09-06 LAB
ANION GAP SERPL CALC-SCNC: 8 MMOL/L — SIGNIFICANT CHANGE UP (ref 5–17)
BUN SERPL-MCNC: 38 MG/DL — HIGH (ref 7–23)
CALCIUM SERPL-MCNC: 8.3 MG/DL — LOW (ref 8.5–10.1)
CHLORIDE SERPL-SCNC: 111 MMOL/L — HIGH (ref 96–108)
CO2 SERPL-SCNC: 21 MMOL/L — LOW (ref 22–31)
CREAT SERPL-MCNC: 1.87 MG/DL — HIGH (ref 0.5–1.3)
GLUCOSE BLDC GLUCOMTR-MCNC: 111 MG/DL — HIGH (ref 70–99)
GLUCOSE BLDC GLUCOMTR-MCNC: 135 MG/DL — HIGH (ref 70–99)
GLUCOSE BLDC GLUCOMTR-MCNC: 81 MG/DL — SIGNIFICANT CHANGE UP (ref 70–99)
GLUCOSE BLDC GLUCOMTR-MCNC: 87 MG/DL — SIGNIFICANT CHANGE UP (ref 70–99)
GLUCOSE SERPL-MCNC: 101 MG/DL — HIGH (ref 70–99)
HBA1C BLD-MCNC: 7.2 % — HIGH (ref 4–5.6)
HCT VFR BLD CALC: 25 % — LOW (ref 39–50)
HGB BLD-MCNC: 7.7 G/DL — LOW (ref 13–17)
IRON SATN MFR SERPL: 11 UG/DL — LOW (ref 45–165)
IRON SATN MFR SERPL: 4 % — LOW (ref 16–55)
MCHC RBC-ENTMCNC: 23 PG — LOW (ref 27–34)
MCHC RBC-ENTMCNC: 30.8 GM/DL — LOW (ref 32–36)
MCV RBC AUTO: 74.6 FL — LOW (ref 80–100)
NRBC # BLD: 0 /100 WBCS — SIGNIFICANT CHANGE UP (ref 0–0)
PLATELET # BLD AUTO: 270 K/UL — SIGNIFICANT CHANGE UP (ref 150–400)
POTASSIUM SERPL-MCNC: 4.3 MMOL/L — SIGNIFICANT CHANGE UP (ref 3.5–5.3)
POTASSIUM SERPL-SCNC: 4.3 MMOL/L — SIGNIFICANT CHANGE UP (ref 3.5–5.3)
RBC # BLD: 3.35 M/UL — LOW (ref 4.2–5.8)
RBC # FLD: 17.7 % — HIGH (ref 10.3–14.5)
SODIUM SERPL-SCNC: 140 MMOL/L — SIGNIFICANT CHANGE UP (ref 135–145)
TIBC SERPL-MCNC: 310 UG/DL — SIGNIFICANT CHANGE UP (ref 220–430)
UIBC SERPL-MCNC: 299 UG/DL — SIGNIFICANT CHANGE UP (ref 110–370)
WBC # BLD: 8.28 K/UL — SIGNIFICANT CHANGE UP (ref 3.8–10.5)
WBC # FLD AUTO: 8.28 K/UL — SIGNIFICANT CHANGE UP (ref 3.8–10.5)

## 2019-09-06 PROCEDURE — 88312 SPECIAL STAINS GROUP 1: CPT | Mod: 26

## 2019-09-06 PROCEDURE — 88305 TISSUE EXAM BY PATHOLOGIST: CPT | Mod: 26

## 2019-09-06 RX ORDER — ACETAMINOPHEN 500 MG
1000 TABLET ORAL ONCE
Refills: 0 | Status: COMPLETED | OUTPATIENT
Start: 2019-09-06 | End: 2019-09-06

## 2019-09-06 RX ORDER — IRON SUCROSE 20 MG/ML
200 INJECTION, SOLUTION INTRAVENOUS ONCE
Refills: 0 | Status: COMPLETED | OUTPATIENT
Start: 2019-09-06 | End: 2019-09-06

## 2019-09-06 RX ORDER — MORPHINE SULFATE 50 MG/1
2 CAPSULE, EXTENDED RELEASE ORAL ONCE
Refills: 0 | Status: DISCONTINUED | OUTPATIENT
Start: 2019-09-06 | End: 2019-09-06

## 2019-09-06 RX ORDER — GABAPENTIN 400 MG/1
100 CAPSULE ORAL THREE TIMES A DAY
Refills: 0 | Status: DISCONTINUED | OUTPATIENT
Start: 2019-09-06 | End: 2019-09-10

## 2019-09-06 RX ORDER — SUCRALFATE 1 G
1 TABLET ORAL
Refills: 0 | Status: DISCONTINUED | OUTPATIENT
Start: 2019-09-06 | End: 2019-09-10

## 2019-09-06 RX ORDER — PANTOPRAZOLE SODIUM 20 MG/1
40 TABLET, DELAYED RELEASE ORAL EVERY 12 HOURS
Refills: 0 | Status: DISCONTINUED | OUTPATIENT
Start: 2019-09-06 | End: 2019-09-10

## 2019-09-06 RX ADMIN — Medication 200 MILLIGRAM(S): at 20:54

## 2019-09-06 RX ADMIN — Medication 25 MILLIGRAM(S): at 18:03

## 2019-09-06 RX ADMIN — GABAPENTIN 100 MILLIGRAM(S): 400 CAPSULE ORAL at 21:32

## 2019-09-06 RX ADMIN — Medication 400 MILLIGRAM(S): at 09:26

## 2019-09-06 RX ADMIN — MORPHINE SULFATE 2 MILLIGRAM(S): 50 CAPSULE, EXTENDED RELEASE ORAL at 12:12

## 2019-09-06 RX ADMIN — Medication 1 GRAM(S): at 18:03

## 2019-09-06 RX ADMIN — INSULIN GLARGINE 16 UNIT(S): 100 INJECTION, SOLUTION SUBCUTANEOUS at 21:33

## 2019-09-06 RX ADMIN — Medication 1 GRAM(S): at 23:53

## 2019-09-06 RX ADMIN — IRON SUCROSE 110 MILLIGRAM(S): 20 INJECTION, SOLUTION INTRAVENOUS at 12:33

## 2019-09-06 RX ADMIN — ATORVASTATIN CALCIUM 40 MILLIGRAM(S): 80 TABLET, FILM COATED ORAL at 21:32

## 2019-09-06 RX ADMIN — PANTOPRAZOLE SODIUM 40 MILLIGRAM(S): 20 TABLET, DELAYED RELEASE ORAL at 18:03

## 2019-09-06 RX ADMIN — MORPHINE SULFATE 2 MILLIGRAM(S): 50 CAPSULE, EXTENDED RELEASE ORAL at 11:54

## 2019-09-06 RX ADMIN — GABAPENTIN 100 MILLIGRAM(S): 400 CAPSULE ORAL at 18:03

## 2019-09-07 LAB
ANION GAP SERPL CALC-SCNC: 7 MMOL/L — SIGNIFICANT CHANGE UP (ref 5–17)
BUN SERPL-MCNC: 31 MG/DL — HIGH (ref 7–23)
CALCIUM SERPL-MCNC: 8.3 MG/DL — LOW (ref 8.5–10.1)
CHLORIDE SERPL-SCNC: 110 MMOL/L — HIGH (ref 96–108)
CO2 SERPL-SCNC: 22 MMOL/L — SIGNIFICANT CHANGE UP (ref 22–31)
CREAT SERPL-MCNC: 1.92 MG/DL — HIGH (ref 0.5–1.3)
GLUCOSE BLDC GLUCOMTR-MCNC: 120 MG/DL — HIGH (ref 70–99)
GLUCOSE BLDC GLUCOMTR-MCNC: 140 MG/DL — HIGH (ref 70–99)
GLUCOSE BLDC GLUCOMTR-MCNC: 199 MG/DL — HIGH (ref 70–99)
GLUCOSE BLDC GLUCOMTR-MCNC: 82 MG/DL — SIGNIFICANT CHANGE UP (ref 70–99)
GLUCOSE SERPL-MCNC: 82 MG/DL — SIGNIFICANT CHANGE UP (ref 70–99)
HCT VFR BLD CALC: 27.3 % — LOW (ref 39–50)
HGB BLD-MCNC: 8.4 G/DL — LOW (ref 13–17)
MCHC RBC-ENTMCNC: 23.5 PG — LOW (ref 27–34)
MCHC RBC-ENTMCNC: 30.8 GM/DL — LOW (ref 32–36)
MCV RBC AUTO: 76.5 FL — LOW (ref 80–100)
NRBC # BLD: 0 /100 WBCS — SIGNIFICANT CHANGE UP (ref 0–0)
PLATELET # BLD AUTO: 239 K/UL — SIGNIFICANT CHANGE UP (ref 150–400)
POTASSIUM SERPL-MCNC: 4.1 MMOL/L — SIGNIFICANT CHANGE UP (ref 3.5–5.3)
POTASSIUM SERPL-SCNC: 4.1 MMOL/L — SIGNIFICANT CHANGE UP (ref 3.5–5.3)
RBC # BLD: 3.57 M/UL — LOW (ref 4.2–5.8)
RBC # FLD: 18.3 % — HIGH (ref 10.3–14.5)
SODIUM SERPL-SCNC: 139 MMOL/L — SIGNIFICANT CHANGE UP (ref 135–145)
WBC # BLD: 8.12 K/UL — SIGNIFICANT CHANGE UP (ref 3.8–10.5)
WBC # FLD AUTO: 8.12 K/UL — SIGNIFICANT CHANGE UP (ref 3.8–10.5)

## 2019-09-07 RX ORDER — OXYCODONE AND ACETAMINOPHEN 5; 325 MG/1; MG/1
1 TABLET ORAL ONCE
Refills: 0 | Status: DISCONTINUED | OUTPATIENT
Start: 2019-09-07 | End: 2019-09-07

## 2019-09-07 RX ADMIN — PANTOPRAZOLE SODIUM 40 MILLIGRAM(S): 20 TABLET, DELAYED RELEASE ORAL at 18:12

## 2019-09-07 RX ADMIN — Medication 1 GRAM(S): at 11:48

## 2019-09-07 RX ADMIN — PANTOPRAZOLE SODIUM 40 MILLIGRAM(S): 20 TABLET, DELAYED RELEASE ORAL at 05:26

## 2019-09-07 RX ADMIN — OXYCODONE AND ACETAMINOPHEN 1 TABLET(S): 5; 325 TABLET ORAL at 23:40

## 2019-09-07 RX ADMIN — OXYCODONE AND ACETAMINOPHEN 1 TABLET(S): 5; 325 TABLET ORAL at 23:11

## 2019-09-07 RX ADMIN — Medication 1 GRAM(S): at 18:12

## 2019-09-07 RX ADMIN — Medication 25 MILLIGRAM(S): at 05:26

## 2019-09-07 RX ADMIN — Medication 25 MILLIGRAM(S): at 18:12

## 2019-09-07 RX ADMIN — INSULIN GLARGINE 16 UNIT(S): 100 INJECTION, SOLUTION SUBCUTANEOUS at 22:36

## 2019-09-07 RX ADMIN — GABAPENTIN 100 MILLIGRAM(S): 400 CAPSULE ORAL at 05:27

## 2019-09-07 RX ADMIN — SENNA PLUS 2 TABLET(S): 8.6 TABLET ORAL at 21:03

## 2019-09-07 RX ADMIN — GABAPENTIN 100 MILLIGRAM(S): 400 CAPSULE ORAL at 13:46

## 2019-09-07 RX ADMIN — Medication 1 GRAM(S): at 23:13

## 2019-09-07 RX ADMIN — Medication 1 GRAM(S): at 05:26

## 2019-09-07 RX ADMIN — GABAPENTIN 100 MILLIGRAM(S): 400 CAPSULE ORAL at 21:03

## 2019-09-07 RX ADMIN — ATORVASTATIN CALCIUM 40 MILLIGRAM(S): 80 TABLET, FILM COATED ORAL at 21:03

## 2019-09-08 LAB
GLUCOSE BLDC GLUCOMTR-MCNC: 170 MG/DL — HIGH (ref 70–99)
GLUCOSE BLDC GLUCOMTR-MCNC: 186 MG/DL — HIGH (ref 70–99)
GLUCOSE BLDC GLUCOMTR-MCNC: 191 MG/DL — HIGH (ref 70–99)
GLUCOSE BLDC GLUCOMTR-MCNC: 288 MG/DL — HIGH (ref 70–99)

## 2019-09-08 RX ORDER — SOD SULF/SODIUM/NAHCO3/KCL/PEG
4000 SOLUTION, RECONSTITUTED, ORAL ORAL ONCE
Refills: 0 | Status: COMPLETED | OUTPATIENT
Start: 2019-09-09 | End: 2019-09-09

## 2019-09-08 RX ADMIN — Medication 25 MILLIGRAM(S): at 05:32

## 2019-09-08 RX ADMIN — Medication 2: at 08:33

## 2019-09-08 RX ADMIN — Medication 1 GRAM(S): at 12:07

## 2019-09-08 RX ADMIN — ATORVASTATIN CALCIUM 40 MILLIGRAM(S): 80 TABLET, FILM COATED ORAL at 23:22

## 2019-09-08 RX ADMIN — GABAPENTIN 100 MILLIGRAM(S): 400 CAPSULE ORAL at 05:32

## 2019-09-08 RX ADMIN — PANTOPRAZOLE SODIUM 40 MILLIGRAM(S): 20 TABLET, DELAYED RELEASE ORAL at 05:33

## 2019-09-08 RX ADMIN — PANTOPRAZOLE SODIUM 40 MILLIGRAM(S): 20 TABLET, DELAYED RELEASE ORAL at 18:25

## 2019-09-08 RX ADMIN — GABAPENTIN 100 MILLIGRAM(S): 400 CAPSULE ORAL at 23:22

## 2019-09-08 RX ADMIN — Medication 1 GRAM(S): at 18:19

## 2019-09-08 RX ADMIN — Medication 1 GRAM(S): at 06:44

## 2019-09-08 RX ADMIN — SENNA PLUS 2 TABLET(S): 8.6 TABLET ORAL at 23:17

## 2019-09-08 RX ADMIN — INSULIN GLARGINE 16 UNIT(S): 100 INJECTION, SOLUTION SUBCUTANEOUS at 23:16

## 2019-09-08 RX ADMIN — Medication 25 MILLIGRAM(S): at 18:19

## 2019-09-08 RX ADMIN — Medication 2: at 12:07

## 2019-09-08 RX ADMIN — GABAPENTIN 100 MILLIGRAM(S): 400 CAPSULE ORAL at 14:01

## 2019-09-08 RX ADMIN — Medication 2: at 17:01

## 2019-09-08 RX ADMIN — Medication 2: at 23:15

## 2019-09-08 RX ADMIN — Medication 1 GRAM(S): at 23:17

## 2019-09-08 NOTE — CHART NOTE - NSCHARTNOTEFT_GEN_A_CORE
Upon Nutritional Assessment by the Registered Dietitian your patient was determined to meet criteria / has evidence of the following diagnosis/diagnoses:          [ ]  Mild Protein Calorie Malnutrition        [ ]  Moderate Protein Calorie Malnutrition        [ ] Severe Protein Calorie Malnutrition        [ ] Unspecified Protein Calorie Malnutrition        [ ] Underweight / BMI <19        [x ] Morbid Obesity / BMI > 40      Findings as based on:  •  Comprehensive nutrition assessment and consultation  •  Calorie counts (nutrient intake analysis)  •  Food acceptance and intake status from observations by staff  •  Follow up  •  Patient education  •  Intervention secondary to interdisciplinary rounds  •   concerns      Treatment:    The following diet has been recommended:  DASH/ TLC , consistent carbohydrate c evening snack       PROVIDER Section:     By signing this assessment you are acknowledging and agree with the diagnosis/diagnoses assigned by the Registered Dietitian    Comments:

## 2019-09-09 LAB
ANION GAP SERPL CALC-SCNC: 9 MMOL/L — SIGNIFICANT CHANGE UP (ref 5–17)
BUN SERPL-MCNC: 30 MG/DL — HIGH (ref 7–23)
CALCIUM SERPL-MCNC: 8 MG/DL — LOW (ref 8.5–10.1)
CHLORIDE SERPL-SCNC: 110 MMOL/L — HIGH (ref 96–108)
CO2 SERPL-SCNC: 21 MMOL/L — LOW (ref 22–31)
CREAT SERPL-MCNC: 1.86 MG/DL — HIGH (ref 0.5–1.3)
GLUCOSE BLDC GLUCOMTR-MCNC: 134 MG/DL — HIGH (ref 70–99)
GLUCOSE BLDC GLUCOMTR-MCNC: 178 MG/DL — HIGH (ref 70–99)
GLUCOSE BLDC GLUCOMTR-MCNC: 193 MG/DL — HIGH (ref 70–99)
GLUCOSE BLDC GLUCOMTR-MCNC: 202 MG/DL — HIGH (ref 70–99)
GLUCOSE SERPL-MCNC: 211 MG/DL — HIGH (ref 70–99)
HCT VFR BLD CALC: 28.3 % — LOW (ref 39–50)
HGB BLD-MCNC: 8.7 G/DL — LOW (ref 13–17)
MCHC RBC-ENTMCNC: 23.9 PG — LOW (ref 27–34)
MCHC RBC-ENTMCNC: 30.7 GM/DL — LOW (ref 32–36)
MCV RBC AUTO: 77.7 FL — LOW (ref 80–100)
NRBC # BLD: 0 /100 WBCS — SIGNIFICANT CHANGE UP (ref 0–0)
PLATELET # BLD AUTO: 249 K/UL — SIGNIFICANT CHANGE UP (ref 150–400)
POTASSIUM SERPL-MCNC: 3.9 MMOL/L — SIGNIFICANT CHANGE UP (ref 3.5–5.3)
POTASSIUM SERPL-SCNC: 3.9 MMOL/L — SIGNIFICANT CHANGE UP (ref 3.5–5.3)
RBC # BLD: 3.64 M/UL — LOW (ref 4.2–5.8)
RBC # FLD: 19.9 % — HIGH (ref 10.3–14.5)
SODIUM SERPL-SCNC: 140 MMOL/L — SIGNIFICANT CHANGE UP (ref 135–145)
WBC # BLD: 6.67 K/UL — SIGNIFICANT CHANGE UP (ref 3.8–10.5)
WBC # FLD AUTO: 6.67 K/UL — SIGNIFICANT CHANGE UP (ref 3.8–10.5)

## 2019-09-09 RX ADMIN — GABAPENTIN 100 MILLIGRAM(S): 400 CAPSULE ORAL at 13:16

## 2019-09-09 RX ADMIN — Medication 1 GRAM(S): at 05:24

## 2019-09-09 RX ADMIN — Medication 200 MILLIGRAM(S): at 21:05

## 2019-09-09 RX ADMIN — DULOXETINE HYDROCHLORIDE 30 MILLIGRAM(S): 30 CAPSULE, DELAYED RELEASE ORAL at 12:19

## 2019-09-09 RX ADMIN — Medication 4000 MILLILITER(S): at 12:19

## 2019-09-09 RX ADMIN — Medication 1 GRAM(S): at 17:42

## 2019-09-09 RX ADMIN — Medication 25 MILLIGRAM(S): at 05:24

## 2019-09-09 RX ADMIN — Medication 200 MILLIGRAM(S): at 08:46

## 2019-09-09 RX ADMIN — Medication 10 MILLIGRAM(S): at 17:42

## 2019-09-09 RX ADMIN — SENNA PLUS 2 TABLET(S): 8.6 TABLET ORAL at 21:05

## 2019-09-09 RX ADMIN — PANTOPRAZOLE SODIUM 40 MILLIGRAM(S): 20 TABLET, DELAYED RELEASE ORAL at 17:42

## 2019-09-09 RX ADMIN — Medication 1 GRAM(S): at 12:19

## 2019-09-09 RX ADMIN — ATORVASTATIN CALCIUM 40 MILLIGRAM(S): 80 TABLET, FILM COATED ORAL at 21:06

## 2019-09-09 RX ADMIN — Medication 25 MILLIGRAM(S): at 17:42

## 2019-09-09 RX ADMIN — PANTOPRAZOLE SODIUM 40 MILLIGRAM(S): 20 TABLET, DELAYED RELEASE ORAL at 05:24

## 2019-09-09 RX ADMIN — Medication 4: at 12:18

## 2019-09-09 RX ADMIN — Medication 2: at 08:44

## 2019-09-09 RX ADMIN — GABAPENTIN 100 MILLIGRAM(S): 400 CAPSULE ORAL at 05:23

## 2019-09-09 RX ADMIN — INSULIN GLARGINE 16 UNIT(S): 100 INJECTION, SOLUTION SUBCUTANEOUS at 21:05

## 2019-09-09 RX ADMIN — GABAPENTIN 100 MILLIGRAM(S): 400 CAPSULE ORAL at 21:05

## 2019-09-10 ENCOUNTER — TRANSCRIPTION ENCOUNTER (OUTPATIENT)
Age: 64
End: 2019-09-10

## 2019-09-10 VITALS
OXYGEN SATURATION: 97 % | TEMPERATURE: 98 F | SYSTOLIC BLOOD PRESSURE: 132 MMHG | HEART RATE: 64 BPM | DIASTOLIC BLOOD PRESSURE: 61 MMHG | RESPIRATION RATE: 16 BRPM

## 2019-09-10 LAB
GLUCOSE BLDC GLUCOMTR-MCNC: 111 MG/DL — HIGH (ref 70–99)
GLUCOSE BLDC GLUCOMTR-MCNC: 124 MG/DL — HIGH (ref 70–99)
HCT VFR BLD CALC: 31.1 % — LOW (ref 39–50)
HGB BLD-MCNC: 9.2 G/DL — LOW (ref 13–17)
MCHC RBC-ENTMCNC: 23.2 PG — LOW (ref 27–34)
MCHC RBC-ENTMCNC: 29.6 GM/DL — LOW (ref 32–36)
MCV RBC AUTO: 78.5 FL — LOW (ref 80–100)
NRBC # BLD: 0 /100 WBCS — SIGNIFICANT CHANGE UP (ref 0–0)
PLATELET # BLD AUTO: 252 K/UL — SIGNIFICANT CHANGE UP (ref 150–400)
RBC # BLD: 3.96 M/UL — LOW (ref 4.2–5.8)
RBC # FLD: 20.9 % — HIGH (ref 10.3–14.5)
SURGICAL PATHOLOGY STUDY: SIGNIFICANT CHANGE UP
WBC # BLD: 8.28 K/UL — SIGNIFICANT CHANGE UP (ref 3.8–10.5)
WBC # FLD AUTO: 8.28 K/UL — SIGNIFICANT CHANGE UP (ref 3.8–10.5)

## 2019-09-10 RX ORDER — FERROUS SULFATE 325(65) MG
325 TABLET ORAL DAILY
Refills: 0 | Status: DISCONTINUED | OUTPATIENT
Start: 2019-09-10 | End: 2019-09-10

## 2019-09-10 RX ORDER — INSULIN GLARGINE 100 [IU]/ML
20 INJECTION, SOLUTION SUBCUTANEOUS
Qty: 0 | Refills: 0 | DISCHARGE

## 2019-09-10 RX ORDER — FERROUS SULFATE 325(65) MG
325 TABLET ORAL
Qty: 90 | Refills: 0
Start: 2019-09-10

## 2019-09-10 RX ORDER — ZOLPIDEM TARTRATE 10 MG/1
1 TABLET ORAL
Qty: 0 | Refills: 0 | DISCHARGE

## 2019-09-10 RX ORDER — ONDANSETRON 8 MG/1
4 TABLET, FILM COATED ORAL ONCE
Refills: 0 | Status: DISCONTINUED | OUTPATIENT
Start: 2019-09-10 | End: 2019-09-10

## 2019-09-10 RX ORDER — LANSOPRAZOLE 15 MG/1
2 CAPSULE, DELAYED RELEASE ORAL
Qty: 120 | Refills: 0
Start: 2019-09-10

## 2019-09-10 RX ORDER — SODIUM CHLORIDE 9 MG/ML
1000 INJECTION, SOLUTION INTRAVENOUS
Refills: 0 | Status: DISCONTINUED | OUTPATIENT
Start: 2019-09-10 | End: 2019-09-10

## 2019-09-10 RX ORDER — CARBAMAZEPINE 200 MG
1 TABLET ORAL
Qty: 0 | Refills: 0 | DISCHARGE

## 2019-09-10 RX ORDER — DULOXETINE HYDROCHLORIDE 30 MG/1
1 CAPSULE, DELAYED RELEASE ORAL
Qty: 0 | Refills: 0 | DISCHARGE

## 2019-09-10 RX ADMIN — PANTOPRAZOLE SODIUM 40 MILLIGRAM(S): 20 TABLET, DELAYED RELEASE ORAL at 05:42

## 2019-09-10 RX ADMIN — SODIUM CHLORIDE 150 MILLILITER(S): 9 INJECTION, SOLUTION INTRAVENOUS at 11:45

## 2019-09-10 RX ADMIN — Medication 325 MILLIGRAM(S): at 13:00

## 2019-09-10 RX ADMIN — GABAPENTIN 100 MILLIGRAM(S): 400 CAPSULE ORAL at 13:00

## 2019-09-10 RX ADMIN — DULOXETINE HYDROCHLORIDE 30 MILLIGRAM(S): 30 CAPSULE, DELAYED RELEASE ORAL at 13:00

## 2019-09-10 RX ADMIN — Medication 1 GRAM(S): at 13:00

## 2019-09-10 RX ADMIN — Medication 25 MILLIGRAM(S): at 05:42

## 2019-09-10 RX ADMIN — Medication 200 MILLIGRAM(S): at 09:45

## 2019-09-10 RX ADMIN — Medication 1 GRAM(S): at 05:40

## 2019-09-10 RX ADMIN — GABAPENTIN 100 MILLIGRAM(S): 400 CAPSULE ORAL at 05:40

## 2019-09-10 RX ADMIN — Medication 1 GRAM(S): at 00:48

## 2019-09-10 NOTE — PROGRESS NOTE ADULT - PROVIDER SPECIALTY LIST ADULT
Gastroenterology
Internal Medicine

## 2019-09-10 NOTE — PROGRESS NOTE ADULT - ASSESSMENT
for colonoscopy on Tuesday.    diabetes under control
severe anemia   GI bleeding   s/p cardiac stent.  GERD  Diabetes
GI bleed - protonix IV for DU. Hol plavix and aspirin for 4 weeks.   blind   RT BKA   Diabetes. HTN
GI bleed - stable-  Duodenal ulcer.   diabetes,   HTn   for colonoscopy
stable. for colonoscopy today and dischrge , if no pathology is found.    Hold plavix and aspirin for 3 weeks.   Iron supplementation.    diabetic management.

## 2019-09-10 NOTE — DISCHARGE NOTE NURSING/CASE MANAGEMENT/SOCIAL WORK - PATIENT PORTAL LINK FT
You can access the FollowMyHealth Patient Portal offered by [facility] by registering at the following website: [email or web address]. By joining [email or web address]’s FollowMyHealth portal, you will also be able to view your health information using other applications (apps) compatible with our system.

## 2019-09-10 NOTE — DISCHARGE NOTE PROVIDER - NSDCFUADDINST_GEN_ALL_CORE_FT
start Iron Fe 325mg 3 times a day  prevacid 30 mg twice a day  Do not take aspirin or Plavix for 3 weeks  Resume all over medications    Both medications are over the counter and can be purchased at Pharmacy.  Follow up with Dr. [facility] start Iron Fe 325mg 3 times a day  prevacid 30 mg twice a day    Do not take aspirin or Plavix for 3 weeks  Resume all other home medications    Both medications are over the counter and can be purchased at Pharmacy.  Follow up with Dr. [facility]

## 2019-09-10 NOTE — DISCHARGE NOTE PROVIDER - NSDCCPCAREPLAN_GEN_ALL_CORE_FT
PRINCIPAL DISCHARGE DIAGNOSIS  Diagnosis: Anemia  Assessment and Plan of Treatment:       SECONDARY DISCHARGE DIAGNOSES  Diagnosis: Diabetes mellitus  Assessment and Plan of Treatment: continue to monitor blood sugar and continue medication regime  Assessment/Plan  HTN   continue blood pressure medication PRINCIPAL DISCHARGE DIAGNOSIS  Diagnosis: Anemia  Assessment and Plan of Treatment: secondary to gastritis and Duodenal ulcer   start iron fe three times a day  prevacid  30 mg twice a day      SECONDARY DISCHARGE DIAGNOSES  Diagnosis: Diabetes mellitus  Assessment and Plan of Treatment: continue to monitor blood sugar and continue medication regime  Assessment/Plan  HTN   continue blood pressure medication

## 2019-09-10 NOTE — PROGRESS NOTE ADULT - SUBJECTIVE AND OBJECTIVE BOX
Patient is a 64y old  Male who presents with a chief complaint of evere anemia, diabetes, , Gi bleeding (05 Sep 2019 15:34)  feeling improved after 2 unoits of PRBC/   Hb 7.7   serum iron is low. , occult blood is positive.    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 1000 milliLiter(s) (50 mL/Hr) IV Continuous <Continuous>  dextrose 50% Injectable 12.5 Gram(s) IV Push once  dextrose 50% Injectable 25 Gram(s) IV Push once  dextrose 50% Injectable 25 Gram(s) IV Push once  DULoxetine 30 milliGRAM(s) Oral daily  gabapentin 100 milliGRAM(s) Oral three times a day  influenza   Vaccine 0.5 milliLiter(s) IntraMuscular once  insulin glargine Injectable (LANTUS) 16 Unit(s) SubCutaneous at bedtime  insulin lispro (HumaLOG) corrective regimen sliding scale   SubCutaneous three times a day before meals  insulin lispro (HumaLOG) corrective regimen sliding scale   SubCutaneous at bedtime  metoprolol tartrate 25 milliGRAM(s) Oral two times a day  morphine  - Injectable 2 milliGRAM(s) IV Push once  pantoprazole    Tablet 40 milliGRAM(s) Oral before breakfast  senna 2 Tablet(s) Oral at bedtime    MEDICATIONS  (PRN):  dextrose 40% Gel 15 Gram(s) Oral once PRN Blood Glucose LESS THAN 70 milliGRAM(s)/deciliter  glucagon  Injectable 1 milliGRAM(s) IntraMuscular once PRN Glucose LESS THAN 70 milligrams/deciliter  magnesium hydroxide Suspension 30 milliLiter(s) Oral daily PRN Constipation  zaleplon 5 milliGRAM(s) Oral at bedtime PRN Insomnia      Allergies    No Known Allergies    Intolerances        REVIEW OF SYSTEMS:  CONSTITUTIONAL: No fever, weight loss, or fatigue  EYES: No eye pain, visual disturbances, or discharge  ENMT:  No difficulty hearing, tinnitus, vertigo; No sinus or throat pain  NECK: No pain or stiffness  BREASTS: No pain, masses, or nipple discharge  RESPIRATORY: No cough, wheezing, chills or hemoptysis; No shortness of breath  CARDIOVASCULAR: No chest pain, palpitations, dizziness, or leg swelling  GASTROINTESTINAL: No abdominal or epigastric pain. No nausea, vomiting, or hematemesis; No diarrhea or constipation. No melena or hematochezia.  GENITOURINARY: No dysuria, frequency, hematuria, or incontinence  NEUROLOGICAL: No headaches, memory loss, loss of strength, numbness, or tremors  SKIN: No itching, burning, rashes, or lesions   LYMPH NODES: No enlarged glands  ENDOCRINE: No heat or cold intolerance; No hair loss  MUSCULOSKELETAL: No joint pain or swelling; No muscle, back, or extremity pain  PSYCHIATRIC: No depression, anxiety, mood swings, or difficulty sleeping  HEME/LYMPH: No easy bruising, or bleeding gums  ALLERY AND IMMUNOLOGIC: No hives or eczema    Vital Signs Last 24 Hrs  T(C): 36.7 (06 Sep 2019 05:25), Max: 37.1 (05 Sep 2019 13:40)  T(F): 98 (06 Sep 2019 05:25), Max: 98.8 (05 Sep 2019 13:40)  HR: 70 (06 Sep 2019 05:25) (68 - 80)  BP: 146/64 (06 Sep 2019 05:25) (120/56 - 157/72)  BP(mean): --  RR: 16 (06 Sep 2019 05:25) (14 - 18)  SpO2: 97% (06 Sep 2019 05:25) (97% - 99%)    PHYSICAL EXAM:  GENERAL: NAD, well-groomed, well-developed  HEAD:  Atraumatic, Normocephalic  EYES: EOMI, PERRLA, conjunctiva and sclera clear. Blind  ENMT: No tonsillar erythema, exudates, or enlargement; Moist mucous membranes, Good dentition, No lesions  NECK: Supple, No JVD, Normal thyroid  NERVOUS SYSTEM:  Alert & Oriented X3, Good concentration; Motor Strength 5/5 B/L upper and lower extremities; DTRs 2+ intact and symmetric  CHEST/LUNG: Clear to percussion bilaterally; No rales, rhonchi, wheezing, or rubs  HEART: Regular rate and rhythm; No murmurs, rubs, or gallops  ABDOMEN: Soft, Nontender, Nondistended; Bowel sounds present  EXTREMITIES:  2+ Peripheral Pulses, No clubbing, cyanosis, or edema. Rt BKA  LYMPH: No lymphadenopathy noted  SKIN: No rashes or lesions    LABS:                        7.7    8.28  )-----------( 270      ( 06 Sep 2019 07:53 )             25.0     09-06    140  |  111<H>  |  38<H>  ----------------------------<  101<H>  4.3   |  21<L>  |  1.87<H>    Ca    8.3<L>      06 Sep 2019 07:53    TPro  6.0  /  Alb  2.5<L>  /  TBili  0.2  /  DBili  x   /  AST  7<L>  /  ALT  13  /  AlkPhos  84  09-05      PT/INR - ( 05 Sep 2019 11:54 )   PT: 13.3 sec;   INR: 1.18 ratio         PTT - ( 05 Sep 2019 11:54 )  PTT:29.9 sec    CAPILLARY BLOOD GLUCOSE      POCT Blood Glucose.: 111 mg/dL (06 Sep 2019 08:36)  POCT Blood Glucose.: 149 mg/dL (05 Sep 2019 21:39)  POCT Blood Glucose.: 150 mg/dL (05 Sep 2019 15:34)                RADIOLOGY & ADDITIONAL TESTS:    Imaging Personally Reviewed:  [ ] YES  [ ] NO    Consultant(s) Notes Reviewed:  [ ] YES  [ ] NO    Care Discussed with Consultants/Other Providers [ ] YES  [ ] NO    Care discussed with family,         [  ]   yes  [  ]  No    imp:    stable[ ]    unstable[  ]     improving [  x ]       unchanged  [  ]                Plans:  Continue present plans  [x  ] for EGD today               New consult [  ]   specialty  .......               order test[  ]    test name.  labs in am                Discharge Planning  [  x]
Patient is a 64y old  Male who presents with a chief complaint of evere anemia, diabetes, , Gi bleeding (07 Sep 2019 09:58)  duodenal unlcer with gi bleed.   s/p transfusions.   for colonoscopy on Tuesday.     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 1000 milliLiter(s) (50 mL/Hr) IV Continuous <Continuous>  dextrose 50% Injectable 12.5 Gram(s) IV Push once  dextrose 50% Injectable 25 Gram(s) IV Push once  dextrose 50% Injectable 25 Gram(s) IV Push once  DULoxetine 30 milliGRAM(s) Oral daily  gabapentin 100 milliGRAM(s) Oral three times a day  influenza   Vaccine 0.5 milliLiter(s) IntraMuscular once  insulin glargine Injectable (LANTUS) 16 Unit(s) SubCutaneous at bedtime  insulin lispro (HumaLOG) corrective regimen sliding scale   SubCutaneous three times a day before meals  insulin lispro (HumaLOG) corrective regimen sliding scale   SubCutaneous at bedtime  metoprolol tartrate 25 milliGRAM(s) Oral two times a day  pantoprazole  Injectable 40 milliGRAM(s) IV Push every 12 hours  senna 2 Tablet(s) Oral at bedtime  sucralfate 1 Gram(s) Oral four times a day    MEDICATIONS  (PRN):  dextrose 40% Gel 15 Gram(s) Oral once PRN Blood Glucose LESS THAN 70 milliGRAM(s)/deciliter  glucagon  Injectable 1 milliGRAM(s) IntraMuscular once PRN Glucose LESS THAN 70 milligrams/deciliter  magnesium hydroxide Suspension 30 milliLiter(s) Oral daily PRN Constipation  zaleplon 5 milliGRAM(s) Oral at bedtime PRN Insomnia      Allergies    No Known Allergies    Intolerances        REVIEW OF SYSTEMS:  CONSTITUTIONAL: No fever, weight loss, or fatigue  EYES: No eye pain, visual disturbances, or discharge  ENMT:  No difficulty hearing, tinnitus, vertigo; No sinus or throat pain  NECK: No pain or stiffness  BREASTS: No pain, masses, or nipple discharge  RESPIRATORY: No cough, wheezing, chills or hemoptysis; No shortness of breath  CARDIOVASCULAR: No chest pain, palpitations, dizziness, or leg swelling  GASTROINTESTINAL: No abdominal or epigastric pain. No nausea, vomiting, or hematemesis; No diarrhea or constipation. No melena or hematochezia.  GENITOURINARY: No dysuria, frequency, hematuria, or incontinence  NEUROLOGICAL: No headaches, memory loss, loss of strength, numbness, or tremors  SKIN: No itching, burning, rashes, or lesions   LYMPH NODES: No enlarged glands  ENDOCRINE: No heat or cold intolerance; No hair loss  MUSCULOSKELETAL: No joint pain or swelling; No muscle, back, or extremity pain  PSYCHIATRIC: No depression, anxiety, mood swings, or difficulty sleeping  HEME/LYMPH: No easy bruising, or bleeding gums  ALLERY AND IMMUNOLOGIC: No hives or eczema    Vital Signs Last 24 Hrs  T(C): 36.9 (08 Sep 2019 05:01), Max: 36.9 (07 Sep 2019 13:00)  T(F): 98.5 (08 Sep 2019 05:01), Max: 98.5 (07 Sep 2019 13:00)  HR: 77 (08 Sep 2019 05:01) (62 - 77)  BP: 150/79 (08 Sep 2019 05:01) (116/52 - 150/79)  BP(mean): --  RR: 17 (08 Sep 2019 05:01) (17 - 17)  SpO2: 98% (08 Sep 2019 05:01) (97% - 98%)    PHYSICAL EXAM:  GENERAL: NAD, well-groomed, well-developed  HEAD:  Atraumatic, Normocephalic  EYES: EOMI, Blind in both eyes., conjunctiva and sclera clear  ENMT: No tonsillar erythema, exudates, or enlargement; Moist mucous membranes, Good dentition, No lesions  NECK: Supple, No JVD, Normal thyroid  NERVOUS SYSTEM:  Alert & Oriented X3, Good concentration; Motor Strength 5/5 B/L upper and lower extremities; DTRs 2+ intact and symmetric  CHEST/LUNG: Clear to percussion bilaterally; No rales, rhonchi, wheezing, or rubs  HEART: Regular rate and rhythm; No murmurs, rubs, or gallops  ABDOMEN: Soft, Nontender, Nondistended; Bowel sounds present  EXTREMITIES:  2+ Peripheral Pulses, No clubbing, cyanosis, or edema. Rt BKA  LYMPH: No lymphadenopathy noted  SKIN: No rashes or lesions    LABS:                        8.4    8.12  )-----------( 239      ( 07 Sep 2019 08:18 )             27.3     09-07    139  |  110<H>  |  31<H>  ----------------------------<  82  4.1   |  22  |  1.92<H>    Ca    8.3<L>      07 Sep 2019 08:18            CAPILLARY BLOOD GLUCOSE      POCT Blood Glucose.: 191 mg/dL (08 Sep 2019 07:58)  POCT Blood Glucose.: 199 mg/dL (07 Sep 2019 22:34)  POCT Blood Glucose.: 140 mg/dL (07 Sep 2019 16:56)  POCT Blood Glucose.: 120 mg/dL (07 Sep 2019 11:45)          Hemoglobin A1C, Whole Blood: 7.2 % (09-06 @ 10:33)        RADIOLOGY & ADDITIONAL TESTS:    Imaging Personally Reviewed:  [ ] YES  [ ] NO    Consultant(s) Notes Reviewed:  [ ] YES  [ ] NO    Care Discussed with Consultants/Other Providers [ ] YES  [ ] NO    Care discussed with family,         [  ]   yes  [  ]  No    imp:    stable[ ]    unstable[  ]     improving [   ]       unchanged  [  ]                Plans:  Continue present plans  [ x ] GI follow up.               New consult [  ]   specialty  .......               order test[  ]    test name.  labs in am                Discharge Planning  [  ]
Patient is a 64y old  Male who presents with a chief complaint of evere anemia, diabetes, , Gi bleeding (06 Sep 2019 14:21)  patient had EGD yesterday - showed gastritis and DU. . On IV protonix now BID  Colonoscopy advised. patient has disabilities and will stay for colonoscopy, to be scheduled fr tuesday by Dr Rawls.    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 1000 milliLiter(s) (50 mL/Hr) IV Continuous <Continuous>  dextrose 50% Injectable 12.5 Gram(s) IV Push once  dextrose 50% Injectable 25 Gram(s) IV Push once  dextrose 50% Injectable 25 Gram(s) IV Push once  DULoxetine 30 milliGRAM(s) Oral daily  gabapentin 100 milliGRAM(s) Oral three times a day  influenza   Vaccine 0.5 milliLiter(s) IntraMuscular once  insulin glargine Injectable (LANTUS) 16 Unit(s) SubCutaneous at bedtime  insulin lispro (HumaLOG) corrective regimen sliding scale   SubCutaneous three times a day before meals  insulin lispro (HumaLOG) corrective regimen sliding scale   SubCutaneous at bedtime  metoprolol tartrate 25 milliGRAM(s) Oral two times a day  pantoprazole  Injectable 40 milliGRAM(s) IV Push every 12 hours  senna 2 Tablet(s) Oral at bedtime  sucralfate 1 Gram(s) Oral four times a day    MEDICATIONS  (PRN):  dextrose 40% Gel 15 Gram(s) Oral once PRN Blood Glucose LESS THAN 70 milliGRAM(s)/deciliter  glucagon  Injectable 1 milliGRAM(s) IntraMuscular once PRN Glucose LESS THAN 70 milligrams/deciliter  magnesium hydroxide Suspension 30 milliLiter(s) Oral daily PRN Constipation  zaleplon 5 milliGRAM(s) Oral at bedtime PRN Insomnia      Allergies    No Known Allergies    Intolerances        REVIEW OF SYSTEMS:  CONSTITUTIONAL: No fever, weight loss, or fatigue  EYES: No eye pain, visual disturbances, or discharge  ENMT:  No difficulty hearing, tinnitus, vertigo; No sinus or throat pain  NECK: No pain or stiffness  BREASTS: No pain, masses, or nipple discharge  RESPIRATORY: No cough, wheezing, chills or hemoptysis; No shortness of breath  CARDIOVASCULAR: No chest pain, palpitations, dizziness, or leg swelling  GASTROINTESTINAL: No abdominal or epigastric pain. No nausea, vomiting, or hematemesis; No diarrhea or constipation. No melena or hematochezia.  GENITOURINARY: No dysuria, frequency, hematuria, or incontinence  NEUROLOGICAL: No headaches, memory loss, loss of strength, numbness, or tremors  SKIN: No itching, burning, rashes, or lesions   LYMPH NODES: No enlarged glands  ENDOCRINE: No heat or cold intolerance; No hair loss  MUSCULOSKELETAL: No joint pain or swelling; No muscle, back, or extremity pain  PSYCHIATRIC: No depression, anxiety, mood swings, or difficulty sleeping  HEME/LYMPH: No easy bruising, or bleeding gums  ALLERY AND IMMUNOLOGIC: No hives or eczema    Vital Signs Last 24 Hrs  T(C): 36.7 (07 Sep 2019 05:39), Max: 36.8 (06 Sep 2019 11:20)  T(F): 98 (07 Sep 2019 05:39), Max: 98.3 (06 Sep 2019 11:20)  HR: 69 (07 Sep 2019 05:39) (60 - 96)  BP: 137/70 (07 Sep 2019 05:39) (104/60 - 158/65)  BP(mean): --  RR: 18 (07 Sep 2019 05:39) (14 - 18)  SpO2: 97% (07 Sep 2019 05:39) (96% - 100%)    PHYSICAL EXAM:  GENERAL: NAD, well-groomed, well-developed  HEAD:  Atraumatic, Normocephalic  EYES: EOMI, PERRLA, conjunctiva and sclera clear  ENMT: No tonsillar erythema, exudates, or enlargement; Moist mucous membranes, Good dentition, No lesions  NECK: Supple, No JVD, Normal thyroid  NERVOUS SYSTEM:  Alert & Oriented X3, Good concentration; Motor Strength 5/5 B/L upper and lower extremities; DTRs 2+ intact and symmetric  CHEST/LUNG: Clear to percussion bilaterally; No rales, rhonchi, wheezing, or rubs  HEART: Regular rate and rhythm; No murmurs, rubs, or gallops  ABDOMEN: Soft, Nontender, Nondistended; Bowel sounds present  EXTREMITIES:  2+ Peripheral Pulses, No clubbing, cyanosis, or edema  LYMPH: No lymphadenopathy noted  SKIN: No rashes or lesions    LABS:                        8.4    8.12  )-----------( 239      ( 07 Sep 2019 08:18 )             27.3     09-07    139  |  110<H>  |  31<H>  ----------------------------<  82  4.1   |  22  |  1.92<H>    Ca    8.3<L>      07 Sep 2019 08:18    TPro  6.0  /  Alb  2.5<L>  /  TBili  0.2  /  DBili  x   /  AST  7<L>  /  ALT  13  /  AlkPhos  84  09-05      PT/INR - ( 05 Sep 2019 11:54 )   PT: 13.3 sec;   INR: 1.18 ratio         PTT - ( 05 Sep 2019 11:54 )  PTT:29.9 sec    CAPILLARY BLOOD GLUCOSE      POCT Blood Glucose.: 82 mg/dL (07 Sep 2019 08:27)  POCT Blood Glucose.: 135 mg/dL (06 Sep 2019 21:13)  POCT Blood Glucose.: 81 mg/dL (06 Sep 2019 16:42)  POCT Blood Glucose.: 87 mg/dL (06 Sep 2019 11:51)          Hemoglobin A1C, Whole Blood: 7.2 % (09-06 @ 10:33)        RADIOLOGY & ADDITIONAL TESTS:    Imaging Personally Reviewed:  [ ] YES  [ ] NO    Consultant(s) Notes Reviewed:  [ ] YES  [ ] NO    Care Discussed with Consultants/Other Providers [ ] YES  [ ] NO    Care discussed with family,         [  ]   yes  [  ]  No    imp:    stable[ ]    unstable[  ]     improving [ x  ]       unchanged  [  ]                Plans:  Continue present plans  [x  ] for colonoscopy, continue Iv protonix                New consult [  ]   specialty  .......               order test[  ]    test name.                  Discharge Planning  [  ]
Patient is a 64y old  Male who presents with a chief complaint of evere anemia, diabetes, , Gi bleeding (08 Sep 2019 21:52)  GI bleed and anemia- Stable after transfusion.    for colonoscopy tomorrow.   diabetes stable.    INTERVAL HPI/OVERNIGHT EVENTS:  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  Unilateral complete BKA, right, subsequent encounter  History of coronary artery stent placement      MEDICATIONS  (STANDING):  atorvastatin 40 milliGRAM(s) Oral at bedtime  bisacodyl 10 milliGRAM(s) Oral once  carBAMazepine 200 milliGRAM(s) Oral two times a day  dextrose 5%. 1000 milliLiter(s) (50 mL/Hr) IV Continuous <Continuous>  dextrose 50% Injectable 12.5 Gram(s) IV Push once  dextrose 50% Injectable 25 Gram(s) IV Push once  dextrose 50% Injectable 25 Gram(s) IV Push once  DULoxetine 30 milliGRAM(s) Oral daily  gabapentin 100 milliGRAM(s) Oral three times a day  influenza   Vaccine 0.5 milliLiter(s) IntraMuscular once  insulin glargine Injectable (LANTUS) 16 Unit(s) SubCutaneous at bedtime  insulin lispro (HumaLOG) corrective regimen sliding scale   SubCutaneous three times a day before meals  insulin lispro (HumaLOG) corrective regimen sliding scale   SubCutaneous at bedtime  metoprolol tartrate 25 milliGRAM(s) Oral two times a day  pantoprazole  Injectable 40 milliGRAM(s) IV Push every 12 hours  polyethylene glycol/electrolyte Solution. 4000 milliLiter(s) Oral once  senna 2 Tablet(s) Oral at bedtime  sucralfate 1 Gram(s) Oral four times a day    MEDICATIONS  (PRN):  dextrose 40% Gel 15 Gram(s) Oral once PRN Blood Glucose LESS THAN 70 milliGRAM(s)/deciliter  glucagon  Injectable 1 milliGRAM(s) IntraMuscular once PRN Glucose LESS THAN 70 milligrams/deciliter  magnesium hydroxide Suspension 30 milliLiter(s) Oral daily PRN Constipation  zaleplon 5 milliGRAM(s) Oral at bedtime PRN Insomnia      Allergies    No Known Allergies    Intolerances        REVIEW OF SYSTEMS:  CONSTITUTIONAL: No fever, weight loss, or fatigue  EYES: No eye pain, visual disturbances, or discharge  ENMT:  No difficulty hearing, tinnitus, vertigo; No sinus or throat pain  NECK: No pain or stiffness  BREASTS: No pain, masses, or nipple discharge  RESPIRATORY: No cough, wheezing, chills or hemoptysis; No shortness of breath  CARDIOVASCULAR: No chest pain, palpitations, dizziness, or leg swelling  GASTROINTESTINAL: No abdominal or epigastric pain. No nausea, vomiting, or hematemesis; No diarrhea or constipation. No melena or hematochezia.  GENITOURINARY: No dysuria, frequency, hematuria, or incontinence  NEUROLOGICAL: No headaches, memory loss, loss of strength, numbness, or tremors  SKIN: No itching, burning, rashes, or lesions   LYMPH NODES: No enlarged glands  ENDOCRINE: No heat or cold intolerance; No hair loss  MUSCULOSKELETAL: No joint pain or swelling; No muscle, back, or extremity pain  PSYCHIATRIC: No depression, anxiety, mood swings, or difficulty sleeping  HEME/LYMPH: No easy bruising, or bleeding gums  ALLERY AND IMMUNOLOGIC: No hives or eczema    Vital Signs Last 24 Hrs  T(C): 36.8 (09 Sep 2019 05:20), Max: 36.8 (09 Sep 2019 05:20)  T(F): 98.2 (09 Sep 2019 05:20), Max: 98.2 (09 Sep 2019 05:20)  HR: 66 (09 Sep 2019 05:20) (60 - 68)  BP: 137/66 (09 Sep 2019 05:20) (135/74 - 147/60)  BP(mean): --  RR: 17 (09 Sep 2019 05:20) (16 - 17)  SpO2: 96% (09 Sep 2019 05:20) (96% - 98%)    PHYSICAL EXAM:  GENERAL: NAD, well-groomed, well-developed  HEAD:  Atraumatic, Normocephalic  EYES: EOMI, blind, conjunctiva and sclera clear  ENMT: No tonsillar erythema, exudates, or enlargement; Moist mucous membranes, Good dentition, No lesions  NECK: Supple, No JVD, Normal thyroid  NERVOUS SYSTEM:  Alert & Oriented X3, Good concentration; Motor Strength 5/5 B/L upper and lower extremities; DTRs 2+ intact and symmetric  CHEST/LUNG: Clear to percussion bilaterally; No rales, rhonchi, wheezing, or rubs  HEART: Regular rate and rhythm; No murmurs, rubs, or gallops  ABDOMEN: Soft, Nontender, Nondistended; Bowel sounds present  EXTREMITIES:  2+ Peripheral Pulses, No clubbing, cyanosis, or edema. Left bka  LYMPH: No lymphadenopathy noted  SKIN: No rashes or lesions    LABS:                        8.7    6.67  )-----------( 249      ( 09 Sep 2019 07:26 )             28.3     09-09    140  |  110<H>  |  30<H>  ----------------------------<  211<H>  3.9   |  21<L>  |  1.86<H>    Ca    8.0<L>      09 Sep 2019 07:26            CAPILLARY BLOOD GLUCOSE      POCT Blood Glucose.: 178 mg/dL (09 Sep 2019 08:18)  POCT Blood Glucose.: 288 mg/dL (08 Sep 2019 23:08)  POCT Blood Glucose.: 186 mg/dL (08 Sep 2019 16:58)  POCT Blood Glucose.: 170 mg/dL (08 Sep 2019 12:05)          Hemoglobin A1C, Whole Blood: 7.2 % (09-06 @ 10:33)        RADIOLOGY & ADDITIONAL TESTS:    Imaging Personally Reviewed:  [ ] YES  [ ] NO    Consultant(s) Notes Reviewed:  [ ] YES  [ ] NO    Care Discussed with Consultants/Other Providers [ ] YES  [ ] NO    Care discussed with family,         [  ]   yes  [  ]  No    imp:    stable[ ]    unstable[  ]     improving [ x  ]       unchanged  [  ]                Plans:  Continue present plans  [ x ] continue Iv protonix during hospitalization               New consult [  ]   specialty  .......               order test[  ]    test name.                  Discharge Planning  [  ]
Patient is a 64y old  Male who presents with a chief complaint of evere anemia, diabetes, , Gi bleeding (09 Sep 2019 16:42)  patient with DU, gastitis. . for colonoscopy today.   Hb stable/    INTERVAL HPI/OVERNIGHT EVENTS: un eventful.  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  Unilateral complete BKA, right, subsequent encounter  History of coronary artery stent placement      MEDICATIONS  (STANDING):  atorvastatin 40 milliGRAM(s) Oral at bedtime  carBAMazepine 200 milliGRAM(s) Oral two times a day  dextrose 5%. 1000 milliLiter(s) (50 mL/Hr) IV Continuous <Continuous>  dextrose 50% Injectable 12.5 Gram(s) IV Push once  dextrose 50% Injectable 25 Gram(s) IV Push once  dextrose 50% Injectable 25 Gram(s) IV Push once  DULoxetine 30 milliGRAM(s) Oral daily  gabapentin 100 milliGRAM(s) Oral three times a day  influenza   Vaccine 0.5 milliLiter(s) IntraMuscular once  insulin glargine Injectable (LANTUS) 16 Unit(s) SubCutaneous at bedtime  insulin lispro (HumaLOG) corrective regimen sliding scale   SubCutaneous three times a day before meals  insulin lispro (HumaLOG) corrective regimen sliding scale   SubCutaneous at bedtime  metoprolol tartrate 25 milliGRAM(s) Oral two times a day  pantoprazole  Injectable 40 milliGRAM(s) IV Push every 12 hours  senna 2 Tablet(s) Oral at bedtime  sucralfate 1 Gram(s) Oral four times a day    MEDICATIONS  (PRN):  dextrose 40% Gel 15 Gram(s) Oral once PRN Blood Glucose LESS THAN 70 milliGRAM(s)/deciliter  glucagon  Injectable 1 milliGRAM(s) IntraMuscular once PRN Glucose LESS THAN 70 milligrams/deciliter  magnesium hydroxide Suspension 30 milliLiter(s) Oral daily PRN Constipation  zaleplon 5 milliGRAM(s) Oral at bedtime PRN Insomnia      Allergies    No Known Allergies    Intolerances        REVIEW OF SYSTEMS:  CONSTITUTIONAL: No fever, weight loss, or fatigue  EYES: No eye pain, visual disturbances, or discharge  ENMT:  No difficulty hearing, tinnitus, vertigo; No sinus or throat pain  NECK: No pain or stiffness  BREASTS: No pain, masses, or nipple discharge  RESPIRATORY: No cough, wheezing, chills or hemoptysis; No shortness of breath  CARDIOVASCULAR: No chest pain, palpitations, dizziness, or leg swelling  GASTROINTESTINAL: No abdominal or epigastric pain. No nausea, vomiting, or hematemesis; No diarrhea or constipation. No melena or hematochezia.  GENITOURINARY: No dysuria, frequency, hematuria, or incontinence  NEUROLOGICAL: No headaches, memory loss, loss of strength, numbness, or tremors  SKIN: No itching, burning, rashes, or lesions   LYMPH NODES: No enlarged glands  ENDOCRINE: No heat or cold intolerance; No hair loss  MUSCULOSKELETAL: No joint pain or swelling; No muscle, back, or extremity pain  PSYCHIATRIC: No depression, anxiety, mood swings, or difficulty sleeping  HEME/LYMPH: No easy bruising, or bleeding gums  ALLERY AND IMMUNOLOGIC: No hives or eczema    Vital Signs Last 24 Hrs  T(C): 36.7 (10 Sep 2019 10:27), Max: 36.8 (09 Sep 2019 16:35)  T(F): 98 (10 Sep 2019 10:27), Max: 98.2 (09 Sep 2019 16:35)  HR: 78 (10 Sep 2019 10:27) (60 - 88)  BP: 132/76 (10 Sep 2019 10:27) (121/59 - 166/83)  BP(mean): --  RR: 17 (10 Sep 2019 10:27) (16 - 18)  SpO2: 97% (10 Sep 2019 05:22) (97% - 98%)    PHYSICAL EXAM:  GENERAL: NAD, well-groomed, well-developed  HEAD:  Atraumatic, Normocephalic  EYES: EOMI, Blind, conjunctiva and sclera clear  ENMT: No tonsillar erythema, exudates, or enlargement; Moist mucous membranes, Good dentition, No lesions  NECK: Supple, No JVD, Normal thyroid  NERVOUS SYSTEM:  Alert & Oriented X3, Good concentration; Motor Strength 5/5 B/L upper and lower extremities; DTRs 2+ intact and symmetric  CHEST/LUNG: Clear to percussion bilaterally; No rales, rhonchi, wheezing, or rubs  HEART: Regular rate and rhythm; No murmurs, rubs, or gallops  ABDOMEN: Soft, Nontender, Nondistended; Bowel sounds present  EXTREMITIES:  2+ Peripheral Pulses, No clubbing, cyanosis, or edema. rt BKA  LYMPH: No lymphadenopathy noted  SKIN: No rashes or lesions    LABS:                        9.2    8.28  )-----------( 252      ( 10 Sep 2019 08:25 )             31.1     09-09    140  |  110<H>  |  30<H>  ----------------------------<  211<H>  3.9   |  21<L>  |  1.86<H>    Ca    8.0<L>      09 Sep 2019 07:26            CAPILLARY BLOOD GLUCOSE      POCT Blood Glucose.: 124 mg/dL (10 Sep 2019 08:31)  POCT Blood Glucose.: 193 mg/dL (09 Sep 2019 21:03)  POCT Blood Glucose.: 134 mg/dL (09 Sep 2019 16:11)  POCT Blood Glucose.: 202 mg/dL (09 Sep 2019 11:33)          Hemoglobin A1C, Whole Blood: 7.2 % (09-06 @ 10:33)        RADIOLOGY & ADDITIONAL TESTS:    Imaging Personally Reviewed:  [ ] YES  [ ] NO    Consultant(s) Notes Reviewed:  [ ] YES  [ ] NO    Care Discussed with Consultants/Other Providers [ ] YES  [ ] NO    Care discussed with family,         [  ]   yes  [  ]  No    imp:    stable[ ]    unstable[  ]     improving [ x  ]       unchanged  [  ]                Plans:  Continue present plans  [x  ]               New consult [  ]   specialty  .......               order test[  ]    test name.                  Discharge Planning  [ x ]
Pt stable - no active bleeding  on protonix/carafate      MEDICATIONS  (STANDING):  atorvastatin 40 milliGRAM(s) Oral at bedtime  bisacodyl 10 milliGRAM(s) Oral once  carBAMazepine 200 milliGRAM(s) Oral two times a day  dextrose 5%. 1000 milliLiter(s) (50 mL/Hr) IV Continuous <Continuous>  dextrose 50% Injectable 12.5 Gram(s) IV Push once  dextrose 50% Injectable 25 Gram(s) IV Push once  dextrose 50% Injectable 25 Gram(s) IV Push once  DULoxetine 30 milliGRAM(s) Oral daily  gabapentin 100 milliGRAM(s) Oral three times a day  influenza   Vaccine 0.5 milliLiter(s) IntraMuscular once  insulin glargine Injectable (LANTUS) 16 Unit(s) SubCutaneous at bedtime  insulin lispro (HumaLOG) corrective regimen sliding scale   SubCutaneous three times a day before meals  insulin lispro (HumaLOG) corrective regimen sliding scale   SubCutaneous at bedtime  metoprolol tartrate 25 milliGRAM(s) Oral two times a day  pantoprazole  Injectable 40 milliGRAM(s) IV Push every 12 hours  senna 2 Tablet(s) Oral at bedtime  sucralfate 1 Gram(s) Oral four times a day    MEDICATIONS  (PRN):  dextrose 40% Gel 15 Gram(s) Oral once PRN Blood Glucose LESS THAN 70 milliGRAM(s)/deciliter  glucagon  Injectable 1 milliGRAM(s) IntraMuscular once PRN Glucose LESS THAN 70 milligrams/deciliter  magnesium hydroxide Suspension 30 milliLiter(s) Oral daily PRN Constipation  zaleplon 5 milliGRAM(s) Oral at bedtime PRN Insomnia      Allergies    No Known Allergies    Intolerances        Vital Signs Last 24 Hrs  T(C): 36.7 (09 Sep 2019 11:54), Max: 36.8 (09 Sep 2019 05:20)  T(F): 98.1 (09 Sep 2019 11:54), Max: 98.2 (09 Sep 2019 05:20)  HR: 60 (09 Sep 2019 11:54) (60 - 68)  BP: 121/59 (09 Sep 2019 11:54) (121/59 - 142/76)  BP(mean): --  RR: 18 (09 Sep 2019 11:54) (16 - 18)  SpO2: 98% (09 Sep 2019 11:54) (96% - 98%)    PHYSICAL EXAM:  General: NAD.  CVS: S1, S2  Chest: air entry bilaterally present  Abd: BS present, soft, non-tender      LABS:                        8.7    6.67  )-----------( 249      ( 09 Sep 2019 07:26 )             28.3     09-09    140  |  110<H>  |  30<H>  ----------------------------<  211<H>  3.9   |  21<L>  |  1.86<H>    Ca    8.0<L>      09 Sep 2019 07:26        colon prep in progress  NPO after MN  CBC in AM
Pt transfused 3u prbc's  had small DU with heme on endoscopy      MEDICATIONS  (STANDING):  atorvastatin 40 milliGRAM(s) Oral at bedtime  carBAMazepine 200 milliGRAM(s) Oral two times a day  dextrose 5%. 1000 milliLiter(s) (50 mL/Hr) IV Continuous <Continuous>  dextrose 50% Injectable 12.5 Gram(s) IV Push once  dextrose 50% Injectable 25 Gram(s) IV Push once  dextrose 50% Injectable 25 Gram(s) IV Push once  DULoxetine 30 milliGRAM(s) Oral daily  gabapentin 100 milliGRAM(s) Oral three times a day  influenza   Vaccine 0.5 milliLiter(s) IntraMuscular once  insulin glargine Injectable (LANTUS) 16 Unit(s) SubCutaneous at bedtime  insulin lispro (HumaLOG) corrective regimen sliding scale   SubCutaneous three times a day before meals  insulin lispro (HumaLOG) corrective regimen sliding scale   SubCutaneous at bedtime  metoprolol tartrate 25 milliGRAM(s) Oral two times a day  pantoprazole  Injectable 40 milliGRAM(s) IV Push every 12 hours  senna 2 Tablet(s) Oral at bedtime  sucralfate 1 Gram(s) Oral four times a day    MEDICATIONS  (PRN):  dextrose 40% Gel 15 Gram(s) Oral once PRN Blood Glucose LESS THAN 70 milliGRAM(s)/deciliter  glucagon  Injectable 1 milliGRAM(s) IntraMuscular once PRN Glucose LESS THAN 70 milligrams/deciliter  magnesium hydroxide Suspension 30 milliLiter(s) Oral daily PRN Constipation  zaleplon 5 milliGRAM(s) Oral at bedtime PRN Insomnia      Allergies    No Known Allergies    Intolerances        Vital Signs Last 24 Hrs  T(C): 36.2 (08 Sep 2019 17:40), Max: 36.9 (08 Sep 2019 05:01)  T(F): 97.1 (08 Sep 2019 17:40), Max: 98.5 (08 Sep 2019 05:01)  HR: 67 (08 Sep 2019 17:40) (60 - 77)  BP: 142/76 (08 Sep 2019 17:40) (134/77 - 150/79)  BP(mean): --  RR: 16 (08 Sep 2019 17:40) (16 - 17)  SpO2: 96% (08 Sep 2019 17:40) (96% - 98%)    PHYSICAL EXAM:  General: NAD.  CVS: S1, S2  Chest: air entry bilaterally present  Abd: BS present, soft, non-tender      LABS:                        8.4    8.12  )-----------( 239      ( 07 Sep 2019 08:18 )             27.3     09-07    139  |  110<H>  |  31<H>  ----------------------------<  82  4.1   |  22  |  1.92<H>    Ca    8.3<L>      07 Sep 2019 08:18        repeat CBC  colonoscopy tentative for 9/10  clear liquids diet
see  gastroscopy report    Imp: Duodenal ulcer with heme (small); Gastritis    Plan: IV protonix; PO carafate; CBC in AM; transfuse 1 additional unit prbc's
see colonoscopy report    Imp: Heme + stools; Poor bowel prep; Diverticulosis; Internal hemorrhoids    Plan: procedure completed to hepatic flexure.  Pt with poor bowel prep.  Small neoplastic lesions cannot be entirely excluded.  R colon not visualized.  Will advance diet and follow CBC. If H/H continues to fall might need repeat colonoscopy

## 2019-09-10 NOTE — DISCHARGE NOTE PROVIDER - CARE PROVIDER_API CALL
Stalin Rawls)  Internal Medicine  [address] [facility], [address] [place] [address]  Phone: [phone]  Fax: [phone]  Follow Up Time:

## 2019-09-10 NOTE — PROGRESS NOTE ADULT - REASON FOR ADMISSION
nirav anemia, diabetes, , Gi bleeding
Severe anemia, diabetes, , Gi bleeding
nirav anemia, diabetes, , Gi bleeding
nirav anemia, diabetes, , Gi bleeding

## 2019-09-10 NOTE — DISCHARGE NOTE PROVIDER - HOSPITAL COURSE
64 years old male admitted with GI bleed, PMH of Diabetes and Anemia , treated for Gastritis and DU. Patinet stable after blood tranfusion, status post colonoscopy 64 years old male admitted with GI bleed, PMH of Diabetes and Anemia , treated for Gastritis and DU. Patinet stable after blood tranfusion, status post colonoscopy with no significant findings. Patient will follow up with GI as outpatient and discontinue aspiring and plavix for 3 weeks. 64 years old male admitted with GI bleed, PMH of Diabetes and Anemia , treated for Gastritis and DU. Patient stable after blood tranfusion, status post colonoscopy with no significant findings. Patient will follow up with GI as outpatient and discontinue aspirin and plavix for 3 weeks.

## 2019-09-12 DIAGNOSIS — K26.9 DUODENAL ULCER, UNSPECIFIED AS ACUTE OR CHRONIC, WITHOUT HEMORRHAGE OR PERFORATION: ICD-10-CM

## 2019-09-12 DIAGNOSIS — K92.2 GASTROINTESTINAL HEMORRHAGE, UNSPECIFIED: ICD-10-CM

## 2019-09-12 DIAGNOSIS — Z95.5 PRESENCE OF CORONARY ANGIOPLASTY IMPLANT AND GRAFT: ICD-10-CM

## 2019-09-12 DIAGNOSIS — E66.01 MORBID (SEVERE) OBESITY DUE TO EXCESS CALORIES: ICD-10-CM

## 2019-09-12 DIAGNOSIS — E11.40 TYPE 2 DIABETES MELLITUS WITH DIABETIC NEUROPATHY, UNSPECIFIED: ICD-10-CM

## 2019-09-12 DIAGNOSIS — K57.30 DIVERTICULOSIS OF LARGE INTESTINE WITHOUT PERFORATION OR ABSCESS WITHOUT BLEEDING: ICD-10-CM

## 2019-09-12 DIAGNOSIS — K21.9 GASTRO-ESOPHAGEAL REFLUX DISEASE WITHOUT ESOPHAGITIS: ICD-10-CM

## 2019-09-12 DIAGNOSIS — I25.10 ATHEROSCLEROTIC HEART DISEASE OF NATIVE CORONARY ARTERY WITHOUT ANGINA PECTORIS: ICD-10-CM

## 2019-09-12 DIAGNOSIS — K64.8 OTHER HEMORRHOIDS: ICD-10-CM

## 2019-09-12 DIAGNOSIS — K29.70 GASTRITIS, UNSPECIFIED, WITHOUT BLEEDING: ICD-10-CM

## 2019-09-12 DIAGNOSIS — I10 ESSENTIAL (PRIMARY) HYPERTENSION: ICD-10-CM

## 2019-09-12 DIAGNOSIS — D64.9 ANEMIA, UNSPECIFIED: ICD-10-CM

## 2019-09-12 DIAGNOSIS — Z79.82 LONG TERM (CURRENT) USE OF ASPIRIN: ICD-10-CM

## 2019-09-12 DIAGNOSIS — H54.7 UNSPECIFIED VISUAL LOSS: ICD-10-CM

## 2019-09-12 DIAGNOSIS — Z79.02 LONG TERM (CURRENT) USE OF ANTITHROMBOTICS/ANTIPLATELETS: ICD-10-CM

## 2019-09-12 DIAGNOSIS — Z79.4 LONG TERM (CURRENT) USE OF INSULIN: ICD-10-CM

## 2019-09-12 DIAGNOSIS — Z89.511 ACQUIRED ABSENCE OF RIGHT LEG BELOW KNEE: ICD-10-CM

## 2019-09-19 DIAGNOSIS — K26.4 CHRONIC OR UNSPECIFIED DUODENAL ULCER WITH HEMORRHAGE: ICD-10-CM

## 2019-09-19 DIAGNOSIS — K57.31 DIVERTICULOSIS OF LARGE INTESTINE WITHOUT PERFORATION OR ABSCESS WITH BLEEDING: ICD-10-CM

## 2019-10-14 NOTE — PROGRESS NOTE ADULT - ATTENDING COMMENTS
Call central scheduling to get an appointment with Interventional Radiology at [facility] (formerly [facility]) to discuss uterine artery embolization. They will request an MRI be performed. I have ordered that as well.       
pt's PMD Dr Calderon 

## 2020-10-02 NOTE — ED ADULT NURSE NOTE - NSFALLRSKINDICATORS_ED_ALL_ED
in no acute distress, well developed, well nourished, ambulating without difficulty, normal communication ability
no

## 2020-11-03 ENCOUNTER — INPATIENT (INPATIENT)
Facility: HOSPITAL | Age: 65
LOS: 1 days | Discharge: HOME HEALTH | End: 2020-11-05
Attending: INTERNAL MEDICINE | Admitting: INTERNAL MEDICINE
Payer: MEDICARE

## 2020-11-03 VITALS
WEIGHT: 300.05 LBS | DIASTOLIC BLOOD PRESSURE: 60 MMHG | HEART RATE: 64 BPM | OXYGEN SATURATION: 100 % | HEIGHT: 73 IN | RESPIRATION RATE: 18 BRPM | SYSTOLIC BLOOD PRESSURE: 125 MMHG | TEMPERATURE: 98 F

## 2020-11-03 DIAGNOSIS — I10 ESSENTIAL (PRIMARY) HYPERTENSION: ICD-10-CM

## 2020-11-03 DIAGNOSIS — N18.30 CHRONIC KIDNEY DISEASE, STAGE 3 UNSPECIFIED: ICD-10-CM

## 2020-11-03 DIAGNOSIS — D64.9 ANEMIA, UNSPECIFIED: ICD-10-CM

## 2020-11-03 DIAGNOSIS — Z95.5 PRESENCE OF CORONARY ANGIOPLASTY IMPLANT AND GRAFT: Chronic | ICD-10-CM

## 2020-11-03 DIAGNOSIS — S88.111D COMPLETE TRAUMATIC AMPUTATION AT LEVEL BETWEEN KNEE AND ANKLE, RIGHT LOWER LEG, SUBSEQUENT ENCOUNTER: Chronic | ICD-10-CM

## 2020-11-03 DIAGNOSIS — I25.10 ATHEROSCLEROTIC HEART DISEASE OF NATIVE CORONARY ARTERY WITHOUT ANGINA PECTORIS: ICD-10-CM

## 2020-11-03 DIAGNOSIS — Z29.9 ENCOUNTER FOR PROPHYLACTIC MEASURES, UNSPECIFIED: ICD-10-CM

## 2020-11-03 DIAGNOSIS — E78.5 HYPERLIPIDEMIA, UNSPECIFIED: ICD-10-CM

## 2020-11-03 DIAGNOSIS — E08.8 DIABETES MELLITUS DUE TO UNDERLYING CONDITION WITH UNSPECIFIED COMPLICATIONS: ICD-10-CM

## 2020-11-03 LAB
ALBUMIN SERPL ELPH-MCNC: 3 G/DL — LOW (ref 3.3–5)
ALP SERPL-CCNC: 81 U/L — SIGNIFICANT CHANGE UP (ref 40–120)
ALT FLD-CCNC: 7 U/L — LOW (ref 12–78)
ANION GAP SERPL CALC-SCNC: 10 MMOL/L — SIGNIFICANT CHANGE UP (ref 5–17)
APPEARANCE UR: CLEAR — SIGNIFICANT CHANGE UP
AST SERPL-CCNC: 4 U/L — LOW (ref 15–37)
B PERT DNA SPEC QL NAA+PROBE: SIGNIFICANT CHANGE UP
BACTERIA # UR AUTO: NEGATIVE — SIGNIFICANT CHANGE UP
BASOPHILS # BLD AUTO: 0.03 K/UL — SIGNIFICANT CHANGE UP (ref 0–0.2)
BASOPHILS NFR BLD AUTO: 0.6 % — SIGNIFICANT CHANGE UP (ref 0–2)
BILIRUB SERPL-MCNC: 0.4 MG/DL — SIGNIFICANT CHANGE UP (ref 0.2–1.2)
BILIRUB UR-MCNC: NEGATIVE — SIGNIFICANT CHANGE UP
BLD GP AB SCN SERPL QL: SIGNIFICANT CHANGE UP
BUN SERPL-MCNC: 35 MG/DL — HIGH (ref 7–23)
C PNEUM DNA SPEC QL NAA+PROBE: SIGNIFICANT CHANGE UP
CALCIUM SERPL-MCNC: 8.1 MG/DL — LOW (ref 8.5–10.1)
CHLORIDE SERPL-SCNC: 107 MMOL/L — SIGNIFICANT CHANGE UP (ref 96–108)
CO2 SERPL-SCNC: 21 MMOL/L — LOW (ref 22–31)
COLOR SPEC: YELLOW — SIGNIFICANT CHANGE UP
CREAT SERPL-MCNC: 1.54 MG/DL — HIGH (ref 0.5–1.3)
DIFF PNL FLD: NEGATIVE — SIGNIFICANT CHANGE UP
EOSINOPHIL # BLD AUTO: 0.09 K/UL — SIGNIFICANT CHANGE UP (ref 0–0.5)
EOSINOPHIL NFR BLD AUTO: 1.7 % — SIGNIFICANT CHANGE UP (ref 0–6)
EPI CELLS # UR: SIGNIFICANT CHANGE UP
FLUAV H1 2009 PAND RNA SPEC QL NAA+PROBE: SIGNIFICANT CHANGE UP
FLUAV H1 RNA SPEC QL NAA+PROBE: SIGNIFICANT CHANGE UP
FLUAV H3 RNA SPEC QL NAA+PROBE: SIGNIFICANT CHANGE UP
FLUAV SUBTYP SPEC NAA+PROBE: SIGNIFICANT CHANGE UP
FLUBV RNA SPEC QL NAA+PROBE: SIGNIFICANT CHANGE UP
GLUCOSE BLDC GLUCOMTR-MCNC: 125 MG/DL — HIGH (ref 70–99)
GLUCOSE SERPL-MCNC: 130 MG/DL — HIGH (ref 70–99)
GLUCOSE UR QL: NEGATIVE MG/DL — SIGNIFICANT CHANGE UP
HADV DNA SPEC QL NAA+PROBE: SIGNIFICANT CHANGE UP
HCOV PNL SPEC NAA+PROBE: SIGNIFICANT CHANGE UP
HCT VFR BLD CALC: 19.9 % — CRITICAL LOW (ref 39–50)
HGB BLD-MCNC: 4.7 G/DL — CRITICAL LOW (ref 13–17)
HMPV RNA SPEC QL NAA+PROBE: SIGNIFICANT CHANGE UP
HPIV1 RNA SPEC QL NAA+PROBE: SIGNIFICANT CHANGE UP
HPIV2 RNA SPEC QL NAA+PROBE: SIGNIFICANT CHANGE UP
HPIV3 RNA SPEC QL NAA+PROBE: SIGNIFICANT CHANGE UP
HPIV4 RNA SPEC QL NAA+PROBE: SIGNIFICANT CHANGE UP
IMM GRANULOCYTES NFR BLD AUTO: 0.4 % — SIGNIFICANT CHANGE UP (ref 0–1.5)
KETONES UR-MCNC: NEGATIVE — SIGNIFICANT CHANGE UP
LACTATE SERPL-SCNC: 1.8 MMOL/L — SIGNIFICANT CHANGE UP (ref 0.7–2)
LEUKOCYTE ESTERASE UR-ACNC: NEGATIVE — SIGNIFICANT CHANGE UP
LYMPHOCYTES # BLD AUTO: 0.64 K/UL — LOW (ref 1–3.3)
LYMPHOCYTES # BLD AUTO: 11.9 % — LOW (ref 13–44)
MCHC RBC-ENTMCNC: 15.8 PG — LOW (ref 27–34)
MCHC RBC-ENTMCNC: 23.6 GM/DL — LOW (ref 32–36)
MCV RBC AUTO: 66.8 FL — LOW (ref 80–100)
MONOCYTES # BLD AUTO: 0.41 K/UL — SIGNIFICANT CHANGE UP (ref 0–0.9)
MONOCYTES NFR BLD AUTO: 7.6 % — SIGNIFICANT CHANGE UP (ref 2–14)
NEUTROPHILS # BLD AUTO: 4.2 K/UL — SIGNIFICANT CHANGE UP (ref 1.8–7.4)
NEUTROPHILS NFR BLD AUTO: 77.8 % — HIGH (ref 43–77)
NITRITE UR-MCNC: NEGATIVE — SIGNIFICANT CHANGE UP
NRBC # BLD: 0 /100 WBCS — SIGNIFICANT CHANGE UP (ref 0–0)
OB PNL STL: NEGATIVE — SIGNIFICANT CHANGE UP
PH UR: 5 — SIGNIFICANT CHANGE UP (ref 5–8)
PLATELET # BLD AUTO: 292 K/UL — SIGNIFICANT CHANGE UP (ref 150–400)
POTASSIUM SERPL-MCNC: 3.9 MMOL/L — SIGNIFICANT CHANGE UP (ref 3.5–5.3)
POTASSIUM SERPL-SCNC: 3.9 MMOL/L — SIGNIFICANT CHANGE UP (ref 3.5–5.3)
PROT SERPL-MCNC: 6.4 GM/DL — SIGNIFICANT CHANGE UP (ref 6–8.3)
PROT UR-MCNC: 30 MG/DL
RAPID RVP RESULT: SIGNIFICANT CHANGE UP
RBC # BLD: 2.98 M/UL — LOW (ref 4.2–5.8)
RBC # FLD: 21.4 % — HIGH (ref 10.3–14.5)
RBC CASTS # UR COMP ASSIST: SIGNIFICANT CHANGE UP /HPF (ref 0–4)
RSV RNA SPEC QL NAA+PROBE: SIGNIFICANT CHANGE UP
RV+EV RNA SPEC QL NAA+PROBE: SIGNIFICANT CHANGE UP
SARS-COV-2 RNA SPEC QL NAA+PROBE: SIGNIFICANT CHANGE UP
SODIUM SERPL-SCNC: 138 MMOL/L — SIGNIFICANT CHANGE UP (ref 135–145)
SP GR SPEC: 1.01 — SIGNIFICANT CHANGE UP (ref 1.01–1.02)
UROBILINOGEN FLD QL: NEGATIVE MG/DL — SIGNIFICANT CHANGE UP
WBC # BLD: 5.39 K/UL — SIGNIFICANT CHANGE UP (ref 3.8–10.5)
WBC # FLD AUTO: 5.39 K/UL — SIGNIFICANT CHANGE UP (ref 3.8–10.5)
WBC UR QL: SIGNIFICANT CHANGE UP

## 2020-11-03 PROCEDURE — 99222 1ST HOSP IP/OBS MODERATE 55: CPT

## 2020-11-03 PROCEDURE — 99285 EMERGENCY DEPT VISIT HI MDM: CPT | Mod: CS

## 2020-11-03 RX ORDER — DEXTROSE 50 % IN WATER 50 %
25 SYRINGE (ML) INTRAVENOUS ONCE
Refills: 0 | Status: DISCONTINUED | OUTPATIENT
Start: 2020-11-03 | End: 2020-11-05

## 2020-11-03 RX ORDER — PANTOPRAZOLE SODIUM 20 MG/1
0 TABLET, DELAYED RELEASE ORAL
Qty: 0 | Refills: 0 | DISCHARGE

## 2020-11-03 RX ORDER — CARBAMAZEPINE 200 MG
0 TABLET ORAL
Qty: 0 | Refills: 0 | DISCHARGE

## 2020-11-03 RX ORDER — DEXTROSE 50 % IN WATER 50 %
12.5 SYRINGE (ML) INTRAVENOUS ONCE
Refills: 0 | Status: DISCONTINUED | OUTPATIENT
Start: 2020-11-03 | End: 2020-11-05

## 2020-11-03 RX ORDER — SUCRALFATE 1 G
1 TABLET ORAL
Refills: 0 | Status: DISCONTINUED | OUTPATIENT
Start: 2020-11-03 | End: 2020-11-05

## 2020-11-03 RX ORDER — OXYCODONE AND ACETAMINOPHEN 5; 325 MG/1; MG/1
2 TABLET ORAL EVERY 8 HOURS
Refills: 0 | Status: DISCONTINUED | OUTPATIENT
Start: 2020-11-03 | End: 2020-11-05

## 2020-11-03 RX ORDER — INSULIN LISPRO 100/ML
VIAL (ML) SUBCUTANEOUS
Refills: 0 | Status: DISCONTINUED | OUTPATIENT
Start: 2020-11-03 | End: 2020-11-05

## 2020-11-03 RX ORDER — ZOLPIDEM TARTRATE 10 MG/1
0 TABLET ORAL
Qty: 0 | Refills: 2 | DISCHARGE

## 2020-11-03 RX ORDER — METOPROLOL TARTRATE 50 MG
25 TABLET ORAL
Refills: 0 | Status: DISCONTINUED | OUTPATIENT
Start: 2020-11-03 | End: 2020-11-05

## 2020-11-03 RX ORDER — ATORVASTATIN CALCIUM 80 MG/1
20 TABLET, FILM COATED ORAL AT BEDTIME
Refills: 0 | Status: DISCONTINUED | OUTPATIENT
Start: 2020-11-03 | End: 2020-11-04

## 2020-11-03 RX ORDER — ATORVASTATIN CALCIUM 80 MG/1
0 TABLET, FILM COATED ORAL
Qty: 0 | Refills: 0 | DISCHARGE

## 2020-11-03 RX ORDER — METOPROLOL TARTRATE 50 MG
0 TABLET ORAL
Qty: 0 | Refills: 1 | DISCHARGE

## 2020-11-03 RX ORDER — SODIUM CHLORIDE 9 MG/ML
1000 INJECTION, SOLUTION INTRAVENOUS
Refills: 0 | Status: DISCONTINUED | OUTPATIENT
Start: 2020-11-03 | End: 2020-11-05

## 2020-11-03 RX ORDER — FERROUS SULFATE 325(65) MG
325 TABLET ORAL THREE TIMES A DAY
Refills: 0 | Status: DISCONTINUED | OUTPATIENT
Start: 2020-11-03 | End: 2020-11-05

## 2020-11-03 RX ORDER — ZOLPIDEM TARTRATE 10 MG/1
5 TABLET ORAL AT BEDTIME
Refills: 0 | Status: DISCONTINUED | OUTPATIENT
Start: 2020-11-03 | End: 2020-11-05

## 2020-11-03 RX ORDER — INSULIN GLARGINE 100 [IU]/ML
0 INJECTION, SOLUTION SUBCUTANEOUS
Qty: 0 | Refills: 1 | DISCHARGE

## 2020-11-03 RX ORDER — SUCRALFATE 1 G
0 TABLET ORAL
Qty: 0 | Refills: 0 | DISCHARGE

## 2020-11-03 RX ORDER — DEXTROSE 50 % IN WATER 50 %
15 SYRINGE (ML) INTRAVENOUS ONCE
Refills: 0 | Status: DISCONTINUED | OUTPATIENT
Start: 2020-11-03 | End: 2020-11-05

## 2020-11-03 RX ORDER — INSULIN LISPRO 100/ML
VIAL (ML) SUBCUTANEOUS AT BEDTIME
Refills: 0 | Status: DISCONTINUED | OUTPATIENT
Start: 2020-11-03 | End: 2020-11-05

## 2020-11-03 RX ORDER — CARBAMAZEPINE 200 MG
200 TABLET ORAL
Refills: 0 | Status: DISCONTINUED | OUTPATIENT
Start: 2020-11-03 | End: 2020-11-05

## 2020-11-03 RX ORDER — INSULIN LISPRO 100/ML
VIAL (ML) SUBCUTANEOUS EVERY 6 HOURS
Refills: 0 | Status: DISCONTINUED | OUTPATIENT
Start: 2020-11-03 | End: 2020-11-03

## 2020-11-03 RX ORDER — NYSTATIN CREAM 100000 [USP'U]/G
0 CREAM TOPICAL
Qty: 0 | Refills: 0 | DISCHARGE

## 2020-11-03 RX ORDER — GLUCAGON INJECTION, SOLUTION 0.5 MG/.1ML
1 INJECTION, SOLUTION SUBCUTANEOUS ONCE
Refills: 0 | Status: DISCONTINUED | OUTPATIENT
Start: 2020-11-03 | End: 2020-11-05

## 2020-11-03 RX ORDER — PREGABALIN 225 MG/1
0 CAPSULE ORAL
Qty: 0 | Refills: 0 | DISCHARGE

## 2020-11-03 RX ORDER — PANTOPRAZOLE SODIUM 20 MG/1
40 TABLET, DELAYED RELEASE ORAL
Refills: 0 | Status: DISCONTINUED | OUTPATIENT
Start: 2020-11-03 | End: 2020-11-05

## 2020-11-03 RX ORDER — INSULIN GLARGINE 100 [IU]/ML
15 INJECTION, SOLUTION SUBCUTANEOUS EVERY MORNING
Refills: 0 | Status: DISCONTINUED | OUTPATIENT
Start: 2020-11-03 | End: 2020-11-05

## 2020-11-03 RX ADMIN — Medication 1 GRAM(S): at 23:21

## 2020-11-03 RX ADMIN — OXYCODONE AND ACETAMINOPHEN 2 TABLET(S): 5; 325 TABLET ORAL at 23:15

## 2020-11-03 NOTE — H&P ADULT - NSHPLABSRESULTS_GEN_ALL_CORE
T(C): 36.7 (20 @ 21:18), Max: 37 (20 @ 20:49)  HR: 62 (20 @ 21:18) (62 - 68)  BP: 143/67 (20 @ 21:18) (125/60 - 146/67)  RR: 18 (20 @ 21:18) (15 - 18)  SpO2: 100% (20 @ 21:18) (100% - 100%)                        4.7    5.39  )-----------( 292      ( 2020 18:04 )             19.9         138  |  107  |  35<H>  ----------------------------<  130<H>  3.9   |  21<L>  |  1.54<H>    Ca    8.1<L>      2020 18:04    TPro  6.4  /  Alb  3.0<L>  /  TBili  0.4  /  DBili  x   /  AST  4<L>  /  ALT  7<L>  /  AlkPhos  81  11-03    LIVER FUNCTIONS - ( 2020 18:04 )  Alb: 3.0 g/dL / Pro: 6.4 gm/dL / ALK PHOS: 81 U/L / ALT: 7 U/L / AST: 4 U/L / GGT: x             Urinalysis Basic - ( 2020 18:04 )    Color: Yellow / Appearance: Clear / S.010 / pH: x  Gluc: x / Ketone: Negative  / Bili: Negative / Urobili: Negative mg/dL   Blood: x / Protein: 30 mg/dL / Nitrite: Negative   Leuk Esterase: Negative / RBC: 0-2 /HPF / WBC 0-2   Sq Epi: x / Non Sq Epi: Occasional / Bacteria: Negative

## 2020-11-03 NOTE — H&P ADULT - PROBLEM SELECTOR PROBLEM 6
DVT prophylaxis Coronary artery disease involving native heart without angina pectoris, unspecified vessel or lesion type

## 2020-11-03 NOTE — ED ADULT NURSE REASSESSMENT NOTE - NS ED NURSE REASSESS COMMENT FT1
After receiving 1 unit of PRBCS, no acute transfusion reaction noted. no complaints from pt at this time, on cardiac monitor. no acute distress noted, will continue to monitor.

## 2020-11-03 NOTE — H&P ADULT - ASSESSMENT
66 y/o male w/ PMHx of CAD s/p stent, DM type 2, legally blind, Peripheral neuropathy, PVD s/p right BKA, bedbound, CKD stage 3, Microcytic anemia, PUD presents to the ED due to abnormal labs. Pt being admitted severe microcytic anemia, no active bleeding.    IMPROVE VTE Individual Risk Assessment    RISK                                                                Points    [  ] Previous VTE                                                  3    [  ] Thrombophilia                                               2    [  ] Lower limb paralysis                                      2        (unable to hold up >15 seconds)      [  ] Current Cancer                                              2         (within 6 months)    [  ] Immobilization > 24 hrs                                1    [  ] ICU/CCU stay > 24 hours                              1    [  ] Age > 60                                                      1    IMPROVE VTE Score ____2_____    IMPROVE Score 0-1: Low Risk, No VTE prophylaxis required for most patients, encourage ambulation.   IMPROVE Score 2-3: At risk, pharmacologic VTE prophylaxis is indicated for most patients (in the absence of a contraindication)  IMPROVE Score > or = 4: High Risk, pharmacologic VTE prophylaxis is indicated for most patients (in the absence of a contraindication)

## 2020-11-03 NOTE — ED ADULT NURSE NOTE - OBJECTIVE STATEMENT
pt awake, alert, blind, pt states sent to ED by pmd for abnormal blood work. pt does not know which blood work was abnormal. palor noted. hx: blind, right BKA, DM. pt denies sob, chest pain.

## 2020-11-03 NOTE — ED ADULT NURSE REASSESSMENT NOTE - NS ED NURSE REASSESS COMMENT FT1
pt is alert and oriented x3. After 15 min. of second unit of PRBCS, no acute transfusion reaction noted. no complaints from pt at this time, on cardiac monitor. no acute distress noted, will continue to monitor. pt is alert and oriented x3. After 15 min. of second unit of PRBCS, no acute transfusion reaction noted. no complaints from pt at this time, on cardiac monitor. pt turned to left side. no acute distress noted, will continue to monitor.

## 2020-11-03 NOTE — ED ADULT NURSE NOTE - ED STAT RN HANDOFF DETAILS 2
Report endorsed to JESSICA ragsdale . Safety checks compld this shift/Safety rounds completed hourly.  IV sites checked Q2+remains WDL. Meds given as ord with no s/s of adverse RXNs. Fall +skin precs in place. no acute distress noted. Any issues endorsed to oncoming RN for follow up.

## 2020-11-03 NOTE — ED ADULT NURSE REASSESSMENT NOTE - NS ED NURSE REASSESS COMMENT FT1
pt is alert and oriented x3. . After receiving second unit of PRBCS, no acute transfusion reaction noted. no complaints from pt at this time, on cardiac monitor. no acute distress noted, will continue to monitor.

## 2020-11-03 NOTE — H&P ADULT - HISTORY OF PRESENT ILLNESS
66 y/o male w/ PMHx of CAD s/p stent, DM type 2, legally blind, Peripheral neuropathy, PVD s/p right BKA, bedbound, CKD stage 3, Microcytic anemia, PUD presents to the ED due to abnormal labs. Pt reports a visiting nurse came to the house to draw routine blood work, and give him his Flu and B12 shots. Pt reports later on the day they were called and advised to go to the ED due to low blood count. Pt is bedbound and has an aid which helps clean him every time he soils himself and per wife aid or herself has not noticed any BRB or melena. Pt has not had any hematemesis as well. Pt was previously on ASA and Plavix however was taken off Sept last yr when pt had a GI bleed and was found to have PUD. Per Rx pt was started on Iron however wife reports that is not one of pt's medications. Pt denies dizziness, chest pain, abdominal pain or SOB.     In ED, vitals stable. Labs sig for H/H 4.7/19.9, MCV 66.8. Guaiac negative. Pt ordered 2 unit PRBC.

## 2020-11-03 NOTE — ED PROVIDER NOTE - CLINICAL SUMMARY MEDICAL DECISION MAKING FREE TEXT BOX
DDx: GI bleed, anemia of chorionic disease,  Plan: CBC, CMP, type and screen, transfusion as needed, likely admission DDx: GI bleed, although stool is light brown, anemia of chronic disease,  Plan: CBC, CMP, type and screen, transfusion as needed, likely admission

## 2020-11-03 NOTE — H&P ADULT - PROBLEM SELECTOR PROBLEM 5
Coronary artery disease involving native heart without angina pectoris, unspecified vessel or lesion type Hyperlipidemia, unspecified hyperlipidemia type

## 2020-11-03 NOTE — H&P ADULT - PROBLEM SELECTOR PLAN 1
- No hematemesis, or BRBPR  - FOBT neg  - 2 Unit PRBC ordered  - rpt H/H after transfusion  - Pt scoped by Dr Rawls, will speak with in am, if rpt scope needed inpt vs outpt  - Keep NPO after mn  - f/u iron, TIBC, ferritin  - place on iron - No hematemesis, or BRBPR  - FOBT neg  - 2 Unit PRBC ordered  - rpt H/H after transfusion  - Keep NPO after mn  - f/u iron, TIBC, ferritin, may benefit from IV iron  - place on iron

## 2020-11-03 NOTE — ED PROVIDER NOTE - OBJECTIVE STATEMENT
Pt is a 65 year old male w/PMH of HTN, CAD, right EKA, IDDM, bed bound who presents to the ED today for abnormal lab results. Pt was found to have low hemoglobin and has had a stomach ulcer found in 2019 September. Labs were drawn for routine testing and found to be low with hemoglobin of 5. Denies dark stool, lightheadedness, abdominal pain, nausea, vomit, diarrhea CP, and not on any anticoagulants. Patient denies EtOH/tobacco/illicit substance use.

## 2020-11-03 NOTE — ED ADULT NURSE NOTE - PSH
History of coronary artery stent placement    Unilateral complete BKA, right, subsequent encounter

## 2020-11-03 NOTE — ED ADULT NURSE REASSESSMENT NOTE - NS ED NURSE REASSESS COMMENT FT1
pt is alert and oriented x3. accompanied by wife. After 15 min. of 1 unit of PRBCS, no acute transfusion reaction noted. no complaints from pt at this time, on cardiac monitor. no acute distress noted, will continue to monitor.

## 2020-11-03 NOTE — ED ADULT NURSE NOTE - ED STAT RN HANDOFF DETAILS
report received from jacquelyn sesay. pt on cardiac monitor, v/s stable. no complaints from pt. at this time. no acute distress noted, will continue to monitor. as per lab, no confirmation needed, pending 2 unit PRBCS. report received from jacquelyn sesay. pt presents with right bka, hx blind. pt is pt on cardiac monitor, v/s stable. no complaints from pt. at this time. no acute distress noted, will continue to monitor. as per lab, no confirmation needed, pending 2 unit PRBCS. report received from jacquelyn sesay. pt presents with right bka, legally blind. pt is pt on cardiac monitor, v/s stable. no complaints from pt. at this time. no acute distress noted, will continue to monitor. as per lab, no confirmation needed, pending 2 unit PRBCS.

## 2020-11-03 NOTE — H&P ADULT - NSHPPHYSICALEXAM_GEN_ALL_CORE
PHYSICAL EXAM:    Vital Signs Last 24 Hrs  T(C): 37 (03 Nov 2020 20:49), Max: 37 (03 Nov 2020 20:49)  T(F): 98.6 (03 Nov 2020 20:49), Max: 98.6 (03 Nov 2020 20:49)  HR: 68 (03 Nov 2020 20:49) (62 - 68)  BP: 146/67 (03 Nov 2020 20:49) (125/60 - 146/67)  BP(mean): 78 (03 Nov 2020 20:00) (78 - 78)  RR: 15 (03 Nov 2020 20:49) (15 - 18)  SpO2: 100% (03 Nov 2020 20:49) (100% - 100%)    GENERAL: Pt lying in bed comfortably in NAD, very pale  HEENT:  Atraumatic, blind b/l  NECK: Supple  CHEST/LUNG: Clear to auscultation bilaterally, Unlabored respirations  HEART: Regular rate and rhythm  ABDOMEN: Bowel sounds present; Soft, Nontender, Nondistended. No guarding or rigidity, no ecchymosis    EXTREMITIES:  1+ pulse LLE. s/p right BKA, site clean, LLE, no ulcers  NEUROLOGICAL:  Alert & Oriented X3, speech clear. Pt bedbound, also to move extremities  MSK: FROM x 4 extremities   SKIN: mild stage 1 ulcer

## 2020-11-04 LAB
ALBUMIN SERPL ELPH-MCNC: 2.7 G/DL — LOW (ref 3.3–5)
ALP SERPL-CCNC: 71 U/L — SIGNIFICANT CHANGE UP (ref 40–120)
ALT FLD-CCNC: 8 U/L — LOW (ref 12–78)
ANION GAP SERPL CALC-SCNC: 9 MMOL/L — SIGNIFICANT CHANGE UP (ref 5–17)
AST SERPL-CCNC: 9 U/L — LOW (ref 15–37)
BILIRUB SERPL-MCNC: 0.7 MG/DL — SIGNIFICANT CHANGE UP (ref 0.2–1.2)
BUN SERPL-MCNC: 31 MG/DL — HIGH (ref 7–23)
CALCIUM SERPL-MCNC: 8.1 MG/DL — LOW (ref 8.5–10.1)
CHLORIDE SERPL-SCNC: 113 MMOL/L — HIGH (ref 96–108)
CO2 SERPL-SCNC: 18 MMOL/L — LOW (ref 22–31)
CREAT SERPL-MCNC: 1.35 MG/DL — HIGH (ref 0.5–1.3)
CULTURE RESULTS: SIGNIFICANT CHANGE UP
FERRITIN SERPL-MCNC: 5 NG/ML — LOW (ref 30–400)
FOLATE SERPL-MCNC: 5.5 NG/ML — SIGNIFICANT CHANGE UP
GLUCOSE BLDC GLUCOMTR-MCNC: 114 MG/DL — HIGH (ref 70–99)
GLUCOSE BLDC GLUCOMTR-MCNC: 133 MG/DL — HIGH (ref 70–99)
GLUCOSE BLDC GLUCOMTR-MCNC: 79 MG/DL — SIGNIFICANT CHANGE UP (ref 70–99)
GLUCOSE BLDC GLUCOMTR-MCNC: 99 MG/DL — SIGNIFICANT CHANGE UP (ref 70–99)
GLUCOSE SERPL-MCNC: 107 MG/DL — HIGH (ref 70–99)
HCT VFR BLD CALC: 21.6 % — LOW (ref 39–50)
HCT VFR BLD CALC: 24.8 % — LOW (ref 39–50)
HGB BLD-MCNC: 6.1 G/DL — CRITICAL LOW (ref 13–17)
HGB BLD-MCNC: 7 G/DL — CRITICAL LOW (ref 13–17)
IRON SATN MFR SERPL: 13 UG/DL — LOW (ref 45–165)
IRON SATN MFR SERPL: 3 % — LOW (ref 16–55)
MAGNESIUM SERPL-MCNC: 1.7 MG/DL — SIGNIFICANT CHANGE UP (ref 1.6–2.6)
MCHC RBC-ENTMCNC: 20.3 PG — LOW (ref 27–34)
MCHC RBC-ENTMCNC: 28.2 GM/DL — LOW (ref 32–36)
MCV RBC AUTO: 71.9 FL — LOW (ref 80–100)
NRBC # BLD: 0 /100 WBCS — SIGNIFICANT CHANGE UP (ref 0–0)
PHOSPHATE SERPL-MCNC: 1.7 MG/DL — LOW (ref 2.5–4.5)
PLATELET # BLD AUTO: 208 K/UL — SIGNIFICANT CHANGE UP (ref 150–400)
POTASSIUM SERPL-MCNC: 3.7 MMOL/L — SIGNIFICANT CHANGE UP (ref 3.5–5.3)
POTASSIUM SERPL-SCNC: 3.7 MMOL/L — SIGNIFICANT CHANGE UP (ref 3.5–5.3)
PROT SERPL-MCNC: 5.8 GM/DL — LOW (ref 6–8.3)
RBC # BLD: 3.45 M/UL — LOW (ref 4.2–5.8)
RBC # FLD: 25.2 % — HIGH (ref 10.3–14.5)
SODIUM SERPL-SCNC: 140 MMOL/L — SIGNIFICANT CHANGE UP (ref 135–145)
SPECIMEN SOURCE: SIGNIFICANT CHANGE UP
TIBC SERPL-MCNC: 369 UG/DL — SIGNIFICANT CHANGE UP (ref 220–430)
UIBC SERPL-MCNC: 356 UG/DL — SIGNIFICANT CHANGE UP (ref 110–370)
WBC # BLD: 4.55 K/UL — SIGNIFICANT CHANGE UP (ref 3.8–10.5)
WBC # FLD AUTO: 4.55 K/UL — SIGNIFICANT CHANGE UP (ref 3.8–10.5)

## 2020-11-04 PROCEDURE — 99233 SBSQ HOSP IP/OBS HIGH 50: CPT

## 2020-11-04 RX ORDER — FOLIC ACID 0.8 MG
1 TABLET ORAL DAILY
Refills: 0 | Status: DISCONTINUED | OUTPATIENT
Start: 2020-11-04 | End: 2020-11-05

## 2020-11-04 RX ORDER — MAGNESIUM OXIDE 400 MG ORAL TABLET 241.3 MG
400 TABLET ORAL ONCE
Refills: 0 | Status: COMPLETED | OUTPATIENT
Start: 2020-11-04 | End: 2020-11-05

## 2020-11-04 RX ORDER — POTASSIUM PHOSPHATE, MONOBASIC POTASSIUM PHOSPHATE, DIBASIC 236; 224 MG/ML; MG/ML
15 INJECTION, SOLUTION INTRAVENOUS ONCE
Refills: 0 | Status: COMPLETED | OUTPATIENT
Start: 2020-11-04 | End: 2020-11-05

## 2020-11-04 RX ORDER — ATORVASTATIN CALCIUM 80 MG/1
40 TABLET, FILM COATED ORAL AT BEDTIME
Refills: 0 | Status: DISCONTINUED | OUTPATIENT
Start: 2020-11-04 | End: 2020-11-05

## 2020-11-04 RX ORDER — POLYETHYLENE GLYCOL 3350 17 G/17G
17 POWDER, FOR SOLUTION ORAL DAILY
Refills: 0 | Status: DISCONTINUED | OUTPATIENT
Start: 2020-11-04 | End: 2020-11-05

## 2020-11-04 RX ORDER — IRON SUCROSE 20 MG/ML
100 INJECTION, SOLUTION INTRAVENOUS ONCE
Refills: 0 | Status: COMPLETED | OUTPATIENT
Start: 2020-11-04 | End: 2020-11-04

## 2020-11-04 RX ADMIN — Medication 1 GRAM(S): at 05:54

## 2020-11-04 RX ADMIN — PANTOPRAZOLE SODIUM 40 MILLIGRAM(S): 20 TABLET, DELAYED RELEASE ORAL at 07:52

## 2020-11-04 RX ADMIN — Medication 25 MILLIGRAM(S): at 17:31

## 2020-11-04 RX ADMIN — ATORVASTATIN CALCIUM 40 MILLIGRAM(S): 80 TABLET, FILM COATED ORAL at 21:08

## 2020-11-04 RX ADMIN — Medication 1 MILLIGRAM(S): at 11:52

## 2020-11-04 RX ADMIN — INSULIN GLARGINE 15 UNIT(S): 100 INJECTION, SOLUTION SUBCUTANEOUS at 08:24

## 2020-11-04 RX ADMIN — Medication 25 MILLIGRAM(S): at 05:55

## 2020-11-04 RX ADMIN — Medication 200 MILLIGRAM(S): at 17:31

## 2020-11-04 RX ADMIN — Medication 325 MILLIGRAM(S): at 21:05

## 2020-11-04 RX ADMIN — Medication 1 GRAM(S): at 11:48

## 2020-11-04 RX ADMIN — POLYETHYLENE GLYCOL 3350 17 GRAM(S): 17 POWDER, FOR SOLUTION ORAL at 11:52

## 2020-11-04 RX ADMIN — Medication 325 MILLIGRAM(S): at 05:54

## 2020-11-04 RX ADMIN — Medication 200 MILLIGRAM(S): at 05:55

## 2020-11-04 RX ADMIN — IRON SUCROSE 210 MILLIGRAM(S): 20 INJECTION, SOLUTION INTRAVENOUS at 09:20

## 2020-11-04 RX ADMIN — Medication 1 GRAM(S): at 17:30

## 2020-11-04 RX ADMIN — Medication 325 MILLIGRAM(S): at 14:10

## 2020-11-04 NOTE — PROGRESS NOTE ADULT - SUBJECTIVE AND OBJECTIVE BOX
CHIEF COMPLAINT: Follow up of anemia  no active gross bleeding  no n/v/d/fever/sp/sob      PHYSICAL EXAM:    GENERAL: elderly pleasant male, legally blind  CHEST/LUNG: Clear to ausculation bilaterally, no wheezing, no crackles   HEART: Regular rate and rhythm; +  SM  ABDOMEN: Soft, Nontender, Nondistended; Bowel sounds present  EXTREMITIES:  s/p right BKA. + stage 2 ischium. no cyanosis or edema   NERVOUS SYSTEM:  Grossly non focal.  Psychiatry: AAO x 3, mood is appropriate       OBJECTIVE DATA:   Vital Signs Last 24 Hrs  T(C): 36.6 (2020 06:50), Max: 37.1 (2020 03:40)  T(F): 97.9 (2020 06:50), Max: 98.7 (2020 03:40)  HR: 66 (2020 06:50) (59 - 68)  BP: 161/79 (2020 06:50) (125/60 - 161/79)  BP(mean): 89 (2020 21:07) (78 - 89)  RR: 16 (2020 06:50) (15 - 19)  SpO2: 100% (2020 06:50) (98% - 100%)           Daily Height in cm: 185.42 (2020 02:35)    Daily Weight in k (2020 05:24)  LABS:                        6.1    x     )-----------( x        ( 2020 01:15 )             21.6             11-03    138  |  107  |  35<H>  ----------------------------<  130<H>  3.9   |  21<L>  |  1.54<H>    Ca    8.1<L>      2020 18:04    TPro  6.4  /  Alb  3.0<L>  /  TBili  0.4  /  DBili  x   /  AST  4<L>  /  ALT  7<L>  /  AlkPhos  81  11-03                Urinalysis Basic - ( 2020 18:04 )    Color: Yellow / Appearance: Clear / S.010 / pH: x  Gluc: x / Ketone: Negative  / Bili: Negative / Urobili: Negative mg/dL   Blood: x / Protein: 30 mg/dL / Nitrite: Negative   Leuk Esterase: Negative / RBC: 0-2 /HPF / WBC 0-2   Sq Epi: x / Non Sq Epi: Occasional / Bacteria: Negative            CAPILLARY BLOOD GLUCOSE      POCT Blood Glucose.: 99 mg/dL (2020 07:29)        MEDICATIONS  (STANDING):  atorvastatin 20 milliGRAM(s) Oral at bedtime  carBAMazepine 200 milliGRAM(s) Oral two times a day  dextrose 5%. 1000 milliLiter(s) (50 mL/Hr) IV Continuous <Continuous>  dextrose 50% Injectable 12.5 Gram(s) IV Push once  dextrose 50% Injectable 25 Gram(s) IV Push once  dextrose 50% Injectable 25 Gram(s) IV Push once  ferrous    sulfate 325 milliGRAM(s) Oral three times a day  folic acid 1 milliGRAM(s) Oral daily  insulin glargine Injectable (LANTUS) 15 Unit(s) SubCutaneous every morning  insulin lispro (ADMELOG) corrective regimen sliding scale   SubCutaneous three times a day before meals  insulin lispro (ADMELOG) corrective regimen sliding scale   SubCutaneous at bedtime  metoprolol tartrate 25 milliGRAM(s) Oral two times a day  pantoprazole    Tablet 40 milliGRAM(s) Oral before breakfast  polyethylene glycol 3350 17 Gram(s) Oral daily  sucralfate 1 Gram(s) Oral four times a day    MEDICATIONS  (PRN):  dextrose 40% Gel 15 Gram(s) Oral once PRN Blood Glucose LESS THAN 70 milliGRAM(s)/deciliter  glucagon  Injectable 1 milliGRAM(s) IntraMuscular once PRN Glucose LESS THAN 70 milligrams/deciliter  oxycodone    5 mG/acetaminophen 325 mG 2 Tablet(s) Oral every 8 hours PRN Moderate Pain (4 - 6)  zolpidem 5 milliGRAM(s) Oral at bedtime PRN Insomnia

## 2020-11-04 NOTE — PROGRESS NOTE ADULT - ASSESSMENT
66 y/o male w/ PMHx of CAD s/p stent, DM type 2, legally blind, Peripheral neuropathy, PVD s/p right BKA, bedbound, CKD stage 3, Microcytic anemia, PUD presents to the ED due to abnormal labs. Pt being admitted severe microcytic anemia, no active bleeding.      Problem/Plan - 1:  ·  Problem: severe Anemia, unspecified type.  Plan: - No hematemesis, or BRBPR  - FOBT neg  - s/p blood transfusion. + iron deficiency and low normal folate level. give iv iron and start oral folic acid.   - Follow H and H closely.     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 controlled. Plan: - place of FS q 6 w/ SSI  - f/u A1c.     Problem/Plan - 3:  ·  Problem: Stage 3 chronic kidney disease, unspecified whether stage 3a or 3b CKD. Plan: - GFR stable.    Problem/Plan - 4:  ·  Problem: Essential hypertension. Plan: - c/w home meds.    Problem/Plan - 5:  ·  Problem: Hyperlipidemia, unspecified hyperlipidemia type. Plan: - c/w statin.    Problem/Plan - 6:  Problem: Coronary artery disease involving native heart without angina pectoris, unspecified vessel or lesion type.Plan: - pt taken off ASA and plavix last yr after bleed  - c/w statin.    Problem/Plan - 7:  ·  Problem: DVT prophylaxis.  Plan: - SCDs.     Ischium stage 2 decubiti ulcer. consulted wound care.     DNR  PT samy.

## 2020-11-05 ENCOUNTER — TRANSCRIPTION ENCOUNTER (OUTPATIENT)
Age: 65
End: 2020-11-05

## 2020-11-05 VITALS
DIASTOLIC BLOOD PRESSURE: 58 MMHG | OXYGEN SATURATION: 97 % | SYSTOLIC BLOOD PRESSURE: 140 MMHG | HEART RATE: 67 BPM | RESPIRATION RATE: 18 BRPM | TEMPERATURE: 100 F

## 2020-11-05 LAB
A1C WITH ESTIMATED AVERAGE GLUCOSE RESULT: 6 % — HIGH (ref 4–5.6)
ANION GAP SERPL CALC-SCNC: 9 MMOL/L — SIGNIFICANT CHANGE UP (ref 5–17)
BUN SERPL-MCNC: 27 MG/DL — HIGH (ref 7–23)
CALCIUM SERPL-MCNC: 8.1 MG/DL — LOW (ref 8.5–10.1)
CHLORIDE SERPL-SCNC: 114 MMOL/L — HIGH (ref 96–108)
CO2 SERPL-SCNC: 19 MMOL/L — LOW (ref 22–31)
CREAT SERPL-MCNC: 1.28 MG/DL — SIGNIFICANT CHANGE UP (ref 0.5–1.3)
ESTIMATED AVERAGE GLUCOSE: 126 MG/DL — HIGH (ref 68–114)
GLUCOSE BLDC GLUCOMTR-MCNC: 100 MG/DL — HIGH (ref 70–99)
GLUCOSE BLDC GLUCOMTR-MCNC: 101 MG/DL — HIGH (ref 70–99)
GLUCOSE BLDC GLUCOMTR-MCNC: 102 MG/DL — HIGH (ref 70–99)
GLUCOSE BLDC GLUCOMTR-MCNC: 68 MG/DL — LOW (ref 70–99)
GLUCOSE SERPL-MCNC: 58 MG/DL — LOW (ref 70–99)
HCT VFR BLD CALC: 29.6 % — LOW (ref 39–50)
HGB BLD-MCNC: 8.5 G/DL — LOW (ref 13–17)
MCHC RBC-ENTMCNC: 21.3 PG — LOW (ref 27–34)
MCHC RBC-ENTMCNC: 28.7 GM/DL — LOW (ref 32–36)
MCV RBC AUTO: 74.2 FL — LOW (ref 80–100)
NRBC # BLD: 0 /100 WBCS — SIGNIFICANT CHANGE UP (ref 0–0)
PLATELET # BLD AUTO: 217 K/UL — SIGNIFICANT CHANGE UP (ref 150–400)
POTASSIUM SERPL-MCNC: 3.8 MMOL/L — SIGNIFICANT CHANGE UP (ref 3.5–5.3)
POTASSIUM SERPL-SCNC: 3.8 MMOL/L — SIGNIFICANT CHANGE UP (ref 3.5–5.3)
RBC # BLD: 3.99 M/UL — LOW (ref 4.2–5.8)
RBC # FLD: 25.5 % — HIGH (ref 10.3–14.5)
SARS-COV-2 IGG SERPL QL IA: NEGATIVE — SIGNIFICANT CHANGE UP
SARS-COV-2 IGM SERPL IA-ACNC: <0.1 INDEX — SIGNIFICANT CHANGE UP
SODIUM SERPL-SCNC: 142 MMOL/L — SIGNIFICANT CHANGE UP (ref 135–145)
WBC # BLD: 5.55 K/UL — SIGNIFICANT CHANGE UP (ref 3.8–10.5)
WBC # FLD AUTO: 5.55 K/UL — SIGNIFICANT CHANGE UP (ref 3.8–10.5)

## 2020-11-05 PROCEDURE — 99239 HOSP IP/OBS DSCHRG MGMT >30: CPT

## 2020-11-05 RX ORDER — FOLIC ACID 0.8 MG
1 TABLET ORAL
Qty: 30 | Refills: 0
Start: 2020-11-05

## 2020-11-05 RX ORDER — POLYETHYLENE GLYCOL 3350 17 G/17G
17 POWDER, FOR SOLUTION ORAL
Qty: 510 | Refills: 0
Start: 2020-11-05

## 2020-11-05 RX ADMIN — POLYETHYLENE GLYCOL 3350 17 GRAM(S): 17 POWDER, FOR SOLUTION ORAL at 13:42

## 2020-11-05 RX ADMIN — Medication 200 MILLIGRAM(S): at 06:11

## 2020-11-05 RX ADMIN — Medication 1 GRAM(S): at 13:42

## 2020-11-05 RX ADMIN — Medication 325 MILLIGRAM(S): at 06:11

## 2020-11-05 RX ADMIN — MAGNESIUM OXIDE 400 MG ORAL TABLET 400 MILLIGRAM(S): 241.3 TABLET ORAL at 01:58

## 2020-11-05 RX ADMIN — Medication 62.5 MILLIMOLE(S): at 13:42

## 2020-11-05 RX ADMIN — Medication 1 GRAM(S): at 00:07

## 2020-11-05 RX ADMIN — Medication 325 MILLIGRAM(S): at 13:42

## 2020-11-05 RX ADMIN — POTASSIUM PHOSPHATE, MONOBASIC POTASSIUM PHOSPHATE, DIBASIC 62.5 MILLIMOLE(S): 236; 224 INJECTION, SOLUTION INTRAVENOUS at 01:59

## 2020-11-05 RX ADMIN — Medication 1 GRAM(S): at 06:11

## 2020-11-05 RX ADMIN — Medication 25 MILLIGRAM(S): at 06:11

## 2020-11-05 RX ADMIN — PANTOPRAZOLE SODIUM 40 MILLIGRAM(S): 20 TABLET, DELAYED RELEASE ORAL at 10:00

## 2020-11-05 RX ADMIN — Medication 1 MILLIGRAM(S): at 13:42

## 2020-11-05 NOTE — DISCHARGE NOTE NURSING/CASE MANAGEMENT/SOCIAL WORK - PATIENT PORTAL LINK FT
You can access the FollowMyHealth Patient Portal offered by [facility] by registering at the following website: [email or web address]. By joining [facility]’s FollowMyHealth portal, you will also be able to view your health information using other applications (apps) compatible with our system.

## 2020-11-05 NOTE — DISCHARGE NOTE PROVIDER - HOSPITAL COURSE
66 y/o male w/ PMHx of CAD s/p stent, DM type 2, legally blind, Peripheral neuropathy, PVD s/p right BKA, bedbound, CKD stage 3, Microcytic anemia, PUD presents to the ED due to abnormal labs. Pt being admitted severe microcytic anemia, no active bleeding.      Problem/Plan - 1:  ·  Problem: severe Anemia, unspecified type.  Plan: - No hematemesis, or BRBPR  - FOBT neg  - s/p blood transfusion. + iron deficiency and low normal folate level. cont feosol and  oral folic acid.   - stable H and H.     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 controlled. Plan: - place of FS q 6 w/ SSI    Problem/Plan - 3:  ·  Problem: Stage 3 chronic kidney disease, unspecified whether stage 3a or 3b CKD. Plan: - GFR stable.    Problem/Plan - 4:  ·  Problem: Essential hypertension. Plan: - c/w home meds.    Problem/Plan - 5:  ·  Problem: Hyperlipidemia, unspecified hyperlipidemia type. Plan: - c/w statin.    Problem/Plan - 6:  Problem: Coronary artery disease involving native heart without angina pectoris, unspecified vessel or lesion type.Plan: - pt taken off ASA and plavix last yr after bleed  - c/w statin.      Ischium stage 2 decubiti ulcer. consulted wound care.     DNR  PT eval>> Home with HC       Seen and examined by me today. Vitals stable.   All questions welcomed and answered appropriately. Patient's wife Brenna verbalized understanding of post discharge physician's follows up and discharge instructions.   DC time spent by me excluding billable procedures 38 mins

## 2020-11-05 NOTE — DIETITIAN INITIAL EVALUATION ADULT. - PERTINENT LABORATORY DATA
11-05 Na142 mmol/L Glu 58 mg/dL<L> K+ 3.8 mmol/L Cr  1.28 mg/dL BUN 27 mg/dL<H> 11-04 Phos 1.7 mg/dL<L> 11-04 Alb 2.7 g/dL<L>    11-04-20 @ 23:41A1C 6.0

## 2020-11-05 NOTE — DIETITIAN INITIAL EVALUATION ADULT. - FEEDING SKILL
----- Message from Nando Blue RN sent at 5/23/2017 11:31 AM CDT -----  Regarding: FW: Inez Varma  Contact: [phone]   Spoke to son briefly before disconnected  Left message with direct triage number at [phone]  Nando Blue RN 11:31 AM 05/23/17    ----- Message -----     From: Denisse Rosenbaum     Sent: 5/22/2017   6:37 PM       To: Eye Triage[facility]  Subject: Eriker vinayak Varma                            The pt's son Malka called. The pt is having pain in his eyes, they are swollen, not seeing well and can't wait until June.   Malka wants to know if he can see another MD. Can you see if you can bring him in sooner?  Please call Malka at [phone] on Tuesday to discuss. You may need an  but I could understand most of what he was saying.     Thanks - Denisse    Please DO NOT send this message and/or reply back to sender.  Call Center Representatives DO NOT respond to messages.         minimal assistance

## 2020-11-05 NOTE — DIETITIAN INITIAL EVALUATION ADULT. - OTHER INFO
Pt legally blind, able to answer RD questions with out difficulty. Pt lives with spouse and has a home health aid that assist with ADL. Denies any N/V/D/C and no difficulties with chewing or swallowing. Pt denied following any therapeutic diet at home. Pt reported good appetite currently and PTA. Pt reported UBW ~300# for many years but does not recall when he was last weighed. Pt reported taking medication for T2DM, but could not recall name. Pt denies taking finger sticks at home. A1c 6.0%, pt made aware of value and its purpose. Discussed with pt current diet, pt resistant to education; but reported to food is fine. Pt receptively said during interview, " I feel great".

## 2020-11-05 NOTE — PHYSICAL THERAPY INITIAL EVALUATION ADULT - ADDITIONAL COMMENTS
Pt lives with spouse.   Pt does not transfer out of bed.   Pt has a hospital bed, no other dme.   Pt has a nurse that visits 2 to 3 times a week.

## 2020-11-05 NOTE — PHYSICAL THERAPY INITIAL EVALUATION ADULT - GENERAL OBSERVATIONS, REHAB EVAL
Pt encountered supine, iv in place, R BKA, pneumatic patel to L LE in place, NAD. Pt wound measured and documented by PMALIKA,

## 2020-11-05 NOTE — PHYSICAL THERAPY INITIAL EVALUATION ADULT - PERTINENT HX OF CURRENT PROBLEM, REHAB EVAL
64 y/o male w/ PMHx of CAD s/p stent, DM type 2, legally blind, Peripheral neuropathy, PVD s/p right BKA, bedbound, CKD stage 3, Microcytic anemia, PUD presents to the ED due to abnormal labs. Pt reports a visiting nurse came to the house to draw routine blood work, and give him his Flu and B12 shots. Pt reports later on the day they were called and advised to go to the ED due to low blood count. pt s/p 3 units PRBC.

## 2020-11-05 NOTE — DIETITIAN INITIAL EVALUATION ADULT. - PROBLEM SELECTOR PLAN 1
- No hematemesis, or BRBPR  - FOBT neg  - 2 Unit PRBC ordered  - rpt H/H after transfusion  - Keep NPO after mn  - f/u iron, TIBC, ferritin, may benefit from IV iron  - place on iron

## 2020-11-05 NOTE — DISCHARGE NOTE PROVIDER - NSDCFUADDINST_GEN_ALL_CORE_FT
1) It is important to see your primary physician as well as other necessary consultants within the next week to perform a comprehensive medical review.  Call their offices for an appointment as soon as you leave the hospital.  If you do not have a primary physician, contact the [facility] Physician Referral Service at [phone].  Your medical issues appear to be stable at this time, but if your symptoms recur or worsen, contact your physicians and/or return to the hospital if necessary.  If you encounter any issues or questions with your medication, call your physicians before stopping the medication.     2) discussed with wife that patient should continue feosol and folic acid at home and patient can be given miralax for constipation prophylaxis.

## 2020-11-05 NOTE — DIETITIAN INITIAL EVALUATION ADULT. - OTHER CALCULATIONS
Ht (cm):   185.4 Wt (kg):   125.0 kg      IBW:    73.4 (adjusted)   %IBW: 107%   UBW:    unknown  %UBW: n/a

## 2020-11-05 NOTE — DISCHARGE NOTE PROVIDER - NSDCMRMEDTOKEN_GEN_ALL_CORE_FT
ATORVASTATIN 20MG TABLETS:   CARBAMAZEPINE 200MG TABLETS:   CYANOCOBALAMIN 1000MCG/ML INJ 1ML: INJECT ONE ML IM  MONTHLY  FeroSul 325 mg (65 mg elemental iron) oral tablet: 325 tab(s) orally 3 times a day MDD:3   folic acid 1 mg oral tablet: 1 tab(s) orally once a day  LANTUS SOLOSTAR PEN INJ 3ML: INJECT 20 UNITS SC 1-2 TIMES A DAY  METOPROLOL TARTRATE 25MG TABLETS:   NYSTOP 129836 U/GM TOP POWDER 60GM: APPLY TO AFFECTED AREA TID  PANTOPRAZOLE 40MG TABLETS:   Percocet 10/325 oral tablet: 1 tab(s) orally once a day  polyethylene glycol 3350 oral powder for reconstitution: 17 gram(s) orally once a day  SUCRALFATE 1GM TABLETS:   ZOLPIDEM 10MG TABLETS: TK 1 T PO QHS

## 2020-11-05 NOTE — DIETITIAN INITIAL EVALUATION ADULT. - PERTINENT MEDS FT
MEDICATIONS  (STANDING):  atorvastatin 40 milliGRAM(s) Oral at bedtime  carBAMazepine 200 milliGRAM(s) Oral two times a day  dextrose 5%. 1000 milliLiter(s) (50 mL/Hr) IV Continuous <Continuous>  dextrose 50% Injectable 12.5 Gram(s) IV Push once  dextrose 50% Injectable 25 Gram(s) IV Push once  dextrose 50% Injectable 25 Gram(s) IV Push once  ferrous    sulfate 325 milliGRAM(s) Oral three times a day  folic acid 1 milliGRAM(s) Oral daily  insulin glargine Injectable (LANTUS) 15 Unit(s) SubCutaneous every morning  insulin lispro (ADMELOG) corrective regimen sliding scale   SubCutaneous three times a day before meals  insulin lispro (ADMELOG) corrective regimen sliding scale   SubCutaneous at bedtime  metoprolol tartrate 25 milliGRAM(s) Oral two times a day  pantoprazole    Tablet 40 milliGRAM(s) Oral before breakfast  polyethylene glycol 3350 17 Gram(s) Oral daily  sodium phosphate IVPB 15 milliMole(s) IV Intermittent once  sucralfate 1 Gram(s) Oral four times a day    MEDICATIONS  (PRN):  dextrose 40% Gel 15 Gram(s) Oral once PRN Blood Glucose LESS THAN 70 milliGRAM(s)/deciliter  glucagon  Injectable 1 milliGRAM(s) IntraMuscular once PRN Glucose LESS THAN 70 milligrams/deciliter  oxycodone    5 mG/acetaminophen 325 mG 2 Tablet(s) Oral every 8 hours PRN Moderate Pain (4 - 6)  zolpidem 5 milliGRAM(s) Oral at bedtime PRN Insomnia

## 2020-11-05 NOTE — DIETITIAN INITIAL EVALUATION ADULT. - PHYSCIAL ASSESSMENT
obese Actual BMI: 38.4 (Adjusted for BKA Right)   Ideal body weight adjusted for BKA: 73.4 kg    Unable to perform nutrition focused exam. Based on observations: Orbital WNL; Temples WNL;  Clavicle WNL; interosseous WNL

## 2020-11-11 DIAGNOSIS — H54.8 LEGAL BLINDNESS, AS DEFINED IN USA: ICD-10-CM

## 2020-11-11 DIAGNOSIS — D64.9 ANEMIA, UNSPECIFIED: ICD-10-CM

## 2020-11-11 DIAGNOSIS — E11.42 TYPE 2 DIABETES MELLITUS WITH DIABETIC POLYNEUROPATHY: ICD-10-CM

## 2020-11-11 DIAGNOSIS — I12.9 HYPERTENSIVE CHRONIC KIDNEY DISEASE WITH STAGE 1 THROUGH STAGE 4 CHRONIC KIDNEY DISEASE, OR UNSPECIFIED CHRONIC KIDNEY DISEASE: ICD-10-CM

## 2020-11-11 DIAGNOSIS — E61.1 IRON DEFICIENCY: ICD-10-CM

## 2020-11-11 DIAGNOSIS — Z89.511 ACQUIRED ABSENCE OF RIGHT LEG BELOW KNEE: ICD-10-CM

## 2020-11-11 DIAGNOSIS — N18.30 CHRONIC KIDNEY DISEASE, STAGE 3 UNSPECIFIED: ICD-10-CM

## 2020-11-11 DIAGNOSIS — Z74.01 BED CONFINEMENT STATUS: ICD-10-CM

## 2020-11-11 DIAGNOSIS — Z95.5 PRESENCE OF CORONARY ANGIOPLASTY IMPLANT AND GRAFT: ICD-10-CM

## 2020-11-11 DIAGNOSIS — Z66 DO NOT RESUSCITATE: ICD-10-CM

## 2020-11-11 DIAGNOSIS — E11.22 TYPE 2 DIABETES MELLITUS WITH DIABETIC CHRONIC KIDNEY DISEASE: ICD-10-CM

## 2020-11-11 DIAGNOSIS — K27.9 PEPTIC ULCER, SITE UNSPECIFIED, UNSPECIFIED AS ACUTE OR CHRONIC, WITHOUT HEMORRHAGE OR PERFORATION: ICD-10-CM

## 2020-11-11 DIAGNOSIS — I25.10 ATHEROSCLEROTIC HEART DISEASE OF NATIVE CORONARY ARTERY WITHOUT ANGINA PECTORIS: ICD-10-CM

## 2020-11-11 DIAGNOSIS — L89.42 PRESSURE ULCER OF CONTIGUOUS SITE OF BACK, BUTTOCK AND HIP, STAGE 2: ICD-10-CM

## 2021-01-25 NOTE — PATIENT PROFILE ADULT. - PAIN SCALE PREFERRED, PROFILE
numerical 0-10 Localized Dermabrasion With Wire Brush Text: The patient was draped in routine manner.  Localized dermabrasion using 3 x 17 mm wire brush was performed in routine manner to papillary dermis. This spot dermabrasion is being performed to complete skin cancer reconstruction. It also will eliminate the other sun damaged precancerous cells that are known to be part of the regional effect of a lifetime's worth of sun exposure. This localized dermabrasion is therapeutic and should not be considered cosmetic in any regard. Localized Dermabrasion Text: The patient was draped in routine manner.  Localized dermabrasion using 3 x 17 mm wire brush was performed in routine manner to papillary dermis. This spot dermabrasion is being performed to complete skin cancer reconstruction. It also will eliminate the other sun damaged precancerous cells that are known to be part of the regional effect of a lifetime's worth of sun exposure. This localized dermabrasion is therapeutic and should not be considered cosmetic in any regard.

## 2021-01-27 NOTE — PROGRESS NOTE ADULT - SUBJECTIVE AND OBJECTIVE BOX
Mr. Coker is an 80 yo man with PMH of HTN, HLD, [place] disease, recent dx of COVID 19 admitted for hypoxic respiratory failure 2/2 COVID PNA with improving respiratory symptoms, YUKI 2/2 rhabdomyolysis with improving SCr, GI bleed worsening follow no improvement with 5 Units PRBC. MICU consulted for further management of GI bleed.     Neuro  -lethargic on exam.   -as per GI notes, patient is AOx0    Cardiovascular  -No active issues    Respiratory  #COVID 19 admitted for hypoxic respiratory failure 2/2 COVID PNA  -improved, now on Room Air    GI/Nutrition  #Anemia 2/2 GI bleed s/p 5 units PRBC, Hb decrease to 8.4 from 9.1, hemodynamically stable  -colonoscopy/EGD 1/27 AM, if Hgb downtrending/ BP unstable, GI will do emergent scope   -if stable with dropping Hb, will consider CTA a/p   -Continue to monitor Hgb and provide blood products as needed  -Monitor bowel movements    Renal  #YUKI in the setting of sepsis, hypotension and rhabdomyolysis likely ATN  -SCr improving from 5.37 on admission to 3.54  -Continue with D5W with current hypernaturemia.  -Avoid nephrotoxic medications  -Monitor electrolytes    #Hypernatremia 2/2 free water deficit  -D5W at 100 ml/hr per nephrology recommendations  -Monitor serum sodium    ID  #COVID-19 PNA s/p antibiotics, decadron, previously on 6L NC, now breathing on RA.  -Continue to monitor respiratory function     Endocrine  #Adrenal Insufficiency  -Was receiving dexamethasone and florinef until 1/20, transitioned to Hydrocortisone 25 mg IV BID  -Continue with Hydrocortisone 25 mg IV BID    #Hypothyroidism  -Continue with synthroid    #Hypoglycemia most likely 2/2 steroid use  -Pt currently off all insulin  -Contiue with D5 until able to take PO per endocrinology     Hematologic  -monitor CBC as above    DVT ppx  -holding ppx in the setting of acute bleed.   Patient is a 62y old  Male who presents with a chief complaint of weakness, found with ASUNCION      SUBJECTIVE / OVERNIGHT EVENTS: c/o constipation; seems aggrevated with the medical system.  "why can't they just cut off my leg above my knee and we wouldn't have this problem?"  "This is because I have coverage."      MEDICATIONS  (STANDING):  aspirin 325 milliGRAM(s) Oral daily  atorvastatin 40 milliGRAM(s) Oral at bedtime  calcium carbonate 500 mG (Tums) Chewable 1 Tablet(s) Chew three times a day  chlorhexidine 4% Liquid 1 Application(s) Topical daily  clopidogrel Tablet 75 milliGRAM(s) Oral daily  dextrose 5%. 1000 milliLiter(s) (50 mL/Hr) IV Continuous <Continuous>  dextrose 50% Injectable 12.5 Gram(s) IV Push once  dextrose 50% Injectable 25 Gram(s) IV Push once  dextrose 50% Injectable 25 Gram(s) IV Push once  docusate sodium 100 milliGRAM(s) Oral three times a day  insulin glargine Injectable (LANTUS) 10 Unit(s) SubCutaneous at bedtime  insulin lispro (HumaLOG) corrective regimen sliding scale   SubCutaneous three times a day before meals  lactobacillus acidophilus 1 Tablet(s) Oral two times a day with meals  levoFLOXacin  Tablet 250 milliGRAM(s) Oral every 24 hours  polyethylene glycol 3350 17 Gram(s) Oral daily  pregabalin 100 milliGRAM(s) Oral two times a day  sodium chloride 0.9%. 1000 milliLiter(s) (80 mL/Hr) IV Continuous <Continuous>  sucralfate suspension 1 Gram(s) Oral four times a day    MEDICATIONS  (PRN):  dextrose Gel 1 Dose(s) Oral once PRN Blood Glucose LESS THAN 70 milliGRAM(s)/deciliter  glucagon  Injectable 1 milliGRAM(s) IntraMuscular once PRN Glucose LESS THAN 70 milligrams/deciliter  oxyCODONE    5 mG/acetaminophen 325 mG 1 Tablet(s) Oral every 8 hours PRN Moderate Pain (4 - 6)      Vital Signs Last 24 Hrs    T(F): 98 (09 Oct 2017 11:43), Max: 98.8 (08 Oct 2017 16:29)  HR: 67 (09 Oct 2017 11:43) (67 - 82)  BP: 132/61 (09 Oct 2017 11:43) (124/55 - 143/67)  RR: 18 (09 Oct 2017 11:43) (16 - 18)  SpO2: 98% (09 Oct 2017 11:43) (94% - 98%)  CAPILLARY BLOOD GLUCOSE  164 (09 Oct 2017 11:40)  119 (09 Oct 2017 07:54)  115 (08 Oct 2017 23:24)  153 (08 Oct 2017 16:45)              PHYSICAL EXAM  GENERAL: NAD, well-developed  HEAD:  Atraumatic, Normocephalic  EYES: EOMI, PERRLA, conjunctiva and sclera clear  NECK: Supple, No JVD  CHEST/LUNG: Clear to auscultation bilaterally; No wheeze  HEART: Regular rate and rhythm; No murmurs, rubs, or gallops  ABDOMEN: Soft, Nontender, Nondistended; Bowel sounds present; obese  EXTREMITIES: R BKA with lat wound covered with mepilex  PSYCH: AAOx3  SKIN: R BKA stump ulcer, covered  LABS:                        7.2    5.5   )-----------( 139      ( 09 Oct 2017 07:08 )             21.9     10-09    141  |  110<H>  |  98<H>  ----------------------------<  101<H>  4.7   |  18<L>  |  5.36<H>    Ca    8.4<L>      09 Oct 2017 07:08  Phos  3.2     10-09  Mg     2.1     10-09                RADIOLOGY & ADDITIONAL TESTS:    Imaging Personally Reviewed:  Consultant(s) Notes Reviewed:    Care Discussed with Consultants/Other Providers: Mr. Coker is an 78 yo man with PMH of HTN, HLD, [place] disease, recent dx of COVID 19 admitted for hypoxic respiratory failure 2/2 COVID PNA with improving respiratory symptoms, YUKI 2/2 rhabdomyolysis with improving SCr, GI bleed worsening follow no improvement with 5 Units PRBC. MICU consulted for further management of GI bleed.     Neuro  -lethargic on exam.   -Patient AOx2     Cardiovascular  -Atorvastatin for HLD    Respiratory  #COVID 19 admitted for hypoxic respiratory failure 2/2 COVID PNA  -improved, now on Room Air    GI/Nutrition  #Anemia 2/2 GI bleed s/p 5 units PRBC hemodynamically stable  -colonoscopy/EGD 1/27 AM, showed non bleeding diverticula and rectal ulcers. Rectal ulcers likely source of rectal bleeding, consult colorectal surgery    -Continue to monitor Hgb and provide blood products as needed  -Monitor bowel movements    Renal  #YUKI in the setting of sepsis, hypotension and rhabdomyolysis likely ATN  -SCr improving from 5.37 on admission to 3.54  -Continue with D5W with current hypernatremia.  -Avoid nephrotoxic medications  -Monitor electrolytes    #Hypernatremia 2/2 free water deficit  -D5W at 100 ml/hr per nephrology recommendations  -Monitor serum sodium    #Hypokalemia   - Monitor serum potassium     ID  #COVID-19 PNA s/p antibiotics, decadron, previously on 6L NC, now breathing on RA.  -Continue to monitor respiratory function     Endocrine  #Adrenal Insufficiency  -Was receiving dexamethasone and florinef until 1/20, transitioned to Hydrocortisone 25 mg IV BID  -Continue with Hydrocortisone 25 mg IV BID    #Hypothyroidism  -Continue with synthroid    #Hypoglycemia most likely 2/2 steroid use  -Pt currently off all insulin  -Contiue with D5 until able to take PO per endocrinology     Hematologic  -monitor CBC as above    DVT ppx  -holding ppx in the setting of acute bleed.         Mr. Coker is an 80 yo man with PMH of HTN, HLD, [place] disease, recent dx of COVID 19 admitted for hypoxic respiratory failure 2/2 COVID PNA with improving respiratory symptoms, YUKI 2/2 rhabdomyolysis with improving SCr, GI bleed worsening follow no improvement with 5 Units PRBC. MICU consulted for further management of GI bleed.     Neuro  -lethargic on exam.   -Patient AOx2     Cardiovascular  -Atorvastatin for HLD    Respiratory  #COVID 19 admitted for hypoxic respiratory failure 2/2 COVID PNA  -improved, now on Room Air    GI/Nutrition  #Anemia 2/2 GI bleed s/p 5 units PRBC hemodynamically stable  -colonoscopy/EGD 1/27 AM, showed non bleeding diverticula and rectal ulcers. Rectal ulcers likely source of rectal bleeding, consult colorectal surgery    -Continue to monitor Hgb and provide blood products as needed  -Monitor bowel movements    Renal  #YUKI in the setting of sepsis, hypotension and rhabdomyolysis likely ATN  -SCr improving from 5.37 on admission to 3.54  -Continue with D5W with current hypernatremia.  -Avoid nephrotoxic medications  -Monitor electrolytes    #Hypernatremia 2/2 free water deficit  -D5W at 150 ml/hr   -Monitor serum sodium    #Hypokalemia   - Monitor serum potassium     ID  #COVID-19 PNA s/p antibiotics, decadron, previously on 6L NC, now breathing on RA.  -Continue to monitor respiratory function     Endocrine  #Adrenal Insufficiency  -Was receiving dexamethasone and florinef until 1/20, transitioned to Hydrocortisone 25 mg IV BID  -Continue with Hydrocortisone 25 mg IV BID    #Hypothyroidism  -Continue with synthroid    #Hypoglycemia most likely 2/2 steroid use  -Pt currently off all insulin  -Contiue with D5 until able to take PO per endocrinology     Hematologic  -monitor CBC as above    DVT ppx  -holding ppx in the setting of acute bleed.

## 2021-05-12 NOTE — ED PROVIDER NOTE - CARDIAC, MLM
Annual with repeat pap and HPV. Thank you. Normal rate, regular rhythm.  Heart sounds S1, S2.  No murmurs, rubs or gallops.

## 2022-06-09 NOTE — ED PROVIDER NOTE - GASTROINTESTINAL, MLM
Abdomen soft, non-tender, no guarding. Surgeon/Pathologist Verbiage (Will Incorporate Name Of Surgeon From Intro If Not Blank): operated in two distinct and integrated capacities as the surgeon and pathologist.

## 2022-07-03 NOTE — PROGRESS NOTE ADULT - PROBLEM/PLAN-4
DISPLAY PLAN FREE TEXT
clipper

## 2022-08-10 NOTE — DIETITIAN INITIAL EVALUATION ADULT. - ENTER TO (ML/KG)
WOUND CARE CONSULT NOTE    Patient: Jeferson Dickey Date: 8/10/2022   : 1950 Attending: Lori ONOFRE Do, DO   71 year old male      Reason For Wound RN consult:  Bilateral feet    Wound Measurements:     Wound Foot Right (Active)   Wound Care Team Consult Date 08/10/22 08/10/22 1440   Wound Length (cm) 7.7 cm 08/10/22 1440   Wound Width (cm) 3.7 cm 08/10/22 1440   Wound Depth (cm) 0.2 cm 08/10/22 1440   Wound Surface Area (cm^2) 28.49 cm^2 08/10/22 1440   Wound Volume (cm^3) 5.698 cm^3 08/10/22 1440   Number of days: 1       Wound Ankle Left Medial (Active)   Wound Care Team Consult Date 08/10/22 08/10/22 1440   Number of days: 1       Wound Achilles Right (Active)   Wound Care Team Consult Date 08/10/22 08/10/22 1440   Wound Length (cm) 16 cm 08/10/22 1440   Wound Width (cm) 3 cm 08/10/22 1440   Wound Depth (cm) 0.3 cm 08/10/22 1440   Wound Surface Area (cm^2) 48 cm^2 08/10/22 1440   Wound Volume (cm^3) 14.4 cm^3 08/10/22 1440   Number of days: 1     Wound Characteristics:  Wound Edge  Wound Foot Right-Wound Edge: Attached to wound bed, Poorly defined  Wound Ankle Left Medial-Wound Edge: Attached to wound bed, Poorly defined  Wound Achilles Right-Wound Edge: Attached to wound bed, Well defined    Wound Exudate  Wound Foot Right-Wound Exudate: Moderate, Purulent, Mild odor  Wound Ankle Left Medial-Wound Exudate: Minimal, Serosanguineous, No odor  Wound Achilles Right-Wound Exudate: Minimal, Serosanguineous, No odor    Wound Bed/Tissue Type  Wound Foot Right-Wound Bed/Tissue Type: Pink, Red, Yellow, Tan  Wound Ankle Left Medial-Wound Bed/Tissue Type: Pink, White  Wound Achilles Right-Wound Bed/Tissue Type: Pink, Red, White    Zuri-wound Condition  Wound Foot Right-Periwound Condition: Erythema, blanchable, Pain increase  Wound Ankle Left Medial-Periwound Condition: Erythema, blanchable  Wound Achilles Right-Periwound Condition: Erythema, blanchable, Pain increase    Pressure Ulcer Prevention Interventions and  Assessments    Current assessments and interventions:  1. Most recent Arben Scale Score:     08/10/22 1000   Arben Scale   Activity 1   Mobility 3   Sensory Perception 3   Nutrition 3   Moisture 3   Friction and Shear 2   Arben Scale Score 15     2. Moisture management interventions at this time:  Recommend frequent linen changes, frequent repositioning and scheduled toileting.    3. Mattress in place: standard non-powered  4. Heels off loaded by: pillows.  5. Patient repositioned last:  See flowsheet.  6. Medical devices: PIV.  7. Bony prominences:  Unable to complete head to toe assessment - attending MD came into assess and discuss plan.     Spent 15 minutes with direct patient care and 20 minutes with documentation.    SUMMARY AND PLAN:    Wound RNs at bedside to assess wounds to both feet/ankles.  Patient reports he has rec'd wound care from [facility] in [place].  Old dressings removed, wounds cleaned with normal saline and gauze.  One wound identified on left - medial ankle - wound bed is pink and white - patient reports that wound has been there over a year.  Two wounds identified on right - posterior/achilles (wound bed is pink, red, white and tender) and great toe/dorsal (wound bed is pink, red, brown and yellow with purulent drainage) - patient reports those wounds are newer, occurring this year.  All three wounds dressed with Xeroform, gauze, roll gauze and tape.    Upon review of patient's chart, attending MD also consulted podiatry and vascular surgery.  Confirmed with podiatrist, Dr. Olivera, that wound care team will sign off at this time.    Chiquita Sexton, BRIANNAN RN [facility]   33

## 2022-08-20 NOTE — ED ADULT TRIAGE NOTE - CHIEF COMPLAINT QUOTE
Department of Pediatrics  General Pediatrics  Attending History and Physical        CHIEF COMPLAINT:  cough, respiratory distress    Reason for Admission:  respiratory distress/ bronchiolitis    History Obtained From:  patient, mother, father    HISTORY OF PRESENT ILLNESS:              The patient is a 24 m.o. female without a significant past medical history who presents with cough, SOB, wheezing for 1-2 days. Mother reports starting Thursday with increased temps, cough, drainage. Seemed to worsen thru overnight therefore brought into ED this am for evaluation. Review of Systems:  RESPIRATORY:  positive for dry cough, dyspnea, and wheezing  GASTROINTESTINAL:  negative  BEHAVIOR/PSYCH:  negative    BIRTH HISTORY    Gestational Age: <None>   Type of Delivery:  Delivery Method: N/A  Complications:  none    Past Medical History:    History reviewed. No pertinent past medical history. Past Surgical History:    History reviewed. No pertinent surgical history. Medications Prior to Admission:   No medications prior to admission. Allergies:  Patient has no known allergies. Vaccinations:  Routine Immunizations: Up to date? Yes                    High Risk Immunizations:  Influenza: Up-to-date. Pneumococcal Polysaccharide (after age 2): Up-to-date. Palivizumab (RSV):  Up-to-date    Diet:  general    Family History:   History reviewed. No pertinent family history. Social History:   Current Caregiver is mother/ father    Development: 18 months:  Runs stiffly and Feeds self    Physical Exam:    Vitals:    Temp: 97.4 °F (36.3 °C) I Temp  Av.6 °F (37 °C)  Min: 97.4 °F (36.3 °C)  Max: 100.1 °F (37.8 °C) I Heart Rate: 119 I Pulse  Av  Min: 119  Max: 173 I   I No data recorded.  ; No data recorded. I Resp: 28 I Resp  Av.3  Min: 28  Max: 38 I SpO2: 99 % I SpO2  Av.3 %  Min: 96 %  Max: 99 % I   I Height: 32\" (81.3 cm) I   I No head circumference on file for this encounter.  I      62 %ile (Z= 0.31) dizziness with nausea for 2 days, denies any headache. Pt had blood work done 2 days ago with low H&H , denies chest pain or sob based on WHO (Girls, 0-2 years) weight-for-age data using vitals from 8/20/2022.  16 %ile (Z= -0.98) based on WHO (Girls, 0-2 years) Length-for-age data based on Length recorded on 8/20/2022. No head circumference on file for this encounter. 89 %ile (Z= 1.25) based on WHO (Girls, 0-2 years) BMI-for-age based on BMI available as of 8/20/2022. GENERAL:  alert, active, and cooperative  RESPIRATORY:  breath sounds clear to auscultation bilaterally, no crackles or wheezing, and good air exchange  CARDIOVASCULAR:  regular rate and rhythm, normal S1, S2, no murmur noted, 2+ pulses throughout, and capillary Refill less than 2 seconds  ABDOMEN:  soft, non-distended, non-tender, no rebound tenderness or guarding, normal active bowel sounds, no masses palpated, and no hepatosplenomegaly  SKIN:  no rashes    DATA:  none    Assessment/Diagnostic and Treatment Plan:    Health Maintenance:    Patient's primary care physician is No primary care provider on file. The PCP has not been notified. Principal Problem:    RSV (acute bronchiolitis due to respiratory syncytial virus)  Plan: observe toddler after receiving racemic epi in ED and monitor breathing. Toddler has improved significantly since getting to floor. Discussed with pt and family about possible going home today/ tmrw pending progress with close F/U with PCP.

## 2022-11-02 NOTE — DIETITIAN INITIAL EVALUATION ADULT. - +GENDER
Return to the ED in 10 days for removal of 5 stitches. Return sooner if the wound appears red or with pus/blood discharge.     Laceration    WHAT YOU NEED TO KNOW:    A laceration is an injury to the skin and the soft tissue underneath it. Lacerations happen when you are cut or hit by something. They can happen anywhere on the body.     DISCHARGE INSTRUCTIONS:    Return to the emergency department if:     You have heavy bleeding or bleeding that does not stop after 10 minutes of holding firm, direct pressure over the wound.       Your wound opens up.     Contact your healthcare provider if:     You have a fever or chills.       Your laceration is red, warm, or swollen.      You have red streaks on your skin coming from your wound.      You have white or yellow drainage from the wound that smells bad.      You have pain that gets worse, even after treatment.       You have questions or concerns about your condition or care.     Medicines:     Prescription pain medicine may be given. Ask how to take this medicine safely.       Antibiotics help treat or prevent a bacterial infection.       Take your medicine as directed. Contact your healthcare provider if you think your medicine is not helping or if you have side effects. Tell him or her if you are allergic to any medicine. Keep a list of the medicines, vitamins, and herbs you take. Include the amounts, and when and why you take them. Bring the list or the pill bottles to follow-up visits. Carry your medicine list with you in case of an emergency.    Care for your wound as directed:     Do not get your wound wet until your healthcare provider says it is okay. Do not soak your wound in water. Do not go swimming until your healthcare provider says it is okay. Carefully wash the wound with soap and water. Gently pat the area dry or allow it to air dry.       Change your bandages when they get wet, dirty, or after washing. Apply new, clean bandages as directed. Do not apply elastic bandages or tape too tight. Do not put powders or lotions over your incision.       Apply antibiotic ointment as directed. Your healthcare provider may give you antibiotic ointment to put over your wound if you have stitches. If you have strips of tape over your incision, let them dry up and fall off on their own. If they do not fall off within 14 days, gently remove them. If you have glue over your wound, do not remove or pick at it. If your glue comes off, do not replace it with glue that you have at home.       Check your wound every day for signs of infection such as swelling, redness, or pus.     Self-care:     Apply ice on your wound for 15 to 20 minutes every hour or as directed. Use an ice pack, or put crushed ice in a plastic bag. Cover it with a towel. Ice helps prevent tissue damage and decreases swelling and pain.      Use a splint as directed. A splint will decrease movement and stress on your wound. It may help it heal faster. A splint may be used for lacerations over joints or areas of your body that bend. Ask your healthcare provider how to apply and remove a splint.       Decrease scarring of your wound by applying ointments as directed. Do not apply ointments until your healthcare provider says it is okay. You may need to wait until your wound is healed. Ask which ointment to buy and how often to use it. After your wound is healed, use sunscreen over the area when you are out in the sun. You should do this for at least 6 months to 1 year after your injury.     Follow up with your healthcare provider as directed: You may need to follow up in 24 to 48 hours to have your wound checked for infection. You will need to return in 3 to 14 days if you have stitches or staples so they can be removed. Care for your wound as directed to prevent infection and help it heal. Write down your questions so you remember to ask them during your visits.       © Copyright [employer] 2019 All illustrations and images included in CareNotes are the copyrighted property of [employer] or [employer].
Statement Selected

## 2022-11-09 NOTE — PATIENT PROFILE ADULT. - SOURCE OF INFORMATION, PROFILE
The patient has been examined and the H&P has been reviewed:        I concur with the findings and no changes have occurred since H&P was written.        Patient cleared for Anesthesia: General        Anesthesia/Surgery risks, benefits and alternative options discussed and understood by patient/family.      There are no hospital problems to display for this patient.     patient

## 2022-12-02 NOTE — ED ADULT TRIAGE NOTE - HEIGHT IN FEET
[facility] [place]  Nurses Note -- 4 Eyes      12/1/2022       Skin assessed on: Q Shift      [x] No Pressure Injuries Present    [x]Prevention Measures Documented    [] Yes LDA  for Pressure Injury Previously documented     [] Yes New Pressure Injury Discovered   [] LDA for New Pressure Injury Added      Attending RN:  Amadeo Peña RN     Second RN:  Velia Messer RN         6

## 2023-01-26 NOTE — ED ADULT NURSE NOTE - CAS DISCH CONDITION
Doxycycline Pregnancy And Lactation Text: This medication is Pregnancy Category D and not consider safe during pregnancy. It is also excreted in breast milk but is considered safe for shorter treatment courses. Stable

## 2023-02-16 NOTE — ED ADULT TRIAGE NOTE - AS TEMP SITE
1st ATTEMPT  LVM for pt to call back to schedule MWV with PCP.  
2nd Attempt  LVM for pt to schedule an appt    
Patient returned call to office. She wants to know if it is okay for her to wait a month or so before scheduling her MWV. Her  passed away and she would like to wait a month or so to try and avoid bad weather. Please advise [phone]    
Received refill request for hydroCHLOROthiazide (HYDRODIURIL) 25 MG tablet, lisinopril (ZESTRIL) 20 MG tablet,losartan (COZAAR) 100 MG tablet,nebivolol (BYSTOLIC) 10 MG tablet  Request is Approved   because refill protocol met approval criteria  LOV and or labs were verified to be correct  Refill sent to pharmacy  Last physical or MWV 10/08/2020   Upcoming appointment   
oral

## 2023-05-23 NOTE — PATIENT PROFILE ADULT. - NS PRO OT REFERRAL QUES 1 YN
#Cellulitis c/b Sepsis  Pt with significant right gluteal wound progressive x1 weeks. P/w tachycardia, leukocytsosis c/w sepsis. Has h/o MRSA infection, requiring long course of vancomycin in the past. Historical Derm Bx negative for PG, but if persistent with poor wound healing, may reconsider further evaluation.  hx of left   left gluteal wound requiring debridement  surgery recommends multidisciplinary consults to determine etiology prior to debridement   TTE: ef: 60% unremarkable, endocardium could not be adequately visualized  5/6 BCX: MRSA; 5/8, 5/10 BCx: NGTD   s/p cefepime   UCx negative   - s/p wound care debridement 5/10, f/u path results, will f/u if any interventions needed inpatient  - Derm recs appreciated: not likely Pyoderma Gangrenosum  - c/w vancomycin (5/7-6/5)  - f/u ID recs no

## 2023-06-10 NOTE — ED ADULT NURSE NOTE - NSIMPLEMENTINTERV_GEN_ALL_ED
OFFICE VISIT    Patient: Luis Felipe Alonso   : 1971 MRN: [id number]    Disclaimer to Patients: Medical notes are made available to patients in the interest of transparency. Please be advised that our medical notes are intended for peer to peer communication regarding medical care. Therefore, it will contain medical language with which you may not be familiar. In addition, the note includes information that is relevant to the provider and the medical note will carry the provider's thought process and management of your medical problems.     \"This note was generated in part using \"Dragon\" voice recognition technology/scribble. The report was reviewed by this FNP but still may have unintentional errors due to inherent limitations of voice recognition technology.\"    SUBJECTIVE:  Chief Complaint   Patient presents with   • Hospital F/U     Adrian 2023 had an allergic reaction to lisinopril. Lip and tough  swelling     Was told per  to stop taking lisinopril and take 2 metoprolol. Will need new RX sent to pharmacy.        A 52 year old male presents for ER hospital follow up.     Patient has given consent to record this visit for documentation in their clinical record.    HISTORY OF PRESENT ILLNESS:    Historian: self.   Allergic reaction to drug, subsequent encounter  - Primary: recently had ER visit on 2023, with complaints of lip and tongue swelling. States he had Allergic reaction to lisinopril. Presently, he discontinued Lisinopril. His PCP wanted him to double his to take Metoprolol 25 mg twice a day. States he had a bradycardia, when he took metoprolol. Inquires mechanism of Metoprolol.     Essential hypertension: He had history of stroke, with left sided weakness. Blood pressure measure by MA is 132/83 mmHg.       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 180 tablet 0   • ezetimibe (ZETIA) 10 MG tablet Take 1 tablet by mouth daily. 90 tablet 1   • Vitamin D, Ergocalciferol, 1.25 mg (50,000 units) capsule TAKE 1 CAPSULE BY MOUTH 1 TIME A WEEK 12 capsule 1   • potassium chloride (KLOR-CON) 10 MEQ ER tablet Take 1 tablet by mouth in the morning and 1 tablet in the evening. 60 tablet 1   • potassium chloride (Klor-Con 10) 10 MEQ ER tablet Take 1 tablet by mouth in the morning and 1 tablet in the evening. 60 tablet 1   • atorvastatin (LIPITOR) 80 MG tablet Take 1 tablet by mouth at bedtime. 90 tablet 1   • NIFEdipine CC (ADALAT CC) 90 MG 24 hr tablet Take 1 tablet by mouth daily. 90 tablet 1   • FLUoxetine (PROzac) 20 MG capsule Take 1 capsule by mouth daily. 90 capsule 1   • trazodone (DESYREL) 150 MG tablet Take 1 tablet by mouth at bedtime. 90 tablet 1   • tamsulosin (FLOMAX) 0.4 MG Cap Take 1 capsule by mouth at bedtime. 90 capsule 1   • finasteride (PROSCAR) 5 MG tablet Take 1 tablet by mouth daily. 90 tablet 1   • potassium chloride (KLOR-CON) 10 MEQ ER tablet TAKE 1 TABLET BY MOUTH DAILY 30 tablet 1   • potassium chloride (KLOR-CON M) 10 MEQ joanne ER tablet Take 1 tablet by mouth daily. 90 tablet 1   • aspirin 81 MG EC tablet Take 1 tablet by mouth daily.     • hydroCHLOROthiazide (HYDRODIURIL) 25 MG tablet Take 2 tablets by mouth daily. 180 tablet 0   •  baclofen (LIORESAL) 10 MG tablet Take 10 mg by mouth 3 times daily as needed.     • Multiple Vitamin (Multivitamin) Tab Take 2 tablets by mouth daily.     • baclofen (LIORESAL) 10 MG tablet TAKE 1 TABLET BY MOUTH IN THE MORNING AND AT NOON AND IN THE EVENING 90 tablet 1   • hydrochlorothiazide (HYDRODIURIL) 25 MG tablet Take 1 tablet by mouth daily. 90 tablet 1   • aspirin (ECOTRIN) 81 MG EC tablet Take 81 mg by mouth daily.     • Multiple Vitamins-Minerals (vitamin - therapeutic multivitamins w/minerals) tablet Take 1 tablet by mouth daily.       No current facility-administered medications for this visit.     ALLERGIES:  Allergies as of 06/09/2023 - Reviewed 06/09/2023   Allergen Reaction Noted   • Flax seeds po   (food or med) HIVES, SWELLING, and SHORTNESS OF BREATH 10/13/2021   • Penicillin g ANAPHYLAXIS 01/01/2023   • Penicillins ANAPHYLAXIS 01/03/2020   • Pumpkin seed   (food or med) SHORTNESS OF BREATH 10/13/2021   • Pumpkin seed oil   (food or med) THROAT SWELLING and ANAPHYLAXIS 08/23/2019   • Ace inhibitors SWELLING 06/08/2023   • Penicillin g SWELLING 08/23/2019     FAMILY HISTORY:  Family History   Problem Relation Age of Onset   • Hypertension Mother    • Diabetes Mother    • Cancer Father    • Hypertension Father    • Diabetes Father    • Hypertension Maternal Grandmother    • Hypertension Maternal Grandfather    • Hypertension Paternal Grandmother    • Hypertension Paternal Grandfather      SOCIAL HISTORY:  Social History     Tobacco Use   • Smoking status: Never     Passive exposure: Never   • Smokeless tobacco: Never   Vaping Use   • Vaping Use: never used   Substance Use Topics   • Alcohol use: Yes     Comment: 1-2 month   • Drug use: Never     Past Surgical HISTORY  Past Surgical History:   Procedure Laterality Date   • Back surgery     • Loop recorder implant  2020   • Spine surgery         REVIEW OF SYSTEMS:    Constitutional: Negative for chills, fatigue and fever.   HENT: Negative for  congestion, ear pain, postnasal drip, sinus pressure, sinus pain and sneezing.    Eyes: Negative for visual disturbance.   Respiratory: Negative for cough, shortness of breath and wheezing.    Cardiovascular: As per HPI.  Gastrointestinal: Negative for abdominal pain.   Endocrine: Negative for cold intolerance, heat intolerance, polydipsia, polyphagia and polyuria.   Genitourinary: Negative for frequency.   Musculoskeletal: Negative for back pain.   Allergic: As Per HPI.     Skin: Negative for rash.   Neurological: Negative for dizziness and headaches.   Psychiatric/Behavioral: Negative for self-injury, sleep disturbance and suicidal ideas. The patient is not nervous/anxious.        OBJECTIVE:  Visit Vitals  /83   Pulse 68   Temp 97.2 °F (36.2 °C)   Resp 16   Ht 5' 11\" (1.803 m)   SpO2 97%   BMI 29.99 kg/m²       PHYSICAL EXAM:  Constitutional: Alert, in no acute distress and current vital signs reviewed.   Head and Face: Atraumatic and normocephalic.   Eyes: No discharge, no eyelid swelling and the sclerae were normal.   ENT: Oropharynx normal. Normal appearing outer ear. Tympanic membranes are bilaterally clear, normal appearing nose and normal lips.   Neck: Normal appearing neck and supple neck.   Pulmonary: Breath sounds clear to auscultation bilaterally, but no respiratory distress and normal respiratory rate and effort.   Cardiovascular: Normal rate, regular rhythm, normal S1, normal S2 and edema was not present in the lower extremities.   Abdomen: Soft and nontender.   Psychiatric: Alert and awake, interactive and mood/affect were appropriate. He is pleasant and ask good questions regarding his medications.   Skin, Hair, Nails: Normal skin color and pigmentation.    DIAGNOSTIC STUDIES:  LAB RESULTS:  Ancillary Procedure on 06/09/2023   Component Date Value Ref Range Status   • Implantable Pulse Generator Manufa* 06/09/2023 Medtronic   Final   • Implantable Pulse Generator Model 06/09/2023 LNQ11 Reveal  LINQ   Final   • Implantable Pulse Generator Serial* 06/09/2023 [id number]   Final   • Type Interrogation Session 06/09/2023 Remote   Final   • Clinic Name 06/09/2023 [facility] [id number] FIDENCIO S200 CARD   Final   • Implantable Pulse Generator Type 06/09/2023 Implantable Diagnostic Monitor   Final   • Implantable Pulse Generator Implan* 06/09/2023 [id number]   Final   Lab Services on 06/09/2023   Component Date Value Ref Range Status   • Cholesterol 06/09/2023 131  <=199 mg/dL Final   • Triglycerides 06/09/2023 59  <=149 mg/dL Final   • HDL 06/09/2023 53  >=40 mg/dL Final   • LDL 06/09/2023 66  <=129 mg/dL Final   • Non-HDL Cholesterol 06/09/2023 78  mg/dL Final   • Cholesterol/ HDL Ratio 06/09/2023 2.5  <=4.4 Final   • Sodium 06/09/2023 138  135 - 145 mmol/L Final   • Potassium 06/09/2023 3.3 (L)  3.4 - 5.1 mmol/L Final   • Chloride 06/09/2023 101  97 - 110 mmol/L Final   • Carbon Dioxide 06/09/2023 25  21 - 32 mmol/L Final   • Anion Gap 06/09/2023 15  7 - 19 mmol/L Final   • Glucose 06/09/2023 116 (H)  70 - 99 mg/dL Final   • BUN 06/09/2023 13  6 - 20 mg/dL Final   • Creatinine 06/09/2023 1.11  0.67 - 1.17 mg/dL Final   • Glomerular Filtration Rate 06/09/2023 79  >=60 Final   • BUN/Cr 06/09/2023 12  7 - 25 Final   • Calcium 06/09/2023 10.0  8.4 - 10.2 mg/dL Final   • Bilirubin, Total 06/09/2023 0.5  0.2 - 1.0 mg/dL Final   • GOT/AST 06/09/2023 19  <=37 Units/L Final   • GPT/ALT 06/09/2023 39  <64 Units/L Final   • Alkaline Phosphatase 06/09/2023 94  45 - 117 Units/L Final   • Albumin 06/09/2023 4.0  3.6 - 5.1 g/dL Final   • Protein, Total 06/09/2023 7.8  6.4 - 8.2 g/dL Final   • Globulin 06/09/2023 3.8  2.0 - 4.0 g/dL Final   • A/G Ratio 06/09/2023 1.1  1.0 - 2.4 Final   Ancillary Procedure on 05/11/2023   Component Date Value Ref Range Status   • Implantable Pulse Generator Manufa* 05/11/2023 Medtronic   Final   • Implantable Pulse Generator Model 05/11/2023 LNQ11 Reveal LINQ   Final   • Implantable Pulse  Generator Serial* 05/11/2023 [id number]   Final   • Type Interrogation Session 05/11/2023 Remote   Final   • Clinic Name 05/11/2023 [facility] [id number] FIDENCIO S200 CARD   Final   • Implantable Pulse Generator Type 05/11/2023 Implantable Diagnostic Monitor   Final   • Implantable Pulse Generator Implan* 05/11/2023 [id number]   Final       ASSESSMENT AND PLAN:  This 52 year old male presents with :    1. Essential hypertension:  Borderline, 132/83 mmHg.  Advised to buy pulse oximeter, to monitor his heart rate.  Advised not to take metoprolol, if his heart rate is below 60.  Explained medication administration to the patient.   Educated mechanism of metoprolol.  Advised having healthy diet.  Encouraged to eat a healthy diet. Advised to eat half portion of meals with rainbow-colored vegetables, quarter portion with protein, and fist-sized carbs.   Advised to air fried food, instead of oil fried foods.  Replace fried foods with grilled foods.  Encouraged to cut down carbs. Advised to eat brown rice, wheat bread, wheat pasta, cauliflower rice instead of white rice, white bread, potatoes, pasta.  Advised having plant based foods like Cauliflower rice, quinoa  Preventive diet and exercise related life-style changes discussed and recommended  Prescribed metoprolol succinate (TOPROL-XL) 25 MG 24 hr tablet; Take 1 tablet by mouth in the morning and 1 tablet in the evening.  Dispense: 180 tablet; Refill: 0    2. Allergic reaction to drug, subsequent encounter  Prescribed Metoprolol succinate (TOPROL-XL) 25 MG 24 hr tablet; Take 1 tablet by mouth in the morning and 1 tablet in the evening.  Dispense: 180 tablet; Refill: 0      Follow up with PCP as scheduled.    Orders Placed This Encounter   • metoPROLOL succinate (TOPROL-XL) 25 MG 24 hr tablet       Refer to orders.  Medical compliance with plan discussed and risks of non-compliance reviewed.  Patient education completed on disease process, etiology & prognosis.  Proper  usage and side effects of medications reviewed & discussed.  Return to clinic as clinically indicated as discussed with the patient who verbalized understanding of the plan and is in agreement with the plan.    I,  Dr. Andrzej Christianson, have created a visit summary document based on the audio recording between  Annmarie Park CNP and this patient for the physician to review, edit as needed, and authenticate.    Creation Date: 6/10/2023   I have reviewed and edited the visit summary above and attest that it is accurate.     Implemented All Fall Risk Interventions:  [place] to call system. Call bell, personal items and telephone within reach. Instruct patient to call for assistance. Room bathroom lighting operational. Non-slip footwear when patient is off stretcher. Physically safe environment: no spills, clutter or unnecessary equipment. Stretcher in lowest position, wheels locked, appropriate side rails in place. Provide visual cue, wrist band, yellow gown, etc. Monitor gait and stability. Monitor for mental status changes and reorient to person, place, and time. Review medications for side effects contributing to fall risk. Reinforce activity limits and safety measures with patient and family.

## 2024-01-16 NOTE — PHYSICAL THERAPY INITIAL EVALUATION ADULT - FOLLOWS COMMANDS/ANSWERS QUESTIONS, REHAB EVAL
75M with history as above who presents to the hospital with hypotension and lethargy at his NH here found to have sepsis 2/2 influenza A and UTI.         Problem/Plan - 1:  ·  Problem: Sepsis, unspecified organism.   ·  Plan: - Meets sepsis criteria with fever to 100.7 and HR > 90, influenza A positive and possible UTI  - finished tamiflu     Problem/Plan - 2:  ·  Problem: Influenza A.   ·  Plan: - Tamiflu as above (renally dosed)  - CXR poor study but with grossly clear lungs in the visualized portion  - Lungs grossly clear to auscultation  - Monitor cough/sputum    Problem/Plan - 3:  ·  Problem: Acute UTI.   ·  Plan: - UA positive for leuk esterase and nitrites, negative for bacteria  - off antibiotics   Problem/Plan - 4:·  Problem: DAVID (acute kidney injury).   ·  Plan: - DAVID likely 2/2 hypotension and sepsis, unclear if patient had decreased PO intake at the NH  - monitor cr    Problem/Plan - 5:  ·  Problem: Macrocytic anemia.   ·  Plan: - Worsening macrocytic anemia, no known bleeding issues as per Brother  - monitor cbc     Problem/Plan - 6:  ·  Problem: Dementia.   ·  Plan: - Lethargic at NH, currently awake and alert, oriented x2 to self and place (hospital, not to name of hospital, not to time) which is his baseline as per brother- c/w home donezepil, namenda, risperidone, seroquel    Problem/Plan - 7:  ·  Problem: Essential hypertension.   ·  Plan: - cw current meds     dc planning awaiting rehab bed    100% of the time

## 2024-02-12 NOTE — PATIENT PROFILE ADULT. - BRADEN SCORE (IF 18 OR LESS ACTIVATE SKIN INJURY RISK INCREASED GUIDELINE), MLM
"[facility] Nurse Inpatient Assessment     Consulted for: Sacrum    Summary:     Patient History (according to provider note(s):      Pa García is a 76 year old male admitted on 2/9/2024. He has a history of HTN, HLD, DM, atrial flutter not anticoagulated, stage III chronic kidney disease, partial paraplegia with neurogenic bowel and bladder as a complication of previous back surgery with right foot drop who was recently admitted to  2/1- 2/4/24 for mechanical fall and generalized weakness. He is admitted today for acute renal failure with hyperkalemia and high anion gap metabolic acidosis.     Assessment:      Skin Injury Location: coccyx, old wound    2/12    Last photo: 2/12    No open wound noted, one small piece of dried skin. Patient likely had a recent skin injury here but neoepithelium noted.          Treatment Plan:     Pressure Injury Prevention (PIP) Plan:  If patient is declining pressure injury prevention interventions: Explore reason why and address patient's concerns, Educate on pressure injury risk and prevention intervention(s), If patient is still declining, document \"informed refusal\" , and Ensure Care team is aware ( provider, charge nurse, etc)  Mattress: Follow bed algorithm, reassess daily and order specialty mattress, if indicated.  HOB: Maintain at or below 30 degrees, unless contraindicated  Repositioning in bed: Every 1-2 hours , Left/right positioning; avoid supine, and Raise foot of bed prior to raising head of bed, to reduce patient sliding down (shear)  Heels: Keep elevated off mattress  Protective Dressing: Sacral Mepilex for prevention (#744002),  especially for the agitated patient   Positioning Equipment: None  Chair positioning: Chair cushion (#[id number])    If patient has a buttock pressure injury, or high risk for PI use chair cushion or SPS.  Moisture Management: Perineal cleansing /protection: Follow Incontinence Protocol, Avoid brief in bed, " Clean and dry skin folds with bathing , and Moisturize dry skin  Under Devices: Inspect skin under all medical devices during skin inspection , Ensure tubes are stabilized without tension, and Ensure patient is not lying on medical devices or equipment when repositioned  Ask provider to discontinue device when no longer needed.      Orders: Written    RECOMMEND PRIMARY TEAM ORDER: None, at this time  Education provided: importance of repositioning  Discussed plan of care with: Patient  WOC nurse follow-up plan: signing off  Notify WOC if wound(s) deteriorate.  Nursing to notify the Provider(s) and re-consult the WOC Nurse if new skin concern.    DATA:     Current support surface: Standard  Standard gel/foam mattress (IsoFlex, Atmos air, etc)  Containment of urine/stool: Incontinence Protocol  BMI: Body mass index is 26.31 kg/m .   Active diet order: Orders Placed This Encounter      Combination Diet Regular Diet Adult; Renal Diet (dialysis)     Output: I/O last 3 completed shifts:  In: 520 [P.O.:520]  Out: 500 [Urine:500]     Labs:   Recent Labs   Lab 02/12/24  0603 02/11/24  0447   ALBUMIN  --  2.8*   HGB 8.1* 8.5*   WBC 9.6 11.9*     Pressure injury risk assessment:   Sensory Perception: 3-->slightly limited  Moisture: 4-->rarely moist  Activity: 3-->walks occasionally  Mobility: 3-->slightly limited  Nutrition: 3-->adequate  Friction and Shear: 2-->potential problem  Jeremy Score: 18    MIROSLAVA Franks RN CWOCN  Pager no longer in use, please contact through [employer]: [facility] [facility]       [phone]

## 2024-05-14 NOTE — PROGRESS NOTE ADULT - PROBLEM SELECTOR PROBLEM 1
Gastrointestinal hemorrhage, unspecified gastrointestinal hemorrhage type [Alert] : alert [No Acute Distress] : no acute distress [Normocephalic] : normocephalic [EOMI Bilateral] : EOMI bilateral [Clear tympanic membranes with bony landmarks and light reflex present bilaterally] : clear tympanic membranes with bony landmarks and light reflex present bilaterally  [Pink Nasal Mucosa] : pink nasal mucosa [Nonerythematous Oropharynx] : nonerythematous oropharynx [Supple, full passive range of motion] : supple, full passive range of motion [No Palpable Masses] : no palpable masses [Clear to Auscultation Bilaterally] : clear to auscultation bilaterally [Regular Rate and Rhythm] : regular rate and rhythm [Normal S1, S2 audible] : normal S1, S2 audible [No Murmurs] : no murmurs [Soft] : soft [NonTender] : non tender [Non Distended] : non distended [No Hepatomegaly] : no hepatomegaly [No Splenomegaly] : no splenomegaly [Adis: _____] : Adis [unfilled] [No Abnormal Lymph Nodes Palpated] : no abnormal lymph nodes palpated [Normal Muscle Tone] : normal muscle tone [No Gait Asymmetry] : no gait asymmetry [No pain or deformities with palpation of bone, muscles, joints] : no pain or deformities with palpation of bone, muscles, joints [Straight] : straight [Cranial Nerves Grossly Intact] : cranial nerves grossly intact [No Rash or Lesions] : no rash or lesions

## 2024-07-18 NOTE — PROGRESS NOTE ADULT - PROBLEM SELECTOR PLAN 7
Clinical Pharmacy Note    Jovanny Espinoza is a 57 y.o. male, referred to Clinical Pharmacy for medication management by Marianne Osorio MD. Jovanny Espinoza was seen today for diabetes management.     Allergies   Allergen Reactions    Adhesive Tape Other (See Comments)     Blister,skin tears with silk tape        Past Medical History:   Diagnosis Date    Anxiety     no treatment    Arthritis     left shoulder    Cardiomegaly     CHF (congestive heart failure) (HCC) EF 20%    Chronic back pain     GERD (gastroesophageal reflux disease)     Headache     Heart attack (HCC)     History of lung thrombosis     Hx of blood clots     left leg to lungs     Hyperlipidemia     Hypertension     Irregular heart beat     Neuropathy     feet    Obesity     WILLA (obstructive sleep apnea)     not using the machine    Pacemaker     AICD    PE (pulmonary embolism) 2001    \"about 5 yrs ago\"    Prostate cancer (HCC) 2016    Spinal stenosis at L4-L5 level 2019    Traumatic closed fx of eight or more ribs with minimal displacement 06/2017    pneumothorax.  Pt fell 50 feet from a balcony.  Pt denies history of a head injury.    Type 2 diabetes mellitus without complication ([facility])     Checks TID, waiting for CGM    Under care of team     Cardiology-    Under care of team     Nephrology-[facility]      Social History     Tobacco Use    Smoking status: Every Day     Current packs/day: 0.25     Average packs/day: 0.3 packs/day for 20.0 years (5.0 ttl pk-yrs)     Types: Cigarettes    Smokeless tobacco: Never   Substance Use Topics    Alcohol use: No     Family History   Problem Relation Age of Onset    Hypertension Mother     Diabetes Father     Hypertension Father     Heart Disease Father     Prostate Cancer Brother     Diabetes Brother     Diabetes Brother     Diabetes Brother        REVIEW OF CURRENT DISEASE STATE  Diabetes Mellitus: Patient here for an evaluation of Type 2 diabetes mellitus.  Current symptoms/problems include  Pneumonia seen on chest ct no correlation with original chest xray

## 2024-12-13 NOTE — ED ADULT NURSE NOTE - NSSISCREENINGQ5_ED_A_ED
Copied from CRM #[id number]. Topic: MW Schedule Appointment -  Schedule Imag/Lab/Vacc  >> Dec 13, 2024  9:19 AM Kimberly FUCHS wrote:  Oscar Gongora called to schedule a vaccine. Checked insurance.    Scheduled on nurse resource schedule. Caller wants to be scheduled in 3 days or less. Do NOT schedule. Selected 'Wrap Up CRM' and created new Telephone Encounter after clicking 'Convert to Clinical Call'. Selected reason for call 'Appointment'. Sent Appointment template and routed as routine priority per Care Site Dept KB page to appropriate clinician pool to inform clinic team. Readback full message.-- DO NOT REPLY / DO NOT REPLY ALL --  -- This inbox is not monitored. If this was sent to the wrong provider or department, reroute message to P ECO Reroute pool. --  -- Message is from Engagement Center Operations (ECO) --    Offered Waitlist if Available for the Visit Type? No    Caller is requesting an appointment.    Reason for Appointment Message:  Vaccine appointment requested in less than 3- business days    Reason for Visit: Patient is needing vaccines and flu shot    Is the patient currently scheduled? Yes.  Appointment date:  12/16/2024    Preferred time to be seen: 3:30pm    Caller Information       Contact Date/Time Type Contact Phone/Fax    12/13/2024 09:14 AM CST Phone (Incoming) Oscar Gongora [phone]            Alternative phone number: n/a    Can a detailed message be left?  Yes - Voicemail   Patient has been advised the message will be addressed within 2-3 business days         
No

## 2025-03-31 NOTE — PRE-OP CHECKLIST - SIDE RAILS UP
The form was sent to the Physician's Nurse MSG Pool via In-Basket for review and signature.    Completed form will be faxed to Alisia Ontiveros @ [phone].   
done
done

## 2025-06-18 NOTE — ED ADULT NURSE NOTE - CHIEF COMPLAINT
Urology Progress Note    6/18- Pt sleeping when I arrived. Urine light pink/yellow. No output in CRIS. Labs stable.      6/17- Pt sitting in chair. Feeding tube in place, minimal food eaten from trays. Urine with gross hematuria, which pt states started yesterday. Hbg stable on heparin. No increased pain. CRIS drain says 800 however this may be in error as CRIS empty during rounds. Also minimal output yesterday.     6/16 Pt in OR during rounds this was a chart check only. CRIS drain minimal, Good UOP. Labs stable     6/15-Pt is sleeping on rounds. He has an NG tube in place. He is afebrile, luz is draining yellow urine, CRIS drain with 23cc/24 hrs. WBC improving now 6.0, Hgb: 8.4, creatinine 0.65. Pt declined examination for his abdomen. AST improved now 251 (474), ALT has increased 236 (84). Alk phos: 158.    6/14- Pt is lying comfortably in bed. He was seen by speech therapy for a swallow evaluation, unfortunately the patient experience severe mid esophageal pain. He continue to have hicupps.Surgical the patient is doing well, abdominal drain with minimal output, luz draining yellow urine. WBC 4.0, Hgb: 8.7, creatinine 0.80, AST and ALT are improving, 474/97.     6/13-Pt is lying in bed in NAD. Luz catheter draining clear yellow urine. CRIS drain with 45cc of SS of exam. He notes that his hiccups have return, he has not been able to ambulate yet w/PT. He is afebrile, UOP 3000cc/24hrs, creatinine 1. WB: 2.8, Hgb: 9.2 and HCT 28.9. G.I was consulted for elevated liver enzymes.     6/12-Pt is sleeping soundly on exam. Luz catheter in place draining watermelon urine, no clots. UOP 1200cc/24hrs. Abdominal drain 2100cc/24hrs of SS. Abdominal drain had minimal output on exam. Afebrile, WBC: 1.4, Hgb: 9.2, Hct: 29.1, creatinine improved,now 1.16. He is now on a full liquid diet along with ensure. Abdominal incision c/d/I.     6/11-Pt lying comfortably in bed in NAD. RN at bedside. Pt is POD1 s/p cysto, on table  "cystogram, complex catheterization and laparoscopic washout of hematoma and abdominal cavity on 6/10/25 with Dr. Cesar. He is afebrile, hypertensive at 145/94, tachycardic 100, UOP 700cc/24hrs, WBC: 2.9, Hgb: 11.3, creatinine elevated 1.97. Pt notes his hiccups have resolved and feels that he is due for a BM today.     6/10- Pt is lying comfortably in bed in NAD. He is afebrile. UOP 2100cc/24hrs, drain output 1185cc/24hrs. He remain on Zosyn and is NPO. CT cystogram on 6/10 showed contrast within the peritoneal cavity compatible with an intraperitoneal bladder rupture. WBC: 3.8, Hgb: 10.4, Hct: 31.5, creatinine increased, now 1.63 (1.19). Pt is schedule to go to the OR today with Dr. Cesar for a bladder repair.     6/9-S/p cysto luz placement over a wire on 6/8/25 performed by Dr. Todd and IR abdominal drain placement on 6/8. Pt is lying comfortably in bed in NAD. IR APN at bedside assessing CRIS drain. Tmax 99.2, pt is tachycardic at 105. Normotensive at 136/84. Documented abdominal drain output 3100mL/24hrs, drainage is dark/old SS. He remains on Zosyn. Luz catheter is drainage clear yellow urine, good UOP on exam. WBC: 5.3, Hgb: 9.8, No updated creatinine as of yet, however his creatinine from 6/8/25 is 2.48 (4.79). Pt denies f/c/n/v     Overnight Events: None    S:    O:   /66   Pulse 70   Temp 36.2 °C (97.2 °F) (Temporal)   Resp 18   Ht 1.727 m (5' 8\")   Wt 57.8 kg (127 lb 6.8 oz)   SpO2 97%   Recent Labs     06/16/25  0237 06/17/25  0700 06/18/25  0747   SODIUM 146* 140 139   POTASSIUM 3.7 3.5* 3.7   CHLORIDE 115* 112 108   CO2 24 24 23   GLUCOSE 94 108* 103*   BUN 12 12 11   CREATININE 0.63 0.57 0.59   CALCIUM 7.5* 7.4* 7.8*     Recent Labs     06/16/25  0237 06/17/25  0700 06/18/25  0747   WBC 6.2 7.2 9.4   RBC 2.84* 2.97* 3.31*   HEMOGLOBIN 8.2* 8.5* 9.8*   HEMATOCRIT 26.4* 27.6* 30.7*   MCV 93.0 92.9 92.7   MCH 28.9 28.6 29.6   MCHC 31.1* 30.8* 31.9*   RDW 49.3 50.3* 52.7*   PLATELETCT " 423 529* 688*   MPV 10.8 10.8 10.7         Intake/Output Summary (Last 24 hours) at 6/9/2025 1008  Last data filed at 6/9/2025 0822  Gross per 24 hour   Intake 840 ml   Output 3100 ml   Net -2260 ml       Exam:    NG tube is present  Abdominal: pt declined abdominal examination today. CRIS with minimal output.   Urine: luz draining yellow urine    A/P:    Active Hospital Problems    Diagnosis     Esophagitis determined by endoscopy [K20.90]     Malnutrition (HCC) [E46]     Elevated liver enzymes [R74.8]     Anemia [D64.9]     Liver lesion [K76.9]     Abdominal pain [R10.9]     Prostate cancer (HCC) [C61]     History of tobacco abuse [Z87.891]     Parkinson's disease with dyskinesia and fluctuating manifestations (HCC) [G20.B2]      Plan:     -Continue Ditropan 10mg ER daily for bladder spasms  -Monitor urine output and abdominal drain output.   -Luz to remain until cystogram done and confirmed with Dr. Cesar.   -CRIS can be removed in our office.   -Urology will continue to follow the patient during this hospital stay but has no other treatments or interventions planned     -Patient's case was discussed with Dr. Barajas and Dr. Cesar who have directed the plan of care for the patient.      Lizz Boland, PMalathiA.-C.   [address] [place] [address]   [phone]      The patient is a 62y Male complaining of abdominal pain.